# Patient Record
Sex: MALE | Race: WHITE | NOT HISPANIC OR LATINO | Employment: FULL TIME | URBAN - METROPOLITAN AREA
[De-identification: names, ages, dates, MRNs, and addresses within clinical notes are randomized per-mention and may not be internally consistent; named-entity substitution may affect disease eponyms.]

---

## 2021-04-14 ENCOUNTER — TELEPHONE (OUTPATIENT)
Dept: RHEUMATOLOGY | Facility: CLINIC | Age: 56
End: 2021-04-14

## 2021-05-26 ENCOUNTER — TELEPHONE (OUTPATIENT)
Dept: RHEUMATOLOGY | Facility: CLINIC | Age: 56
End: 2021-05-26

## 2021-07-30 ENCOUNTER — APPOINTMENT (OUTPATIENT)
Dept: RADIOLOGY | Facility: HOSPITAL | Age: 56
End: 2021-07-30
Attending: INTERNAL MEDICINE
Payer: COMMERCIAL

## 2021-07-30 ENCOUNTER — OFFICE VISIT (OUTPATIENT)
Dept: RHEUMATOLOGY | Facility: CLINIC | Age: 56
End: 2021-07-30
Payer: COMMERCIAL

## 2021-07-30 VITALS
TEMPERATURE: 98 F | DIASTOLIC BLOOD PRESSURE: 93 MMHG | SYSTOLIC BLOOD PRESSURE: 148 MMHG | WEIGHT: 219.38 LBS | OXYGEN SATURATION: 97 % | BODY MASS INDEX: 31.41 KG/M2 | RESPIRATION RATE: 18 BRPM | HEIGHT: 70 IN | HEART RATE: 93 BPM

## 2021-07-30 DIAGNOSIS — M06.9 RHEUMATOID ARTHRITIS INVOLVING MULTIPLE SITES, UNSPECIFIED WHETHER RHEUMATOID FACTOR PRESENT: ICD-10-CM

## 2021-07-30 DIAGNOSIS — Z71.89 COUNSELING AND COORDINATION OF CARE: ICD-10-CM

## 2021-07-30 DIAGNOSIS — E66.9 CLASS 1 OBESITY WITH BODY MASS INDEX (BMI) OF 31.0 TO 31.9 IN ADULT, UNSPECIFIED OBESITY TYPE, UNSPECIFIED WHETHER SERIOUS COMORBIDITY PRESENT: ICD-10-CM

## 2021-07-30 DIAGNOSIS — Z79.899 ENCOUNTER FOR LONG-TERM (CURRENT) USE OF OTHER MEDICATIONS: ICD-10-CM

## 2021-07-30 DIAGNOSIS — M06.9 RHEUMATOID ARTHRITIS INVOLVING MULTIPLE SITES, UNSPECIFIED WHETHER RHEUMATOID FACTOR PRESENT: Primary | ICD-10-CM

## 2021-07-30 PROCEDURE — 99204 OFFICE O/P NEW MOD 45 MIN: CPT | Mod: 25,S$GLB,, | Performed by: INTERNAL MEDICINE

## 2021-07-30 PROCEDURE — 3077F PR MOST RECENT SYSTOLIC BLOOD PRESSURE >= 140 MM HG: ICD-10-PCS | Mod: CPTII,S$GLB,, | Performed by: INTERNAL MEDICINE

## 2021-07-30 PROCEDURE — 77077 JOINT SURVEY SINGLE VIEW: CPT | Mod: 26,,, | Performed by: RADIOLOGY

## 2021-07-30 PROCEDURE — 99999 PR PBB SHADOW E&M-EST. PATIENT-LVL III: CPT | Mod: PBBFAC,,, | Performed by: INTERNAL MEDICINE

## 2021-07-30 PROCEDURE — 1125F AMNT PAIN NOTED PAIN PRSNT: CPT | Mod: CPTII,S$GLB,, | Performed by: INTERNAL MEDICINE

## 2021-07-30 PROCEDURE — 1125F PR PAIN SEVERITY QUANTIFIED, PAIN PRESENT: ICD-10-PCS | Mod: CPTII,S$GLB,, | Performed by: INTERNAL MEDICINE

## 2021-07-30 PROCEDURE — 3077F SYST BP >= 140 MM HG: CPT | Mod: CPTII,S$GLB,, | Performed by: INTERNAL MEDICINE

## 2021-07-30 PROCEDURE — 3080F PR MOST RECENT DIASTOLIC BLOOD PRESSURE >= 90 MM HG: ICD-10-PCS | Mod: CPTII,S$GLB,, | Performed by: INTERNAL MEDICINE

## 2021-07-30 PROCEDURE — 3080F DIAST BP >= 90 MM HG: CPT | Mod: CPTII,S$GLB,, | Performed by: INTERNAL MEDICINE

## 2021-07-30 PROCEDURE — 77077 XR ARTHRITIS SURVEY: ICD-10-PCS | Mod: 26,,, | Performed by: RADIOLOGY

## 2021-07-30 PROCEDURE — 99999 PR PBB SHADOW E&M-EST. PATIENT-LVL III: ICD-10-PCS | Mod: PBBFAC,,, | Performed by: INTERNAL MEDICINE

## 2021-07-30 PROCEDURE — 20610 DRAIN/INJ JOINT/BURSA W/O US: CPT | Mod: RT,S$GLB,, | Performed by: INTERNAL MEDICINE

## 2021-07-30 PROCEDURE — 3008F PR BODY MASS INDEX (BMI) DOCUMENTED: ICD-10-PCS | Mod: CPTII,S$GLB,, | Performed by: INTERNAL MEDICINE

## 2021-07-30 PROCEDURE — 99204 PR OFFICE/OUTPT VISIT, NEW, LEVL IV, 45-59 MIN: ICD-10-PCS | Mod: 25,S$GLB,, | Performed by: INTERNAL MEDICINE

## 2021-07-30 PROCEDURE — 1159F PR MEDICATION LIST DOCUMENTED IN MEDICAL RECORD: ICD-10-PCS | Mod: CPTII,S$GLB,, | Performed by: INTERNAL MEDICINE

## 2021-07-30 PROCEDURE — 3008F BODY MASS INDEX DOCD: CPT | Mod: CPTII,S$GLB,, | Performed by: INTERNAL MEDICINE

## 2021-07-30 PROCEDURE — 77077 JOINT SURVEY SINGLE VIEW: CPT | Mod: TC,PN

## 2021-07-30 PROCEDURE — 20610 LARGE JOINT ASPIRATION/INJECTION: R KNEE JOINT: ICD-10-PCS | Mod: RT,S$GLB,, | Performed by: INTERNAL MEDICINE

## 2021-07-30 PROCEDURE — 1159F MED LIST DOCD IN RCRD: CPT | Mod: CPTII,S$GLB,, | Performed by: INTERNAL MEDICINE

## 2021-07-30 RX ORDER — TRIAMCINOLONE ACETONIDE 40 MG/ML
40 INJECTION, SUSPENSION INTRA-ARTICULAR; INTRAMUSCULAR
Status: DISCONTINUED | OUTPATIENT
Start: 2021-07-30 | End: 2021-07-30 | Stop reason: HOSPADM

## 2021-07-30 RX ORDER — METHOTREXATE 2.5 MG/1
15 TABLET ORAL
Qty: 80 TABLET | Refills: 1 | Status: SHIPPED | OUTPATIENT
Start: 2021-07-30 | End: 2022-01-24 | Stop reason: SDUPTHER

## 2021-07-30 RX ORDER — SERTRALINE HYDROCHLORIDE 50 MG/1
50 TABLET, FILM COATED ORAL NIGHTLY
COMMUNITY
Start: 2021-05-10 | End: 2021-10-11 | Stop reason: SDUPTHER

## 2021-07-30 RX ORDER — DOXAZOSIN 2 MG/1
2 TABLET ORAL NIGHTLY
COMMUNITY
Start: 2021-05-10 | End: 2021-10-11 | Stop reason: SDUPTHER

## 2021-07-30 RX ORDER — PREDNISONE 20 MG/1
20 TABLET ORAL DAILY PRN
COMMUNITY
Start: 2021-04-28 | End: 2022-11-07

## 2021-07-30 RX ORDER — FOLIC ACID 1 MG/1
1000 TABLET ORAL DAILY
Qty: 90 TABLET | Refills: 2 | Status: SHIPPED | OUTPATIENT
Start: 2021-07-30 | End: 2022-01-24 | Stop reason: SDUPTHER

## 2021-07-30 RX ORDER — FOLIC ACID 1 MG/1
1000 TABLET ORAL DAILY
COMMUNITY
Start: 2021-04-28 | End: 2021-07-30 | Stop reason: SDUPTHER

## 2021-07-30 RX ORDER — METHOTREXATE 2.5 MG/1
15 TABLET ORAL
COMMUNITY
Start: 2021-06-19 | End: 2021-07-30 | Stop reason: SDUPTHER

## 2021-07-30 RX ORDER — LIDOCAINE HYDROCHLORIDE 10 MG/ML
2 INJECTION INFILTRATION; PERINEURAL
Status: DISCONTINUED | OUTPATIENT
Start: 2021-07-30 | End: 2021-07-30 | Stop reason: HOSPADM

## 2021-07-30 RX ADMIN — LIDOCAINE HYDROCHLORIDE 2 ML: 10 INJECTION INFILTRATION; PERINEURAL at 01:07

## 2021-07-30 RX ADMIN — TRIAMCINOLONE ACETONIDE 40 MG: 40 INJECTION, SUSPENSION INTRA-ARTICULAR; INTRAMUSCULAR at 01:07

## 2021-08-02 ENCOUNTER — LAB VISIT (OUTPATIENT)
Dept: LAB | Facility: HOSPITAL | Age: 56
End: 2021-08-02
Payer: COMMERCIAL

## 2021-08-02 DIAGNOSIS — M06.9 RHEUMATOID ARTHRITIS INVOLVING MULTIPLE SITES, UNSPECIFIED WHETHER RHEUMATOID FACTOR PRESENT: ICD-10-CM

## 2021-08-02 LAB
25(OH)D3+25(OH)D2 SERPL-MCNC: 32 NG/ML (ref 30–96)
ALBUMIN SERPL BCP-MCNC: 4.1 G/DL (ref 3.5–5.2)
ALP SERPL-CCNC: 95 U/L (ref 38–126)
ALT SERPL W/O P-5'-P-CCNC: 53 U/L (ref 10–44)
ANION GAP SERPL CALC-SCNC: 10 MMOL/L (ref 8–16)
AST SERPL-CCNC: 74 U/L (ref 15–46)
BASOPHILS # BLD AUTO: 0.03 K/UL (ref 0–0.2)
BASOPHILS NFR BLD: 0.7 % (ref 0–1.9)
BILIRUB SERPL-MCNC: 0.5 MG/DL (ref 0.1–1)
CALCIUM SERPL-MCNC: 9.3 MG/DL (ref 8.7–10.5)
CCP AB SER IA-ACNC: 0.7 U/ML
CHLORIDE SERPL-SCNC: 106 MMOL/L (ref 95–110)
CO2 SERPL-SCNC: 26 MMOL/L (ref 23–29)
CREAT SERPL-MCNC: 0.74 MG/DL (ref 0.5–1.4)
CRP SERPL-MCNC: 0.78 MG/DL (ref 0–1)
DIFFERENTIAL METHOD: ABNORMAL
EOSINOPHIL # BLD AUTO: 0.1 K/UL (ref 0–0.5)
EOSINOPHIL NFR BLD: 2.6 % (ref 0–8)
ERYTHROCYTE [DISTWIDTH] IN BLOOD BY AUTOMATED COUNT: 12.9 % (ref 11.5–14.5)
ERYTHROCYTE [SEDIMENTATION RATE] IN BLOOD BY WESTERGREN METHOD: 17 MM/HR (ref 0–10)
EST. GFR  (AFRICAN AMERICAN): >60 ML/MIN/1.73 M^2
EST. GFR  (NON AFRICAN AMERICAN): >60 ML/MIN/1.73 M^2
GLUCOSE SERPL-MCNC: 110 MG/DL (ref 70–110)
HCT VFR BLD AUTO: 44.8 % (ref 40–54)
HGB BLD-MCNC: 15.5 G/DL (ref 14–18)
IMM GRANULOCYTES # BLD AUTO: 0.03 K/UL (ref 0–0.04)
IMM GRANULOCYTES NFR BLD AUTO: 0.7 % (ref 0–0.5)
LYMPHOCYTES # BLD AUTO: 0.8 K/UL (ref 1–4.8)
LYMPHOCYTES NFR BLD: 20 % (ref 18–48)
MCH RBC QN AUTO: 34.8 PG (ref 27–31)
MCHC RBC AUTO-ENTMCNC: 34.6 G/DL (ref 32–36)
MCV RBC AUTO: 100 FL (ref 82–98)
MONOCYTES # BLD AUTO: 0.5 K/UL (ref 0.3–1)
MONOCYTES NFR BLD: 10.7 % (ref 4–15)
NEUTROPHILS # BLD AUTO: 2.7 K/UL (ref 1.8–7.7)
NEUTROPHILS NFR BLD: 65.3 % (ref 38–73)
NRBC BLD-RTO: 0 /100 WBC
PLATELET # BLD AUTO: 184 K/UL (ref 150–450)
PMV BLD AUTO: 10.1 FL (ref 9.2–12.9)
POTASSIUM SERPL-SCNC: 3.9 MMOL/L (ref 3.5–5.1)
PROT SERPL-MCNC: 7.4 G/DL (ref 6–8.4)
RBC # BLD AUTO: 4.46 M/UL (ref 4.6–6.2)
SODIUM SERPL-SCNC: 142 MMOL/L (ref 136–145)
TSH SERPL DL<=0.005 MIU/L-ACNC: 1.26 UIU/ML (ref 0.4–4)
URATE SERPL-MCNC: 8 MG/DL (ref 3.4–7)
URATE SERPL-MCNC: 8 MG/DL (ref 3.4–7)
UUN UR-MCNC: 7 MG/DL (ref 2–20)
WBC # BLD AUTO: 4.19 K/UL (ref 3.9–12.7)

## 2021-08-02 PROCEDURE — 80053 COMPREHEN METABOLIC PANEL: CPT | Mod: PO | Performed by: INTERNAL MEDICINE

## 2021-08-02 PROCEDURE — 86200 CCP ANTIBODY: CPT | Mod: PO | Performed by: INTERNAL MEDICINE

## 2021-08-02 PROCEDURE — 85025 COMPLETE CBC W/AUTO DIFF WBC: CPT | Mod: PO | Performed by: INTERNAL MEDICINE

## 2021-08-02 PROCEDURE — 87340 HEPATITIS B SURFACE AG IA: CPT | Mod: PO | Performed by: INTERNAL MEDICINE

## 2021-08-02 PROCEDURE — 86706 HEP B SURFACE ANTIBODY: CPT | Mod: PO | Performed by: INTERNAL MEDICINE

## 2021-08-02 PROCEDURE — 86140 C-REACTIVE PROTEIN: CPT | Mod: PO | Performed by: INTERNAL MEDICINE

## 2021-08-02 PROCEDURE — 84443 ASSAY THYROID STIM HORMONE: CPT | Mod: PO | Performed by: INTERNAL MEDICINE

## 2021-08-02 PROCEDURE — 84550 ASSAY OF BLOOD/URIC ACID: CPT | Performed by: INTERNAL MEDICINE

## 2021-08-02 PROCEDURE — 85652 RBC SED RATE AUTOMATED: CPT | Performed by: INTERNAL MEDICINE

## 2021-08-02 PROCEDURE — 86803 HEPATITIS C AB TEST: CPT | Mod: PO | Performed by: INTERNAL MEDICINE

## 2021-08-02 PROCEDURE — 87389 HIV-1 AG W/HIV-1&-2 AB AG IA: CPT | Mod: PO | Performed by: INTERNAL MEDICINE

## 2021-08-02 PROCEDURE — 82306 VITAMIN D 25 HYDROXY: CPT | Mod: PO | Performed by: INTERNAL MEDICINE

## 2021-08-02 PROCEDURE — 86431 RHEUMATOID FACTOR QUANT: CPT | Mod: PO | Performed by: INTERNAL MEDICINE

## 2021-08-03 LAB
HBV SURFACE AB SER-ACNC: ABNORMAL M[IU]/ML
HBV SURFACE AG SERPL QL IA: NEGATIVE
HCV AB SERPL QL IA: NEGATIVE
HIV 1+2 AB+HIV1 P24 AG SERPL QL IA: NEGATIVE
RHEUMATOID FACT SERPL-ACNC: 199 IU/ML (ref 0–15)

## 2021-08-04 ENCOUNTER — TELEPHONE (OUTPATIENT)
Dept: RHEUMATOLOGY | Facility: CLINIC | Age: 56
End: 2021-08-04

## 2021-08-04 DIAGNOSIS — M1A.9XX0 CHRONIC GOUT WITHOUT TOPHUS, UNSPECIFIED CAUSE, UNSPECIFIED SITE: Primary | ICD-10-CM

## 2021-08-04 RX ORDER — COLCHICINE 0.6 MG/1
0.6 TABLET ORAL DAILY
Qty: 30 TABLET | Refills: 0 | Status: ON HOLD | OUTPATIENT
Start: 2021-08-04 | End: 2022-06-20

## 2021-10-11 ENCOUNTER — OFFICE VISIT (OUTPATIENT)
Dept: FAMILY MEDICINE | Facility: CLINIC | Age: 56
End: 2021-10-11
Payer: COMMERCIAL

## 2021-10-11 VITALS
WEIGHT: 219.5 LBS | SYSTOLIC BLOOD PRESSURE: 150 MMHG | HEIGHT: 70 IN | OXYGEN SATURATION: 97 % | BODY MASS INDEX: 31.42 KG/M2 | HEART RATE: 76 BPM | DIASTOLIC BLOOD PRESSURE: 98 MMHG | RESPIRATION RATE: 18 BRPM

## 2021-10-11 DIAGNOSIS — Z00.00 WELLNESS EXAMINATION: Primary | ICD-10-CM

## 2021-10-11 DIAGNOSIS — F41.9 ANXIETY: ICD-10-CM

## 2021-10-11 DIAGNOSIS — E79.0 ELEVATED URIC ACID IN BLOOD: ICD-10-CM

## 2021-10-11 DIAGNOSIS — M06.9 RHEUMATOID ARTHRITIS, INVOLVING UNSPECIFIED SITE, UNSPECIFIED WHETHER RHEUMATOID FACTOR PRESENT: ICD-10-CM

## 2021-10-11 DIAGNOSIS — R79.89 ELEVATED LFTS: ICD-10-CM

## 2021-10-11 DIAGNOSIS — I10 PRIMARY HYPERTENSION: ICD-10-CM

## 2021-10-11 PROCEDURE — 3080F PR MOST RECENT DIASTOLIC BLOOD PRESSURE >= 90 MM HG: ICD-10-PCS | Mod: CPTII,S$GLB,, | Performed by: FAMILY MEDICINE

## 2021-10-11 PROCEDURE — 3077F PR MOST RECENT SYSTOLIC BLOOD PRESSURE >= 140 MM HG: ICD-10-PCS | Mod: CPTII,S$GLB,, | Performed by: FAMILY MEDICINE

## 2021-10-11 PROCEDURE — 99204 OFFICE O/P NEW MOD 45 MIN: CPT | Mod: S$GLB,,, | Performed by: FAMILY MEDICINE

## 2021-10-11 PROCEDURE — 3008F PR BODY MASS INDEX (BMI) DOCUMENTED: ICD-10-PCS | Mod: CPTII,S$GLB,, | Performed by: FAMILY MEDICINE

## 2021-10-11 PROCEDURE — 3008F BODY MASS INDEX DOCD: CPT | Mod: CPTII,S$GLB,, | Performed by: FAMILY MEDICINE

## 2021-10-11 PROCEDURE — 99204 PR OFFICE/OUTPT VISIT, NEW, LEVL IV, 45-59 MIN: ICD-10-PCS | Mod: S$GLB,,, | Performed by: FAMILY MEDICINE

## 2021-10-11 PROCEDURE — 3080F DIAST BP >= 90 MM HG: CPT | Mod: CPTII,S$GLB,, | Performed by: FAMILY MEDICINE

## 2021-10-11 PROCEDURE — 1159F PR MEDICATION LIST DOCUMENTED IN MEDICAL RECORD: ICD-10-PCS | Mod: CPTII,S$GLB,, | Performed by: FAMILY MEDICINE

## 2021-10-11 PROCEDURE — 3077F SYST BP >= 140 MM HG: CPT | Mod: CPTII,S$GLB,, | Performed by: FAMILY MEDICINE

## 2021-10-11 PROCEDURE — 1159F MED LIST DOCD IN RCRD: CPT | Mod: CPTII,S$GLB,, | Performed by: FAMILY MEDICINE

## 2021-10-11 RX ORDER — SERTRALINE HYDROCHLORIDE 50 MG/1
50 TABLET, FILM COATED ORAL NIGHTLY
Qty: 90 TABLET | Refills: 3 | Status: SHIPPED | OUTPATIENT
Start: 2021-10-11 | End: 2022-10-04

## 2021-10-11 RX ORDER — DOXAZOSIN 2 MG/1
2 TABLET ORAL NIGHTLY
Qty: 90 TABLET | Refills: 3 | Status: SHIPPED | OUTPATIENT
Start: 2021-10-11 | End: 2022-07-02

## 2021-10-13 ENCOUNTER — TELEPHONE (OUTPATIENT)
Dept: ENDOSCOPY | Facility: HOSPITAL | Age: 56
End: 2021-10-13

## 2021-11-18 ENCOUNTER — TELEPHONE (OUTPATIENT)
Dept: ENDOSCOPY | Facility: HOSPITAL | Age: 56
End: 2021-11-18
Payer: COMMERCIAL

## 2021-11-22 ENCOUNTER — ANESTHESIA (OUTPATIENT)
Dept: ENDOSCOPY | Facility: HOSPITAL | Age: 56
End: 2021-11-22
Payer: COMMERCIAL

## 2021-11-22 ENCOUNTER — HOSPITAL ENCOUNTER (OUTPATIENT)
Facility: HOSPITAL | Age: 56
Discharge: HOME | End: 2021-11-22
Attending: INTERNAL MEDICINE | Admitting: INTERNAL MEDICINE
Payer: COMMERCIAL

## 2021-11-22 ENCOUNTER — ANESTHESIA EVENT (OUTPATIENT)
Dept: ENDOSCOPY | Facility: HOSPITAL | Age: 56
End: 2021-11-22
Payer: COMMERCIAL

## 2021-11-22 VITALS
WEIGHT: 225 LBS | BODY MASS INDEX: 32.21 KG/M2 | DIASTOLIC BLOOD PRESSURE: 79 MMHG | RESPIRATION RATE: 15 BRPM | HEIGHT: 70 IN | HEART RATE: 57 BPM | TEMPERATURE: 98 F | OXYGEN SATURATION: 98 % | SYSTOLIC BLOOD PRESSURE: 108 MMHG

## 2021-11-22 DIAGNOSIS — Z12.11 COLON CANCER SCREENING: Primary | ICD-10-CM

## 2021-11-22 PROCEDURE — 37000008 HC ANESTHESIA 1ST 15 MINUTES: Performed by: INTERNAL MEDICINE

## 2021-11-22 PROCEDURE — 37000009 HC ANESTHESIA EA ADD 15 MINS: Performed by: INTERNAL MEDICINE

## 2021-11-22 PROCEDURE — G0121 COLON CA SCRN NOT HI RSK IND: HCPCS | Performed by: INTERNAL MEDICINE

## 2021-11-22 PROCEDURE — G0121 COLON CA SCRN NOT HI RSK IND: ICD-10-PCS | Mod: ,,, | Performed by: INTERNAL MEDICINE

## 2021-11-22 PROCEDURE — 63600175 PHARM REV CODE 636 W HCPCS: Performed by: NURSE ANESTHETIST, CERTIFIED REGISTERED

## 2021-11-22 PROCEDURE — G0121 COLON CA SCRN NOT HI RSK IND: HCPCS | Mod: ,,, | Performed by: INTERNAL MEDICINE

## 2021-11-22 PROCEDURE — 25000003 PHARM REV CODE 250: Performed by: INTERNAL MEDICINE

## 2021-11-22 PROCEDURE — 25000003 PHARM REV CODE 250: Performed by: NURSE ANESTHETIST, CERTIFIED REGISTERED

## 2021-11-22 RX ORDER — SODIUM CHLORIDE 9 MG/ML
INJECTION, SOLUTION INTRAVENOUS CONTINUOUS
Status: DISCONTINUED | OUTPATIENT
Start: 2021-11-22 | End: 2021-11-22 | Stop reason: HOSPADM

## 2021-11-22 RX ORDER — LIDOCAINE HYDROCHLORIDE 20 MG/ML
INJECTION INTRAVENOUS
Status: DISCONTINUED | OUTPATIENT
Start: 2021-11-22 | End: 2021-11-22

## 2021-11-22 RX ORDER — DEXTROMETHORPHAN/PSEUDOEPHED 2.5-7.5/.8
DROPS ORAL
Status: COMPLETED | OUTPATIENT
Start: 2021-11-22 | End: 2021-11-22

## 2021-11-22 RX ORDER — SODIUM CHLORIDE 0.9 % (FLUSH) 0.9 %
10 SYRINGE (ML) INJECTION
Status: DISCONTINUED | OUTPATIENT
Start: 2021-11-22 | End: 2021-11-22 | Stop reason: HOSPADM

## 2021-11-22 RX ORDER — PROPOFOL 10 MG/ML
VIAL (ML) INTRAVENOUS
Status: DISCONTINUED | OUTPATIENT
Start: 2021-11-22 | End: 2021-11-22

## 2021-11-22 RX ORDER — PROPOFOL 10 MG/ML
VIAL (ML) INTRAVENOUS CONTINUOUS PRN
Status: DISCONTINUED | OUTPATIENT
Start: 2021-11-22 | End: 2021-11-22

## 2021-11-22 RX ORDER — SODIUM CHLORIDE 0.9 % (FLUSH) 0.9 %
3 SYRINGE (ML) INJECTION
Status: DISCONTINUED | OUTPATIENT
Start: 2021-11-22 | End: 2021-11-22 | Stop reason: HOSPADM

## 2021-11-22 RX ADMIN — SODIUM CHLORIDE: 0.9 INJECTION, SOLUTION INTRAVENOUS at 11:11

## 2021-11-22 RX ADMIN — PROPOFOL 80 MG: 10 INJECTION, EMULSION INTRAVENOUS at 12:11

## 2021-11-22 RX ADMIN — LIDOCAINE HYDROCHLORIDE 60 MG: 20 INJECTION, SOLUTION INTRAVENOUS at 12:11

## 2021-11-22 RX ADMIN — PROPOFOL 150 MCG/KG/MIN: 10 INJECTION, EMULSION INTRAVENOUS at 12:11

## 2021-11-22 RX ADMIN — SIMETHICONE 66 MG: 20 SUSPENSION/ DROPS ORAL at 12:11

## 2022-01-24 ENCOUNTER — OFFICE VISIT (OUTPATIENT)
Dept: RHEUMATOLOGY | Facility: CLINIC | Age: 57
End: 2022-01-24
Payer: COMMERCIAL

## 2022-01-24 ENCOUNTER — LAB VISIT (OUTPATIENT)
Dept: LAB | Facility: HOSPITAL | Age: 57
End: 2022-01-24
Attending: INTERNAL MEDICINE
Payer: COMMERCIAL

## 2022-01-24 VITALS
BODY MASS INDEX: 32.01 KG/M2 | HEIGHT: 70 IN | HEART RATE: 82 BPM | RESPIRATION RATE: 18 BRPM | OXYGEN SATURATION: 96 % | DIASTOLIC BLOOD PRESSURE: 86 MMHG | TEMPERATURE: 98 F | SYSTOLIC BLOOD PRESSURE: 132 MMHG | WEIGHT: 223.56 LBS

## 2022-01-24 DIAGNOSIS — M06.9 RHEUMATOID ARTHRITIS INVOLVING MULTIPLE SITES, UNSPECIFIED WHETHER RHEUMATOID FACTOR PRESENT: ICD-10-CM

## 2022-01-24 DIAGNOSIS — M06.9 RHEUMATOID ARTHRITIS INVOLVING MULTIPLE SITES, UNSPECIFIED WHETHER RHEUMATOID FACTOR PRESENT: Primary | ICD-10-CM

## 2022-01-24 DIAGNOSIS — E66.9 CLASS 1 OBESITY WITH BODY MASS INDEX (BMI) OF 32.0 TO 32.9 IN ADULT, UNSPECIFIED OBESITY TYPE, UNSPECIFIED WHETHER SERIOUS COMORBIDITY PRESENT: ICD-10-CM

## 2022-01-24 DIAGNOSIS — Z71.89 COUNSELING AND COORDINATION OF CARE: ICD-10-CM

## 2022-01-24 DIAGNOSIS — Z79.899 ENCOUNTER FOR LONG-TERM (CURRENT) USE OF OTHER MEDICATIONS: ICD-10-CM

## 2022-01-24 DIAGNOSIS — S76.111A STRAIN OF RIGHT QUADRICEPS MUSCLE, INITIAL ENCOUNTER: ICD-10-CM

## 2022-01-24 LAB
ALBUMIN SERPL BCP-MCNC: 3.3 G/DL (ref 3.5–5.2)
ALP SERPL-CCNC: 63 U/L (ref 55–135)
ALT SERPL W/O P-5'-P-CCNC: 41 U/L (ref 10–44)
ANION GAP SERPL CALC-SCNC: 11 MMOL/L (ref 8–16)
AST SERPL-CCNC: 69 U/L (ref 10–40)
BASOPHILS # BLD AUTO: 0.04 K/UL (ref 0–0.2)
BASOPHILS NFR BLD: 0.6 % (ref 0–1.9)
BILIRUB SERPL-MCNC: 0.6 MG/DL (ref 0.1–1)
BUN SERPL-MCNC: 6 MG/DL (ref 6–20)
CALCIUM SERPL-MCNC: 8.9 MG/DL (ref 8.7–10.5)
CHLORIDE SERPL-SCNC: 103 MMOL/L (ref 95–110)
CO2 SERPL-SCNC: 28 MMOL/L (ref 23–29)
CREAT SERPL-MCNC: 0.9 MG/DL (ref 0.5–1.4)
DIFFERENTIAL METHOD: ABNORMAL
EOSINOPHIL # BLD AUTO: 0.1 K/UL (ref 0–0.5)
EOSINOPHIL NFR BLD: 1.3 % (ref 0–8)
ERYTHROCYTE [DISTWIDTH] IN BLOOD BY AUTOMATED COUNT: 12.8 % (ref 11.5–14.5)
EST. GFR  (AFRICAN AMERICAN): >60 ML/MIN/1.73 M^2
EST. GFR  (NON AFRICAN AMERICAN): >60 ML/MIN/1.73 M^2
GLUCOSE SERPL-MCNC: 86 MG/DL (ref 70–110)
HCT VFR BLD AUTO: 47.6 % (ref 40–54)
HGB BLD-MCNC: 15.8 G/DL (ref 14–18)
IMM GRANULOCYTES # BLD AUTO: 0.04 K/UL (ref 0–0.04)
IMM GRANULOCYTES NFR BLD AUTO: 0.6 % (ref 0–0.5)
LYMPHOCYTES # BLD AUTO: 0.8 K/UL (ref 1–4.8)
LYMPHOCYTES NFR BLD: 13.2 % (ref 18–48)
MCH RBC QN AUTO: 33.8 PG (ref 27–31)
MCHC RBC AUTO-ENTMCNC: 33.2 G/DL (ref 32–36)
MCV RBC AUTO: 102 FL (ref 82–98)
MONOCYTES # BLD AUTO: 0.7 K/UL (ref 0.3–1)
MONOCYTES NFR BLD: 11.4 % (ref 4–15)
NEUTROPHILS # BLD AUTO: 4.7 K/UL (ref 1.8–7.7)
NEUTROPHILS NFR BLD: 72.9 % (ref 38–73)
NRBC BLD-RTO: 0 /100 WBC
PLATELET # BLD AUTO: 195 K/UL (ref 150–450)
PMV BLD AUTO: 10.7 FL (ref 9.2–12.9)
POTASSIUM SERPL-SCNC: 4.5 MMOL/L (ref 3.5–5.1)
PROT SERPL-MCNC: 7.4 G/DL (ref 6–8.4)
RBC # BLD AUTO: 4.67 M/UL (ref 4.6–6.2)
SODIUM SERPL-SCNC: 142 MMOL/L (ref 136–145)
WBC # BLD AUTO: 6.38 K/UL (ref 3.9–12.7)

## 2022-01-24 PROCEDURE — 36415 COLL VENOUS BLD VENIPUNCTURE: CPT | Mod: PN | Performed by: INTERNAL MEDICINE

## 2022-01-24 PROCEDURE — 85025 COMPLETE CBC W/AUTO DIFF WBC: CPT | Performed by: INTERNAL MEDICINE

## 2022-01-24 PROCEDURE — 3075F SYST BP GE 130 - 139MM HG: CPT | Mod: CPTII,S$GLB,, | Performed by: INTERNAL MEDICINE

## 2022-01-24 PROCEDURE — 1159F PR MEDICATION LIST DOCUMENTED IN MEDICAL RECORD: ICD-10-PCS | Mod: CPTII,S$GLB,, | Performed by: INTERNAL MEDICINE

## 2022-01-24 PROCEDURE — 3008F PR BODY MASS INDEX (BMI) DOCUMENTED: ICD-10-PCS | Mod: CPTII,S$GLB,, | Performed by: INTERNAL MEDICINE

## 2022-01-24 PROCEDURE — 3079F PR MOST RECENT DIASTOLIC BLOOD PRESSURE 80-89 MM HG: ICD-10-PCS | Mod: CPTII,S$GLB,, | Performed by: INTERNAL MEDICINE

## 2022-01-24 PROCEDURE — 1159F MED LIST DOCD IN RCRD: CPT | Mod: CPTII,S$GLB,, | Performed by: INTERNAL MEDICINE

## 2022-01-24 PROCEDURE — 99999 PR PBB SHADOW E&M-EST. PATIENT-LVL III: ICD-10-PCS | Mod: PBBFAC,,, | Performed by: INTERNAL MEDICINE

## 2022-01-24 PROCEDURE — 80053 COMPREHEN METABOLIC PANEL: CPT | Performed by: INTERNAL MEDICINE

## 2022-01-24 PROCEDURE — 99214 OFFICE O/P EST MOD 30 MIN: CPT | Mod: S$GLB,,, | Performed by: INTERNAL MEDICINE

## 2022-01-24 PROCEDURE — 3079F DIAST BP 80-89 MM HG: CPT | Mod: CPTII,S$GLB,, | Performed by: INTERNAL MEDICINE

## 2022-01-24 PROCEDURE — 99999 PR PBB SHADOW E&M-EST. PATIENT-LVL III: CPT | Mod: PBBFAC,,, | Performed by: INTERNAL MEDICINE

## 2022-01-24 PROCEDURE — 99214 PR OFFICE/OUTPT VISIT, EST, LEVL IV, 30-39 MIN: ICD-10-PCS | Mod: S$GLB,,, | Performed by: INTERNAL MEDICINE

## 2022-01-24 PROCEDURE — 3008F BODY MASS INDEX DOCD: CPT | Mod: CPTII,S$GLB,, | Performed by: INTERNAL MEDICINE

## 2022-01-24 PROCEDURE — 3075F PR MOST RECENT SYSTOLIC BLOOD PRESS GE 130-139MM HG: ICD-10-PCS | Mod: CPTII,S$GLB,, | Performed by: INTERNAL MEDICINE

## 2022-01-24 RX ORDER — METHOTREXATE 2.5 MG/1
15 TABLET ORAL
Qty: 80 TABLET | Refills: 1 | Status: SHIPPED | OUTPATIENT
Start: 2022-01-24 | End: 2022-07-08

## 2022-01-24 RX ORDER — FOLIC ACID 1 MG/1
1000 TABLET ORAL DAILY
Qty: 90 TABLET | Refills: 2 | Status: SHIPPED | OUTPATIENT
Start: 2022-01-24 | End: 2023-04-30 | Stop reason: SDUPTHER

## 2022-01-24 NOTE — PROGRESS NOTES
RHEUMATOLOGY OUTPATIENT CLINIC NOTE    1/24/2022    Attending Rheumatologist: Shane Lewis  Primary Care Provider: Shane Lewis MD   Physician Requesting Consultation: No referring provider defined for this encounter.  Chief Complaint/Reason For Consultation:  No chief complaint on file.      Subjective:       HPI  Andrae Aviles is a 56 y.o. White male with medical history noted below presents for evaluation of rheumatoid arthritis.  Patient just recently moved from [place]. Seeking to establish with Rheumatology. Patient was following with Dr. Mateo Charles (Rheumatology) for RA. Paced on MTX 3 years ago and has been in remission sense. Methotrexate 6 tabs weekly. No other therapies. Patient notes that for the last 3 weeks knee pain and swelling which has improved slightly. No other complaints.     Today  Patient here for follow up.   Last visit right knee injected for knee pain and MTX continued for RA. He notes persistent right knee pain. Small joints doing well, tolerating meds. No other complaints.     Review of Systems   Constitutional: Negative for chills, fatigue, fever and unexpected weight change.   HENT: Negative for mouth sores and trouble swallowing.    Eyes: Negative for redness and eye dryness.   Respiratory: Negative for cough and shortness of breath.    Cardiovascular: Negative for chest pain.   Gastrointestinal: Negative for abdominal distention, constipation, diarrhea, nausea, vomiting and reflux.   Genitourinary: Negative for genital sores.   Musculoskeletal: Positive for arthralgias and joint swelling. Negative for back pain, gait problem, leg pain, myalgias, neck pain, neck stiffness and joint deformity.   Integumentary:  Negative for rash.   Neurological: Negative for weakness, numbness, headaches and memory loss.   Hematological: Negative for adenopathy. Does not bruise/bleed easily.   Psychiatric/Behavioral: Negative for confusion, decreased concentration, sleep  disturbance and suicidal ideas. The patient is not nervous/anxious.    All other systems reviewed and are negative.       Chronic comorbid conditions affecting medical decision making today:  Past Medical History:   Diagnosis Date    Arthritis     rheumatoid    Hypertension      Past Surgical History:   Procedure Laterality Date    COLONOSCOPY      age 42    COLONOSCOPY N/A 11/22/2021    Procedure: COLONOSCOPY  Lori Garza;  Surgeon: Shane Guzman MD;  Location: [facility];  Service: Endoscopy;  Laterality: N/A;    VASECTOMY       Family History   Problem Relation Age of Onset    No Known Problems Daughter     No Known Problems Son      Social History     Substance and Sexual Activity   Alcohol Use Yes    Comment: social     Social History     Tobacco Use   Smoking Status Former Smoker   Smokeless Tobacco Never Used     Social History     Substance and Sexual Activity   Drug Use Never       Current Outpatient Medications:     colchicine (COLCRYS) 0.6 mg tablet, Take 1 tablet (0.6 mg total) by mouth once daily., Disp: 30 tablet, Rfl: 0    doxazosin (CARDURA) 2 MG tablet, Take 1 tablet (2 mg total) by mouth nightly., Disp: 90 tablet, Rfl: 3    predniSONE (DELTASONE) 20 MG tablet, Take 20 mg by mouth daily as needed., Disp: , Rfl:     sertraline (ZOLOFT) 50 MG tablet, Take 1 tablet (50 mg total) by mouth every evening., Disp: 90 tablet, Rfl: 3    folic acid (FOLVITE) 1 MG tablet, Take 1 tablet (1,000 mcg total) by mouth once daily., Disp: 90 tablet, Rfl: 2    methotrexate 2.5 MG Tab, Take 6 tablets (15 mg total) by mouth every 7 days., Disp: 80 tablet, Rfl: 1     Objective:         Vitals:    01/24/22 0858   BP: 132/86   Pulse: 82   Resp: 18   Temp: 98.2 °F (36.8 °C)     Physical Exam   Musculoskeletal:      Right shoulder: Normal.      Left shoulder: Normal.      Right elbow: Normal.      Left elbow: Normal.      Right wrist: Normal.      Left wrist: Normal.      Left knee: Normal.       Comments: Right knee VMA swelling and TTP        Right Side Rheumatological Exam     Examination finds the shoulder, elbow, wrist, 1st PIP, 1st MCP, 2nd PIP, 2nd MCP, 3rd PIP, 3rd MCP, 4th PIP, 4th MCP, 5th PIP and 5th MCP normal.    Left Side Rheumatological Exam     Examination finds the shoulder, elbow, wrist, knee, 1st PIP, 1st MCP, 2nd PIP, 2nd MCP, 3rd PIP, 3rd MCP, 4th PIP, 4th MCP, 5th PIP and 5th MCP normal.          Reviewed old and all outside pertinent medical records available.    All lab results personally reviewed and interpreted by me.  Lab Results   Component Value Date    WBC 4.19 08/02/2021    HGB 15.5 08/02/2021    HCT 44.8 08/02/2021     (H) 08/02/2021    MCH 34.8 (H) 08/02/2021    MCHC 34.6 08/02/2021    RDW 12.9 08/02/2021     08/02/2021    MPV 10.1 08/02/2021       Lab Results   Component Value Date     08/02/2021    K 3.9 08/02/2021     08/02/2021    CO2 26 08/02/2021     08/02/2021    BUN 7 08/02/2021    CALCIUM 9.3 08/02/2021    PROT 7.4 08/02/2021    ALBUMIN 4.1 08/02/2021    BILITOT 0.5 08/02/2021    AST 74 (H) 08/02/2021    ALKPHOS 95 08/02/2021    ALT 53 (H) 08/02/2021       No results found for: COLORU, APPEARANCEUA, SPECGRAV, PHUR, PHUA, PROTEINUA, GLUCOSEU, KETONESU, BLOODU, LEUKOCYTESUR, NITRITE, UROBILINOGEN    Lab Results   Component Value Date    CRP 0.78 08/02/2021       Lab Results   Component Value Date    SEDRATE 17 (H) 08/02/2021       Lab Results   Component Value Date    .0 (H) 08/02/2021    SEDRATE 17 (H) 08/02/2021       No components found for: 25OHVITDTOT, 08PVVFZO1, 77EWYXHO1, METHODNOTE    Lab Results   Component Value Date    URICACID 8.0 (H) 08/02/2021    URICACID 8.0 (H) 08/02/2021       No components found for: TSPOTTB        Imaging:  All imaging reviewed and independently interpreted by me.         ASSESSMENT / PLAN:     Andrae Aviles is a 56 y.o. White male with:      1. Rheumatoid arthritis involving multiple sites,  unspecified whether rheumatoid factor present  - patient with Hx of RA  - on MTX 6 tabs, daily FA  - doing well  - update labs  - reassurance     2. Right knee Pain  - patients knee pain like coming from [employer] strain   - disused diagnosis and management  - Ice area, NSAIDs  - reassurance     3. DMARD Toxicity Monitoring  - daily FA  - monitor labs     4. Other specified counseling  - over 10 minutes spent regarding below topics:  - Immunization counseling done.  - Weight loss counseling done.  - Nutrition and exercise counseling.  - Limitation of alcohol consumption.  - Regular exercise:  Aerobic and resistance.  - Medication counseling provided.    5. Obesity  - would benefit from decreasing at least 10% of body weight.  - recommended goal of losing 1 lb per week.  - consider nutritionist evaluation.      Follow up in about 3 months (around 4/24/2022).    Method of contact with patient concerns: [facility]    Disclaimer:  This note is prepared using voice recognition software and as such is likely to have errors and has not been proof read. Please contact me for questions.     Time spent: 30 minutes in face to face discussion concerning diagnosis, prognosis, review of lab and test results, benefits of treatment as well as management of disease, counseling of patient and coordination of care between various health care providers.  Greater than half the time spent was used for coordination of care and counseling of patient.    Shane Lewis M.D.  Rheumatology Department   [facility]

## 2022-02-01 ENCOUNTER — TELEPHONE (OUTPATIENT)
Dept: RHEUMATOLOGY | Facility: CLINIC | Age: 57
End: 2022-02-01
Payer: COMMERCIAL

## 2022-02-01 NOTE — TELEPHONE ENCOUNTER
----- Message from Shane Lewis MD sent at 1/28/2022  8:59 AM CST -----  Please let him know labs are stable.   Dr. Lewis

## 2022-06-19 ENCOUNTER — HOSPITAL ENCOUNTER (INPATIENT)
Facility: HOSPITAL | Age: 57
LOS: 5 days | Discharge: ANOTHER HOSPITAL | DRG: 280 | End: 2022-06-24
Attending: FAMILY MEDICINE | Admitting: FAMILY MEDICINE
Payer: COMMERCIAL

## 2022-06-19 DIAGNOSIS — R07.9 CHEST PAIN: ICD-10-CM

## 2022-06-19 DIAGNOSIS — I21.4 NSTEMI (NON-ST ELEVATED MYOCARDIAL INFARCTION): Primary | ICD-10-CM

## 2022-06-19 DIAGNOSIS — S30.1XXA HEMATOMA OF ABDOMINAL WALL, INITIAL ENCOUNTER: ICD-10-CM

## 2022-06-19 PROBLEM — M25.561 ACUTE PAIN OF RIGHT KNEE: Status: ACTIVE | Noted: 2022-06-19

## 2022-06-19 PROBLEM — F10.20 ALCOHOL DEPENDENCE: Status: ACTIVE | Noted: 2022-06-19

## 2022-06-19 PROBLEM — I21.3 STEMI (ST ELEVATION MYOCARDIAL INFARCTION): Status: ACTIVE | Noted: 2022-06-19

## 2022-06-19 LAB
ABO + RH BLD: NORMAL
ALBUMIN SERPL BCP-MCNC: 3 G/DL (ref 3.5–5.2)
ALBUMIN SERPL BCP-MCNC: 3.9 G/DL (ref 3.5–5.2)
ALP SERPL-CCNC: 67 U/L (ref 55–135)
ALP SERPL-CCNC: 97 U/L (ref 38–126)
ALT SERPL W/O P-5'-P-CCNC: 39 U/L (ref 10–44)
ALT SERPL W/O P-5'-P-CCNC: 44 U/L (ref 10–44)
ANION GAP SERPL CALC-SCNC: 13 MMOL/L (ref 8–16)
ANION GAP SERPL CALC-SCNC: 6 MMOL/L (ref 8–16)
APTT BLDCRRT: 25.6 SEC (ref 21–32)
APTT BLDCRRT: 31.6 SEC (ref 21–32)
AST SERPL-CCNC: 116 U/L (ref 15–46)
AST SERPL-CCNC: 132 U/L (ref 10–40)
BASOPHILS # BLD AUTO: 0.03 K/UL (ref 0–0.2)
BASOPHILS # BLD AUTO: 0.04 K/UL (ref 0–0.2)
BASOPHILS NFR BLD: 0.4 % (ref 0–1.9)
BASOPHILS NFR BLD: 0.5 % (ref 0–1.9)
BILIRUB SERPL-MCNC: 0.8 MG/DL (ref 0.1–1)
BILIRUB SERPL-MCNC: 0.9 MG/DL (ref 0.1–1)
BLD GP AB SCN CELLS X3 SERPL QL: NORMAL
BNP SERPL-MCNC: 337 PG/ML (ref 0–99)
BUN SERPL-MCNC: 9 MG/DL (ref 6–20)
CALCIUM SERPL-MCNC: 8.6 MG/DL (ref 8.7–10.5)
CALCIUM SERPL-MCNC: 8.7 MG/DL (ref 8.7–10.5)
CHLORIDE SERPL-SCNC: 106 MMOL/L (ref 95–110)
CHLORIDE SERPL-SCNC: 107 MMOL/L (ref 95–110)
CO2 SERPL-SCNC: 22 MMOL/L (ref 23–29)
CO2 SERPL-SCNC: 25 MMOL/L (ref 23–29)
CREAT SERPL-MCNC: 0.8 MG/DL (ref 0.5–1.4)
CREAT SERPL-MCNC: 1 MG/DL (ref 0.5–1.4)
DIFFERENTIAL METHOD: ABNORMAL
DIFFERENTIAL METHOD: ABNORMAL
EOSINOPHIL # BLD AUTO: 0 K/UL (ref 0–0.5)
EOSINOPHIL # BLD AUTO: 0 K/UL (ref 0–0.5)
EOSINOPHIL NFR BLD: 0.3 % (ref 0–8)
EOSINOPHIL NFR BLD: 0.6 % (ref 0–8)
ERYTHROCYTE [DISTWIDTH] IN BLOOD BY AUTOMATED COUNT: 13 % (ref 11.5–14.5)
ERYTHROCYTE [DISTWIDTH] IN BLOOD BY AUTOMATED COUNT: 13 % (ref 11.5–14.5)
EST. GFR  (AFRICAN AMERICAN): >60 ML/MIN/1.73 M^2
EST. GFR  (AFRICAN AMERICAN): >60 ML/MIN/1.73 M^2
EST. GFR  (NON AFRICAN AMERICAN): >60 ML/MIN/1.73 M^2
EST. GFR  (NON AFRICAN AMERICAN): >60 ML/MIN/1.73 M^2
GLUCOSE SERPL-MCNC: 176 MG/DL (ref 70–110)
GLUCOSE SERPL-MCNC: 98 MG/DL (ref 70–110)
HCT VFR BLD AUTO: 40.9 % (ref 40–54)
HCT VFR BLD AUTO: 45 % (ref 40–54)
HGB BLD-MCNC: 14 G/DL (ref 14–18)
HGB BLD-MCNC: 15.4 G/DL (ref 14–18)
IMM GRANULOCYTES # BLD AUTO: 0.04 K/UL (ref 0–0.04)
IMM GRANULOCYTES # BLD AUTO: 0.05 K/UL (ref 0–0.04)
IMM GRANULOCYTES NFR BLD AUTO: 0.6 % (ref 0–0.5)
IMM GRANULOCYTES NFR BLD AUTO: 0.6 % (ref 0–0.5)
INR PPP: 1 (ref 0.8–1.2)
INR PPP: 1.1 (ref 0.8–1.2)
LYMPHOCYTES # BLD AUTO: 0.9 K/UL (ref 1–4.8)
LYMPHOCYTES # BLD AUTO: 1 K/UL (ref 1–4.8)
LYMPHOCYTES NFR BLD: 11.2 % (ref 18–48)
LYMPHOCYTES NFR BLD: 12.4 % (ref 18–48)
MCH RBC QN AUTO: 34.1 PG (ref 27–31)
MCH RBC QN AUTO: 34.5 PG (ref 27–31)
MCHC RBC AUTO-ENTMCNC: 34.2 G/DL (ref 32–36)
MCHC RBC AUTO-ENTMCNC: 34.2 G/DL (ref 32–36)
MCV RBC AUTO: 100 FL (ref 82–98)
MCV RBC AUTO: 101 FL (ref 82–98)
MONOCYTES # BLD AUTO: 0.7 K/UL (ref 0.3–1)
MONOCYTES # BLD AUTO: 0.8 K/UL (ref 0.3–1)
MONOCYTES NFR BLD: 8.9 % (ref 4–15)
MONOCYTES NFR BLD: 9.7 % (ref 4–15)
NEUTROPHILS # BLD AUTO: 5.5 K/UL (ref 1.8–7.7)
NEUTROPHILS # BLD AUTO: 7 K/UL (ref 1.8–7.7)
NEUTROPHILS NFR BLD: 76.3 % (ref 38–73)
NEUTROPHILS NFR BLD: 78.5 % (ref 38–73)
NRBC BLD-RTO: 0 /100 WBC
NRBC BLD-RTO: 0 /100 WBC
NT-PROBNP SERPL-MCNC: 976 PG/ML (ref 5–900)
PLATELET # BLD AUTO: 193 K/UL (ref 150–450)
PLATELET # BLD AUTO: 203 K/UL (ref 150–450)
PMV BLD AUTO: 10.7 FL (ref 9.2–12.9)
PMV BLD AUTO: 10.7 FL (ref 9.2–12.9)
POCT GLUCOSE: 109 MG/DL (ref 70–110)
POTASSIUM SERPL-SCNC: 3.8 MMOL/L (ref 3.5–5.1)
POTASSIUM SERPL-SCNC: 4.2 MMOL/L (ref 3.5–5.1)
PROT SERPL-MCNC: 6.8 G/DL (ref 6–8.4)
PROT SERPL-MCNC: 7.4 G/DL (ref 6–8.4)
PROTHROMBIN TIME: 10.9 SEC (ref 9–12.5)
PROTHROMBIN TIME: 10.9 SEC (ref 9–12.5)
RBC # BLD AUTO: 4.06 M/UL (ref 4.6–6.2)
RBC # BLD AUTO: 4.52 M/UL (ref 4.6–6.2)
SARS-COV-2 RDRP RESP QL NAA+PROBE: NEGATIVE
SODIUM SERPL-SCNC: 137 MMOL/L (ref 136–145)
SODIUM SERPL-SCNC: 142 MMOL/L (ref 136–145)
TROPONIN I SERPL DL<=0.01 NG/ML-MCNC: 18.38 NG/ML (ref 0–0.03)
TROPONIN I SERPL-MCNC: 1.76 NG/ML (ref 0.01–0.03)
TROPONIN I SERPL-MCNC: 9.23 NG/ML (ref 0.01–0.03)
UUN UR-MCNC: 8 MG/DL (ref 2–20)
WBC # BLD AUTO: 7.19 K/UL (ref 3.9–12.7)
WBC # BLD AUTO: 8.85 K/UL (ref 3.9–12.7)

## 2022-06-19 PROCEDURE — 83880 ASSAY OF NATRIURETIC PEPTIDE: CPT | Mod: ER | Performed by: FAMILY MEDICINE

## 2022-06-19 PROCEDURE — 84484 ASSAY OF TROPONIN QUANT: CPT | Mod: 91,ER | Performed by: FAMILY MEDICINE

## 2022-06-19 PROCEDURE — 86850 RBC ANTIBODY SCREEN: CPT | Performed by: FAMILY MEDICINE

## 2022-06-19 PROCEDURE — 99285 EMERGENCY DEPT VISIT HI MDM: CPT | Mod: 25,ER

## 2022-06-19 PROCEDURE — 85610 PROTHROMBIN TIME: CPT | Mod: ER | Performed by: FAMILY MEDICINE

## 2022-06-19 PROCEDURE — 85025 COMPLETE CBC W/AUTO DIFF WBC: CPT | Mod: 91 | Performed by: FAMILY MEDICINE

## 2022-06-19 PROCEDURE — 25000003 PHARM REV CODE 250

## 2022-06-19 PROCEDURE — 93010 EKG 12-LEAD: ICD-10-PCS | Mod: ,,, | Performed by: INTERNAL MEDICINE

## 2022-06-19 PROCEDURE — 25000003 PHARM REV CODE 250: Mod: ER | Performed by: FAMILY MEDICINE

## 2022-06-19 PROCEDURE — 36415 COLL VENOUS BLD VENIPUNCTURE: CPT

## 2022-06-19 PROCEDURE — 93005 ELECTROCARDIOGRAM TRACING: CPT

## 2022-06-19 PROCEDURE — 25000003 PHARM REV CODE 250: Performed by: FAMILY MEDICINE

## 2022-06-19 PROCEDURE — 85730 THROMBOPLASTIN TIME PARTIAL: CPT | Mod: ER | Performed by: FAMILY MEDICINE

## 2022-06-19 PROCEDURE — 85610 PROTHROMBIN TIME: CPT | Mod: 91 | Performed by: FAMILY MEDICINE

## 2022-06-19 PROCEDURE — 93005 ELECTROCARDIOGRAM TRACING: CPT | Mod: ER

## 2022-06-19 PROCEDURE — 93010 ELECTROCARDIOGRAM REPORT: CPT | Mod: ,,, | Performed by: INTERNAL MEDICINE

## 2022-06-19 PROCEDURE — 63600175 PHARM REV CODE 636 W HCPCS: Performed by: FAMILY MEDICINE

## 2022-06-19 PROCEDURE — 85730 THROMBOPLASTIN TIME PARTIAL: CPT | Mod: 91 | Performed by: FAMILY MEDICINE

## 2022-06-19 PROCEDURE — 84484 ASSAY OF TROPONIN QUANT: CPT | Mod: 91

## 2022-06-19 PROCEDURE — U0002 COVID-19 LAB TEST NON-CDC: HCPCS | Mod: ER | Performed by: FAMILY MEDICINE

## 2022-06-19 PROCEDURE — 36415 COLL VENOUS BLD VENIPUNCTURE: CPT | Performed by: FAMILY MEDICINE

## 2022-06-19 PROCEDURE — 85025 COMPLETE CBC W/AUTO DIFF WBC: CPT | Mod: ER | Performed by: FAMILY MEDICINE

## 2022-06-19 PROCEDURE — 99900035 HC TECH TIME PER 15 MIN (STAT)

## 2022-06-19 PROCEDURE — 63600175 PHARM REV CODE 636 W HCPCS: Mod: ER | Performed by: FAMILY MEDICINE

## 2022-06-19 PROCEDURE — 80053 COMPREHEN METABOLIC PANEL: CPT | Mod: 91 | Performed by: FAMILY MEDICINE

## 2022-06-19 PROCEDURE — 83880 ASSAY OF NATRIURETIC PEPTIDE: CPT | Mod: 91 | Performed by: FAMILY MEDICINE

## 2022-06-19 PROCEDURE — 11000001 HC ACUTE MED/SURG PRIVATE ROOM

## 2022-06-19 PROCEDURE — 80053 COMPREHEN METABOLIC PANEL: CPT | Mod: ER | Performed by: FAMILY MEDICINE

## 2022-06-19 PROCEDURE — 93010 ELECTROCARDIOGRAM REPORT: CPT | Mod: 76,,, | Performed by: INTERNAL MEDICINE

## 2022-06-19 PROCEDURE — 94761 N-INVAS EAR/PLS OXIMETRY MLT: CPT

## 2022-06-19 RX ORDER — IPRATROPIUM BROMIDE AND ALBUTEROL SULFATE 2.5; .5 MG/3ML; MG/3ML
3 SOLUTION RESPIRATORY (INHALATION) EVERY 6 HOURS PRN
Status: DISCONTINUED | OUTPATIENT
Start: 2022-06-19 | End: 2022-06-24 | Stop reason: HOSPADM

## 2022-06-19 RX ORDER — CLOPIDOGREL BISULFATE 75 MG/1
75 TABLET ORAL DAILY
Status: DISCONTINUED | OUTPATIENT
Start: 2022-06-20 | End: 2022-06-23

## 2022-06-19 RX ORDER — CLOPIDOGREL BISULFATE 75 MG/1
600 TABLET ORAL ONCE
Status: COMPLETED | OUTPATIENT
Start: 2022-06-19 | End: 2022-06-19

## 2022-06-19 RX ORDER — SODIUM CHLORIDE 0.9 % (FLUSH) 0.9 %
10 SYRINGE (ML) INJECTION EVERY 8 HOURS PRN
Status: DISCONTINUED | OUTPATIENT
Start: 2022-06-19 | End: 2022-06-19

## 2022-06-19 RX ORDER — GLUCAGON 1 MG
1 KIT INJECTION
Status: DISCONTINUED | OUTPATIENT
Start: 2022-06-19 | End: 2022-06-24 | Stop reason: HOSPADM

## 2022-06-19 RX ORDER — TALC
6 POWDER (GRAM) TOPICAL NIGHTLY PRN
Status: DISCONTINUED | OUTPATIENT
Start: 2022-06-19 | End: 2022-06-24 | Stop reason: HOSPADM

## 2022-06-19 RX ORDER — ASPIRIN 325 MG
325 TABLET ORAL
Status: COMPLETED | OUTPATIENT
Start: 2022-06-19 | End: 2022-06-19

## 2022-06-19 RX ORDER — IBUPROFEN 200 MG
16 TABLET ORAL
Status: DISCONTINUED | OUTPATIENT
Start: 2022-06-19 | End: 2022-06-24 | Stop reason: HOSPADM

## 2022-06-19 RX ORDER — MORPHINE SULFATE 4 MG/ML
4 INJECTION, SOLUTION INTRAMUSCULAR; INTRAVENOUS
Status: DISCONTINUED | OUTPATIENT
Start: 2022-06-19 | End: 2022-06-19

## 2022-06-19 RX ORDER — ACETAMINOPHEN 325 MG/1
650 TABLET ORAL EVERY 4 HOURS PRN
Status: DISCONTINUED | OUTPATIENT
Start: 2022-06-19 | End: 2022-06-24 | Stop reason: HOSPADM

## 2022-06-19 RX ORDER — SIMETHICONE 80 MG
1 TABLET,CHEWABLE ORAL 4 TIMES DAILY PRN
Status: DISCONTINUED | OUTPATIENT
Start: 2022-06-19 | End: 2022-06-24 | Stop reason: HOSPADM

## 2022-06-19 RX ORDER — DIPHENHYDRAMINE HCL 25 MG
25 CAPSULE ORAL NIGHTLY PRN
Status: DISCONTINUED | OUTPATIENT
Start: 2022-06-19 | End: 2022-06-24 | Stop reason: HOSPADM

## 2022-06-19 RX ORDER — ASPIRIN 81 MG/1
81 TABLET ORAL DAILY
Status: DISCONTINUED | OUTPATIENT
Start: 2022-06-20 | End: 2022-06-24 | Stop reason: HOSPADM

## 2022-06-19 RX ORDER — SERTRALINE HYDROCHLORIDE 50 MG/1
50 TABLET, FILM COATED ORAL NIGHTLY
Status: DISCONTINUED | OUTPATIENT
Start: 2022-06-19 | End: 2022-06-24 | Stop reason: HOSPADM

## 2022-06-19 RX ORDER — NITROGLYCERIN 0.4 MG/1
0.4 TABLET SUBLINGUAL EVERY 5 MIN PRN
Status: DISCONTINUED | OUTPATIENT
Start: 2022-06-19 | End: 2022-06-24 | Stop reason: HOSPADM

## 2022-06-19 RX ORDER — THIAMINE HCL 100 MG
100 TABLET ORAL DAILY
Status: DISCONTINUED | OUTPATIENT
Start: 2022-06-20 | End: 2022-06-24 | Stop reason: HOSPADM

## 2022-06-19 RX ORDER — SODIUM CHLORIDE 0.9 % (FLUSH) 0.9 %
10 SYRINGE (ML) INJECTION
Status: DISCONTINUED | OUTPATIENT
Start: 2022-06-19 | End: 2022-06-24 | Stop reason: HOSPADM

## 2022-06-19 RX ORDER — DOXAZOSIN 2 MG/1
2 TABLET ORAL NIGHTLY
Status: DISCONTINUED | OUTPATIENT
Start: 2022-06-19 | End: 2022-06-24 | Stop reason: HOSPADM

## 2022-06-19 RX ORDER — FOLIC ACID 1 MG/1
1000 TABLET ORAL DAILY
Status: DISCONTINUED | OUTPATIENT
Start: 2022-06-20 | End: 2022-06-24 | Stop reason: HOSPADM

## 2022-06-19 RX ORDER — NAPROXEN SODIUM 220 MG/1
324 TABLET, FILM COATED ORAL ONCE
Status: DISCONTINUED | OUTPATIENT
Start: 2022-06-19 | End: 2022-06-24 | Stop reason: HOSPADM

## 2022-06-19 RX ORDER — ATORVASTATIN CALCIUM 40 MG/1
40 TABLET, FILM COATED ORAL DAILY
Status: DISCONTINUED | OUTPATIENT
Start: 2022-06-20 | End: 2022-06-24 | Stop reason: HOSPADM

## 2022-06-19 RX ORDER — INSULIN ASPART 100 [IU]/ML
0-5 INJECTION, SOLUTION INTRAVENOUS; SUBCUTANEOUS
Status: DISCONTINUED | OUTPATIENT
Start: 2022-06-19 | End: 2022-06-24 | Stop reason: HOSPADM

## 2022-06-19 RX ORDER — ONDANSETRON 2 MG/ML
4 INJECTION INTRAMUSCULAR; INTRAVENOUS EVERY 8 HOURS PRN
Status: DISCONTINUED | OUTPATIENT
Start: 2022-06-19 | End: 2022-06-24 | Stop reason: HOSPADM

## 2022-06-19 RX ORDER — IBUPROFEN 200 MG
24 TABLET ORAL
Status: DISCONTINUED | OUTPATIENT
Start: 2022-06-19 | End: 2022-06-24 | Stop reason: HOSPADM

## 2022-06-19 RX ORDER — HEPARIN SODIUM,PORCINE/D5W 25000/250
0-40 INTRAVENOUS SOLUTION INTRAVENOUS CONTINUOUS
Status: DISCONTINUED | OUTPATIENT
Start: 2022-06-19 | End: 2022-06-23

## 2022-06-19 RX ORDER — TALC
6 POWDER (GRAM) TOPICAL NIGHTLY PRN
Status: DISCONTINUED | OUTPATIENT
Start: 2022-06-19 | End: 2022-06-19

## 2022-06-19 RX ORDER — DIAZEPAM 5 MG/1
5 TABLET ORAL EVERY 8 HOURS
Status: COMPLETED | OUTPATIENT
Start: 2022-06-19 | End: 2022-06-20

## 2022-06-19 RX ORDER — MAG HYDROX/ALUMINUM HYD/SIMETH 200-200-20
30 SUSPENSION, ORAL (FINAL DOSE FORM) ORAL 4 TIMES DAILY PRN
Status: DISCONTINUED | OUTPATIENT
Start: 2022-06-19 | End: 2022-06-24 | Stop reason: HOSPADM

## 2022-06-19 RX ADMIN — SERTRALINE HYDROCHLORIDE 50 MG: 50 TABLET ORAL at 10:06

## 2022-06-19 RX ADMIN — NITROGLYCERIN 0.5 INCH: 20 OINTMENT TOPICAL at 11:06

## 2022-06-19 RX ADMIN — DOXAZOSIN 2 MG: 2 TABLET ORAL at 10:06

## 2022-06-19 RX ADMIN — ASPIRIN 325 MG ORAL TABLET 325 MG: 325 PILL ORAL at 11:06

## 2022-06-19 RX ADMIN — ACETAMINOPHEN 650 MG: 325 TABLET ORAL at 06:06

## 2022-06-19 RX ADMIN — HEPARIN SODIUM 12 UNITS/KG/HR: 1000 INJECTION, SOLUTION INTRAVENOUS; SUBCUTANEOUS at 12:06

## 2022-06-19 RX ADMIN — CLOPIDOGREL 600 MG: 75 TABLET, FILM COATED ORAL at 05:06

## 2022-06-19 RX ADMIN — DIPHENHYDRAMINE HYDROCHLORIDE 25 MG: 25 CAPSULE ORAL at 10:06

## 2022-06-19 RX ADMIN — HEPARIN SODIUM 15 UNITS/KG/HR: 1000 INJECTION, SOLUTION INTRAVENOUS; SUBCUTANEOUS at 06:06

## 2022-06-19 RX ADMIN — DIAZEPAM 5 MG: 5 TABLET ORAL at 10:06

## 2022-06-19 NOTE — Clinical Note
75 ml of contrast were injected throughout the case. 50 mL of contrast was the total wasted during the case. 125 mL was the total amount used during the case.

## 2022-06-19 NOTE — ED PROVIDER NOTES
Encounter Date: 6/19/2022       History     Chief Complaint   Patient presents with    Chest Pain     56-year-old male complains of left chest pain which started about an hour ago.  He has been having left chest pain for last couple of weeks which was on and off.  Chest pain increased frequency in last 1 week.  Increase intensity this morning.  Numbness in his left upper arm.  No weakness, tingling.  No jaw pain.  Mild nausea 1 episode of vomiting.    The history is provided by the patient.     Review of patient's allergies indicates:  No Known Allergies  Past Medical History:   Diagnosis Date    Arthritis     rheumatoid    Hypertension      Past Surgical History:   Procedure Laterality Date    COLONOSCOPY      age 42    COLONOSCOPY N/A 11/22/2021    Procedure: COLONOSCOPY  [place]ely   Rapid Covid;  Surgeon: Shane Guzman MD;  Location: [facility] ENDO;  Service: Endoscopy;  Laterality: N/A;    LEFT HEART CATHETERIZATION N/A 6/20/2022    Procedure: Left heart cath;  Surgeon: Marlen Thompson MD;  Location: [facility] CATH LAB/EP;  Service: Cardiology;  Laterality: N/A;    VASECTOMY       Family History   Problem Relation Age of Onset    No Known Problems Daughter     No Known Problems Son      Social History     Tobacco Use    Smoking status: Former Smoker    Smokeless tobacco: Never Used   Substance Use Topics    Alcohol use: Yes     Comment: social    Drug use: Never     Review of Systems   Constitutional: Negative for activity change, appetite change, chills and fever.   HENT: Negative for congestion, rhinorrhea and sore throat.    Eyes: Negative for pain and itching.   Respiratory: Negative for cough, shortness of breath and wheezing.    Cardiovascular: Positive for chest pain.   Gastrointestinal: Negative for abdominal pain, diarrhea, nausea and vomiting.   Genitourinary: Negative for dysuria and flank pain.   Musculoskeletal: Negative for back pain, gait problem, neck pain and neck stiffness.    Skin: Negative for rash.   Neurological: Negative for dizziness, facial asymmetry, weakness, light-headedness, numbness and headaches.   Hematological: Does not bruise/bleed easily.   Psychiatric/Behavioral: The patient is not nervous/anxious.    All other systems reviewed and are negative.      Physical Exam     Initial Vitals   BP Pulse Resp Temp SpO2   06/19/22 1114 06/19/22 1114 06/19/22 1114 06/19/22 1655 06/19/22 1114   126/88 91 19 97.6 °F (36.4 °C) 98 %      MAP       --                Physical Exam    Nursing note and vitals reviewed.  Constitutional: Vital signs are normal. He appears well-developed and well-nourished. He is not diaphoretic. He is active. No distress.   HENT:   Head: Normocephalic.   Nose: Nose normal.   Mouth/Throat: Oropharynx is clear and moist and mucous membranes are normal.   Eyes: Conjunctivae, EOM and lids are normal.   Neck: Neck supple. No JVD present.   Normal range of motion.  Cardiovascular: Normal rate, regular rhythm, S1 normal, S2 normal and normal heart sounds.   Pulmonary/Chest: Breath sounds normal. No respiratory distress. He has no wheezes. He has no rhonchi. He has no rales. He exhibits no tenderness.   Abdominal: Abdomen is soft. Bowel sounds are normal. He exhibits no distension. There is no abdominal tenderness. There is no rebound and no guarding.   Musculoskeletal:         General: No tenderness or edema. Normal range of motion.      Right upper arm: Normal.      Left upper arm: Normal.      Cervical back: Normal range of motion and neck supple.      Right lower leg: Normal.      Left lower leg: Normal.     Neurological: He is alert and oriented to person, place, and time. He has normal strength. He displays normal reflexes. No cranial nerve deficit or sensory deficit. GCS score is 15. GCS eye subscore is 4. GCS verbal subscore is 5. GCS motor subscore is 6.   Skin: Skin is warm. Capillary refill takes less than 2 seconds.   Psychiatric: He has a normal mood  and affect. His speech is normal and behavior is normal. Thought content normal. Cognition and memory are normal.         ED Course   Critical Care    Date/Time: 6/19/2022 1:24 PM  Performed by: Saravanan Ketia MD  Authorized by: Kilo Mares MD   Direct patient critical care time: 5 minutes  Additional history critical care time: 5 minutes  Ordering / reviewing critical care time: 5 minutes  Documentation critical care time: 5 minutes  Consulting other physicians critical care time: 5 minutes  Consult with family critical care time: 5 minutes  Other critical care time: 5 minutes  Total critical care time (exclusive of procedural time) : 35 minutes  Critical care was time spent personally by me on the following activities: evaluation of patient's response to treatment, obtaining history from patient or surrogate, ordering and review of laboratory studies, pulse oximetry, review of old charts, re-evaluation of patient's condition, ordering and review of radiographic studies, ordering and performing treatments and interventions, examination of patient and discussions with consultants.        Labs Reviewed   CBC W/ AUTO DIFFERENTIAL - Abnormal; Notable for the following components:       Result Value    RBC 4.52 (*)      (*)     MCH 34.1 (*)     Immature Granulocytes 0.6 (*)     Lymph # 0.9 (*)     Gran % 76.3 (*)     Lymph % 12.4 (*)     All other components within normal limits   COMPREHENSIVE METABOLIC PANEL - Abnormal; Notable for the following components:    Glucose 176 (*)      (*)     Anion Gap 6 (*)     All other components within normal limits   TROPONIN I - Abnormal; Notable for the following components:    Troponin I 1.760 (*)     All other components within normal limits   NT-PRO NATRIURETIC PEPTIDE - Abnormal; Notable for the following components:    NT-proBNP 976 (*)     All other components within normal limits   TROPONIN I - Abnormal; Notable for the following components:     Troponin I 9.230 (*)     All other components within normal limits   SARS-COV-2 RNA AMPLIFICATION, QUAL    Narrative:     Is the patient symptomatic?->No   NT-PRO NATRIURETIC PEPTIDE   APTT   PROTIME-INR   APTT   PROTIME-INR     EKG Readings: (Independently Interpreted)   Initial Reading: No STEMI. Rhythm: Normal Sinus Rhythm. Heart Rate: 90. Ectopy: No Ectopy. Conduction: Normal. ST Segments: Non-Specific ST Segment Depression. ST Segment Depression: V3, V4, V5 and V6. T Waves: Normal. Clinical Impression: Normal Sinus Rhythm     ECG Results          EKG 12-lead (Final result)  Result time 06/20/22 08:28:36    Final result by Interface, Lab In [facility] (06/20/22 08:28:36)                 Narrative:    Test Reason : R07.9,    Vent. Rate : 090 BPM     Atrial Rate : 090 BPM     P-R Int : 144 ms          QRS Dur : 098 ms      QT Int : 364 ms       P-R-T Axes : 034 080 -19 degrees     QTc Int : 445 ms    Normal sinus rhythm  ST and T wave abnormality, consider inferior ischemia  Abnormal ECG  No previous ECGs available  Confirmed by Cameron Villasenor MD ([id number]) on 6/20/2022 8:28:27 AM    Referred By: [email or web address]   SELF           Confirmed By:Cameron Villasenor MD                            Imaging Results          X-Ray Chest AP Portable (Final result)  Result time 06/19/22 11:39:00    Final result by Nghia Richards Jr., MD (06/19/22 11:39:00)                 Impression:      Age-indeterminate posterolateral left rib fractures.  Ground-glass infiltrate within the periphery of the right lung could relate to and Modic process including COVID-19 infection.      Electronically signed by: Nghia Richards Jr., MD  Date:    06/19/2022  Time:    11:39             Narrative:    EXAMINATION:  XR CHEST AP PORTABLE    CLINICAL HISTORY:  chest pain;    COMPARISON:  None.    FINDINGS:  There are age-indeterminate posterolateral left rib fractures.  Peripheral patchy ground-glass opacity predominantly within the right lung.  No pleural fluid  or pneumothorax.  Heart size within normal limits.                                 Medications   morphine 2 mg/mL injection (  Canceled Entry 6/23/22 0000)   aspirin tablet 325 mg (325 mg Oral Given 6/19/22 1133)   nitroGLYCERIN 2% TD oint ointment 0.5 inch (0.5 inches Transdermal Given 6/19/22 1133)   heparin 25,000 units in dextrose 5% (100 units/ml) IV bolus from bag INITIAL BOLUS (max bolus 4000 units) (4,000 Units Intravenous Bolus from Bag 6/19/22 1246)   clopidogreL tablet 600 mg (600 mg Oral Given 6/19/22 1758)   diazePAM tablet 5 mg (5 mg Oral Given 6/20/22 1400)   furosemide injection 20 mg (20 mg Intravenous Given 6/20/22 1230)   methotrexate tablet 15 mg (15 mg Oral Given 6/20/22 1520)   perflutren protein-A microsphr 0.22 mg/mL IV susp (0.11 mg Intravenous Given 6/20/22 1700)   oxyCODONE-acetaminophen 5-325 mg per tablet 1 tablet (1 tablet Oral Given 6/21/22 2225)   morphine injection 2 mg (2 mg Intravenous Given 6/22/22 2347)   morphine injection 2 mg (2 mg Intravenous Given 6/23/22 0712)   azithromycin tablet 500 mg (500 mg Oral Given 6/23/22 1614)     Medical Decision Making:   Initial Assessment:   Left chest pain radiating to left arm with numbness.  One episode of vomiting.  Differential Diagnosis:   Angina, MI, acute coronary syndrome, musculoskeletal pain, rib pain  Clinical Tests:   Lab Tests: Ordered and Reviewed  Radiological Study: Ordered and Reviewed  Medical Tests: Ordered and Reviewed  ED Management:  ST depressions in lateral leads.  T-wave inversion in inferior lead.  Elevated troponin 1.7.  Patient is given nitro and aspirin.  Pain improved from 7-3.  Consistent with NSTMI-   Discussed with  advised for heparin drip.- will consider for cath lab if pain does not get better.  Admit to hospitalist.  2:00 p.m. re-evaluation no pain.                      Clinical Impression:   Final diagnoses:  [R07.9] Chest pain  [I21.4] NSTEMI (non-ST elevated myocardial infarction)  (Primary)          ED Disposition Condition    Admit               Saravanan Keita MD  06/19/22 1402       Saravanan Keita MD  07/02/22 4830

## 2022-06-19 NOTE — ASSESSMENT & PLAN NOTE
-admits to drinking 12 can pack of beer daily  -Initiated alcohol withdrawal protocol  -CIWA q4hr  -Thiamine, MVI, and folate daily  -Monitor for s/s of alcohol withdrawal or DTs

## 2022-06-19 NOTE — ASSESSMENT & PLAN NOTE
Chest pain    - presents with reported left sided chest pain x2-3 weeks  - EKG revealed ST depression V3, V4, V5 and V6.  -Troponin 1.760--9.230; continue to trend troponins   -NT-proBNP 976  -CXR revealed ground-glass infiltrate within the periphery of the right lung could relate to and Modic process including COVID-19 infection.  - Received 325mg PO ASA x1 in ED and was started on continuous heparin infusion  -Will give Plavix load 600mg PO now  - Continue ASA 81mg daily and Plavix 81mg daily  - TTE ordered  - stratify risks: obtain lipid panel, HgbA1c, and TSH  - Monitor on telemetry  - CMP daily; keep K >4, Mg >2  -Supplemental oxygen PRN for SpO2 <90%  -Will initiate atorvastatin 40mg   -SL Nitro PRN for chest pain  -EKG PRN for recurrent chest pain  - Cardiology consulted in ED; appreciate assistance  -NPO after midnight for LHC in am

## 2022-06-19 NOTE — SUBJECTIVE & OBJECTIVE
Past Medical History:   Diagnosis Date    Arthritis     rheumatoid    Hypertension        Past Surgical History:   Procedure Laterality Date    COLONOSCOPY      age 42    COLONOSCOPY N/A 11/22/2021    Procedure: COLONOSCOPY  Lori Garza;  Surgeon: Shane Guzman MD;  Location: [facility];  Service: Endoscopy;  Laterality: N/A;    VASECTOMY         Review of patient's allergies indicates:  No Known Allergies    No current facility-administered medications on file prior to encounter.     Current Outpatient Medications on File Prior to Encounter   Medication Sig    colchicine (COLCRYS) 0.6 mg tablet Take 1 tablet (0.6 mg total) by mouth once daily.    doxazosin (CARDURA) 2 MG tablet Take 1 tablet (2 mg total) by mouth nightly.    folic acid (FOLVITE) 1 MG tablet Take 1 tablet (1,000 mcg total) by mouth once daily.    methotrexate 2.5 MG Tab Take 6 tablets (15 mg total) by mouth every 7 days.    predniSONE (DELTASONE) 20 MG tablet Take 20 mg by mouth daily as needed.    sertraline (ZOLOFT) 50 MG tablet Take 1 tablet (50 mg total) by mouth every evening.     Family History       Problem Relation (Age of Onset)    No Known Problems Daughter, Son          Tobacco Use    Smoking status: Former Smoker    Smokeless tobacco: Never Used   Substance and Sexual Activity    Alcohol use: Yes     Comment: social    Drug use: Never    Sexual activity: Not on file     Review of Systems   Constitutional:  Negative for chills, diaphoresis and fever.   HENT:  Negative for hearing loss and sore throat.    Eyes:  Negative for visual disturbance.   Respiratory:  Positive for shortness of breath. Negative for cough and chest tightness.    Cardiovascular:  Positive for chest pain and palpitations. Negative for leg swelling.   Gastrointestinal:  Positive for nausea and vomiting. Negative for abdominal pain and diarrhea.   Genitourinary:  Negative for difficulty urinating and flank pain.   Musculoskeletal:  Negative for back  pain.   Skin:  Negative for rash and wound.   Neurological:  Negative for dizziness, weakness and headaches.   Psychiatric/Behavioral:  Negative for agitation and confusion.    Objective:     Vital Signs (Most Recent):  Pulse: 81 (06/19/22 1200)  Resp: 17 (06/19/22 1200)  BP: 112/74 (06/19/22 1200)  SpO2: (!) 93 % (06/19/22 1200)   Vital Signs (24h Range):  Pulse:  [81-91] 81  Resp:  [17-19] 17  SpO2:  [93 %-98 %] 93 %  BP: (112-126)/(74-88) 112/74     Weight: 102.5 kg (226 lb)  Body mass index is 32.43 kg/m².    Physical Exam  Vitals and nursing note reviewed.   Constitutional:       General: He is not in acute distress.     Appearance: Normal appearance. He is not ill-appearing.   HENT:      Head: Normocephalic and atraumatic.      Mouth/Throat:      Mouth: Mucous membranes are moist.   Eyes:      Extraocular Movements: Extraocular movements intact.      Pupils: Pupils are equal, round, and reactive to light.   Cardiovascular:      Rate and Rhythm: Normal rate and regular rhythm.      Pulses: Normal pulses.      Heart sounds: Normal heart sounds. No murmur heard.    No friction rub. No gallop.   Pulmonary:      Effort: Pulmonary effort is normal. No respiratory distress.      Breath sounds: No decreased breath sounds, wheezing or rhonchi.   Abdominal:      General: Bowel sounds are normal. There is no distension.      Palpations: Abdomen is soft.      Tenderness: There is no abdominal tenderness. There is no guarding.   Genitourinary:     Comments: deferred  Musculoskeletal:         General: No swelling.      Cervical back: Normal range of motion and neck supple.      Right knee: Erythema present. Tenderness present.      Right lower leg: No edema.      Left lower leg: No edema.   Skin:     General: Skin is warm and dry.      Capillary Refill: Capillary refill takes less than 2 seconds.      Findings: No bruising, erythema or rash.   Neurological:      General: No focal deficit present.      Mental Status: He is  alert and oriented to person, place, and time.      GCS: GCS eye subscore is 4. GCS verbal subscore is 5. GCS motor subscore is 6.   Psychiatric:         Mood and Affect: Mood normal.         Behavior: Behavior normal. Behavior is cooperative.         CRANIAL NERVES     CN III, IV, VI   Pupils are equal, round, and reactive to light.     Significant Labs: I have reviewed all significant lab findings within the past 24 hours.    Recent Results (from the past 24 hour(s))   CBC auto differential    Collection Time: 06/19/22 11:16 AM   Result Value Ref Range    WBC 7.19 3.90 - 12.70 K/uL    RBC 4.52 (L) 4.60 - 6.20 M/uL    Hemoglobin 15.4 14.0 - 18.0 g/dL    Hematocrit 45.0 40.0 - 54.0 %     (H) 82 - 98 fL    MCH 34.1 (H) 27.0 - 31.0 pg    MCHC 34.2 32.0 - 36.0 g/dL    RDW 13.0 11.5 - 14.5 %    Platelets 203 150 - 450 K/uL    MPV 10.7 9.2 - 12.9 fL    Immature Granulocytes 0.6 (H) 0.0 - 0.5 %    Gran # (ANC) 5.5 1.8 - 7.7 K/uL    Immature Grans (Abs) 0.04 0.00 - 0.04 K/uL    Lymph # 0.9 (L) 1.0 - 4.8 K/uL    Mono # 0.7 0.3 - 1.0 K/uL    Eos # 0.0 0.0 - 0.5 K/uL    Baso # 0.03 0.00 - 0.20 K/uL    nRBC 0 0 /100 WBC    Gran % 76.3 (H) 38.0 - 73.0 %    Lymph % 12.4 (L) 18.0 - 48.0 %    Mono % 9.7 4.0 - 15.0 %    Eosinophil % 0.6 0.0 - 8.0 %    Basophil % 0.4 0.0 - 1.9 %    Differential Method Automated    Comprehensive metabolic panel    Collection Time: 06/19/22 11:16 AM   Result Value Ref Range    Sodium 137 136 - 145 mmol/L    Potassium 4.2 3.5 - 5.1 mmol/L    Chloride 106 95 - 110 mmol/L    CO2 25 23 - 29 mmol/L    Glucose 176 (H) 70 - 110 mg/dL    BUN 8 2 - 20 mg/dL    Creatinine 1.00 0.50 - 1.40 mg/dL    Calcium 8.7 8.7 - 10.5 mg/dL    Total Protein 7.4 6.0 - 8.4 g/dL    Albumin 3.9 3.5 - 5.2 g/dL    Total Bilirubin 0.8 0.1 - 1.0 mg/dL    Alkaline Phosphatase 97 38 - 126 U/L     (H) 15 - 46 U/L    ALT 44 10 - 44 U/L    Anion Gap 6 (L) 8 - 16 mmol/L    eGFR if African American >60.0 >60 mL/min/1.73 m^2     eGFR if non African American >60.0 >60 mL/min/1.73 m^2   Troponin I #1    Collection Time: 06/19/22 11:16 AM   Result Value Ref Range    Troponin I 1.760 (H) 0.012 - 0.034 ng/mL   NT-Pro Natriuretic Peptide    Collection Time: 06/19/22 11:16 AM   Result Value Ref Range    NT-proBNP 976 (H) 5 - 900 pg/mL   COVID-19 Rapid Screening    Collection Time: 06/19/22 12:08 PM   Result Value Ref Range    SARS-CoV-2 RNA, Amplification, Qual Negative Negative   APTT    Collection Time: 06/19/22 12:15 PM   Result Value Ref Range    aPTT 25.6 21.0 - 32.0 sec   Protime-INR    Collection Time: 06/19/22 12:15 PM   Result Value Ref Range    Prothrombin Time 10.9 9.0 - 12.5 sec    INR 1.0 0.8 - 1.2   Troponin I    Collection Time: 06/19/22  3:27 PM   Result Value Ref Range    Troponin I 9.230 (H) 0.012 - 0.034 ng/mL   Comprehensive metabolic panel    Collection Time: 06/19/22  5:06 PM   Result Value Ref Range    Sodium 142 136 - 145 mmol/L    Potassium 3.8 3.5 - 5.1 mmol/L    Chloride 107 95 - 110 mmol/L    CO2 22 (L) 23 - 29 mmol/L    Glucose 98 70 - 110 mg/dL    BUN 9 6 - 20 mg/dL    Creatinine 0.8 0.5 - 1.4 mg/dL    Calcium 8.6 (L) 8.7 - 10.5 mg/dL    Total Protein 6.8 6.0 - 8.4 g/dL    Albumin 3.0 (L) 3.5 - 5.2 g/dL    Total Bilirubin 0.9 0.1 - 1.0 mg/dL    Alkaline Phosphatase 67 55 - 135 U/L     (H) 10 - 40 U/L    ALT 39 10 - 44 U/L    Anion Gap 13 8 - 16 mmol/L    eGFR if African American >60 >60 mL/min/1.73 m^2    eGFR if non African American >60 >60 mL/min/1.73 m^2   CBC auto differential    Collection Time: 06/19/22  5:06 PM   Result Value Ref Range    WBC 8.85 3.90 - 12.70 K/uL    RBC 4.06 (L) 4.60 - 6.20 M/uL    Hemoglobin 14.0 14.0 - 18.0 g/dL    Hematocrit 40.9 40.0 - 54.0 %     (H) 82 - 98 fL    MCH 34.5 (H) 27.0 - 31.0 pg    MCHC 34.2 32.0 - 36.0 g/dL    RDW 13.0 11.5 - 14.5 %    Platelets 193 150 - 450 K/uL    MPV 10.7 9.2 - 12.9 fL    Immature Granulocytes 0.6 (H) 0.0 - 0.5 %    Gran # (ANC) 7.0 1.8 -  7.7 K/uL    Immature Grans (Abs) 0.05 (H) 0.00 - 0.04 K/uL    Lymph # 1.0 1.0 - 4.8 K/uL    Mono # 0.8 0.3 - 1.0 K/uL    Eos # 0.0 0.0 - 0.5 K/uL    Baso # 0.04 0.00 - 0.20 K/uL    nRBC 0 0 /100 WBC    Gran % 78.5 (H) 38.0 - 73.0 %    Lymph % 11.2 (L) 18.0 - 48.0 %    Mono % 8.9 4.0 - 15.0 %    Eosinophil % 0.3 0.0 - 8.0 %    Basophil % 0.5 0.0 - 1.9 %    Differential Method Automated          Significant Imaging: I have reviewed all pertinent imaging results/findings within the past 24 hours.

## 2022-06-19 NOTE — Clinical Note
The catheter was repositioned into the ostium   right coronary artery. An angiography was performed of the right coronary arteries. Multiple views were taken. The angiography was performed via hand injection with 30 mL of contrast.

## 2022-06-19 NOTE — PROGRESS NOTES
06/19/22 1849   Admission   Initial VN Admission Questions Complete   Communication Issues? None   Shift   Virtual Nurse - Rounding Complete   Pain Management Interventions care clustered;diversional activity provided;pain management plan reviewed with patient/caregiver;quiet environment facilitated;relaxation techniques promoted   Virtual Nurse - Patient Verbalized Approval Of Camera Use;VN Rounding   Type of Frequent Check   Type Telemetry Monitoring;Patient Rounds;Other (see comments)  (new admission)   Safety/Activity   Patient Rounds bed in low position;call light in patient/parent reach;clutter free environment maintained;visualized patient;placement of personal items at bedside;ID band on   Safety Promotion/Fall Prevention assistive device/personal item within reach;diversional activities provided;Fall Risk reviewed with patient/family;family to remain at bedside;high risk medications identified;medications reviewed;nonskid shoes/socks when out of bed;side rails raised x 2;instructed to call staff for mobility   Safety Precautions emergency equipment at bedside   Activity Management Rolling - L1   Positioning   Body Position position changed independently;supine   Head of Bed (HOB) Positioning HOB at 30-45 degrees   VN cued into patient's room for introduction with patient's permission.  VN role explained and informed patient that VN would be working with bedside nurse and rest of care team.  Fall risk and bed alarm protocol education provided.  Instructed patient to call for assistance.  Patient aware and agreeable.  Patient's chart, labs and vital signs reviewed.  Allowed time for questions.  No acute distress noted.  Will continue to be available as needed.

## 2022-06-19 NOTE — ASSESSMENT & PLAN NOTE
-Reports right knee pain/erythema; states he was seen by his rheumatologist who planned on scheduling PT   -Painful to touch; may be associated with his RA  -Will resume home

## 2022-06-19 NOTE — Clinical Note
The catheter was inserted into the ostium   left main. An angiography was performed of the left coronary arteries. Multiple views were taken. The angiography was performed via hand injection with 35 mL of contrast.

## 2022-06-19 NOTE — HPI
"Andrae Bran is a 55 y/o male with PMHx HTN and RA presents to [facility] ED with complaints of chest pain. He reports his chest pain started today after he ate breakfast. He reports his chest pain is on his left side with radiation and numbness to his left arm. He describes his chest pain as squeezing and felt like "someone is sitting on my chest". He rates his chest pain 7/10. He reports shortness of breath with palpitations. He also reports associated nausea and vomiting today. He admits he has been having left sided chest pain for a couple of weeks that has been intermittent. He states his left sided chest pain has increased in frequency within the last week. He denies headaches, vision changes, or jaw pain. Also states right knee redness/pain that was evaluated by his rheumatologist and he was scheduled to work with PT. Denies history of past cardiac history. He travels for work. Reports past tobacco use but state he has quit. He admits to drinking 12 can pack of beer a day.       In the ED: /74   Pulse 77   Resp 20   Ht 5' 10" (1.778 m)   Wt 102.5 kg (226 lb)   SpO2 95%   BMI 32.43 kg/m² Labs revealed , glucose 176, , NT-proBNP 976, and initial troponin 1.760, repeat 9.230. CXR revealed Ground-glass infiltrate within the periphery of the right lung could relate to and Modic process including COVID-19 infection. Cardiology was consulted. He received ASA 325mg PO x1, Nitro paste x1, and was started on continuous heparin infusion. Will admit to hospital medicine for further evaluation and treatment.        "

## 2022-06-19 NOTE — H&P
"[facility] Medicine  History & Physical    Patient Name: Andrae Aviles  MRN: [id number]  Patient Class: IP- Inpatient  Admission Date: 6/19/2022  Attending Physician: Vidhya Truong*   Primary Care Provider: Shane Lewis MD         Patient information was obtained from patient and ER records.     Subjective:     Principal Problem:NSTEMI (non-ST elevated myocardial infarction)    Chief Complaint:   Chief Complaint   Patient presents with    Chest Pain        HPI: Andrae Bran is a 55 y/o male with PMHx HTN and RA presents to [facility] ED with complaints of chest pain. He reports his chest pain started today after he ate breakfast. He reports his chest pain is on his left side with radiation and numbness to his left arm. He describes his chest pain as squeezing and felt like "someone is sitting on my chest". He rates his chest pain 7/10. He reports shortness of breath with palpitations. He also reports associated nausea and vomiting today. He admits he has been having left sided chest pain for a couple of weeks that has been intermittent. He states his left sided chest pain has increased in frequency within the last week. He denies headaches, vision changes, or jaw pain. Also states right knee redness/pain that was evaluated by his rheumatologist and he was scheduled to work with PT. Denies history of past cardiac history. He travels for work. Reports past tobacco use but state he has quit. He admits to drinking 12 can pack of beer a day.       In the ED: /74   Pulse 77   Resp 20   Ht 5' 10" (1.778 m)   Wt 102.5 kg (226 lb)   SpO2 95%   BMI 32.43 kg/m² Labs revealed , glucose 176, , NT-proBNP 976, and initial troponin 1.760, repeat 9.230. CXR revealed Ground-glass infiltrate within the periphery of the right lung could relate to and Modic process including COVID-19 infection. Cardiology was consulted. He received ASA 325mg PO x1, Nitro paste x1, and was started " on continuous heparin infusion. Will admit to hospital medicine for further evaluation and treatment.            Past Medical History:   Diagnosis Date    Arthritis     rheumatoid    Hypertension        Past Surgical History:   Procedure Laterality Date    COLONOSCOPY      age 42    COLONOSCOPY N/A 11/22/2021    Procedure: COLONOSCOPY  Lori Youssefid;  Surgeon: Shane Guzman MD;  Location: [facility];  Service: Endoscopy;  Laterality: N/A;    VASECTOMY         Review of patient's allergies indicates:  No Known Allergies    No current facility-administered medications on file prior to encounter.     Current Outpatient Medications on File Prior to Encounter   Medication Sig    colchicine (COLCRYS) 0.6 mg tablet Take 1 tablet (0.6 mg total) by mouth once daily.    doxazosin (CARDURA) 2 MG tablet Take 1 tablet (2 mg total) by mouth nightly.    folic acid (FOLVITE) 1 MG tablet Take 1 tablet (1,000 mcg total) by mouth once daily.    methotrexate 2.5 MG Tab Take 6 tablets (15 mg total) by mouth every 7 days.    predniSONE (DELTASONE) 20 MG tablet Take 20 mg by mouth daily as needed.    sertraline (ZOLOFT) 50 MG tablet Take 1 tablet (50 mg total) by mouth every evening.     Family History       Problem Relation (Age of Onset)    No Known Problems Daughter, Son          Tobacco Use    Smoking status: Former Smoker    Smokeless tobacco: Never Used   Substance and Sexual Activity    Alcohol use: Yes     Comment: social    Drug use: Never    Sexual activity: Not on file     Review of Systems   Constitutional:  Negative for chills, diaphoresis and fever.   HENT:  Negative for hearing loss and sore throat.    Eyes:  Negative for visual disturbance.   Respiratory:  Positive for shortness of breath. Negative for cough and chest tightness.    Cardiovascular:  Positive for chest pain and palpitations. Negative for leg swelling.   Gastrointestinal:  Positive for nausea and vomiting. Negative for  abdominal pain and diarrhea.   Genitourinary:  Negative for difficulty urinating and flank pain.   Musculoskeletal:  Negative for back pain.   Skin:  Negative for rash and wound.   Neurological:  Negative for dizziness, weakness and headaches.   Psychiatric/Behavioral:  Negative for agitation and confusion.    Objective:     Vital Signs (Most Recent):  Pulse: 81 (06/19/22 1200)  Resp: 17 (06/19/22 1200)  BP: 112/74 (06/19/22 1200)  SpO2: (!) 93 % (06/19/22 1200)   Vital Signs (24h Range):  Pulse:  [81-91] 81  Resp:  [17-19] 17  SpO2:  [93 %-98 %] 93 %  BP: (112-126)/(74-88) 112/74     Weight: 102.5 kg (226 lb)  Body mass index is 32.43 kg/m².    Physical Exam  Vitals and nursing note reviewed.   Constitutional:       General: He is not in acute distress.     Appearance: Normal appearance. He is not ill-appearing.   HENT:      Head: Normocephalic and atraumatic.      Mouth/Throat:      Mouth: Mucous membranes are moist.   Eyes:      Extraocular Movements: Extraocular movements intact.      Pupils: Pupils are equal, round, and reactive to light.   Cardiovascular:      Rate and Rhythm: Normal rate and regular rhythm.      Pulses: Normal pulses.      Heart sounds: Normal heart sounds. No murmur heard.    No friction rub. No gallop.   Pulmonary:      Effort: Pulmonary effort is normal. No respiratory distress.      Breath sounds: No decreased breath sounds, wheezing or rhonchi.   Abdominal:      General: Bowel sounds are normal. There is no distension.      Palpations: Abdomen is soft.      Tenderness: There is no abdominal tenderness. There is no guarding.   Genitourinary:     Comments: deferred  Musculoskeletal:         General: No swelling.      Cervical back: Normal range of motion and neck supple.      Right knee: Erythema present. Tenderness present.      Right lower leg: No edema.      Left lower leg: No edema.   Skin:     General: Skin is warm and dry.      Capillary Refill: Capillary refill takes less than 2  seconds.      Findings: No bruising, erythema or rash.   Neurological:      General: No focal deficit present.      Mental Status: He is alert and oriented to person, place, and time.      GCS: GCS eye subscore is 4. GCS verbal subscore is 5. GCS motor subscore is 6.   Psychiatric:         Mood and Affect: Mood normal.         Behavior: Behavior normal. Behavior is cooperative.         CRANIAL NERVES     CN III, IV, VI   Pupils are equal, round, and reactive to light.     Significant Labs: I have reviewed all significant lab findings within the past 24 hours.    Recent Results (from the past 24 hour(s))   CBC auto differential    Collection Time: 06/19/22 11:16 AM   Result Value Ref Range    WBC 7.19 3.90 - 12.70 K/uL    RBC 4.52 (L) 4.60 - 6.20 M/uL    Hemoglobin 15.4 14.0 - 18.0 g/dL    Hematocrit 45.0 40.0 - 54.0 %     (H) 82 - 98 fL    MCH 34.1 (H) 27.0 - 31.0 pg    MCHC 34.2 32.0 - 36.0 g/dL    RDW 13.0 11.5 - 14.5 %    Platelets 203 150 - 450 K/uL    MPV 10.7 9.2 - 12.9 fL    Immature Granulocytes 0.6 (H) 0.0 - 0.5 %    Gran # (ANC) 5.5 1.8 - 7.7 K/uL    Immature Grans (Abs) 0.04 0.00 - 0.04 K/uL    Lymph # 0.9 (L) 1.0 - 4.8 K/uL    Mono # 0.7 0.3 - 1.0 K/uL    Eos # 0.0 0.0 - 0.5 K/uL    Baso # 0.03 0.00 - 0.20 K/uL    nRBC 0 0 /100 WBC    Gran % 76.3 (H) 38.0 - 73.0 %    Lymph % 12.4 (L) 18.0 - 48.0 %    Mono % 9.7 4.0 - 15.0 %    Eosinophil % 0.6 0.0 - 8.0 %    Basophil % 0.4 0.0 - 1.9 %    Differential Method Automated    Comprehensive metabolic panel    Collection Time: 06/19/22 11:16 AM   Result Value Ref Range    Sodium 137 136 - 145 mmol/L    Potassium 4.2 3.5 - 5.1 mmol/L    Chloride 106 95 - 110 mmol/L    CO2 25 23 - 29 mmol/L    Glucose 176 (H) 70 - 110 mg/dL    BUN 8 2 - 20 mg/dL    Creatinine 1.00 0.50 - 1.40 mg/dL    Calcium 8.7 8.7 - 10.5 mg/dL    Total Protein 7.4 6.0 - 8.4 g/dL    Albumin 3.9 3.5 - 5.2 g/dL    Total Bilirubin 0.8 0.1 - 1.0 mg/dL    Alkaline Phosphatase 97 38 - 126 U/L      (H) 15 - 46 U/L    ALT 44 10 - 44 U/L    Anion Gap 6 (L) 8 - 16 mmol/L    eGFR if African American >60.0 >60 mL/min/1.73 m^2    eGFR if non African American >60.0 >60 mL/min/1.73 m^2   Troponin I #1    Collection Time: 06/19/22 11:16 AM   Result Value Ref Range    Troponin I 1.760 (H) 0.012 - 0.034 ng/mL   NT-Pro Natriuretic Peptide    Collection Time: 06/19/22 11:16 AM   Result Value Ref Range    NT-proBNP 976 (H) 5 - 900 pg/mL   COVID-19 Rapid Screening    Collection Time: 06/19/22 12:08 PM   Result Value Ref Range    SARS-CoV-2 RNA, Amplification, Qual Negative Negative   APTT    Collection Time: 06/19/22 12:15 PM   Result Value Ref Range    aPTT 25.6 21.0 - 32.0 sec   Protime-INR    Collection Time: 06/19/22 12:15 PM   Result Value Ref Range    Prothrombin Time 10.9 9.0 - 12.5 sec    INR 1.0 0.8 - 1.2   Troponin I    Collection Time: 06/19/22  3:27 PM   Result Value Ref Range    Troponin I 9.230 (H) 0.012 - 0.034 ng/mL   Comprehensive metabolic panel    Collection Time: 06/19/22  5:06 PM   Result Value Ref Range    Sodium 142 136 - 145 mmol/L    Potassium 3.8 3.5 - 5.1 mmol/L    Chloride 107 95 - 110 mmol/L    CO2 22 (L) 23 - 29 mmol/L    Glucose 98 70 - 110 mg/dL    BUN 9 6 - 20 mg/dL    Creatinine 0.8 0.5 - 1.4 mg/dL    Calcium 8.6 (L) 8.7 - 10.5 mg/dL    Total Protein 6.8 6.0 - 8.4 g/dL    Albumin 3.0 (L) 3.5 - 5.2 g/dL    Total Bilirubin 0.9 0.1 - 1.0 mg/dL    Alkaline Phosphatase 67 55 - 135 U/L     (H) 10 - 40 U/L    ALT 39 10 - 44 U/L    Anion Gap 13 8 - 16 mmol/L    eGFR if African American >60 >60 mL/min/1.73 m^2    eGFR if non African American >60 >60 mL/min/1.73 m^2   CBC auto differential    Collection Time: 06/19/22  5:06 PM   Result Value Ref Range    WBC 8.85 3.90 - 12.70 K/uL    RBC 4.06 (L) 4.60 - 6.20 M/uL    Hemoglobin 14.0 14.0 - 18.0 g/dL    Hematocrit 40.9 40.0 - 54.0 %     (H) 82 - 98 fL    MCH 34.5 (H) 27.0 - 31.0 pg    MCHC 34.2 32.0 - 36.0 g/dL    RDW 13.0 11.5  - 14.5 %    Platelets 193 150 - 450 K/uL    MPV 10.7 9.2 - 12.9 fL    Immature Granulocytes 0.6 (H) 0.0 - 0.5 %    Gran # (ANC) 7.0 1.8 - 7.7 K/uL    Immature Grans (Abs) 0.05 (H) 0.00 - 0.04 K/uL    Lymph # 1.0 1.0 - 4.8 K/uL    Mono # 0.8 0.3 - 1.0 K/uL    Eos # 0.0 0.0 - 0.5 K/uL    Baso # 0.04 0.00 - 0.20 K/uL    nRBC 0 0 /100 WBC    Gran % 78.5 (H) 38.0 - 73.0 %    Lymph % 11.2 (L) 18.0 - 48.0 %    Mono % 8.9 4.0 - 15.0 %    Eosinophil % 0.3 0.0 - 8.0 %    Basophil % 0.5 0.0 - 1.9 %    Differential Method Automated          Significant Imaging: I have reviewed all pertinent imaging results/findings within the past 24 hours.    Assessment/Plan:     * NSTEMI (non-ST elevated myocardial infarction)  Chest pain    - presents with reported left sided chest pain x2-3 weeks  - EKG revealed ST depression V3, V4, V5 and V6.  -Troponin 1.760--9.230; continue to trend troponins   -NT-proBNP 976  -CXR revealed ground-glass infiltrate within the periphery of the right lung could relate to and Modic process including COVID-19 infection.  - Received 325mg PO ASA x1 in ED and was started on continuous heparin infusion  -Will give Plavix load 600mg PO now  - Continue ASA 81mg daily and Plavix 81mg daily  - TTE ordered  - stratify risks: obtain lipid panel, HgbA1c, and TSH  - Monitor on telemetry  - CMP daily; keep K >4, Mg >2  -Supplemental oxygen PRN for SpO2 <90%  -Will initiate atorvastatin 40mg   -SL Nitro PRN for chest pain  -EKG PRN for recurrent chest pain  - Cardiology consulted in ED; appreciate assistance  -NPO after midnight for LHC in am    Alcohol dependence  -admits to drinking 12 can pack of beer daily  -Initiated alcohol withdrawal protocol  -CIWA q4hr  -Thiamine, MVI, and folate daily  -Monitor for s/s of alcohol withdrawal or DTs      Chest pain  See above      Anxiety  -resume home zoloft 50mg PO nightly      Rheumatoid arthritis  -stable  -Followed by Rheumatologist, Dr. Lewis  -On Methotrexate 2.5mg (15mg  total) weekly  -Resume folic acid        Primary hypertension  -Resume home doxazosin 2mg PO nightly  -Monitor and trend vital signs q4hr  -Anti-hypertensive PRN for SBP >180        VTE Risk Mitigation (From admission, onward)         Ordered     IP VTE HIGH RISK PATIENT  Once         06/19/22 1656     Place sequential compression device  Until discontinued         06/19/22 1656     Reason for No Pharmacological VTE Prophylaxis  Once        Question:  Reasons:  Answer:  IV Heparin w/in 24 hrs. Pre or Post-Op    06/19/22 1656     Place sequential compression device  Until discontinued         06/19/22 1654     heparin 25,000 units in dextrose 5% (100 units/ml) IV bolus from bag - ADDITIONAL PRN BOLUS - 60 units/kg (max bolus 4000 units)  As needed (PRN)        Question:  Heparin Infusion Adjustment (DO NOT MODIFY ANSWER)  Answer:  [email or web address]\epic\Images\Pharmacy\HeparinInfusions\heparin LOW INTENSITY nomogram for [facility] [id number].pdf    06/19/22 1216     heparin 25,000 units in dextrose 5% (100 units/ml) IV bolus from bag - ADDITIONAL PRN BOLUS - 30 units/kg (max bolus 4000 units)  As needed (PRN)        Question:  Heparin Infusion Adjustment (DO NOT MODIFY ANSWER)  Answer:  [email or web address]\epic\Images\Pharmacy\HeparinInfusions\heparin LOW INTENSITY nomogram for [facility] [id number].pdf    06/19/22 1216     heparin 25,000 units in dextrose 5% 250 mL (100 units/mL) infusion LOW INTENSITY nomogram - [facility]  Continuous        Question Answer Comment   Heparin Infusion Adjustment (DO NOT MODIFY ANSWER) \\[email or web address]\epic\Images\Pharmacy\HeparinInfusions\heparin LOW INTENSITY nomogram for [facility] [id number].pdf    Begin at (in units/kg/hr) 12        06/19/22 1216                   Carie Lyman, RUDDY, ACNPC-AG, CCRN  Hospitalist  Department of Hospital Medicine  [facility]   Pager: [phone]

## 2022-06-19 NOTE — ED NOTES
Pt resting on stretcher. Denies any chest pain. No SOB. VSS. Denies any needs at this time. Awaiting EMS for transport.

## 2022-06-19 NOTE — ASSESSMENT & PLAN NOTE
-Resume home doxazosin 2mg PO nightly  -Monitor and trend vital signs q4hr  -Anti-hypertensive PRN for SBP >180

## 2022-06-19 NOTE — ASSESSMENT & PLAN NOTE
-stable  -Followed by Rheumatologist, Dr. Lewis  -On Methotrexate 2.5mg (15mg total) weekly  -Resume folic acid

## 2022-06-20 LAB
ALBUMIN SERPL BCP-MCNC: 2.8 G/DL (ref 3.5–5.2)
ALP SERPL-CCNC: 66 U/L (ref 55–135)
ALT SERPL W/O P-5'-P-CCNC: 39 U/L (ref 10–44)
ANION GAP SERPL CALC-SCNC: 14 MMOL/L (ref 8–16)
AORTIC ROOT ANNULUS: 3.18 CM
AORTIC VALVE CUSP SEPERATION: 1.99 CM
APTT BLDCRRT: 27.4 SEC (ref 21–32)
APTT BLDCRRT: 35.1 SEC (ref 21–32)
APTT BLDCRRT: 39.5 SEC (ref 21–32)
APTT BLDCRRT: 50.1 SEC (ref 21–32)
AST SERPL-CCNC: 135 U/L (ref 10–40)
AV INDEX (PROSTH): 0.59
AV MEAN GRADIENT: 5 MMHG
AV PEAK GRADIENT: 8 MMHG
AV VALVE AREA: 2.04 CM2
AV VELOCITY RATIO: 0.72
BASOPHILS # BLD AUTO: 0.03 K/UL (ref 0–0.2)
BASOPHILS NFR BLD: 0.3 % (ref 0–1.9)
BILIRUB SERPL-MCNC: 1 MG/DL (ref 0.1–1)
BSA FOR ECHO PROCEDURE: 2.25 M2
BUN SERPL-MCNC: 9 MG/DL (ref 6–20)
CALCIUM SERPL-MCNC: 8.3 MG/DL (ref 8.7–10.5)
CHLORIDE SERPL-SCNC: 102 MMOL/L (ref 95–110)
CHOLEST SERPL-MCNC: 160 MG/DL (ref 120–199)
CHOLEST/HDLC SERPL: 3.6 {RATIO} (ref 2–5)
CO2 SERPL-SCNC: 23 MMOL/L (ref 23–29)
CREAT SERPL-MCNC: 0.8 MG/DL (ref 0.5–1.4)
CV ECHO LV RWT: 0.34 CM
DIFFERENTIAL METHOD: ABNORMAL
DOP CALC AO PEAK VEL: 1.41 M/S
DOP CALC AO VTI: 26.71 CM
DOP CALC LVOT AREA: 3.4 CM2
DOP CALC LVOT DIAMETER: 2.09 CM
DOP CALC LVOT PEAK VEL: 1.01 M/S
DOP CALC LVOT STROKE VOLUME: 54.49 CM3
DOP CALC MV VTI: 28.34 CM
DOP CALCLVOT PEAK VEL VTI: 15.89 CM
E WAVE DECELERATION TIME: 139.05 MSEC
E/A RATIO: 1.14
E/E' RATIO: 12.13 M/S
ECHO LV POSTERIOR WALL: 1.02 CM (ref 0.6–1.1)
EJECTION FRACTION: 30 %
EOSINOPHIL # BLD AUTO: 0.1 K/UL (ref 0–0.5)
EOSINOPHIL NFR BLD: 0.9 % (ref 0–8)
ERYTHROCYTE [DISTWIDTH] IN BLOOD BY AUTOMATED COUNT: 13.1 % (ref 11.5–14.5)
EST. GFR  (AFRICAN AMERICAN): >60 ML/MIN/1.73 M^2
EST. GFR  (NON AFRICAN AMERICAN): >60 ML/MIN/1.73 M^2
ESTIMATED AVG GLUCOSE: 123 MG/DL (ref 68–131)
FRACTIONAL SHORTENING: 18 % (ref 28–44)
GLUCOSE SERPL-MCNC: 73 MG/DL (ref 70–110)
HBA1C MFR BLD: 5.9 % (ref 4–5.6)
HCT VFR BLD AUTO: 40.7 % (ref 40–54)
HDLC SERPL-MCNC: 44 MG/DL (ref 40–75)
HDLC SERPL: 27.5 % (ref 20–50)
HGB BLD-MCNC: 13.9 G/DL (ref 14–18)
IMM GRANULOCYTES # BLD AUTO: 0.07 K/UL (ref 0–0.04)
IMM GRANULOCYTES NFR BLD AUTO: 0.7 % (ref 0–0.5)
INTERVENTRICULAR SEPTUM: 1.06 CM (ref 0.6–1.1)
IVRT: 105.61 MSEC
LA MAJOR: 5.58 CM
LA WIDTH: 4.2 CM
LDLC SERPL CALC-MCNC: 92.8 MG/DL (ref 63–159)
LEFT ATRIUM SIZE: 3.97 CM
LEFT ATRIUM VOLUME INDEX MOD: 29.7 ML/M2
LEFT ATRIUM VOLUME MOD: 65.3 CM3
LEFT INTERNAL DIMENSION IN SYSTOLE: 4.97 CM (ref 2.1–4)
LEFT VENTRICLE DIASTOLIC VOLUME INDEX: 83.85 ML/M2
LEFT VENTRICLE DIASTOLIC VOLUME: 184.46 ML
LEFT VENTRICLE MASS INDEX: 120 G/M2
LEFT VENTRICLE SYSTOLIC VOLUME INDEX: 52.9 ML/M2
LEFT VENTRICLE SYSTOLIC VOLUME: 116.35 ML
LEFT VENTRICULAR INTERNAL DIMENSION IN DIASTOLE: 6.06 CM (ref 3.5–6)
LEFT VENTRICULAR MASS: 264.18 G
LV LATERAL E/E' RATIO: 9.1 M/S
LV SEPTAL E/E' RATIO: 18.2 M/S
LYMPHOCYTES # BLD AUTO: 1.2 K/UL (ref 1–4.8)
LYMPHOCYTES NFR BLD: 11.9 % (ref 18–48)
MAGNESIUM SERPL-MCNC: 1.7 MG/DL (ref 1.6–2.6)
MCH RBC QN AUTO: 33.8 PG (ref 27–31)
MCHC RBC AUTO-ENTMCNC: 34.2 G/DL (ref 32–36)
MCV RBC AUTO: 99 FL (ref 82–98)
MONOCYTES # BLD AUTO: 0.9 K/UL (ref 0.3–1)
MONOCYTES NFR BLD: 8.9 % (ref 4–15)
MV A" WAVE DURATION": 13.42 MSEC
MV MEAN GRADIENT: 1 MMHG
MV PEAK A VEL: 0.8 M/S
MV PEAK E VEL: 0.91 M/S
MV PEAK GRADIENT: 5 MMHG
MV STENOSIS PRESSURE HALF TIME: 40.32 MS
MV VALVE AREA BY CONTINUITY EQUATION: 1.92 CM2
MV VALVE AREA P 1/2 METHOD: 5.46 CM2
NEUTROPHILS # BLD AUTO: 7.6 K/UL (ref 1.8–7.7)
NEUTROPHILS NFR BLD: 77.3 % (ref 38–73)
NONHDLC SERPL-MCNC: 116 MG/DL
NRBC BLD-RTO: 0 /100 WBC
PISA MRMAX VEL: 0.05 M/S
PISA TR MAX VEL: 1.22 M/S
PLATELET # BLD AUTO: 189 K/UL (ref 150–450)
PMV BLD AUTO: 11.2 FL (ref 9.2–12.9)
POCT GLUCOSE: 101 MG/DL (ref 70–110)
POCT GLUCOSE: 122 MG/DL (ref 70–110)
POTASSIUM SERPL-SCNC: 3.5 MMOL/L (ref 3.5–5.1)
PROT SERPL-MCNC: 6.4 G/DL (ref 6–8.4)
PULM VEIN S/D RATIO: 0.88
PV PEAK D VEL: 0.43 M/S
PV PEAK S VEL: 0.38 M/S
PV PEAK VELOCITY: 0.99 CM/S
RA PRESSURE: 3 MMHG
RBC # BLD AUTO: 4.11 M/UL (ref 4.6–6.2)
RIGHT VENTRICULAR END-DIASTOLIC DIMENSION: 2.85 CM
SODIUM SERPL-SCNC: 139 MMOL/L (ref 136–145)
TDI LATERAL: 0.1 M/S
TDI SEPTAL: 0.05 M/S
TDI: 0.08 M/S
TR MAX PG: 6 MMHG
TRICUSPID ANNULAR PLANE SYSTOLIC EXCURSION: 1.82 CM
TRIGL SERPL-MCNC: 116 MG/DL (ref 30–150)
TROPONIN I SERPL DL<=0.01 NG/ML-MCNC: 16.61 NG/ML (ref 0–0.03)
TROPONIN I SERPL DL<=0.01 NG/ML-MCNC: 7.62 NG/ML (ref 0–0.03)
TSH SERPL DL<=0.005 MIU/L-ACNC: 1.75 UIU/ML (ref 0.4–4)
TV REST PULMONARY ARTERY PRESSURE: 9 MMHG
WBC # BLD AUTO: 9.87 K/UL (ref 3.9–12.7)

## 2022-06-20 PROCEDURE — 85347 COAGULATION TIME ACTIVATED: CPT | Performed by: INTERNAL MEDICINE

## 2022-06-20 PROCEDURE — 93458 L HRT ARTERY/VENTRICLE ANGIO: CPT | Performed by: INTERNAL MEDICINE

## 2022-06-20 PROCEDURE — 84484 ASSAY OF TROPONIN QUANT: CPT

## 2022-06-20 PROCEDURE — 25000003 PHARM REV CODE 250: Performed by: FAMILY MEDICINE

## 2022-06-20 PROCEDURE — 93458 PR CATH PLACE/CORON ANGIO, IMG SUPER/INTERP,W LEFT HEART VENTRICULOGRAPHY: ICD-10-PCS | Mod: 26,,, | Performed by: INTERNAL MEDICINE

## 2022-06-20 PROCEDURE — 63600175 PHARM REV CODE 636 W HCPCS: Performed by: INTERNAL MEDICINE

## 2022-06-20 PROCEDURE — 25500020 PHARM REV CODE 255: Performed by: INTERNAL MEDICINE

## 2022-06-20 PROCEDURE — 99900035 HC TECH TIME PER 15 MIN (STAT)

## 2022-06-20 PROCEDURE — 36415 COLL VENOUS BLD VENIPUNCTURE: CPT | Performed by: FAMILY MEDICINE

## 2022-06-20 PROCEDURE — 84443 ASSAY THYROID STIM HORMONE: CPT

## 2022-06-20 PROCEDURE — 25000003 PHARM REV CODE 250

## 2022-06-20 PROCEDURE — 99222 1ST HOSP IP/OBS MODERATE 55: CPT | Mod: ,,, | Performed by: INTERNAL MEDICINE

## 2022-06-20 PROCEDURE — 94761 N-INVAS EAR/PLS OXIMETRY MLT: CPT

## 2022-06-20 PROCEDURE — 85730 THROMBOPLASTIN TIME PARTIAL: CPT | Performed by: FAMILY MEDICINE

## 2022-06-20 PROCEDURE — 63600175 PHARM REV CODE 636 W HCPCS: Performed by: FAMILY MEDICINE

## 2022-06-20 PROCEDURE — C1894 INTRO/SHEATH, NON-LASER: HCPCS | Performed by: INTERNAL MEDICINE

## 2022-06-20 PROCEDURE — 83735 ASSAY OF MAGNESIUM: CPT

## 2022-06-20 PROCEDURE — 36415 COLL VENOUS BLD VENIPUNCTURE: CPT

## 2022-06-20 PROCEDURE — 85025 COMPLETE CBC W/AUTO DIFF WBC: CPT

## 2022-06-20 PROCEDURE — 11000001 HC ACUTE MED/SURG PRIVATE ROOM

## 2022-06-20 PROCEDURE — 85730 THROMBOPLASTIN TIME PARTIAL: CPT | Mod: 91 | Performed by: FAMILY MEDICINE

## 2022-06-20 PROCEDURE — C1769 GUIDE WIRE: HCPCS | Performed by: INTERNAL MEDICINE

## 2022-06-20 PROCEDURE — 83036 HEMOGLOBIN GLYCOSYLATED A1C: CPT

## 2022-06-20 PROCEDURE — 99153 MOD SED SAME PHYS/QHP EA: CPT | Performed by: INTERNAL MEDICINE

## 2022-06-20 PROCEDURE — 99152 MOD SED SAME PHYS/QHP 5/>YRS: CPT | Performed by: INTERNAL MEDICINE

## 2022-06-20 PROCEDURE — 25000003 PHARM REV CODE 250: Performed by: INTERNAL MEDICINE

## 2022-06-20 PROCEDURE — 80053 COMPREHEN METABOLIC PANEL: CPT

## 2022-06-20 PROCEDURE — 99152 MOD SED SAME PHYS/QHP 5/>YRS: CPT | Mod: ,,, | Performed by: INTERNAL MEDICINE

## 2022-06-20 PROCEDURE — 63600175 PHARM REV CODE 636 W HCPCS: Performed by: NURSE PRACTITIONER

## 2022-06-20 PROCEDURE — 93458 L HRT ARTERY/VENTRICLE ANGIO: CPT | Mod: 26,,, | Performed by: INTERNAL MEDICINE

## 2022-06-20 PROCEDURE — 99222 PR INITIAL HOSPITAL CARE,LEVL II: ICD-10-PCS | Mod: ,,, | Performed by: INTERNAL MEDICINE

## 2022-06-20 PROCEDURE — 99152 PR MOD CONSCIOUS SEDATION, SAME PHYS, 5+ YRS, FIRST 15 MIN: ICD-10-PCS | Mod: ,,, | Performed by: INTERNAL MEDICINE

## 2022-06-20 PROCEDURE — 80061 LIPID PANEL: CPT

## 2022-06-20 PROCEDURE — C1887 CATHETER, GUIDING: HCPCS | Performed by: INTERNAL MEDICINE

## 2022-06-20 RX ORDER — MIDAZOLAM HYDROCHLORIDE 1 MG/ML
INJECTION, SOLUTION INTRAMUSCULAR; INTRAVENOUS
Status: DISCONTINUED | OUTPATIENT
Start: 2022-06-20 | End: 2022-06-20 | Stop reason: HOSPADM

## 2022-06-20 RX ORDER — FUROSEMIDE 10 MG/ML
20 INJECTION INTRAMUSCULAR; INTRAVENOUS ONCE
Status: COMPLETED | OUTPATIENT
Start: 2022-06-20 | End: 2022-06-20

## 2022-06-20 RX ORDER — FENTANYL CITRATE 50 UG/ML
INJECTION, SOLUTION INTRAMUSCULAR; INTRAVENOUS
Status: DISCONTINUED | OUTPATIENT
Start: 2022-06-20 | End: 2022-06-20 | Stop reason: HOSPADM

## 2022-06-20 RX ORDER — METHOTREXATE 2.5 MG/1
15 TABLET ORAL ONCE
Status: COMPLETED | OUTPATIENT
Start: 2022-06-20 | End: 2022-06-20

## 2022-06-20 RX ORDER — VERAPAMIL HYDROCHLORIDE 2.5 MG/ML
INJECTION, SOLUTION INTRAVENOUS
Status: DISCONTINUED | OUTPATIENT
Start: 2022-06-20 | End: 2022-06-20 | Stop reason: HOSPADM

## 2022-06-20 RX ORDER — HEPARIN SODIUM 1000 [USP'U]/ML
INJECTION, SOLUTION INTRAVENOUS; SUBCUTANEOUS
Status: DISCONTINUED | OUTPATIENT
Start: 2022-06-20 | End: 2022-06-20 | Stop reason: HOSPADM

## 2022-06-20 RX ORDER — NAPROXEN SODIUM 220 MG/1
TABLET, FILM COATED ORAL
Status: DISCONTINUED | OUTPATIENT
Start: 2022-06-20 | End: 2022-06-20 | Stop reason: HOSPADM

## 2022-06-20 RX ORDER — CLOPIDOGREL BISULFATE 75 MG/1
TABLET ORAL
Status: DISCONTINUED | OUTPATIENT
Start: 2022-06-20 | End: 2022-06-20 | Stop reason: HOSPADM

## 2022-06-20 RX ORDER — IODIXANOL 320 MG/ML
INJECTION, SOLUTION INTRAVASCULAR
Status: DISCONTINUED | OUTPATIENT
Start: 2022-06-20 | End: 2022-06-24 | Stop reason: HOSPADM

## 2022-06-20 RX ORDER — HEPARIN SODIUM 200 [USP'U]/100ML
INJECTION, SOLUTION INTRAVENOUS
Status: DISCONTINUED | OUTPATIENT
Start: 2022-06-20 | End: 2022-06-24 | Stop reason: HOSPADM

## 2022-06-20 RX ORDER — SODIUM CHLORIDE 0.9 % (FLUSH) 0.9 %
10 SYRINGE (ML) INJECTION
Status: DISCONTINUED | OUTPATIENT
Start: 2022-06-20 | End: 2022-06-24 | Stop reason: HOSPADM

## 2022-06-20 RX ORDER — LIDOCAINE HYDROCHLORIDE 10 MG/ML
INJECTION, SOLUTION EPIDURAL; INFILTRATION; INTRACAUDAL; PERINEURAL
Status: DISCONTINUED | OUTPATIENT
Start: 2022-06-20 | End: 2022-06-20 | Stop reason: HOSPADM

## 2022-06-20 RX ADMIN — CLOPIDOGREL 75 MG: 75 TABLET, FILM COATED ORAL at 12:06

## 2022-06-20 RX ADMIN — FOLIC ACID 1000 MCG: 1 TABLET ORAL at 12:06

## 2022-06-20 RX ADMIN — HEPARIN SODIUM 17 UNITS/KG/HR: 1000 INJECTION, SOLUTION INTRAVENOUS; SUBCUTANEOUS at 02:06

## 2022-06-20 RX ADMIN — DOXAZOSIN 2 MG: 2 TABLET ORAL at 08:06

## 2022-06-20 RX ADMIN — HEPARIN SODIUM 15 UNITS/KG/HR: 1000 INJECTION, SOLUTION INTRAVENOUS; SUBCUTANEOUS at 03:06

## 2022-06-20 RX ADMIN — SERTRALINE HYDROCHLORIDE 50 MG: 50 TABLET ORAL at 08:06

## 2022-06-20 RX ADMIN — THIAMINE HCL TAB 100 MG 100 MG: 100 TAB at 12:06

## 2022-06-20 RX ADMIN — METHOTREXATE SODIUM 15 MG: 2.5 TABLET ORAL at 03:06

## 2022-06-20 RX ADMIN — DIAZEPAM 5 MG: 5 TABLET ORAL at 02:06

## 2022-06-20 RX ADMIN — FUROSEMIDE 20 MG: 10 INJECTION, SOLUTION INTRAMUSCULAR; INTRAVENOUS at 12:06

## 2022-06-20 RX ADMIN — HUMAN ALBUMIN MICROSPHERES AND PERFLUTREN 0.11 MG: 10; .22 INJECTION, SOLUTION INTRAVENOUS at 05:06

## 2022-06-20 RX ADMIN — DIAZEPAM 5 MG: 5 TABLET ORAL at 05:06

## 2022-06-20 RX ADMIN — ATORVASTATIN CALCIUM 40 MG: 40 TABLET, FILM COATED ORAL at 12:06

## 2022-06-20 NOTE — PLAN OF CARE
SW met with pt via [facility] to discuss dc planning. Pts wife was at bedside Caroline Aviles   [phone].     Pt confirmed information on chart. Pt reported that he resides in home with his wife. Pt is independent in ADLs Pt reported upon dc his wife will transport him home. Pt reported no HH/DME at home. Pt was made aware to contact SW with any questions or concerns. SW will continue to follow pt throughout his transitions of care and assist with any dc needs.     Future Appointments   Date Time Provider Department Center   8/1/2022  9:00 AM Shane Lewis MD [facility] [place] [facility]        06/20/22 1304   Discharge Assessment   Assessment Type Discharge Planning Assessment   Confirmed/corrected address, phone number and insurance Yes   Confirmed Demographics Correct on Facesheet   Source of Information patient;family   Communicated TURNER with patient/caregiver Yes   Reason For Admission NSTEMI (non-ST elevated myocardial infarction)   Lives With spouse   Do you expect to return to your current living situation? Yes   Do you have help at home or someone to help you manage your care at home? Yes   Who are your caregiver(s) and their phone number(s)? Caroline Aviles (Spouse)   [phone]   Prior to hospitilization cognitive status: Alert/Oriented   Current cognitive status: Alert/Oriented   Walking or Climbing Stairs Difficulty none   Dressing/Bathing Difficulty none   Home Layout Able to live on 1st floor   Equipment Currently Used at Home none   Readmission within 30 days? No   Patient currently being followed by outpatient case management? No   Do you currently have service(s) that help you manage your care at home? No   Do you take prescription medications? Yes   Do you have prescription coverage? Yes   Coverage Blue [employer] Blue Shield   Do you have any problems affording any of your prescribed medications? No   Is the patient taking medications as prescribed? yes   Who is going to help you get home at  discharge? Caroline Aviles (Spouse)   [phone]   How do you get to doctors appointments? family or friend will provide   Are you on dialysis? No   Do you take coumadin? No   Discharge Plan A Home with family   DME Needed Upon Discharge  none   Discharge Plan discussed with: Patient;Spouse/sig other   Name(s) and Number(s) Caroline Aviles (Spouse)   [phone]   Discharge Barriers Identified None   Relationship/Environment   Name(s) of Who Lives With Patient Caroline Aviles (Spouse)   [phone]

## 2022-06-20 NOTE — ASSESSMENT & PLAN NOTE
- presented with chest pain concerning for ischemic etiology  - troponin elevated with peak at 18.379  - [facility] with multivessel CAD  - continue GDMT; transfer to [facility] for CABG evaluation

## 2022-06-20 NOTE — PLAN OF CARE
Report called to [facility] 4th floor tele. All questions and concerns addressed. Will continue to monitor pt through to transfer back to the floor.

## 2022-06-20 NOTE — CONSULTS
[facility] - Telemetry  Cardiology  Consult Note    Patient Name: Andrae Aviles  MRN: [id number]  Admission Date: 6/19/2022  Hospital Length of Stay: 1 days  Code Status: Full Code   Attending Provider: Vidhya Truong*   Consulting Provider: ALETHEA Stewart, GIANNA  Primary Care Physician: Shane Lewis MD  Principal Problem:NSTEMI (non-ST elevated myocardial infarction)      Inpatient consult to [facility]  Consult performed by: ALETHEA Woodard, GIANNA  Consult ordered by: Vidhya Truong MD  Reason for consult: NSTEMI         Subjective:     Chief Complaint:  Chest pain     HPI:   55yo male with HTN, RA, NSTEMI and chest pain who presented to the ER with complaints of chest pain. His consult was received this morning and he was in the cath lab during rounds. His HPI was obtained from his chart. He complained of left sided chest pain that started after eating and was accompanied by radiation, numbness to his left arm and SOB with palpitations. He describes the pain as if someone was sitting on his chest. He reports the chest pain started a few weeks ago and began increasing in frequency over the past week. Upon arrival to the ER, initial troponin 1.760 with trend up to 9.230-18.379 and down to 16.0608 this AM. EKG with NSR with normal axis and STTW abnormality. He was loaded with ASA and Plavix and started on GDMT with DAPT, statin and Heparin drip. BMP and CBC WNL. Cardiology consulted for NSTEMI. Taken to the cath lab with notation of multivessel CAD with plans for transfer to [facility] for CABG evaluation          No new subjective & objective note has been filed under this hospital service since the last note was generated.    Assessment and Plan:     * NSTEMI (non-ST elevated myocardial infarction)  - initial troponin 1.762 with trend up to 9.230-18.379 and down to 16.609; EKG with STTW abnormality  - [facility] today with following results:     LM: 80-90% ostial and mid LM  disease   LAD: Moderate early mid LAD lesion. Mild-mod distal LAD stenosis   LCx: 90% mid LCx stenosis. 80% stenosis OM2 at the point of bifurcation. Samll caliber vessels.   RCA: Proximal RCA disease. Mid RCA  with L-R collaterals  - continue GDMT; transfer to [facility] for CABG evaluation        Chest pain  - presented with chest pain concerning for ischemic etiology  - troponin elevated with peak at 18.379  - [facility] with multivessel CAD  - continue GDMT; transfer to [place] for CABG evaluation     Primary hypertension  - SBP 110s-120s overnight  - no antihypertensives noted on home med rec  - monitor BP         VTE Risk Mitigation (From admission, onward)         Ordered     heparin infusion 1,000 units/500 ml in 0.9% NaCl (pressure line flush)  Intra-op continuous PRN         06/20/22 0842     IP VTE HIGH RISK PATIENT  Once         06/19/22 1656     Place sequential compression device  Until discontinued         06/19/22 1656     Reason for No Pharmacological VTE Prophylaxis  Once        Question:  Reasons:  Answer:  IV Heparin w/in 24 hrs. Pre or Post-Op    06/19/22 1656     Place sequential compression device  Until discontinued         06/19/22 1654     heparin 25,000 units in dextrose 5% (100 units/ml) IV bolus from bag - ADDITIONAL PRN BOLUS - 60 units/kg (max bolus 4000 units)  As needed (PRN)        Question:  Heparin Infusion Adjustment (DO NOT MODIFY ANSWER)  Answer:  [email or web address]\[place]\Images\Pharmacy\HeparinInfusions\heparin LOW INTENSITY nomogram for [facility] [id number].pdf    06/19/22 1216     heparin 25,000 units in dextrose 5% (100 units/ml) IV bolus from bag - ADDITIONAL PRN BOLUS - 30 units/kg (max bolus 4000 units)  As needed (PRN)        Question:  Heparin Infusion Adjustment (DO NOT MODIFY ANSWER)  Answer:  [email or web address]\epic\Images\Pharmacy\HeparinInfusions\heparin LOW INTENSITY nomogram for [facility] [id number].pdf    06/19/22 1216     heparin 25,000 units in dextrose 5% 250 mL (100 units/mL) infusion LOW INTENSITY  nomogram - [facility]  Continuous        Question Answer Comment   Heparin Infusion Adjustment (DO NOT MODIFY ANSWER) \\[email or web address]\epic\Images\Pharmacy\HeparinInfusions\heparin LOW INTENSITY nomogram for [facility] [id number].pdf    Begin at (in units/kg/hr) 12        06/19/22 1216                Thank you for your consult. I will follow-up with patient. Please contact us if you have any additional questions.    ALETHEA Stewart, ANP  Cardiology   [place] - Telemetry

## 2022-06-20 NOTE — ASSESSMENT & PLAN NOTE
- initial troponin 1.762 with trend up to 9.230-18.379 and down to 16.609; EKG with STTW abnormality  - [facility] today with following results:     LM: 80-90% ostial and mid LM disease   LAD: Moderate early mid LAD lesion. Mild-mod distal LAD stenosis   LCx: 90% mid LCx stenosis. 80% stenosis OM2 at the point of bifurcation. Samll caliber vessels.   RCA: Proximal RCA disease. Mid RCA  with L-R collaterals  - continue GDMT; transfer to [facility] for CABG evaluation

## 2022-06-20 NOTE — PLAN OF CARE
Patient on room air tolerating well with no distress. Will continue to monitor.  No shortness of breath or wheezing detected at this time. PRN treatment not needed and will continue to monitor.

## 2022-06-20 NOTE — PLAN OF CARE
VN note: VN completed AVS and attachments and notified bedside nurse, Demetra TERAN Will cont to be available and intervene prn.

## 2022-06-20 NOTE — DISCHARGE INSTRUCTIONS
Discharge Instructions:    Do not drive a car, operate heavy equipment, care for a young child, etc for the next 12-24 hours.   Avoid drinking alcohol for 24 hours.  Do not make any important decisions for 24 hours.  Drink fluids to keep hydrated. Resume your usual diet as tolerated.   Rest for today then activity as tolerated.   Do not lift anything over 5 pounds for the first 3 days after procedure.    Remove dressing tomorrow Tuesday 6/21/2022 at 11:00AM then may shower with warm soapy water. Do not scrub site. Pat dry.   May apply bandaid for 2 days. No tub baths.  Do not submerge wound in water for 3 days.     Call MD for any unrelieved pain, excessive nausea or vomiting, redness around site, bleeding, or pus or foul smelling drainage, or any other questions or concerns. [phone]. CALL 911 and Go to the ER for any difficulty breathing or chest pain.    If site swells or bleeds, hold direct pressure to area for 10 full minutes.   If site continues to bleed, continue to hold pressure to site and have someone bring you to the ER.      Follow any additional instructions given to you by MD. Call MD to schedule a follow up appointment, or follow up as instructed.    Understanding Transradial Cardiac Catheterization      Cardiac catheterization (cardiac cath) is a common, non-surgical procedure. During the procedure, your doctor will insert a long, thin tube (catheter) into an artery and move it up into your heart. Transradial means the catheter is inserted into an artery in the wrist (the radial artery). This procedure can be used to diagnose and treat certain heart problems.  Why do I need a transradial cardiac cath?  You may need a cardiac cath if signs indicate a problem with your heart. These may include:  Symptoms of chest pain, tightness, or heaviness (known as angina). This is a common symptom of blocked heart arteries, known as coronary artery disease.  Symptoms of weakness, dizziness, trouble breathing,  or swollen legs or feet. These may be symptoms of a problem with a heart valve or the heart muscle.  Other test results show heart problems. Tests may include stress tests, heart scans, and echocardiography.  During a cardiac cath, your doctor can see the condition of the coronary arteries and heart valves. He or she can also check how well the heart pumps and the flow of blood through the heart. Your doctor can also measure pressures and take blood samples. And, if needed, he or she can open blocked arteries. This can help reduce symptoms of angina.  Cardiac cath is often done using a catheter inserted into an artery in the groin. During transradial cardiac cath, the catheter is inserted into an artery in the wrist. This can mean less bleeding and a faster recovery. Some people may have blockages in the groin arteries as well as in the heart arteries, making it difficult to reach the heart. The transradial approach can be used to get around this problem.   What happens during a transradial cardiac cath?  The procedure is done in the hospital or a surgery center. First, an IV line is put in your arm or hand to deliver fluids and medicines. You will likely be given medicine to relax you and make you drowsy. When the procedure begins:  You lie on an X-ray table.  The skin over the insertion site in your wrist is numbed.  The doctor makes a tiny puncture or incision into the artery in the wrist. He or she then inserts a catheter and threads it through the blood vessel into your heart.    The doctor may inject a contrast fluid through the catheter into the arteries. This fluid makes the arteries show up better on X-rays.  Tests may be done to check the condition of your heart and arteries. If needed, the doctor can clear blockages in the arteries or do other repairs.  When the doctor is finished, he or she will remove the catheter and put direct pressure on the site to prevent bleeding.  You will stay for a time to  recover, and then go home.  What are the risks of transradial cardiac cath?  These include:  Bleeding, bruising, infection, or blood clots  Damage to the radial artery that may cause injury to the hand  Allergic reaction to the contrast fluid  Abnormal heartbeat (arrhythmia)  Damage to blood vessels or tissues  Kidney damage or failure  The need for emergency heart surgery  Heart attack, stroke, or death  Date Last Reviewed: 5/1/2016 © 2000-2017 [employer]. [address] [place] [address]. All rights reserved. This information is not intended as a substitute for professional medical care. Always follow your healthcare professional's instructions.    Bleeding or Hematoma After Cardiac Catheterization  You recently had cardiac catheterization. A catheter was put into your body through a puncture site in your groin or arm. You now have bleeding from this site. When bleeding occurs, it may drip or spurt from the site. Or it may collect in a lump (hematoma) under the skin. In the emergency department, pressure will be put on the site to stop bleeding. You will be sent home when the bleeding stops. To prevent repeat bleeding, take some precautions at home. If the bleeding starts again, follow the advice below.      Home care  For the next 48 hours:  Don't do any strenuous activity.  Don't climb stairs.  Don't lift anything heavy.  If the puncture site is in your arm or wrist, don't lie on that arm.  If your puncture site is in the groin, don't strain at bowel movements.  Don't scrub the site when bathing. It is fine to get it wet after your healthcare provider says it is OK, but don't scrub it or massage it.  If bleeding happens again, call 911. Take the following steps to stop the bleeding until help arrives:  Lie on your back.  Place a clean cloth or gauze pad over the puncture site. Then hold firm pressure right on the site. Or have someone else apply firm pressure using the gauze pad or  "washcloth.  Keep your arm straight and raised above the level of your heart if the site is in your arm or wrist. If the site is in your groin, have someone else hold pressure on the site. If you dont have someone to do this, put a 5-pound bag of rice or flour on the site and apply pressure with your hand over the bag.  Don't press too hard! If you press too hard, your leg and foot (or arm and hand) will not get blood flow and the skin under your toenails or fingernails may turn white. The skin should look pink, like the toenails on your other foot. When you (or someone) presses on the toenail it will turn white, but when you stop pressing on the nail it will turn pink again. This is called "capillary refill." If there is someone with you, he or she can check it.  If your toes, foot, or leg start feeling numb, tingly, or cold, ease up on the pressure, because you are probably pressing too hard.  As soon as emergency care arrives, they will take over your care.  Follow-up care  Follow up with your healthcare provider, or as advised. Ask your healthcare provider for a contact number to call.  Call 911  Call 911 if any of these occur:  Chest pain or pressure  Any bleeding from the site  Feeling weak or faint  Trouble breathing  Lump (hematoma) is quickly getting larger  Coolness, numbness, tingling, or skin color changes in the leg or arm with the puncture site  When to seek medical advice  Call your healthcare provider right away if any of these occur:  Increased pain, redness, swelling, or drainage from the puncture site  Nausea or vomiting  Date Last Reviewed: 1/11/2016  © 3620-9233 [employer]. [address] [place] [address], [place] [address]. All rights reserved. This information is not intended as a substitute for professional medical care. Always follow your healthcare professional's instructions.    Discharge Instructions for Cardiac Catheterization  Cardiac catheterization is a procedure to look for " blocked areas in the blood vessels that send blood to the heart. A thin, flexible tube (catheter) is put in a blood vessel in your groin or arm. The healthcare provider injects contrast fluid into your blood, which then flows to your heart. X-rays pictures are taken of your heart. Your provider will review the results with you. Be sure to ask any questions you have before you leave. This sheet will help you take care of yourself at home.  Home care  Only do light and easy activities for the next 2 to 3 days. Ask for help with chores and errands while you recover. Have someone drive you to your appointments.  Don't lift anything heavy for a while. Your healthcare team will tell you when it's safe to lift again.  Ask your healthcare team when you can expect to return to work. Unless your job involves lifting, you may be able to return to your normal activities within a couple of days.  Take your medicines as directed. Don't skip doses.  Drink 6 to 8 glasses of water a day. This is to help flush the contrast dye out of your body. Call your healthcare team if your urine has any change in color.  Take your temperature each day for 7 days. If you feel cold and clammy or start sweating, take your temperature right away and call your healthcare team.  Check your incisions every day for signs of infection. These include redness, swelling, and drainage. It is normal to have a small bruise or bump where the catheter was inserted. A bruise that is getting larger is not normal and should be reported to your healthcare team. If you see blood forming in the incision, call your healthcare team. Go to the emergency department if you have uncontrolled bleeding from the artery site. This is especially true if you take medicines that make it difficult for your blood to clot. Examples are aspirin, clopidogrel, and warfarin.  Eat a healthy diet. Make sure it is low in fat, salt, and cholesterol. Ask your healthcare team for diet  information.  Stop smoking. Enroll in a stop-smoking program or ask your healthcare team for help. Stop-smoking programs can be life saving.  Exercise as your healthcare team tells you to. Your healthcare team may recommend you start a cardiac rehabilitation program. Cardiac rehab is an exercise program in which trained healthcare staff watch your progress and stress on your heart while you exercise. Ask your team how to enroll.  Don't swim or take baths until your healthcare team says its OK. You can shower the day after the procedure. Keep the site clean and dry. This keeps the incision from getting wet and infected until the skin and artery can heal.  Follow-up care  Make a follow-up appointment as advised by our staff. It's common to have a follow-up appointment 2 to 4 weeks after an angioplasty or coronary stent procedure.  Make a yearly appointment, too. This is to make sure you are still doing well and not having any new symptoms.  Don't wait for a follow-up appointment if your medicines aren't working or you are having heart-related symptoms.  When to seek medical care  Call your healthcare provider right away if you have any of the following:  Chest pain  Constant or increasing pain or numbness in your leg  Fever of 100.4°F (38.0°C) or higher, or as directed by your healthcare provider  Symptoms of infection. These include redness, swelling, drainage, or warmth at the incision site.  Shortness of breath  A leg that feels cold or appears blue  Bleeding, bruising, or a lot of swelling where the catheter was inserted  Blood in your urine  Black or tarry stools  Any unusual bleeding   Date Last Reviewed: 10/1/2016  © 2167-4594 [employer]. [address] [place] [address], [place] [address]. All rights reserved. This information is not intended as a substitute for professional medical care. Always follow your healthcare professional's instructions.

## 2022-06-20 NOTE — PLAN OF CARE
Dr. Thompson notified by Stephanie PISANO RN of arrival of pt's wife to cath lab waiting room. Wife would like to be updated. Pt resting quietly, declined headphones and tv at this time.

## 2022-06-20 NOTE — PHARMACY MED REC
"[facility]           Pharmacy  Admission Medication Reconciliation     The home medication history was taken by Bernie Brandt PharmD.      Medication history obtained from Medications listed below were obtained from: Patient/family    Based on information gathered for medication list, you may go to "Admission" then "Reconcile Home Medications" tabs to review and/or act upon those items.      The home medication list has been updated by the Pharmacy department.    Please read ALL comments highlighted in yellow.    Please address this information as you see fit.     Feel free to contact us if you have any questions or require assistance.    The current inpatient medication list has been compared to the home medication list and the following discrepancies were noted:     Patient reports NOT TAKING the following medication(s):  o    Patient reports he/she IS TAKING the following which was not ordered upon admit  o    Patient reports taking a DIFFERENT DRUG than that ordered upon admit  o    Patient reports taking a drug DIFFERENTLY than how ordered upon admit  o     Potential issues to be addressed PRIOR TO DISCHARGE  Patient reported not taking the following medications: (COLCHICINE, PREDNISONE). These medications remain on the home medication list. Please address accordingly.     No current facility-administered medications on file prior to encounter.     Current Outpatient Medications on File Prior to Encounter   Medication Sig Dispense Refill    doxazosin (CARDURA) 2 MG tablet Take 1 tablet (2 mg total) by mouth nightly. 90 tablet 3    folic acid (FOLVITE) 1 MG tablet Take 1 tablet (1,000 mcg total) by mouth once daily. 90 tablet 2    methotrexate 2.5 MG Tab Take 6 tablets (15 mg total) by mouth every 7 days. 80 tablet 1    predniSONE (DELTASONE) 20 MG tablet Take 20 mg by mouth daily as needed.      sertraline (ZOLOFT) 50 MG tablet Take 1 tablet (50 mg total) by mouth every evening. " 90 tablet 3    [DISCONTINUED] colchicine (COLCRYS) 0.6 mg tablet Take 1 tablet (0.6 mg total) by mouth once daily. 30 tablet 0       Please address this information as you see fit.  Feel free to contact us if you have any questions or require assistance.    Bernie Brandt, PharmD  [phone]                .

## 2022-06-20 NOTE — PLAN OF CARE
SW confirmed with floor nurse that pt will be transported to [facility]. Pt expressed that he is already aware he will be transported to [facility]. SW confirmed with pt that once bed is available at [facility] he will be transported. Pt expressed understanding. SW will continue to follow pt throughout his transitions of care and assist with any dc needs. Please note pts Caroline Aviles (Spouse) [phone] was at bedside and expressed understanding as well.     Future Appointments   Date Time Provider Department Center   8/1/2022  9:00 AM Shane Lewis MD [facility]        06/20/22 1310   Final Note   Assessment Type Final Discharge Note   Hospital Resources/Appts/Education Provided Appointments scheduled by Navigator/Coordinator   Post-Acute Status   Discharge Delays None known at this time

## 2022-06-20 NOTE — PLAN OF CARE
2 cc of air removed from R radial vasc band. No hematoma or bleeding noted. +2 ewa radial pulses palpated. Skin is normal in color, warm to touch, < 3 sec cap refill. VSS. Will continue to monitor pt for safety and needs.

## 2022-06-20 NOTE — HPI
55yo male with HTN, RA, NSTEMI and chest pain who presented to the ER with complaints of chest pain. His consult was received this morning and he was in the cath lab during rounds. His HPI was obtained from his chart. He complained of left sided chest pain that started after eating and was accompanied by radiation, numbness to his left arm and SOB with palpitations. He describes the pain as if someone was sitting on his chest. He reports the chest pain started a few weeks ago and began increasing in frequency over the past week. Upon arrival to the ER, initial troponin 1.760 with trend up to 9.230-18.379 and down to 16.0608 this AM. EKG with NSR with normal axis and STTW abnormality. He was loaded with ASA and Plavix and started on GDMT with DAPT, statin and Heparin drip. BMP and CBC WNL. Cardiology consulted for NSTEMI. Taken to the cath lab with notation of multivessel CAD with plans for transfer to [facility] for CABG evaluation

## 2022-06-20 NOTE — HOSPITAL COURSE
6/20/2022 per HPI  6/21/2022 [facility] yesterday with results as detailed below:       Findings  Right dominant  LM: 80-90% ostial and mid LM disease  LAD: Moderate early mid LAD lesion. Mild-mod distal LAD stenosis  LCx: 90% mid LCx stenosis. 80% stenosis OM2 at the point of bifurcation. Samll caliber vessels.  RCA: Proximal RCA disease. Mid RCA  with L-R collaterals  LVEDP 29     Transfer to [facility] for CABG evaluation in process. Chest pain free overnight. HR and BP stable. Echo yesterday with EF 30-35%  6/22/2022 Stable overnight. No chest pain or SOB. Remains on IV Heparin. Awaiting bed at [facility]. Carotid ultrasound as part of pre op workup. HR and BP stable   6/23/2022 HR and BP stable overnight. H&H stable. Remains on IV Heparin. CT abdomen with spontaneous rectus abdominal hematoma. Abdominal ultrasound pending. Awaiting transfer to [facility] for CABG evaluation   6/24/2022 HR and BP remains stable. Heparin stopped due to rectus abdominal hematoma. Awaiting transfer to [facility] eval. H/H stable.

## 2022-06-20 NOTE — PLAN OF CARE
Per Dr. Thompson, restart heparin on floor at 1345 at previous rate. NO Bolus. Will inform PACO Mccrary at transfer.

## 2022-06-20 NOTE — PLAN OF CARE
Patient transferred via wheelchair to Room 432 4th floor tele on assigned cardiac tele monitor. VSS, AAOx4. No c/o pain.     Right radial gauze/tegaderm site CDI. No bleeding no hematoma noted. Site and surrounding area soft.  Assessed by PACO Mccrary at bedside. +2 ewa radial pulses. All questions answered.

## 2022-06-20 NOTE — PLAN OF CARE
SW attempted to complete assessment. Pt was not on floor. SW will continue to follow pt throughout his transitions of care and assist with any dc needs.     Future Appointments   Date Time Provider Department Center   8/1/2022  9:00 AM Shane Lewis MD [facility]           06/20/22 0929   Post-Acute Status   Post-Acute Authorization Other

## 2022-06-20 NOTE — PLAN OF CARE
Patient transfered to cath lab bay 2 via stretcher with side rails up x2. Pt AAOx4 and able to follow commands. Pt is stable when connecting to cardiac monitors. VSS. NADN. Will return to 4th floor tele room 432 after 2 hour recovery. Bedside report received from Adia LANDEROS RN.    Right radial vasc band in place with 12cc of air in band. Site is CDI. No redness, bruising, or hematoma noted around site. +2 ewa radial pulses palpated. Skin normal in color and warm to touch, <3 sec cap refill. Fall risk precautions given and patient acknowledges. AIDET completed to pt. Will continue to monitor patient.

## 2022-06-20 NOTE — BRIEF OP NOTE
Procedure: Coronary angiogram, LVEDP  Access: Right radial artery  Indication: NSTEMI    Findings  Right dominant  LM: 80-90% ostial and mid LM disease  LAD: Moderate early mid LAD lesion. Mild-mod distal LAD stenosis  LCx: 90% mid LCx stenosis. 80% stenosis OM2 at the point of bifurcation. Samll caliber vessels.  RCA: Proximal RCA disease. Mid RCA  with L-R collaterals    LVEDP 29    Recommendation  IV furosemide 20 mg x1  CTS consult    Update mitchell Velazquez in the waiting room

## 2022-06-21 DIAGNOSIS — I21.4 NSTEMI (NON-ST ELEVATED MYOCARDIAL INFARCTION): ICD-10-CM

## 2022-06-21 DIAGNOSIS — I25.10 CORONARY ARTERY DISEASE, UNSPECIFIED VESSEL OR LESION TYPE, UNSPECIFIED WHETHER ANGINA PRESENT, UNSPECIFIED WHETHER NATIVE OR TRANSPLANTED HEART: Primary | ICD-10-CM

## 2022-06-21 PROBLEM — I50.41 ACUTE COMBINED SYSTOLIC AND DIASTOLIC HEART FAILURE: Status: ACTIVE | Noted: 2022-06-21

## 2022-06-21 LAB
ALBUMIN SERPL BCP-MCNC: 2.9 G/DL (ref 3.5–5.2)
ALP SERPL-CCNC: 67 U/L (ref 55–135)
ALT SERPL W/O P-5'-P-CCNC: 30 U/L (ref 10–44)
ANION GAP SERPL CALC-SCNC: 11 MMOL/L (ref 8–16)
APTT BLDCRRT: 66.6 SEC (ref 21–32)
AST SERPL-CCNC: 83 U/L (ref 10–40)
BASOPHILS # BLD AUTO: 0.03 K/UL (ref 0–0.2)
BASOPHILS NFR BLD: 0.4 % (ref 0–1.9)
BILIRUB SERPL-MCNC: 1.1 MG/DL (ref 0.1–1)
BUN SERPL-MCNC: 8 MG/DL (ref 6–20)
CALCIUM SERPL-MCNC: 8.7 MG/DL (ref 8.7–10.5)
CHLORIDE SERPL-SCNC: 101 MMOL/L (ref 95–110)
CO2 SERPL-SCNC: 24 MMOL/L (ref 23–29)
CREAT SERPL-MCNC: 0.8 MG/DL (ref 0.5–1.4)
DIFFERENTIAL METHOD: ABNORMAL
EOSINOPHIL # BLD AUTO: 0 K/UL (ref 0–0.5)
EOSINOPHIL NFR BLD: 0.5 % (ref 0–8)
ERYTHROCYTE [DISTWIDTH] IN BLOOD BY AUTOMATED COUNT: 12.9 % (ref 11.5–14.5)
EST. GFR  (AFRICAN AMERICAN): >60 ML/MIN/1.73 M^2
EST. GFR  (NON AFRICAN AMERICAN): >60 ML/MIN/1.73 M^2
GLUCOSE SERPL-MCNC: 166 MG/DL (ref 70–110)
HCT VFR BLD AUTO: 43.8 % (ref 40–54)
HGB BLD-MCNC: 15.2 G/DL (ref 14–18)
IMM GRANULOCYTES # BLD AUTO: 0.04 K/UL (ref 0–0.04)
IMM GRANULOCYTES NFR BLD AUTO: 0.5 % (ref 0–0.5)
LYMPHOCYTES # BLD AUTO: 0.7 K/UL (ref 1–4.8)
LYMPHOCYTES NFR BLD: 9 % (ref 18–48)
MCH RBC QN AUTO: 34.3 PG (ref 27–31)
MCHC RBC AUTO-ENTMCNC: 34.7 G/DL (ref 32–36)
MCV RBC AUTO: 99 FL (ref 82–98)
MONOCYTES # BLD AUTO: 0.7 K/UL (ref 0.3–1)
MONOCYTES NFR BLD: 8 % (ref 4–15)
NEUTROPHILS # BLD AUTO: 6.7 K/UL (ref 1.8–7.7)
NEUTROPHILS NFR BLD: 81.6 % (ref 38–73)
NRBC BLD-RTO: 0 /100 WBC
PLATELET # BLD AUTO: 164 K/UL (ref 150–450)
PMV BLD AUTO: 10.6 FL (ref 9.2–12.9)
POC ACTIVATED CLOTTING TIME K: 166 SEC (ref 74–137)
POC ACTIVATED CLOTTING TIME K: 220 SEC (ref 74–137)
POCT GLUCOSE: 109 MG/DL (ref 70–110)
POCT GLUCOSE: 110 MG/DL (ref 70–110)
POCT GLUCOSE: 112 MG/DL (ref 70–110)
POCT GLUCOSE: 131 MG/DL (ref 70–110)
POTASSIUM SERPL-SCNC: 3.9 MMOL/L (ref 3.5–5.1)
PROT SERPL-MCNC: 7 G/DL (ref 6–8.4)
RBC # BLD AUTO: 4.43 M/UL (ref 4.6–6.2)
SAMPLE: ABNORMAL
SAMPLE: ABNORMAL
SODIUM SERPL-SCNC: 136 MMOL/L (ref 136–145)
WBC # BLD AUTO: 8.24 K/UL (ref 3.9–12.7)

## 2022-06-21 PROCEDURE — 99900035 HC TECH TIME PER 15 MIN (STAT)

## 2022-06-21 PROCEDURE — 80053 COMPREHEN METABOLIC PANEL: CPT | Performed by: NURSE PRACTITIONER

## 2022-06-21 PROCEDURE — 25000003 PHARM REV CODE 250: Performed by: FAMILY MEDICINE

## 2022-06-21 PROCEDURE — 94761 N-INVAS EAR/PLS OXIMETRY MLT: CPT

## 2022-06-21 PROCEDURE — 85730 THROMBOPLASTIN TIME PARTIAL: CPT | Performed by: FAMILY MEDICINE

## 2022-06-21 PROCEDURE — 25000003 PHARM REV CODE 250

## 2022-06-21 PROCEDURE — 11000001 HC ACUTE MED/SURG PRIVATE ROOM

## 2022-06-21 PROCEDURE — 63600175 PHARM REV CODE 636 W HCPCS: Performed by: FAMILY MEDICINE

## 2022-06-21 PROCEDURE — 36415 COLL VENOUS BLD VENIPUNCTURE: CPT | Performed by: NURSE PRACTITIONER

## 2022-06-21 PROCEDURE — 36415 COLL VENOUS BLD VENIPUNCTURE: CPT | Performed by: FAMILY MEDICINE

## 2022-06-21 PROCEDURE — 99233 PR SUBSEQUENT HOSPITAL CARE,LEVL III: ICD-10-PCS | Mod: ,,, | Performed by: NURSE PRACTITIONER

## 2022-06-21 PROCEDURE — 99233 SBSQ HOSP IP/OBS HIGH 50: CPT | Mod: ,,, | Performed by: NURSE PRACTITIONER

## 2022-06-21 PROCEDURE — 85025 COMPLETE CBC W/AUTO DIFF WBC: CPT | Performed by: NURSE PRACTITIONER

## 2022-06-21 RX ORDER — OXYCODONE AND ACETAMINOPHEN 5; 325 MG/1; MG/1
1 TABLET ORAL ONCE
Status: COMPLETED | OUTPATIENT
Start: 2022-06-21 | End: 2022-06-21

## 2022-06-21 RX ADMIN — DOXAZOSIN 2 MG: 2 TABLET ORAL at 10:06

## 2022-06-21 RX ADMIN — THIAMINE HCL TAB 100 MG 100 MG: 100 TAB at 09:06

## 2022-06-21 RX ADMIN — SERTRALINE HYDROCHLORIDE 50 MG: 50 TABLET ORAL at 10:06

## 2022-06-21 RX ADMIN — ATORVASTATIN CALCIUM 40 MG: 40 TABLET, FILM COATED ORAL at 09:06

## 2022-06-21 RX ADMIN — ASPIRIN 81 MG: 81 TABLET, COATED ORAL at 09:06

## 2022-06-21 RX ADMIN — HEPARIN SODIUM 20 UNITS/KG/HR: 1000 INJECTION, SOLUTION INTRAVENOUS; SUBCUTANEOUS at 12:06

## 2022-06-21 RX ADMIN — HEPARIN SODIUM 20 UNITS/KG/HR: 1000 INJECTION, SOLUTION INTRAVENOUS; SUBCUTANEOUS at 02:06

## 2022-06-21 RX ADMIN — CLOPIDOGREL 75 MG: 75 TABLET, FILM COATED ORAL at 09:06

## 2022-06-21 RX ADMIN — ACETAMINOPHEN 650 MG: 325 TABLET ORAL at 05:06

## 2022-06-21 RX ADMIN — OXYCODONE HYDROCHLORIDE AND ACETAMINOPHEN 1 TABLET: 5; 325 TABLET ORAL at 10:06

## 2022-06-21 RX ADMIN — FOLIC ACID 1000 MCG: 1 TABLET ORAL at 09:06

## 2022-06-21 RX ADMIN — DIPHENHYDRAMINE HYDROCHLORIDE 25 MG: 25 CAPSULE ORAL at 10:06

## 2022-06-21 NOTE — SUBJECTIVE & OBJECTIVE
Interval History: Patient is status post LHC - + multivessel CAD. Plan to transfer to [facility] for CVS eval and CABG. On heparin drip.  Denies chest pain and dyspnea    Review of Systems   Constitutional:  Negative for appetite change, chills and diaphoresis.   Respiratory:  Negative for cough, shortness of breath and wheezing.    Cardiovascular:  Negative for chest pain, palpitations and leg swelling.   Gastrointestinal:  Negative for abdominal pain, blood in stool and nausea.   Neurological:  Negative for dizziness, syncope and light-headedness.   Objective:     Vital Signs (Most Recent):  Temp: 97 °F (36.1 °C) (06/20/22 2033)  Pulse: 79 (06/20/22 2033)  Resp: 18 (06/20/22 2033)  BP: 125/76 (06/20/22 1747)  SpO2: 95 % (06/20/22 2033) Vital Signs (24h Range):  Temp:  [96.8 °F (36 °C)-98.6 °F (37 °C)] 97 °F (36.1 °C)  Pulse:  [70-89] 79  Resp:  [15-20] 18  SpO2:  [93 %-98 %] 95 %  BP: (109-126)/(69-85) 125/76     Weight: 102.5 kg (225 lb 15.5 oz)  Body mass index is 32.42 kg/m².  No intake or output data in the 24 hours ending 06/20/22 2134   Physical Exam  Vitals and nursing note reviewed.   Constitutional:       Appearance: He is well-developed.   Cardiovascular:      Rate and Rhythm: Normal rate and regular rhythm.      Heart sounds: Normal heart sounds.   Pulmonary:      Effort: Pulmonary effort is normal.      Breath sounds: Normal breath sounds.   Abdominal:      General: Bowel sounds are normal.      Palpations: Abdomen is soft.   Musculoskeletal:         General: Normal range of motion.   Skin:     General: Skin is warm and dry.   Neurological:      Mental Status: He is alert and oriented to person, place, and time.   Psychiatric:         Behavior: Behavior normal.       Significant Labs: All pertinent labs within the past 24 hours have been reviewed.  BMP:   Recent Labs   Lab 06/20/22  0311   GLU 73      K 3.5      CO2 23   BUN 9   CREATININE 0.8   CALCIUM 8.3*   MG 1.7     CBC:   Recent Labs   Lab  06/19/22  1116 06/19/22  1706 06/20/22  0312   WBC 7.19 8.85 9.87   HGB 15.4 14.0 13.9*   HCT 45.0 40.9 40.7    193 189     Cardiac Markers:   Recent Labs   Lab 06/19/22  1706   *       Significant Imaging: I have reviewed all pertinent imaging results/findings within the past 24 hours.

## 2022-06-21 NOTE — NURSING
Care plan reviewed. Cardiac and glucose monitoring in place. Pt remains on heparin low intensity infusion with two therapeutic levels, next aptt to be drawn in am. Heparin currently at 20units/kg/hr. Pt  Waiting for a bed to become available at [facility]. Cardiology following. Safety maintained, bed at lowest position, wheels locked, call light within reach.

## 2022-06-21 NOTE — SUBJECTIVE & OBJECTIVE
Interval History:   No fevers  No chest pain  Remains on heparin gtt  Awaiting bed at [facility] for CABG    Review of Systems   Constitutional:  Positive for activity change.   HENT: Negative.     Eyes: Negative.    Respiratory:  Negative for cough, choking, chest tightness and shortness of breath.    Cardiovascular:  Negative for chest pain, palpitations and leg swelling.   Gastrointestinal: Negative.    Endocrine: Negative.    Genitourinary: Negative.    Musculoskeletal: Negative.    Skin: Negative.    Allergic/Immunologic: Negative.    Neurological: Negative.    Hematological: Negative.    Psychiatric/Behavioral: Negative.     Objective:     Vital Signs (Most Recent):  Temp: 96.2 °F (35.7 °C) (06/21/22 1432)  Pulse: 85 (06/21/22 1432)  Resp: 17 (06/21/22 1432)  BP: 108/70 (06/21/22 1432)  SpO2: (!) 93 % (06/21/22 1432)   Vital Signs (24h Range):  Temp:  [96.2 °F (35.7 °C)-97.9 °F (36.6 °C)] 96.2 °F (35.7 °C)  Pulse:  [67-85] 85  Resp:  [17-18] 17  SpO2:  [91 %-98 %] 93 %  BP: (108-125)/(63-79) 108/70     Weight: 102.5 kg (225 lb 15.5 oz)  Body mass index is 32.42 kg/m².    Intake/Output Summary (Last 24 hours) at 6/21/2022 1516  Last data filed at 6/20/2022 2130  Gross per 24 hour   Intake 120 ml   Output --   Net 120 ml      Physical Exam  Constitutional:       Appearance: Normal appearance.   HENT:      Head: Normocephalic and atraumatic.      Nose: Nose normal.      Mouth/Throat:      Mouth: Mucous membranes are moist.   Eyes:      General: No scleral icterus.     Extraocular Movements: Extraocular movements intact.      Pupils: Pupils are equal, round, and reactive to light.   Cardiovascular:      Rate and Rhythm: Normal rate and regular rhythm.      Pulses: Normal pulses.      Heart sounds: Normal heart sounds.   Pulmonary:      Effort: Pulmonary effort is normal.      Breath sounds: Normal breath sounds. No wheezing, rhonchi or rales.   Chest:      Chest wall: No tenderness.   Abdominal:      Palpations: Abdomen  is soft.      Tenderness: There is no abdominal tenderness. There is no guarding or rebound.      Hernia: No hernia is present.   Genitourinary:     Comments: deferred  Musculoskeletal:         General: Normal range of motion.      Cervical back: Normal range of motion and neck supple.   Skin:     General: Skin is warm and dry.      Capillary Refill: Capillary refill takes less than 2 seconds.   Neurological:      Mental Status: He is alert and oriented to person, place, and time.   Psychiatric:         Mood and Affect: Mood normal.       Significant Labs:   Recent Labs   Lab 06/19/22  1706 06/20/22  0312 06/21/22  0842   WBC 8.85 9.87 8.24   HGB 14.0 13.9* 15.2   HCT 40.9 40.7 43.8    189 164   MONO 8.9  0.8 8.9  0.9 8.0  0.7     Recent Labs   Lab 06/19/22  1706 06/20/22  0311 06/21/22  0842    139 136   K 3.8 3.5 3.9    102 101   CO2 22* 23 24   BUN 9 9 8   CREATININE 0.8 0.8 0.8   CALCIUM 8.6* 8.3* 8.7   PROT 6.8 6.4 7.0   BILITOT 0.9 1.0 1.1*   ALKPHOS 67 66 67   ALT 39 39 30   * 135* 83*     Microbiology Results (last 7 days)       ** No results found for the last 168 hours. **              Significant Imaging: I have reviewed all pertinent imaging results/findings within the past 24 hours.

## 2022-06-21 NOTE — PLAN OF CARE
SW confirmed with floor nurse that pt will be transported to [facility].  SW confirmed with pt that once bed is available at [facility] he will be transported. Pt expressed understanding. SW will continue to follow pt throughout his transitions of care and assist with any dc needs. Please note pts Caroline Aviles (Spouse) [phone] was at bedside and expressed understanding as well.     Pt and or pts wife did not have any questions or concerns for SW. Pts expressed understanding for the following stated above.      06/21/22 1529   Final Note   Assessment Type Final Discharge Note   Anticipated Discharge Disposition   (Tranfered to [facility])   Hospital Resources/Appts/Education Provided Appointments scheduled by Navigator/Coordinator   Post-Acute Status   Discharge Delays None known at this time

## 2022-06-21 NOTE — ASSESSMENT & PLAN NOTE
- initial troponin 1.762 with trend up to 9.230-18.379 and down to 16.609; EKG with STTW abnormality  - [facility] today with following results:     LM: 80-90% ostial and mid LM disease   LAD: Moderate early mid LAD lesion. Mild-mod distal LAD stenosis   LCx: 90% mid LCx stenosis. 80% stenosis OM2 at the point of bifurcation. Samll caliber vessels.   RCA: Proximal RCA disease. Mid RCA  with L-R collaterals  - continue DAPT and statin; needs BB and ACEI if BP tolerates; transfer to [facility] for CABG evaluation

## 2022-06-21 NOTE — SUBJECTIVE & OBJECTIVE
Review of Systems   Constitutional: Negative for chills, decreased appetite, diaphoresis and fever.   Cardiovascular:  Negative for chest pain, claudication, cyanosis, dyspnea on exertion, irregular heartbeat, leg swelling, near-syncope, orthopnea, palpitations, paroxysmal nocturnal dyspnea and syncope.   Respiratory:  Negative for cough, hemoptysis, shortness of breath and wheezing.    Gastrointestinal:  Negative for bloating, abdominal pain, constipation, diarrhea, melena, nausea and vomiting.   Neurological:  Negative for dizziness and weakness.   Objective:     Vital Signs (Most Recent):  Temp: 97.8 °F (36.6 °C) (06/21/22 0919)  Pulse: 80 (06/21/22 0919)  Resp: 17 (06/21/22 0919)  BP: 113/63 (06/21/22 0919)  SpO2: (!) 93 % (06/21/22 0919)   Vital Signs (24h Range):  Temp:  [96.6 °F (35.9 °C)-98.6 °F (37 °C)] 97.8 °F (36.6 °C)  Pulse:  [67-85] 80  Resp:  [15-20] 17  SpO2:  [91 %-98 %] 93 %  BP: (109-126)/(63-85) 113/63     Weight: 102.5 kg (225 lb 15.5 oz)  Body mass index is 32.42 kg/m².     SpO2: (!) 93 %  O2 Device (Oxygen Therapy): room air      Intake/Output Summary (Last 24 hours) at 6/21/2022 0942  Last data filed at 6/20/2022 2130  Gross per 24 hour   Intake 120 ml   Output --   Net 120 ml       Lines/Drains/Airways       Peripheral Intravenous Line  Duration                  Peripheral IV - Single Lumen 06/19/22 1115 20 G Left Antecubital 1 day         Peripheral IV - Single Lumen 06/19/22 1247 18 G Right Antecubital 1 day                    Physical Exam  Constitutional:       General: He is not in acute distress.     Appearance: He is well-developed.   Cardiovascular:      Rate and Rhythm: Normal rate and regular rhythm.      Heart sounds: No murmur heard.    No gallop.   Pulmonary:      Effort: Pulmonary effort is normal. No respiratory distress.      Breath sounds: Normal breath sounds. No wheezing.   Abdominal:      General: Bowel sounds are normal. There is no distension.      Palpations:  "Abdomen is soft.      Tenderness: There is no abdominal tenderness.   Skin:     General: Skin is warm and dry.   Neurological:      Mental Status: He is alert and oriented to person, place, and time.       Significant Labs: BMP:   Recent Labs   Lab 06/19/22  1706 06/20/22  0311 06/21/22  0842   GLU 98 73 166*    139 136   K 3.8 3.5 3.9    102 101   CO2 22* 23 24   BUN 9 9 8   CREATININE 0.8 0.8 0.8   CALCIUM 8.6* 8.3* 8.7   MG  --  1.7  --     and CBC   Recent Labs   Lab 06/19/22  1706 06/20/22  0312 06/21/22  0842   WBC 8.85 9.87 8.24   HGB 14.0 13.9* 15.2   HCT 40.9 40.7 43.8    189 164       Significant Imaging: Echocardiogram: Transthoracic echo (TTE) complete (Cupid Only):   Results for orders placed or performed during the hospital encounter of 06/19/22   Echo   Result Value Ref Range    BSA 2.25 m2    TDI SEPTAL 0.05 m/s    LV LATERAL E/E' RATIO 9.10 m/s    LV SEPTAL E/E' RATIO 18.20 m/s    LA WIDTH 4.20 cm    AORTIC VALVE CUSP SEPERATION 1.99 cm    TDI LATERAL 0.10 m/s    PV PEAK VELOCITY 0.99 cm/s    LVIDd 6.06 (A) 3.5 - 6.0 cm    IVS 1.06 0.6 - 1.1 cm    Posterior Wall 1.02 0.6 - 1.1 cm    Ao root annulus 3.18 cm    LVIDs 4.97 (A) 2.1 - 4.0 cm    FS 18 28 - 44 %    LV mass 264.18 g    LA size 3.97 cm    RVDD 2.85 cm    TAPSE 1.82 cm    Left Ventricle Relative Wall Thickness 0.34 cm    AV mean gradient 5 mmHg    AV valve area 2.04 cm2    AV Velocity Ratio 0.72     AV index (prosthetic) 0.59     MV mean gradient 1 mmHg    MV valve area p 1/2 method 5.46 cm2    MV valve area by continuity eq 1.92 cm2    E/A ratio 1.14     Mean e' 0.08 m/s    E wave deceleration time 139.05 msec    IVRT 105.61 msec    MV "A" wave duration 13.42 msec    Pulm vein S/D ratio 0.88     LVOT diameter 2.09 cm    LVOT area 3.4 cm2    LVOT peak betty 1.01 m/s    LVOT peak VTI 15.89 cm    Ao peak betty 1.41 m/s    Ao VTI 26.71 cm    Mr max betty 0.05 m/s    LVOT stroke volume 54.49 cm3    AV peak gradient 8 mmHg    MV peak " gradient 5 mmHg    E/E' ratio 12.13 m/s    MV Peak E Tom 0.91 m/s    TR Max Tom 1.22 m/s    MV VTI 28.34 cm    MV stenosis pressure 1/2 time 40.32 ms    MV Peak A Tom 0.80 m/s    PV Peak S Tom 0.38 m/s    PV Peak D Tom 0.43 m/s    LV Systolic Volume 116.35 mL    LV Systolic Volume Index 52.9 mL/m2    LV Diastolic Volume 184.46 mL    LV Diastolic Volume Index 83.85 mL/m2    LV Mass Index 120 g/m2    Left Atrium Major Axis 5.58 cm    Triscuspid Valve Regurgitation Peak Gradient 6 mmHg    LA Volume Index (Mod) 29.7 mL/m2    LA volume (mod) 65.30 cm3    Right Atrial Pressure (from IVC) 3 mmHg    EF 30 %    TV rest pulmonary artery pressure 9 mmHg    Narrative    · The left ventricle is mildly enlarged with eccentric hypertrophy and   moderately decreased systolic function.  · The estimated ejection fraction is 30-35%.  · There are segmental left ventricular wall motion abnormalities.  · Grade I left ventricular diastolic dysfunction.  · With normal right ventricular systolic function.  · Mild mitral regurgitation.  · The estimated PA systolic pressure is 9 mmHg.  · Normal central venous pressure (3 mmHg).

## 2022-06-21 NOTE — PROGRESS NOTES
[facility] - Telemetry  Cardiology  Progress Note    Patient Name: Andrae Aviles  MRN: [id number]  Admission Date: 6/19/2022  Hospital Length of Stay: 2 days  Code Status: Full Code   Attending Physician: Kilo Mares, *   Primary Care Physician: Shane Lewis MD  Expected Discharge Date: 6/20/2022  Principal Problem:NSTEMI (non-ST elevated myocardial infarction)    Subjective:     Hospital Course:   6/20/2022 per HPI  6/21/2022 [facility] yesterday with results as detailed below:       Findings  Right dominant  LM: 80-90% ostial and mid LM disease  LAD: Moderate early mid LAD lesion. Mild-mod distal LAD stenosis  LCx: 90% mid LCx stenosis. 80% stenosis OM2 at the point of bifurcation. Samll caliber vessels.  RCA: Proximal RCA disease. Mid RCA  with L-R collaterals  LVEDP 29     Transfer to [facility] for CABG evaluation in process. Chest pain free overnight. HR and BP stable. Echo yesterday with EF 30-35%          Review of Systems   Constitutional: Negative for chills, decreased appetite, diaphoresis and fever.   Cardiovascular:  Negative for chest pain, claudication, cyanosis, dyspnea on exertion, irregular heartbeat, leg swelling, near-syncope, orthopnea, palpitations, paroxysmal nocturnal dyspnea and syncope.   Respiratory:  Negative for cough, hemoptysis, shortness of breath and wheezing.    Gastrointestinal:  Negative for bloating, abdominal pain, constipation, diarrhea, melena, nausea and vomiting.   Neurological:  Negative for dizziness and weakness.   Objective:     Vital Signs (Most Recent):  Temp: 97.8 °F (36.6 °C) (06/21/22 0919)  Pulse: 80 (06/21/22 0919)  Resp: 17 (06/21/22 0919)  BP: 113/63 (06/21/22 0919)  SpO2: (!) 93 % (06/21/22 0919)   Vital Signs (24h Range):  Temp:  [96.6 °F (35.9 °C)-98.6 °F (37 °C)] 97.8 °F (36.6 °C)  Pulse:  [67-85] 80  Resp:  [15-20] 17  SpO2:  [91 %-98 %] 93 %  BP: (109-126)/(63-85) 113/63     Weight: 102.5 kg (225 lb 15.5 oz)  Body mass index is 32.42 kg/m².     SpO2:  (!) 93 %  O2 Device (Oxygen Therapy): room air      Intake/Output Summary (Last 24 hours) at 6/21/2022 0942  Last data filed at 6/20/2022 2130  Gross per 24 hour   Intake 120 ml   Output --   Net 120 ml       Lines/Drains/Airways       Peripheral Intravenous Line  Duration                  Peripheral IV - Single Lumen 06/19/22 1115 20 G Left Antecubital 1 day         Peripheral IV - Single Lumen 06/19/22 1247 18 G Right Antecubital 1 day                    Physical Exam  Constitutional:       General: He is not in acute distress.     Appearance: He is well-developed.   Cardiovascular:      Rate and Rhythm: Normal rate and regular rhythm.      Heart sounds: No murmur heard.    No gallop.   Pulmonary:      Effort: Pulmonary effort is normal. No respiratory distress.      Breath sounds: Normal breath sounds. No wheezing.   Abdominal:      General: Bowel sounds are normal. There is no distension.      Palpations: Abdomen is soft.      Tenderness: There is no abdominal tenderness.   Skin:     General: Skin is warm and dry.   Neurological:      Mental Status: He is alert and oriented to person, place, and time.       Significant Labs: BMP:   Recent Labs   Lab 06/19/22  1706 06/20/22  0311 06/21/22  0842   GLU 98 73 166*    139 136   K 3.8 3.5 3.9    102 101   CO2 22* 23 24   BUN 9 9 8   CREATININE 0.8 0.8 0.8   CALCIUM 8.6* 8.3* 8.7   MG  --  1.7  --     and CBC   Recent Labs   Lab 06/19/22  1706 06/20/22  0312 06/21/22  0842   WBC 8.85 9.87 8.24   HGB 14.0 13.9* 15.2   HCT 40.9 40.7 43.8    189 164       Significant Imaging: Echocardiogram: Transthoracic echo (TTE) complete (Cupid Only):   Results for orders placed or performed during the hospital encounter of 06/19/22   Echo   Result Value Ref Range    BSA 2.25 m2    TDI SEPTAL 0.05 m/s    LV LATERAL E/E' RATIO 9.10 m/s    LV SEPTAL E/E' RATIO 18.20 m/s    LA WIDTH 4.20 cm    AORTIC VALVE CUSP SEPERATION 1.99 cm    TDI LATERAL 0.10 m/s    PV PEAK  "VELOCITY 0.99 cm/s    LVIDd 6.06 (A) 3.5 - 6.0 cm    IVS 1.06 0.6 - 1.1 cm    Posterior Wall 1.02 0.6 - 1.1 cm    Ao root annulus 3.18 cm    LVIDs 4.97 (A) 2.1 - 4.0 cm    FS 18 28 - 44 %    LV mass 264.18 g    LA size 3.97 cm    RVDD 2.85 cm    TAPSE 1.82 cm    Left Ventricle Relative Wall Thickness 0.34 cm    AV mean gradient 5 mmHg    AV valve area 2.04 cm2    AV Velocity Ratio 0.72     AV index (prosthetic) 0.59     MV mean gradient 1 mmHg    MV valve area p 1/2 method 5.46 cm2    MV valve area by continuity eq 1.92 cm2    E/A ratio 1.14     Mean e' 0.08 m/s    E wave deceleration time 139.05 msec    IVRT 105.61 msec    MV "A" wave duration 13.42 msec    Pulm vein S/D ratio 0.88     LVOT diameter 2.09 cm    LVOT area 3.4 cm2    LVOT peak tom 1.01 m/s    LVOT peak VTI 15.89 cm    Ao peak tom 1.41 m/s    Ao VTI 26.71 cm    Mr max tom 0.05 m/s    LVOT stroke volume 54.49 cm3    AV peak gradient 8 mmHg    MV peak gradient 5 mmHg    E/E' ratio 12.13 m/s    MV Peak E Tom 0.91 m/s    TR Max Tom 1.22 m/s    MV VTI 28.34 cm    MV stenosis pressure 1/2 time 40.32 ms    MV Peak A Tom 0.80 m/s    PV Peak S Tom 0.38 m/s    PV Peak D Tom 0.43 m/s    LV Systolic Volume 116.35 mL    LV Systolic Volume Index 52.9 mL/m2    LV Diastolic Volume 184.46 mL    LV Diastolic Volume Index 83.85 mL/m2    LV Mass Index 120 g/m2    Left Atrium Major Axis 5.58 cm    Triscuspid Valve Regurgitation Peak Gradient 6 mmHg    LA Volume Index (Mod) 29.7 mL/m2    LA volume (mod) 65.30 cm3    Right Atrial Pressure (from IVC) 3 mmHg    EF 30 %    TV rest pulmonary artery pressure 9 mmHg    Narrative    · The left ventricle is mildly enlarged with eccentric hypertrophy and   moderately decreased systolic function.  · The estimated ejection fraction is 30-35%.  · There are segmental left ventricular wall motion abnormalities.  · Grade I left ventricular diastolic dysfunction.  · With normal right ventricular systolic function.  · Mild mitral " regurgitation.  · The estimated PA systolic pressure is 9 mmHg.  · Normal central venous pressure (3 mmHg).        Assessment and Plan:     Brief HPI: Seen this morning on AM NP rounds while resting in bed with wife at the bedside. Denies any complaints. Reviewed angiogram and echocardiogram findings from yesterday.Discussed cardiac POC as detailed below with patient and wife- both verbalized understanding  and agrees with POC     * NSTEMI (non-ST elevated myocardial infarction)  - initial troponin 1.762 with trend up to 9.230-18.379 and down to 16.609; EKG with STTW abnormality  - [facility] today with following results:     LM: 80-90% ostial and mid LM disease   LAD: Moderate early mid LAD lesion. Mild-mod distal LAD stenosis   LCx: 90% mid LCx stenosis. 80% stenosis OM2 at the point of bifurcation. Samll caliber vessels.   RCA: Proximal RCA disease. Mid RCA  with L-R collaterals  - continue DAPT and statin; needs BB and ACEI if BP tolerates; transfer to [facility] for CABG evaluation        Acute combined systolic and diastolic heart failure  - presented with NSTEMI with peak troponin at 18.379  - echo with EF 30-35% and grade I diastolic dysfunction; LVEDP 29mmHg on [facility] yesterday  - Lasix 20mg IV x 1 yesterday- no output documented  - no acute distress noted  - SBP 110s-120s; will start Toprol XL and plan to start ARB if BP tolerates     Chest pain  - presented with chest pain concerning for ischemic etiology  - troponin elevated with peak at 18.379  - resolved with no recurrence overnight; [facility] with multivessel CAD  - continue GDMT; transfer to [facility] for CABG evaluation     Primary hypertension  - SBP 110s-120s overnight  - no antihypertensives noted on home med rec  - monitor BP         VTE Risk Mitigation (From admission, onward)         Ordered     heparin infusion 1,000 units/500 ml in 0.9% NaCl (pressure line flush)  Intra-op continuous PRN         06/20/22 0842     IP VTE HIGH RISK PATIENT  Once         06/19/22  1656     Place sequential compression device  Until discontinued         06/19/22 1656     Reason for No Pharmacological VTE Prophylaxis  Once        Question:  Reasons:  Answer:  IV Heparin w/in 24 hrs. Pre or Post-Op    06/19/22 1656     Place sequential compression device  Until discontinued         06/19/22 1654     heparin 25,000 units in dextrose 5% (100 units/ml) IV bolus from bag - ADDITIONAL PRN BOLUS - 60 units/kg (max bolus 4000 units)  As needed (PRN)        Question:  Heparin Infusion Adjustment (DO NOT MODIFY ANSWER)  Answer:  [email or web address]\Images\Pharmacy\HeparinInfusions\heparin LOW INTENSITY nomogram for [facility] [id number]    06/19/22 1216     heparin 25,000 units in dextrose 5% (100 units/ml) IV bolus from bag - ADDITIONAL PRN BOLUS - 30 units/kg (max bolus 4000 units)  As needed (PRN)        Question:  Heparin Infusion Adjustment (DO NOT MODIFY ANSWER)  Answer:  [email or web address]\Images\Pharmacy\HeparinInfusions\heparin LOW INTENSITY nomogram for [facility] [id number]    06/19/22 1216     heparin 25,000 units in dextrose 5% 250 mL (100 units/mL) infusion LOW INTENSITY nomogram - [facility]  Continuous        Question Answer Comment   Heparin Infusion Adjustment (DO NOT MODIFY ANSWER) [email or web address]\epic\Images\Pharmacy\HeparinInfusions\heparin LOW INTENSITY nomogram for [facility] [id number]    Begin at (in units/kg/hr) 12        06/19/22 1216                Ruthann Pete, APRN, ANP  Cardiology  [facility] - Telemetry

## 2022-06-21 NOTE — ASSESSMENT & PLAN NOTE
Patient has a current diagnosis of HTN which is controlled.  Latest blood pressure and vitals reviewed-   Temp:  [96.2 °F (35.7 °C)-97.9 °F (36.6 °C)]   Pulse:  [67-85]   Resp:  [17-18]   BP: (108-125)/(63-79)   SpO2:  [91 %-98 %] .   Patient currently off IV antihypertensives.   Home meds for hypertension were reviewed and noted below.   Hypertension Medications             doxazosin (CARDURA) 2 MG tablet Take 1 tablet (2 mg total) by mouth nightly.          Medication adjustment for hospital antihypertensives is as follows-     -Resume home doxazosin 2mg PO nightly  -Monitor and trend vital signs q4hr  -Anti-hypertensive PRN for SBP >180    Will goal for controlled BP reduction as noted be medications noted above and monitor and mitigate end organ damage as indicated.

## 2022-06-21 NOTE — PROGRESS NOTES
"[facility] Medicine  Progress Note    Patient Name: Andrae Aviles  MRN: [id number]  Patient Class: IP- Inpatient   Admission Date: 6/19/2022  Length of Stay: 1 days  Attending Physician: Vidhya Truong*  Primary Care Provider: Shane Lewis MD        Subjective:     Principal Problem:NSTEMI (non-ST elevated myocardial infarction)        HPI:  Andrae Bran is a 55 y/o male with PMHx HTN and RA presents to [facility] ED with complaints of chest pain. He reports his chest pain started today after he ate breakfast. He reports his chest pain is on his left side with radiation and numbness to his left arm. He describes his chest pain as squeezing and felt like "someone is sitting on my chest". He rates his chest pain 7/10. He reports shortness of breath with palpitations. He also reports associated nausea and vomiting today. He admits he has been having left sided chest pain for a couple of weeks that has been intermittent. He states his left sided chest pain has increased in frequency within the last week. He denies headaches, vision changes, or jaw pain. Also states right knee redness/pain that was evaluated by his rheumatologist and he was scheduled to work with PT. Denies history of past cardiac history. He travels for work. Reports past tobacco use but state he has quit. He admits to drinking 12 can pack of beer a day.       In the ED: /74   Pulse 77   Resp 20   Ht 5' 10" (1.778 m)   Wt 102.5 kg (226 lb)   SpO2 95%   BMI 32.43 kg/m² Labs revealed , glucose 176, , NT-proBNP 976, and initial troponin 1.760, repeat 9.230. CXR revealed Ground-glass infiltrate within the periphery of the right lung could relate to and Modic process including COVID-19 infection. Cardiology was consulted. He received ASA 325mg PO x1, Nitro paste x1, and was started on continuous heparin infusion. Will admit to hospital medicine for further evaluation and treatment.    "         Overview/Hospital Course:  No notes on file    Interval History: Patient is status post [facility] - + multivessel CAD. Plan to [place] [facility] [place] eval and CABG. On heparin drip.  Denies chest pain and dyspnea    Review of Systems   Constitutional:  Negative for appetite change, chills and diaphoresis.   Respiratory:  Negative for cough, shortness of breath and wheezing.    Cardiovascular:  Negative for chest pain, palpitations and leg swelling.   Gastrointestinal:  Negative for abdominal pain, blood in stool and nausea.   Neurological:  Negative for dizziness, syncope and light-headedness.   Objective:     Vital Signs (Most Recent):  Temp: 97 °F (36.1 °C) (06/20/22 2033)  Pulse: 79 (06/20/22 2033)  Resp: 18 (06/20/22 2033)  BP: 125/76 (06/20/22 1747)  SpO2: 95 % (06/20/22 2033) Vital Signs (24h Range):  Temp:  [96.8 °F (36 °C)-98.6 °F (37 °C)] 97 °F (36.1 °C)  Pulse:  [70-89] 79  Resp:  [15-20] 18  SpO2:  [93 %-98 %] 95 %  BP: (109-126)/(69-85) 125/76     Weight: 102.5 kg (225 lb 15.5 oz)  Body mass index is 32.42 kg/m².  No intake or output data in the 24 hours ending 06/20/22 2134   Physical Exam  Vitals and nursing note reviewed.   Constitutional:       Appearance: He is well-developed.   Cardiovascular:      Rate and Rhythm: Normal rate and regular rhythm.      Heart sounds: Normal heart sounds.   Pulmonary:      Effort: Pulmonary effort is normal.      Breath sounds: Normal breath sounds.   Abdominal:      General: Bowel sounds are normal.      Palpations: Abdomen is soft.   Musculoskeletal:         General: Normal range of motion.   Skin:     General: Skin is warm and dry.   Neurological:      Mental Status: He is alert and oriented to person, place, and time.   Psychiatric:         Behavior: Behavior normal.       Significant Labs: All pertinent labs within the past 24 hours have been reviewed.  BMP:   Recent Labs   Lab 06/20/22  0311   GLU 73      K 3.5      CO2 23   BUN 9   CREATININE 0.8    CALCIUM 8.3*   MG 1.7     CBC:   Recent Labs   Lab 06/19/22  1116 06/19/22  1706 06/20/22  0312   WBC 7.19 8.85 9.87   HGB 15.4 14.0 13.9*   HCT 45.0 40.9 40.7    193 189     Cardiac Markers:   Recent Labs   Lab 06/19/22  1706   *       Significant Imaging: I have reviewed all pertinent imaging results/findings within the past 24 hours.      Assessment/Plan:      * NSTEMI (non-ST elevated myocardial infarction)  Chest pain    - presents with reported left sided chest pain x2-3 weeks  - EKG revealed ST depression V3, V4, V5 and V6.  -Troponin 1.760--9.230; continue to trend troponins   -NT-proBNP 976  -CXR revealed ground-glass infiltrate within the periphery of the right lung could relate to and Modic process including COVID-19 infection.  - Received 325mg PO ASA x1 in ED and was started on continuous heparin infusion  -Will give Plavix load 600mg PO now  - Continue ASA 81mg daily and Plavix 81mg daily  - TTE ordered  - stratify risks: obtain lipid panel, HgbA1c, and TSH  - Monitor on telemetry  - CMP daily; keep K >4, Mg >2  -Supplemental oxygen PRN for SpO2 <90%  -Will initiate atorvastatin 40mg   -SL Nitro PRN for chest pain  -EKG PRN for recurrent chest pain  - Cardiology consulted in ED; appreciate assistance  -NPO after midnight for [facility] in am-- multivessel CAD- transfer to [facility] [place] [facility] For CVS evaluation and possible CABG    Alcohol dependence  -admits to drinking 12 can pack of beer daily  -Initiated alcohol withdrawal protocol  -CIWA q4hr  -Thiamine, MVI, and folate daily  -Monitor for s/s of alcohol withdrawal or DTs      Chest pain  See above      Anxiety  -resume home zoloft 50mg PO nightly      Rheumatoid arthritis  -stable  -Followed by Rheumatologist, Dr. Lewis  -On Methotrexate 2.5mg (15mg total) weekly  -Resume folic acid        Primary hypertension  -Resume home doxazosin 2mg PO nightly  -Monitor and trend vital signs q4hr  -Anti-hypertensive PRN for SBP >180        VTE Risk Mitigation (From  admission, onward)         Ordered     heparin infusion 1,000 units/500 ml in 0.9% NaCl (pressure line flush)  Intra-op continuous PRN         06/20/22 0842     IP VTE HIGH RISK PATIENT  Once         06/19/22 1656     Place sequential compression device  Until discontinued         06/19/22 1656     Reason for No Pharmacological VTE Prophylaxis  Once        Question:  Reasons:  Answer:  IV Heparin w/in 24 hrs. Pre or Post-Op    06/19/22 1656     Place sequential compression device  Until discontinued         06/19/22 1654     heparin 25,000 units in dextrose 5% (100 units/ml) IV bolus from bag - ADDITIONAL PRN BOLUS - 60 units/kg (max bolus 4000 units)  As needed (PRN)        Question:  Heparin Infusion Adjustment (DO NOT MODIFY ANSWER)  Answer:  [email or web address]\epic\Images\Pharmacy\HeparinInfusions\heparin LOW INTENSITY nomogram for [facility] [id number].pdf    06/19/22 1216     heparin 25,000 units in dextrose 5% (100 units/ml) IV bolus from bag - ADDITIONAL PRN BOLUS - 30 units/kg (max bolus 4000 units)  As needed (PRN)        Question:  Heparin Infusion Adjustment (DO NOT MODIFY ANSWER)  Answer:  \\[email or web address]\epic\Images\Pharmacy\HeparinInfusions\heparin LOW INTENSITY nomogram for [facility] [id number].pdf    06/19/22 1216     heparin 25,000 units in dextrose 5% 250 mL (100 units/mL) infusion LOW INTENSITY nomogram - [facility]  Continuous        Question Answer Comment   Heparin Infusion Adjustment (DO NOT MODIFY ANSWER) [email or web address]\epic\Images\Pharmacy\HeparinInfusions\heparin LOW INTENSITY nomogram for [facility] [id number]    Begin at (in units/kg/hr) 12        06/19/22 1216                Discharge Planning   TURNER: 6/20/2022     Code Status: Full Code   Is the patient medically ready for discharge?:     Reason for patient still in hospital (select all that apply): Consult recommendations  Discharge Plan A: Home with family   Discharge Delays: None known at this time              Dede Wright NP  Department of Hospital Medicine   [place] -  Telemetry

## 2022-06-21 NOTE — PROGRESS NOTES
"[facility] Medicine  Progress Note    Patient Name: Andrae Aviles  MRN: [id number]  Patient Class: IP- Inpatient   Admission Date: 6/19/2022  Length of Stay: 2 days  Attending Physician: Kilo Mares, *  Primary Care Provider: Shane Lewis MD        Subjective:     Principal Problem:NSTEMI (non-ST elevated myocardial infarction)        HPI:  Andrae Bran is a 57 y/o male with PMHx HTN and RA presents to [facility] ED with complaints of chest pain. He reports his chest pain started today after he ate breakfast. He reports his chest pain is on his left side with radiation and numbness to his left arm. He describes his chest pain as squeezing and felt like "someone is sitting on my chest". He rates his chest pain 7/10. He reports shortness of breath with palpitations. He also reports associated nausea and vomiting today. He admits he has been having left sided chest pain for a couple of weeks that has been intermittent. He states his left sided chest pain has increased in frequency within the last week. He denies headaches, vision changes, or jaw pain. Also states right knee redness/pain that was evaluated by his rheumatologist and he was scheduled to work with PT. Denies history of past cardiac history. He travels for work. Reports past tobacco use but state he has quit. He admits to drinking 12 can pack of beer a day.       In the ED: /74   Pulse 77   Resp 20   Ht 5' 10" (1.778 m)   Wt 102.5 kg (226 lb)   SpO2 95%   BMI 32.43 kg/m² Labs revealed , glucose 176, , NT-proBNP 976, and initial troponin 1.760, repeat 9.230. CXR revealed Ground-glass infiltrate within the periphery of the right lung could relate to and Modic process including COVID-19 infection. Cardiology was consulted. He received ASA 325mg PO x1, Nitro paste x1, and was started on continuous heparin infusion. Will admit to hospital medicine for further evaluation and treatment.    "         Overview/Hospital Course:  Admitted from OSH with chest pain found to have NSTEMI with peak troponin of 18; now 7.6  Started on heparin gtt and Plavix  Cardiology consulted who took the patient for C with findings of multi vessel CAD with need for CABG  Right dominant  LM: 80-90% ostial and mid LM disease  LAD: Moderate early mid LAD lesion. Mild-mod distal LAD stenosis  LCx: 90% mid LCx stenosis. 80% stenosis OM2 at the point of bifurcation. Samll caliber vessels.  RCA: Proximal RCA disease. Mid RCA  with L-R collaterals  LVEDP 29  Cards recommended [facility] transfer; he was accepted to [facility]  Awaiting bed at [facility]      Interval History:   No fevers  No chest pain  Remains on heparin gtt  Awaiting bed at [facility] for CABG    Review of Systems   Constitutional:  Positive for activity change.   HENT: Negative.     Eyes: Negative.    Respiratory:  Negative for cough, choking, chest tightness and shortness of breath.    Cardiovascular:  Negative for chest pain, palpitations and leg swelling.   Gastrointestinal: Negative.    Endocrine: Negative.    Genitourinary: Negative.    Musculoskeletal: Negative.    Skin: Negative.    Allergic/Immunologic: Negative.    Neurological: Negative.    Hematological: Negative.    Psychiatric/Behavioral: Negative.     Objective:     Vital Signs (Most Recent):  Temp: 96.2 °F (35.7 °C) (06/21/22 1432)  Pulse: 85 (06/21/22 1432)  Resp: 17 (06/21/22 1432)  BP: 108/70 (06/21/22 1432)  SpO2: (!) 93 % (06/21/22 1432)   Vital Signs (24h Range):  Temp:  [96.2 °F (35.7 °C)-97.9 °F (36.6 °C)] 96.2 °F (35.7 °C)  Pulse:  [67-85] 85  Resp:  [17-18] 17  SpO2:  [91 %-98 %] 93 %  BP: (108-125)/(63-79) 108/70     Weight: 102.5 kg (225 lb 15.5 oz)  Body mass index is 32.42 kg/m².    Intake/Output Summary (Last 24 hours) at 6/21/2022 1516  Last data filed at 6/20/2022 2130  Gross per 24 hour   Intake 120 ml   Output --   Net 120 ml      Physical Exam  Constitutional:       Appearance: Normal appearance.    HENT:      Head: Normocephalic and atraumatic.      Nose: Nose normal.      Mouth/Throat:      Mouth: Mucous membranes are moist.   Eyes:      General: No scleral icterus.     Extraocular Movements: Extraocular movements intact.      Pupils: Pupils are equal, round, and reactive to light.   Cardiovascular:      Rate and Rhythm: Normal rate and regular rhythm.      Pulses: Normal pulses.      Heart sounds: Normal heart sounds.   Pulmonary:      Effort: Pulmonary effort is normal.      Breath sounds: Normal breath sounds. No wheezing, rhonchi or rales.   Chest:      Chest wall: No tenderness.   Abdominal:      Palpations: Abdomen is soft.      Tenderness: There is no abdominal tenderness. There is no guarding or rebound.      Hernia: No hernia is present.   Genitourinary:     Comments: deferred  Musculoskeletal:         General: Normal range of motion.      Cervical back: Normal range of motion and neck supple.   Skin:     General: Skin is warm and dry.      Capillary Refill: Capillary refill takes less than 2 seconds.   Neurological:      Mental Status: He is alert and oriented to person, place, and time.   Psychiatric:         Mood and Affect: Mood normal.       Significant Labs:   Recent Labs   Lab 06/19/22  1706 06/20/22  0312 06/21/22  0842   WBC 8.85 9.87 8.24   HGB 14.0 13.9* 15.2   HCT 40.9 40.7 43.8    189 164   MONO 8.9  0.8 8.9  0.9 8.0  0.7     Recent Labs   Lab 06/19/22  1706 06/20/22  0311 06/21/22  0842    139 136   K 3.8 3.5 3.9    102 101   CO2 22* 23 24   BUN 9 9 8   CREATININE 0.8 0.8 0.8   CALCIUM 8.6* 8.3* 8.7   PROT 6.8 6.4 7.0   BILITOT 0.9 1.0 1.1*   ALKPHOS 67 66 67   ALT 39 39 30   * 135* 83*     Microbiology Results (last 7 days)       ** No results found for the last 168 hours. **              Significant Imaging: I have reviewed all pertinent imaging results/findings within the past 24 hours.          Assessment/Plan:      * NSTEMI (non-ST elevated myocardial  infarction)  Chest pain    - presents with reported left sided chest pain x2-3 weeks  - EKG revealed ST depression V3, V4, V5 and V6.  -Troponin 1.760--9.230; continue to trend troponins   -NT-proBNP 976  -CXR revealed ground-glass infiltrate within the periphery of the right lung could relate to and Modic process including COVID-19 infection.  - Received 325mg PO ASA x1 in ED and was started on continuous heparin infusion  -Will give Plavix load 600mg PO now  - Continue ASA 81mg daily and Plavix 81mg daily  - TTE ordered  - stratify risks: obtain lipid panel, HgbA1c, and TSH  - Monitor on telemetry  - CMP daily; keep K >4, Mg >2  -Supplemental oxygen PRN for SpO2 <90%  -Will initiate atorvastatin 40mg   -SL Nitro PRN for chest pain  -EKG PRN for recurrent chest pain  - Cardiology consulted in ED; appreciate assistance  -NPO after midnight for LHC in am-- multivessel CAD- transfer [place] [facility] [place]S evaluation and possible CABG    6/21:  LHC showed multivessel CAD  Needs CABG  Awaiting [facility] bed for CABG  Continue heparin gtt  No chest pain    Acute combined systolic and diastolic heart failure  Appears euvolemic now  Continue GDMT with ASA  BB and ACE I as per cards      Alcohol dependence  -admits to drinking 12 can pack of beer daily  -Initiated alcohol withdrawal protocol  -CIWA q4hr  -Thiamine, MVI, and folate daily  -Monitor for s/s of alcohol withdrawal or DTs    -PRN valium      Chest pain  See above      -resolved      Anxiety  -resume home zoloft 50mg PO nightly      Rheumatoid arthritis  -stable  -Followed by Rheumatologist, Dr. Lewis  -On Methotrexate 2.5mg (15mg total) weekly  -Resume folic acid        Primary hypertension  Patient has a current diagnosis of HTN which is controlled.  Latest blood pressure and vitals reviewed-   Temp:  [96.2 °F (35.7 °C)-97.9 °F (36.6 °C)]   Pulse:  [67-85]   Resp:  [17-18]   BP: (108-125)/(63-79)   SpO2:  [91 %-98 %] .   Patient currently off IV antihypertensives.   Home meds  for hypertension were reviewed and noted below.   Hypertension Medications             doxazosin (CARDURA) 2 MG tablet Take 1 tablet (2 mg total) by mouth nightly.          Medication adjustment for hospital antihypertensives is as follows-     -Resume home doxazosin 2mg PO nightly  -Monitor and trend vital signs q4hr  -Anti-hypertensive PRN for SBP >180    Will goal for controlled BP reduction as noted be medications noted above and monitor and mitigate end organ damage as indicated.                    VTE Risk Mitigation (From admission, onward)         Ordered     heparin infusion 1,000 units/500 ml in 0.9% NaCl (pressure line flush)  Intra-op continuous PRN         06/20/22 0842     IP VTE HIGH RISK PATIENT  Once         06/19/22 1656     Place sequential compression device  Until discontinued         06/19/22 1656     Reason for No Pharmacological VTE Prophylaxis  Once        Question:  Reasons:  Answer:  IV Heparin w/in 24 hrs. Pre or Post-Op    06/19/22 1656     Place sequential compression device  Until discontinued         06/19/22 1654     heparin 25,000 units in dextrose 5% (100 units/ml) IV bolus from bag - ADDITIONAL PRN BOLUS - 60 units/kg (max bolus 4000 units)  As needed (PRN)        Question:  Heparin Infusion Adjustment (DO NOT MODIFY ANSWER)  Answer:  [email or web address]\epic\Images\Pharmacy\HeparinInfusions\heparin LOW INTENSITY nomogram for [facility] [id number].pdf    06/19/22 1216     heparin 25,000 units in dextrose 5% (100 units/ml) IV bolus from bag - ADDITIONAL PRN BOLUS - 30 units/kg (max bolus 4000 units)  As needed (PRN)        Question:  Heparin Infusion Adjustment (DO NOT MODIFY ANSWER)  Answer:  [email or web address]\epic\Images\Pharmacy\HeparinInfusions\heparin LOW INTENSITY nomogram for [facility] [id number].pdf    06/19/22 1216     heparin 25,000 units in dextrose 5% 250 mL (100 units/mL) infusion LOW INTENSITY nomogram - [facility]  Continuous        Question Answer Comment   Heparin Infusion Adjustment (DO NOT MODIFY  ANSWER) [email or web address]\epic\Images\Pharmacy\HeparinInfusions\heparin LOW INTENSITY nomogram for [facility] [id number].pdf    Begin at (in units/kg/hr) 12        06/19/22 1216                Discharge Planning   TURNER: 6/20/2022     Code Status: Full Code   Is the patient medically ready for discharge?:     Reason for patient still in hospital (select all that apply): Other (specify) Awaiting bed at [facility]  Discharge Plan A: Home with family   Discharge Delays: None known at this time              Christen Medrano NP  Department of Hospital Medicine   [place] - [facility]

## 2022-06-21 NOTE — ASSESSMENT & PLAN NOTE
- presented with NSTEMI with peak troponin at 18.379  - echo with EF 30-35% and grade I diastolic dysfunction; LVEDP 29mmHg on LHC yesterday  - Lasix 20mg IV x 1 yesterday- no output documented  - no acute distress noted  - SBP 110s-120s; will start Toprol XL and plan to start ARB if BP tolerates

## 2022-06-21 NOTE — PLAN OF CARE
Care plan reviewed with patient, AAOx4, family at bedside, no respiratory distress noted, on room air. NSR per cardiac monitor, no red alarm noted. Denies any pain of discomfort, education provided on medication effect and side effect, voices understanding. Heparin drip infusing as ordered, no changes in rate, therapeutic APTT level. Call light within his reach, no apparent distress noted, bed in low position, bed alarm on, educated on the importance of calling as needed, voices understanding, stable at this time.

## 2022-06-21 NOTE — ASSESSMENT & PLAN NOTE
Chest pain    - presents with reported left sided chest pain x2-3 weeks  - EKG revealed ST depression V3, V4, V5 and V6.  -Troponin 1.760--9.230; continue to trend troponins   -NT-proBNP 976  -CXR revealed ground-glass infiltrate within the periphery of the right lung could relate to and Modic process including COVID-19 infection.  - Received 325mg PO ASA x1 in ED and was started on continuous heparin infusion  -Will give Plavix load 600mg PO now  - Continue ASA 81mg daily and Plavix 81mg daily  - TTE ordered  - stratify risks: obtain lipid panel, HgbA1c, and TSH  - Monitor on telemetry  - CMP daily; keep K >4, Mg >2  -Supplemental oxygen PRN for SpO2 <90%  -Will initiate atorvastatin 40mg   -SL Nitro PRN for chest pain  -EKG PRN for recurrent chest pain  - Cardiology consulted in ED; appreciate assistance  -NPO after midnight for LHC in am-- multivessel CAD- transfer to [facility] For CVS evaluation and possible CABG    6/21:  LHC showed multivessel CAD  Needs CABG  Awaiting [facility] bed for CABG  Continue heparin gtt  No chest pain

## 2022-06-21 NOTE — ASSESSMENT & PLAN NOTE
- presented with chest pain concerning for ischemic etiology  - troponin elevated with peak at 18.379  - resolved with no recurrence overnight; LHC with multivessel CAD  - continue GDMT; transfer to [facility] for CABG evaluation

## 2022-06-21 NOTE — ASSESSMENT & PLAN NOTE
-admits to drinking 12 can pack of beer daily  -Initiated alcohol withdrawal protocol  -CIWA q4hr  -Thiamine, MVI, and folate daily  -Monitor for s/s of alcohol withdrawal or DTs    -PRN valium

## 2022-06-22 LAB
ALBUMIN SERPL BCP-MCNC: 2.9 G/DL (ref 3.5–5.2)
ALP SERPL-CCNC: 78 U/L (ref 55–135)
ALT SERPL W/O P-5'-P-CCNC: 38 U/L (ref 10–44)
ANION GAP SERPL CALC-SCNC: 12 MMOL/L (ref 8–16)
APTT BLDCRRT: 46.5 SEC (ref 21–32)
AST SERPL-CCNC: 83 U/L (ref 10–40)
BASOPHILS # BLD AUTO: 0.04 K/UL (ref 0–0.2)
BASOPHILS NFR BLD: 0.4 % (ref 0–1.9)
BILIRUB SERPL-MCNC: 1.1 MG/DL (ref 0.1–1)
BUN SERPL-MCNC: 8 MG/DL (ref 6–20)
CALCIUM SERPL-MCNC: 8.9 MG/DL (ref 8.7–10.5)
CHLORIDE SERPL-SCNC: 105 MMOL/L (ref 95–110)
CO2 SERPL-SCNC: 20 MMOL/L (ref 23–29)
CREAT SERPL-MCNC: 0.8 MG/DL (ref 0.5–1.4)
DIFFERENTIAL METHOD: ABNORMAL
EOSINOPHIL # BLD AUTO: 0.1 K/UL (ref 0–0.5)
EOSINOPHIL NFR BLD: 0.8 % (ref 0–8)
ERYTHROCYTE [DISTWIDTH] IN BLOOD BY AUTOMATED COUNT: 13.1 % (ref 11.5–14.5)
EST. GFR  (AFRICAN AMERICAN): >60 ML/MIN/1.73 M^2
EST. GFR  (NON AFRICAN AMERICAN): >60 ML/MIN/1.73 M^2
GLUCOSE SERPL-MCNC: 99 MG/DL (ref 70–110)
HCT VFR BLD AUTO: 47 % (ref 40–54)
HGB BLD-MCNC: 15.4 G/DL (ref 14–18)
IMM GRANULOCYTES # BLD AUTO: 0.03 K/UL (ref 0–0.04)
IMM GRANULOCYTES NFR BLD AUTO: 0.3 % (ref 0–0.5)
LYMPHOCYTES # BLD AUTO: 1.2 K/UL (ref 1–4.8)
LYMPHOCYTES NFR BLD: 12.1 % (ref 18–48)
MCH RBC QN AUTO: 33.8 PG (ref 27–31)
MCHC RBC AUTO-ENTMCNC: 32.8 G/DL (ref 32–36)
MCV RBC AUTO: 103 FL (ref 82–98)
MONOCYTES # BLD AUTO: 0.9 K/UL (ref 0.3–1)
MONOCYTES NFR BLD: 8.8 % (ref 4–15)
NEUTROPHILS # BLD AUTO: 7.8 K/UL (ref 1.8–7.7)
NEUTROPHILS NFR BLD: 77.6 % (ref 38–73)
NRBC BLD-RTO: 0 /100 WBC
PLATELET # BLD AUTO: 174 K/UL (ref 150–450)
PMV BLD AUTO: 11.3 FL (ref 9.2–12.9)
POCT GLUCOSE: 110 MG/DL (ref 70–110)
POCT GLUCOSE: 115 MG/DL (ref 70–110)
POCT GLUCOSE: 123 MG/DL (ref 70–110)
POCT GLUCOSE: 143 MG/DL (ref 70–110)
POTASSIUM SERPL-SCNC: 3.7 MMOL/L (ref 3.5–5.1)
PROT SERPL-MCNC: 6.9 G/DL (ref 6–8.4)
RBC # BLD AUTO: 4.55 M/UL (ref 4.6–6.2)
SODIUM SERPL-SCNC: 137 MMOL/L (ref 136–145)
WBC # BLD AUTO: 10.03 K/UL (ref 3.9–12.7)

## 2022-06-22 PROCEDURE — 99900035 HC TECH TIME PER 15 MIN (STAT)

## 2022-06-22 PROCEDURE — 36415 COLL VENOUS BLD VENIPUNCTURE: CPT | Performed by: NURSE PRACTITIONER

## 2022-06-22 PROCEDURE — 25000003 PHARM REV CODE 250

## 2022-06-22 PROCEDURE — 99233 SBSQ HOSP IP/OBS HIGH 50: CPT | Mod: ,,, | Performed by: NURSE PRACTITIONER

## 2022-06-22 PROCEDURE — 94761 N-INVAS EAR/PLS OXIMETRY MLT: CPT

## 2022-06-22 PROCEDURE — 99233 PR SUBSEQUENT HOSPITAL CARE,LEVL III: ICD-10-PCS | Mod: ,,, | Performed by: NURSE PRACTITIONER

## 2022-06-22 PROCEDURE — 25000003 PHARM REV CODE 250: Performed by: NURSE PRACTITIONER

## 2022-06-22 PROCEDURE — 11000001 HC ACUTE MED/SURG PRIVATE ROOM

## 2022-06-22 PROCEDURE — 25000003 PHARM REV CODE 250: Performed by: FAMILY MEDICINE

## 2022-06-22 PROCEDURE — 85025 COMPLETE CBC W/AUTO DIFF WBC: CPT | Performed by: NURSE PRACTITIONER

## 2022-06-22 PROCEDURE — 99233 PR SUBSEQUENT HOSPITAL CARE,LEVL III: ICD-10-PCS | Mod: ,,, | Performed by: INTERNAL MEDICINE

## 2022-06-22 PROCEDURE — 99233 SBSQ HOSP IP/OBS HIGH 50: CPT | Mod: ,,, | Performed by: INTERNAL MEDICINE

## 2022-06-22 PROCEDURE — 80053 COMPREHEN METABOLIC PANEL: CPT | Performed by: NURSE PRACTITIONER

## 2022-06-22 PROCEDURE — 85730 THROMBOPLASTIN TIME PARTIAL: CPT | Performed by: FAMILY MEDICINE

## 2022-06-22 PROCEDURE — 63600175 PHARM REV CODE 636 W HCPCS: Performed by: FAMILY MEDICINE

## 2022-06-22 RX ORDER — MORPHINE SULFATE 2 MG/ML
2 INJECTION, SOLUTION INTRAMUSCULAR; INTRAVENOUS ONCE
Status: COMPLETED | OUTPATIENT
Start: 2022-06-23 | End: 2022-06-22

## 2022-06-22 RX ORDER — BENZONATATE 100 MG/1
100 CAPSULE ORAL 3 TIMES DAILY PRN
Status: DISCONTINUED | OUTPATIENT
Start: 2022-06-22 | End: 2022-06-24 | Stop reason: HOSPADM

## 2022-06-22 RX ORDER — MORPHINE SULFATE 2 MG/ML
INJECTION, SOLUTION INTRAMUSCULAR; INTRAVENOUS
Status: DISPENSED
Start: 2022-06-22 | End: 2022-06-23

## 2022-06-22 RX ADMIN — ACETAMINOPHEN 650 MG: 325 TABLET ORAL at 09:06

## 2022-06-22 RX ADMIN — FOLIC ACID 1000 MCG: 1 TABLET ORAL at 09:06

## 2022-06-22 RX ADMIN — ASPIRIN 81 MG: 81 TABLET, COATED ORAL at 09:06

## 2022-06-22 RX ADMIN — DOXAZOSIN 2 MG: 2 TABLET ORAL at 08:06

## 2022-06-22 RX ADMIN — DIPHENHYDRAMINE HYDROCHLORIDE 25 MG: 25 CAPSULE ORAL at 08:06

## 2022-06-22 RX ADMIN — THIAMINE HCL TAB 100 MG 100 MG: 100 TAB at 09:06

## 2022-06-22 RX ADMIN — ATORVASTATIN CALCIUM 40 MG: 40 TABLET, FILM COATED ORAL at 09:06

## 2022-06-22 RX ADMIN — HEPARIN SODIUM 20.05 UNITS/KG/HR: 1000 INJECTION, SOLUTION INTRAVENOUS; SUBCUTANEOUS at 06:06

## 2022-06-22 RX ADMIN — ACETAMINOPHEN 650 MG: 325 TABLET ORAL at 04:06

## 2022-06-22 RX ADMIN — CLOPIDOGREL 75 MG: 75 TABLET, FILM COATED ORAL at 09:06

## 2022-06-22 RX ADMIN — MORPHINE SULFATE 2 MG: 2 INJECTION, SOLUTION INTRAMUSCULAR; INTRAVENOUS at 11:06

## 2022-06-22 RX ADMIN — SERTRALINE HYDROCHLORIDE 50 MG: 50 TABLET ORAL at 08:06

## 2022-06-22 RX ADMIN — BENZONATATE 100 MG: 100 CAPSULE ORAL at 08:06

## 2022-06-22 RX ADMIN — HEPARIN SODIUM 20 UNITS/KG/HR: 1000 INJECTION, SOLUTION INTRAVENOUS; SUBCUTANEOUS at 07:06

## 2022-06-22 RX ADMIN — METOPROLOL SUCCINATE 12.5 MG: 25 TABLET, EXTENDED RELEASE ORAL at 09:06

## 2022-06-22 NOTE — PROGRESS NOTES
"[facility] Medicine  Progress Note    Patient Name: Andrae Aviles  MRN: [id number]  Patient Class: IP- Inpatient   Admission Date: 6/19/2022  Length of Stay: 3 days  Attending Physician: Kilo Mares, *  Primary Care Provider: Shane Lewis MD        Subjective:     Principal Problem:NSTEMI (non-ST elevated myocardial infarction)        HPI:  Andrae Bran is a 57 y/o male with PMHx HTN and RA presents to [facility] ED with complaints of chest pain. He reports his chest pain started today after he ate breakfast. He reports his chest pain is on his left side with radiation and numbness to his left arm. He describes his chest pain as squeezing and felt like "someone is sitting on my chest". He rates his chest pain 7/10. He reports shortness of breath with palpitations. He also reports associated nausea and vomiting today. He admits he has been having left sided chest pain for a couple of weeks that has been intermittent. He states his left sided chest pain has increased in frequency within the last week. He denies headaches, vision changes, or jaw pain. Also states right knee redness/pain that was evaluated by his rheumatologist and he was scheduled to work with PT. Denies history of past cardiac history. He travels for work. Reports past tobacco use but state he has quit. He admits to drinking 12 can pack of beer a day.       In the ED: /74   Pulse 77   Resp 20   Ht 5' 10" (1.778 m)   Wt 102.5 kg (226 lb)   SpO2 95%   BMI 32.43 kg/m² Labs revealed , glucose 176, , NT-proBNP 976, and initial troponin 1.760, repeat 9.230. CXR revealed Ground-glass infiltrate within the periphery of the right lung could relate to and Modic process including COVID-19 infection. Cardiology was consulted. He received ASA 325mg PO x1, Nitro paste x1, and was started on continuous heparin infusion. Will admit to hospital medicine for further evaluation and treatment.    "         Overview/Hospital Course:  Admitted from OSH with chest pain found to have NSTEMI with peak troponin of 18; now 7.6  Started on heparin gtt and Plavix  Cardiology consulted who took the patient for C with findings of multi vessel CAD with need for CABG  Right dominant  LM: 80-90% ostial and mid LM disease  LAD: Moderate early mid LAD lesion. Mild-mod distal LAD stenosis  LCx: 90% mid LCx stenosis. 80% stenosis OM2 at the point of bifurcation. Samll caliber vessels.  RCA: Proximal RCA disease. Mid RCA  with L-R collaterals  LVEDP 29  Cards recommended [facility] transfer; he was accepted to [facility]  Awaiting bed at [facility]      Interval History:   No fevers  No chest pain  Remains on heparin gtt  Nothing new to add today  Awaiting bed at [facility] for CABG  Hopefully he gets a bed today    Review of Systems   Constitutional:  Positive for activity change.   HENT: Negative.     Eyes: Negative.    Respiratory:  Negative for cough, choking, chest tightness and shortness of breath.    Cardiovascular:  Negative for chest pain, palpitations and leg swelling.   Gastrointestinal: Negative.    Endocrine: Negative.    Genitourinary: Negative.    Musculoskeletal: Negative.    Skin: Negative.    Allergic/Immunologic: Negative.    Neurological: Negative.    Hematological: Negative.    Psychiatric/Behavioral: Negative.     Objective:     Vital Signs (Most Recent):  Temp: 98.3 °F (36.8 °C) (06/22/22 1017)  Pulse: 79 (06/22/22 1017)  Resp: 18 (06/22/22 1017)  BP: 96/67 (06/22/22 1017)  SpO2: (!) 92 % (06/22/22 1017)   Vital Signs (24h Range):  Temp:  [96.2 °F (35.7 °C)-98.7 °F (37.1 °C)] 98.3 °F (36.8 °C)  Pulse:  [70-95] 79  Resp:  [16-18] 18  SpO2:  [92 %-100 %] 92 %  BP: ()/(59-89) 96/67     Weight: 102.5 kg (225 lb 15.5 oz)  Body mass index is 32.42 kg/m².    Intake/Output Summary (Last 24 hours) at 6/22/2022 1057  Last data filed at 6/22/2022 0526  Gross per 24 hour   Intake 480 ml   Output 1750 ml   Net -1270 ml        Physical  Exam  Constitutional:       Appearance: Normal appearance.   HENT:      Head: Normocephalic and atraumatic.      Nose: Nose normal.      Mouth/Throat:      Mouth: Mucous membranes are moist.   Eyes:      General: No scleral icterus.     Extraocular Movements: Extraocular movements intact.      Pupils: Pupils are equal, round, and reactive to light.   Cardiovascular:      Rate and Rhythm: Normal rate and regular rhythm.      Pulses: Normal pulses.      Heart sounds: Normal heart sounds.   Pulmonary:      Effort: Pulmonary effort is normal.      Breath sounds: Normal breath sounds. No wheezing, rhonchi or rales.   Chest:      Chest wall: No tenderness.   Abdominal:      Palpations: Abdomen is soft.      Tenderness: There is no abdominal tenderness. There is no guarding or rebound.      Hernia: No hernia is present.   Genitourinary:     Comments: deferred  Musculoskeletal:         General: Normal range of motion.      Cervical back: Normal range of motion and neck supple.   Skin:     General: Skin is warm and dry.      Capillary Refill: Capillary refill takes less than 2 seconds.   Neurological:      Mental Status: He is alert and oriented to person, place, and time.   Psychiatric:         Mood and Affect: Mood normal.       Significant Labs:   Recent Labs   Lab 06/20/22  0312 06/21/22  0842 06/22/22  0232   WBC 9.87 8.24 10.03   HGB 13.9* 15.2 15.4   HCT 40.7 43.8 47.0    164 174   MONO 8.9  0.9 8.0  0.7 8.8  0.9       Recent Labs   Lab 06/20/22  0311 06/21/22  0842 06/22/22  0231    136 137   K 3.5 3.9 3.7    101 105   CO2 23 24 20*   BUN 9 8 8   CREATININE 0.8 0.8 0.8   CALCIUM 8.3* 8.7 8.9   PROT 6.4 7.0 6.9   BILITOT 1.0 1.1* 1.1*   ALKPHOS 66 67 78   ALT 39 30 38   * 83* 83*       Microbiology Results (last 7 days)       ** No results found for the last 168 hours. **              Significant Imaging: I have reviewed all pertinent imaging results/findings within the past 24  hours.          Assessment/Plan:      * NSTEMI (non-ST elevated myocardial infarction)  Chest pain    - presents with reported left sided chest pain x2-3 weeks  - EKG revealed ST depression V3, V4, V5 and V6.  -Troponin 1.760--9.230; continue to trend troponins   -NT-proBNP 976  -CXR revealed ground-glass infiltrate within the periphery of the right lung could relate to and Modic process including COVID-19 infection.  - Received 325mg PO ASA x1 in ED and was started on continuous heparin infusion  -Will give Plavix load 600mg PO now  - Continue ASA 81mg daily and Plavix 81mg daily  - TTE ordered  - stratify risks: obtain lipid panel, HgbA1c, and TSH  - Monitor on telemetry  - CMP daily; keep K >4, Mg >2  -Supplemental oxygen PRN for SpO2 <90%  -Will initiate atorvastatin 40mg   -SL Nitro PRN for chest pain  -EKG PRN for recurrent chest pain  - Cardiology consulted in ED; appreciate assistance  -NPO after midnight for LHC in am-- multivessel CAD- transfer to [facility] For CVS evaluation and possible CABG    6/21:  LHC showed multivessel CAD  Needs CABG  Awaiting [facility] bed for CABG  Continue heparin gtt  No chest pain  Await bed at [facility] for transfer    Acute combined systolic and diastolic heart failure  Appears euvolemic now  Continue GDMT with ASA  BB and ACE I as per cards  Strict intake and output   1.5L FR      Alcohol dependence  -admits to drinking 12 can pack of beer daily  -Initiated alcohol withdrawal protocol  -CIWA q4hr  -Thiamine, MVI, and folate daily  -Monitor for s/s of alcohol withdrawal or DTs    -PRN valium      Chest pain  See above      -resolved      Anxiety  -resume home zoloft 50mg PO nightly      Rheumatoid arthritis  -stable  -Followed by Rheumatologist, Dr. Lewis  -On Methotrexate 2.5mg (15mg total) weekly  -Resume folic acid        Primary hypertension  Patient has a current diagnosis of HTN which is controlled.  Latest blood pressure and vitals reviewed-   Temp:  [96.2 °F (35.7 °C)-98.7 °F (37.1  °C)]   Pulse:  [70-95]   Resp:  [16-18]   BP: ()/(59-89)   SpO2:  [92 %-100 %] .   Patient currently off IV antihypertensives.   Home meds for hypertension were reviewed and noted below.   Hypertension Medications             doxazosin (CARDURA) 2 MG tablet Take 1 tablet (2 mg total) by mouth nightly.          Medication adjustment for hospital antihypertensives is as follows-     Doxazosin may be affecting BP; SBP 's  Continue for now but may require a dose adjustment    Will goal for controlled BP reduction as noted be medications noted above and monitor and mitigate end organ damage as indicated.                    VTE Risk Mitigation (From admission, onward)         Ordered     heparin infusion 1,000 units/500 ml in 0.9% NaCl (pressure line flush)  Intra-op continuous PRN         06/20/22 0842     IP VTE HIGH RISK PATIENT  Once         06/19/22 1656     Place sequential compression device  Until discontinued         06/19/22 1656     Reason for No Pharmacological VTE Prophylaxis  Once        Question:  Reasons:  Answer:  IV Heparin w/in 24 hrs. Pre or Post-Op    06/19/22 1656     Place sequential compression device  Until discontinued         06/19/22 1654     heparin 25,000 units in dextrose 5% (100 units/ml) IV bolus from bag - ADDITIONAL PRN BOLUS - 60 units/kg (max bolus 4000 units)  As needed (PRN)        Question:  Heparin Infusion Adjustment (DO NOT MODIFY ANSWER)  Answer:  \\[email or web address]\epic\Images\Pharmacy\HeparinInfusions\heparin LOW INTENSITY nomogram for [facility] [id number].pdf    06/19/22 1216     heparin 25,000 units in dextrose 5% (100 units/ml) IV bolus from bag - ADDITIONAL PRN BOLUS - 30 units/kg (max bolus 4000 units)  As needed (PRN)        Question:  Heparin Infusion Adjustment (DO NOT MODIFY ANSWER)  Answer:  [email or web address]\epic\Images\Pharmacy\HeparinInfusions\heparin LOW INTENSITY nomogram for [facility] [id number].pdf    06/19/22 1216     heparin 25,000 units in dextrose 5% 250 mL (100 units/mL)  infusion LOW INTENSITY nomogram - [facility]  Continuous        Question Answer Comment   Heparin Infusion Adjustment (DO NOT MODIFY ANSWER) [email or web address]\epic\Images\Pharmacy\HeparinInfusions\heparin LOW INTENSITY nomogram for [facility] [id number].pdf    Begin at (in units/kg/hr) 12        06/19/22 1216                Discharge Planning   TURNER: 6/20/2022     Code Status: Full Code   Is the patient medically ready for discharge?:     Reason for patient still in hospital (select all that apply): Other (specify) Awaiting bed at [facility] for CABG  Discharge Plan A: Home with family   Discharge Delays: None known at this time              Christen Medrano NP  Department of Hospital Medicine   [place] [facility]

## 2022-06-22 NOTE — PROGRESS NOTES
[facility] - Telemetry  Cardiology  Progress Note    Patient Name: Andrae Aviles  MRN: [id number]  Admission Date: 6/19/2022  Hospital Length of Stay: 3 days  Code Status: Full Code   Attending Physician: Kilo Mares, *   Primary Care Physician: Shane Lewis MD  Expected Discharge Date: 6/20/2022  Principal Problem:NSTEMI (non-ST elevated myocardial infarction)    Subjective:     Hospital Course:   6/20/2022 per HPI  6/21/2022 [facility] yesterday with results as detailed below:       Findings  Right dominant  LM: 80-90% ostial and mid LM disease  LAD: Moderate early mid LAD lesion. Mild-mod distal LAD stenosis  LCx: 90% mid LCx stenosis. 80% stenosis OM2 at the point of bifurcation. Samll caliber vessels.  RCA: Proximal RCA disease. Mid RCA  with L-R collaterals  LVEDP 29     Transfer to [facility] for CABG evaluation in process. Chest pain free overnight. HR and BP stable. Echo yesterday with EF 30-35%  6/22/2022 Stable overnight. No chest pain or SOB. Remains on IV Heparin. Awaiting bed at [facility]. Carotid ultrasound as part of pre op workup. HR and BP stable           Review of Systems   Constitutional: Negative for chills, decreased appetite, diaphoresis and fever.   Cardiovascular:  Negative for chest pain, claudication, cyanosis, dyspnea on exertion, irregular heartbeat, leg swelling, near-syncope, orthopnea, palpitations, paroxysmal nocturnal dyspnea and syncope.   Respiratory:  Negative for cough, hemoptysis, shortness of breath and wheezing.    Gastrointestinal:  Negative for bloating, abdominal pain, constipation, diarrhea, melena, nausea and vomiting.   Neurological:  Negative for dizziness and weakness.   Objective:     Vital Signs (Most Recent):  Temp: 98.3 °F (36.8 °C) (06/22/22 1017)  Pulse: 79 (06/22/22 1017)  Resp: 18 (06/22/22 1017)  BP: 96/67 (06/22/22 1017)  SpO2: (!) 92 % (06/22/22 1017)   Vital Signs (24h Range):  Temp:  [96.2 °F (35.7 °C)-98.7 °F (37.1 °C)] 98.3 °F (36.8 °C)  Pulse:   [70-95] 79  Resp:  [16-18] 18  SpO2:  [92 %-100 %] 92 %  BP: ()/(59-89) 96/67     Weight: 102.5 kg (225 lb 15.5 oz)  Body mass index is 32.42 kg/m².     SpO2: (!) 92 %  O2 Device (Oxygen Therapy): room air      Intake/Output Summary (Last 24 hours) at 6/22/2022 1113  Last data filed at 6/22/2022 0526  Gross per 24 hour   Intake 480 ml   Output 1750 ml   Net -1270 ml       Lines/Drains/Airways       Peripheral Intravenous Line  Duration                  Peripheral IV - Single Lumen 06/19/22 1115 20 G Left Antecubital 2 days         Peripheral IV - Single Lumen 06/19/22 1247 18 G Right Antecubital 2 days                    Physical Exam  Constitutional:       General: He is not in acute distress.     Appearance: He is well-developed.   Cardiovascular:      Rate and Rhythm: Normal rate and regular rhythm.      Heart sounds: No murmur heard.    No gallop.   Pulmonary:      Effort: Pulmonary effort is normal. No respiratory distress.      Breath sounds: Normal breath sounds. No wheezing.   Abdominal:      General: Bowel sounds are normal. There is no distension.      Palpations: Abdomen is soft.      Tenderness: There is no abdominal tenderness.   Skin:     General: Skin is warm and dry.   Neurological:      Mental Status: He is alert and oriented to person, place, and time.       Significant Labs: BMP:   Recent Labs   Lab 06/21/22  0842 06/22/22  0231   * 99    137   K 3.9 3.7    105   CO2 24 20*   BUN 8 8   CREATININE 0.8 0.8   CALCIUM 8.7 8.9    and CBC   Recent Labs   Lab 06/21/22  0842 06/22/22  0232   WBC 8.24 10.03   HGB 15.2 15.4   HCT 43.8 47.0    174       Significant Imaging: Echocardiogram: Transthoracic echo (TTE) complete ([facility] Only):   Results for orders placed or performed during the hospital encounter of 06/19/22   Echo   Result Value Ref Range    BSA 2.25 m2    TDI SEPTAL 0.05 m/s    LV LATERAL E/E' RATIO 9.10 m/s    LV SEPTAL E/E' RATIO 18.20 m/s    LA WIDTH 4.20 cm     "AORTIC VALVE CUSP SEPERATION 1.99 cm    TDI LATERAL 0.10 m/s    PV PEAK VELOCITY 0.99 cm/s    LVIDd 6.06 (A) 3.5 - 6.0 cm    IVS 1.06 0.6 - 1.1 cm    Posterior Wall 1.02 0.6 - 1.1 cm    Ao root annulus 3.18 cm    LVIDs 4.97 (A) 2.1 - 4.0 cm    FS 18 28 - 44 %    LV mass 264.18 g    LA size 3.97 cm    RVDD 2.85 cm    TAPSE 1.82 cm    Left Ventricle Relative Wall Thickness 0.34 cm    AV mean gradient 5 mmHg    AV valve area 2.04 cm2    AV Velocity Ratio 0.72     AV index (prosthetic) 0.59     MV mean gradient 1 mmHg    MV valve area p 1/2 method 5.46 cm2    MV valve area by continuity eq 1.92 cm2    E/A ratio 1.14     Mean e' 0.08 m/s    E wave deceleration time 139.05 msec    IVRT 105.61 msec    MV "A" wave duration 13.42 msec    Pulm vein S/D ratio 0.88     LVOT diameter 2.09 cm    LVOT area 3.4 cm2    LVOT peak tom 1.01 m/s    LVOT peak VTI 15.89 cm    Ao peak tom 1.41 m/s    Ao VTI 26.71 cm    Mr max tom 0.05 m/s    LVOT stroke volume 54.49 cm3    AV peak gradient 8 mmHg    MV peak gradient 5 mmHg    E/E' ratio 12.13 m/s    MV Peak E Tom 0.91 m/s    TR Max Tom 1.22 m/s    MV VTI 28.34 cm    MV stenosis pressure 1/2 time 40.32 ms    MV Peak A Tom 0.80 m/s    PV Peak S Tom 0.38 m/s    PV Peak D Tom 0.43 m/s    LV Systolic Volume 116.35 mL    LV Systolic Volume Index 52.9 mL/m2    LV Diastolic Volume 184.46 mL    LV Diastolic Volume Index 83.85 mL/m2    LV Mass Index 120 g/m2    Left Atrium Major Axis 5.58 cm    Triscuspid Valve Regurgitation Peak Gradient 6 mmHg    LA Volume Index (Mod) 29.7 mL/m2    LA volume (mod) 65.30 cm3    Right Atrial Pressure (from IVC) 3 mmHg    EF 30 %    TV rest pulmonary artery pressure 9 mmHg    Narrative    · The left ventricle is mildly enlarged with eccentric hypertrophy and   moderately decreased systolic function.  · The estimated ejection fraction is 30-35%.  · There are segmental left ventricular wall motion abnormalities.  · Grade I left ventricular diastolic dysfunction.  · " With normal right ventricular systolic function.  · Mild mitral regurgitation.  · The estimated PA systolic pressure is 9 mmHg.  · Normal central venous pressure (3 mmHg).        Assessment and Plan:     Brief HPI: Seen this morning on AM rounds with Dr. Thompson with wife at the bedside. Complains of cough and chest pain with coughing but different than presentation. No complaints of chest pain with exertion. Discussed POC as detailed below-verbalized understanding and agrees with POC     * NSTEMI (non-ST elevated myocardial infarction)  - initial troponin 1.762 with trend up to 9.230-18.379 and down to 16.609; EKG with STTW abnormality  - [facility] with following results:     LM: 80-90% ostial and mid LM disease   LAD: Moderate early mid LAD lesion. Mild-mod distal LAD stenosis   LCx: 90% mid LCx stenosis. 80% stenosis OM2 at the point of bifurcation. Samll caliber vessels.   RCA: Proximal RCA disease. Mid RCA  with L-R collaterals  - continue DAPT and statin; will start Toprol XL today; awaiting transfer to [facility] for CABG evaluation        Acute combined systolic and diastolic heart failure  - presented with NSTEMI with peak troponin at 18.379  - echo with EF 30-35% and grade I diastolic dysfunction; LVEDP 29mmHg on [facility]; given dose of Lasix 20mg IV; no complaints of TREVINO, orthopnea or PND; appears euvolemic on exam   - no acute distress noted  - SBP 100s-140s; will start Toprol XL today; consider low dose ARB if BP tolerates; will avoid ACEI due to complaints of cough currently     Chest pain  - presented with chest pain concerning for ischemic etiology  - troponin elevated with peak at 18.379  - resolved with no recurrence overnight; [facility] with multivessel CAD  - continue GDMT; awaiting transfer to [facility] for CABG evaluation     Primary hypertension  - SBP 100s-140s overnight  - no antihypertensives noted on home med rec  - monitor BP; will start on BB for CAD and CHF management         VTE Risk Mitigation (From  admission, onward)         Ordered     heparin infusion 1,000 units/500 ml in 0.9% NaCl (pressure line flush)  Intra-op continuous PRN         06/20/22 0842     IP VTE HIGH RISK PATIENT  Once         06/19/22 1656     Place sequential compression device  Until discontinued         06/19/22 1656     Reason for No Pharmacological VTE Prophylaxis  Once        Question:  Reasons:  Answer:  IV Heparin w/in 24 hrs. Pre or Post-Op    06/19/22 1656     Place sequential compression device  Until discontinued         06/19/22 1654     heparin 25,000 units in dextrose 5% (100 units/ml) IV bolus from bag - ADDITIONAL PRN BOLUS - 60 units/kg (max bolus 4000 units)  As needed (PRN)        Question:  Heparin Infusion Adjustment (DO NOT MODIFY ANSWER)  Answer:  [email or web address]\epic\Images\Pharmacy\HeparinInfusions\heparin LOW INTENSITY nomogram for [facility] [id number].pdf    06/19/22 1216     heparin 25,000 units in dextrose 5% (100 units/ml) IV bolus from bag - ADDITIONAL PRN BOLUS - 30 units/kg (max bolus 4000 units)  As needed (PRN)        Question:  Heparin Infusion Adjustment (DO NOT MODIFY ANSWER)  Answer:  \\[email or web address]\epic\Images\Pharmacy\HeparinInfusions\heparin LOW INTENSITY nomogram for [facility] [id number].pdf    06/19/22 1216     heparin 25,000 units in dextrose 5% 250 mL (100 units/mL) infusion LOW INTENSITY nomogram - [facility]  Continuous        Question Answer Comment   Heparin Infusion Adjustment (DO NOT MODIFY ANSWER) \\[email or web address]\epic\Images\Pharmacy\HeparinInfusions\heparin LOW INTENSITY nomogram for [facility] [id number].pdf    Begin at (in units/kg/hr) 12        06/19/22 1216                Ruthann Pete, APRN, ANP  Cardiology  [facility] - Telemetry

## 2022-06-22 NOTE — ASSESSMENT & PLAN NOTE
Patient has a current diagnosis of HTN which is controlled.  Latest blood pressure and vitals reviewed-   Temp:  [96.2 °F (35.7 °C)-98.7 °F (37.1 °C)]   Pulse:  [70-95]   Resp:  [16-18]   BP: ()/(59-89)   SpO2:  [92 %-100 %] .   Patient currently off IV antihypertensives.   Home meds for hypertension were reviewed and noted below.   Hypertension Medications             doxazosin (CARDURA) 2 MG tablet Take 1 tablet (2 mg total) by mouth nightly.          Medication adjustment for hospital antihypertensives is as follows-     Doxazosin may be affecting BP; SBP 's  Continue for now but may require a dose adjustment    Will goal for controlled BP reduction as noted be medications noted above and monitor and mitigate end organ damage as indicated.

## 2022-06-22 NOTE — ASSESSMENT & PLAN NOTE
Appears euvolemic now  Continue GDMT with ASA  BB and ACE I as per cards  Strict intake and output   1.5L FR

## 2022-06-22 NOTE — SUBJECTIVE & OBJECTIVE
Review of Systems   Constitutional: Negative for chills, decreased appetite, diaphoresis and fever.   Cardiovascular:  Negative for chest pain, claudication, cyanosis, dyspnea on exertion, irregular heartbeat, leg swelling, near-syncope, orthopnea, palpitations, paroxysmal nocturnal dyspnea and syncope.   Respiratory:  Negative for cough, hemoptysis, shortness of breath and wheezing.    Gastrointestinal:  Negative for bloating, abdominal pain, constipation, diarrhea, melena, nausea and vomiting.   Neurological:  Negative for dizziness and weakness.   Objective:     Vital Signs (Most Recent):  Temp: 98.3 °F (36.8 °C) (06/22/22 1017)  Pulse: 79 (06/22/22 1017)  Resp: 18 (06/22/22 1017)  BP: 96/67 (06/22/22 1017)  SpO2: (!) 92 % (06/22/22 1017)   Vital Signs (24h Range):  Temp:  [96.2 °F (35.7 °C)-98.7 °F (37.1 °C)] 98.3 °F (36.8 °C)  Pulse:  [70-95] 79  Resp:  [16-18] 18  SpO2:  [92 %-100 %] 92 %  BP: ()/(59-89) 96/67     Weight: 102.5 kg (225 lb 15.5 oz)  Body mass index is 32.42 kg/m².     SpO2: (!) 92 %  O2 Device (Oxygen Therapy): room air      Intake/Output Summary (Last 24 hours) at 6/22/2022 1113  Last data filed at 6/22/2022 0526  Gross per 24 hour   Intake 480 ml   Output 1750 ml   Net -1270 ml       Lines/Drains/Airways       Peripheral Intravenous Line  Duration                  Peripheral IV - Single Lumen 06/19/22 1115 20 G Left Antecubital 2 days         Peripheral IV - Single Lumen 06/19/22 1247 18 G Right Antecubital 2 days                    Physical Exam  Constitutional:       General: He is not in acute distress.     Appearance: He is well-developed.   Cardiovascular:      Rate and Rhythm: Normal rate and regular rhythm.      Heart sounds: No murmur heard.    No gallop.   Pulmonary:      Effort: Pulmonary effort is normal. No respiratory distress.      Breath sounds: Normal breath sounds. No wheezing.   Abdominal:      General: Bowel sounds are normal. There is no distension.       "Palpations: Abdomen is soft.      Tenderness: There is no abdominal tenderness.   Skin:     General: Skin is warm and dry.   Neurological:      Mental Status: He is alert and oriented to person, place, and time.       Significant Labs: BMP:   Recent Labs   Lab 06/21/22  0842 06/22/22  0231   * 99    137   K 3.9 3.7    105   CO2 24 20*   BUN 8 8   CREATININE 0.8 0.8   CALCIUM 8.7 8.9    and CBC   Recent Labs   Lab 06/21/22  0842 06/22/22  0232   WBC 8.24 10.03   HGB 15.2 15.4   HCT 43.8 47.0    174       Significant Imaging: Echocardiogram: Transthoracic echo (TTE) complete (Cupid Only):   Results for orders placed or performed during the hospital encounter of 06/19/22   Echo   Result Value Ref Range    BSA 2.25 m2    TDI SEPTAL 0.05 m/s    LV LATERAL E/E' RATIO 9.10 m/s    LV SEPTAL E/E' RATIO 18.20 m/s    LA WIDTH 4.20 cm    AORTIC VALVE CUSP SEPERATION 1.99 cm    TDI LATERAL 0.10 m/s    PV PEAK VELOCITY 0.99 cm/s    LVIDd 6.06 (A) 3.5 - 6.0 cm    IVS 1.06 0.6 - 1.1 cm    Posterior Wall 1.02 0.6 - 1.1 cm    Ao root annulus 3.18 cm    LVIDs 4.97 (A) 2.1 - 4.0 cm    FS 18 28 - 44 %    LV mass 264.18 g    LA size 3.97 cm    RVDD 2.85 cm    TAPSE 1.82 cm    Left Ventricle Relative Wall Thickness 0.34 cm    AV mean gradient 5 mmHg    AV valve area 2.04 cm2    AV Velocity Ratio 0.72     AV index (prosthetic) 0.59     MV mean gradient 1 mmHg    MV valve area p 1/2 method 5.46 cm2    MV valve area by continuity eq 1.92 cm2    E/A ratio 1.14     Mean e' 0.08 m/s    E wave deceleration time 139.05 msec    IVRT 105.61 msec    MV "A" wave duration 13.42 msec    Pulm vein S/D ratio 0.88     LVOT diameter 2.09 cm    LVOT area 3.4 cm2    LVOT peak tom 1.01 m/s    LVOT peak VTI 15.89 cm    Ao peak tom 1.41 m/s    Ao VTI 26.71 cm    Mr max tom 0.05 m/s    LVOT stroke volume 54.49 cm3    AV peak gradient 8 mmHg    MV peak gradient 5 mmHg    E/E' ratio 12.13 m/s    MV Peak E Tom 0.91 m/s    TR Max Tom 1.22 " m/s    MV VTI 28.34 cm    MV stenosis pressure 1/2 time 40.32 ms    MV Peak A Tom 0.80 m/s    PV Peak S Tom 0.38 m/s    PV Peak D Tom 0.43 m/s    LV Systolic Volume 116.35 mL    LV Systolic Volume Index 52.9 mL/m2    LV Diastolic Volume 184.46 mL    LV Diastolic Volume Index 83.85 mL/m2    LV Mass Index 120 g/m2    Left Atrium Major Axis 5.58 cm    Triscuspid Valve Regurgitation Peak Gradient 6 mmHg    LA Volume Index (Mod) 29.7 mL/m2    LA volume (mod) 65.30 cm3    Right Atrial Pressure (from IVC) 3 mmHg    EF 30 %    TV rest pulmonary artery pressure 9 mmHg    Narrative    · The left ventricle is mildly enlarged with eccentric hypertrophy and   moderately decreased systolic function.  · The estimated ejection fraction is 30-35%.  · There are segmental left ventricular wall motion abnormalities.  · Grade I left ventricular diastolic dysfunction.  · With normal right ventricular systolic function.  · Mild mitral regurgitation.  · The estimated PA systolic pressure is 9 mmHg.  · Normal central venous pressure (3 mmHg).

## 2022-06-22 NOTE — PLAN OF CARE
SW met with pt via [employer] to discuss dc planning.   SW confirmed with pt that once bed is available at [facility] he will be transported. Pt expressed understanding. SW will continue to follow pt throughout his transitions of care and assist with any dc needs. Pt did not have any questions or concerns for SW at this time.      Pts expressed understanding for the following stated above. SW will continue to follow pt throughout his transitions of care and assist with any dc needs.      Future Appointments   Date Time Provider Department Center   8/1/2022  9:00 AM Shane Lewis MD [facility]        06/22/22 1254   Final Note   Assessment Type Final Discharge Note   Anticipated Discharge Disposition   ([facility])   Hospital Resources/Appts/Education Provided Appointments scheduled by Navigator/Coordinator   Post-Acute Status   Discharge Delays None known at this time

## 2022-06-22 NOTE — PROGRESS NOTES
[facility]                    Pharmacy       Discharge Medication Education    Patient ACCEPTED medication education. Pharmacy has provided education on the name, indication, and possible side effects of the medication(s) prescribed, using teach-back method.     The following medications have also been discussed, during this admission.        Medication List        CONTINUE taking these medications      doxazosin 2 MG tablet  Commonly known as: CARDURA  Take 1 tablet (2 mg total) by mouth nightly.     folic acid 1 MG tablet  Commonly known as: FOLVITE  Take 1 tablet (1,000 mcg total) by mouth once daily.     methotrexate 2.5 MG Tab  Take 6 tablets (15 mg total) by mouth every 7 days.     predniSONE 20 MG tablet  Commonly known as: DELTASONE     sertraline 50 MG tablet  Commonly known as: ZOLOFT  Take 1 tablet (50 mg total) by mouth every evening.               Thank you  Alphonso Copeland, PharmD  [phone]

## 2022-06-22 NOTE — ASSESSMENT & PLAN NOTE
-admits to drinking 12 can pack of beer daily  -Initiated alcohol withdrawal protocol  -CIWA q4hr  -Thiamine, MVI, and folate daily  -Monitor for s/s of alcohol withdrawal or DTs    -PRN valium     Inflammation Suggestive Of Cancer Camouflage Histology Text: There was a dense lymphocytic infiltrate which prevented adequate histologic evaluation of adjacent structures.

## 2022-06-22 NOTE — ASSESSMENT & PLAN NOTE
Chest pain    - presents with reported left sided chest pain x2-3 weeks  - EKG revealed ST depression V3, V4, V5 and V6.  -Troponin 1.760--9.230; continue to trend troponins   -NT-proBNP 976  -CXR revealed ground-glass infiltrate within the periphery of the right lung could relate to and Modic process including COVID-19 infection.  - Received 325mg PO ASA x1 in ED and was started on continuous heparin infusion  -Will give Plavix load 600mg PO now  - Continue ASA 81mg daily and Plavix 81mg daily  - TTE ordered  - stratify risks: obtain lipid panel, HgbA1c, and TSH  - Monitor on telemetry  - CMP daily; keep K >4, Mg >2  -Supplemental oxygen PRN for SpO2 <90%  -Will initiate atorvastatin 40mg   -SL Nitro PRN for chest pain  -EKG PRN for recurrent chest pain  - Cardiology consulted in ED; appreciate assistance  -NPO after midnight for LHC in am-- multivessel CAD- transfer to [facility] For CVS evaluation and possible CABG    6/21:  LHC showed multivessel CAD  Needs CABG  Awaiting [facility] bed for CABG  Continue heparin gtt  No chest pain  Await bed at [facility] for transfer

## 2022-06-22 NOTE — ASSESSMENT & PLAN NOTE
- SBP 100s-140s overnight  - no antihypertensives noted on home med rec  - monitor BP; will start on BB for CAD and CHF management

## 2022-06-22 NOTE — ASSESSMENT & PLAN NOTE
- presented with chest pain concerning for ischemic etiology  - troponin elevated with peak at 18.379  - resolved with no recurrence overnight; LHC with multivessel CAD  - continue GDMT; awaiting transfer to [facility] for CABG evaluation

## 2022-06-22 NOTE — ASSESSMENT & PLAN NOTE
- presented with NSTEMI with peak troponin at 18.379  - echo with EF 30-35% and grade I diastolic dysfunction; LVEDP 29mmHg on LHC; given dose of Lasix 20mg IV; no complaints of TREVINO, orthopnea or PND; appears euvolemic on exam   - no acute distress noted  - SBP 100s-140s; will start Toprol XL today; consider low dose ARB if BP tolerates; will avoid ACEI due to complaints of cough currently

## 2022-06-22 NOTE — NURSING
Care plan reviewed. Cardiac and glucose monitoring in place. Pt remains on heparin at 20units/kg/hr, am aptt therapeutic and no changes made. Received call from transport line, pt still waiting for a bed. Carotic US ordered today. Safety maintained, bed at lowest position, wheels locked, call light within reach.

## 2022-06-22 NOTE — SUBJECTIVE & OBJECTIVE
Interval History:   No fevers  No chest pain  Remains on heparin gtt  Nothing new to add today  Awaiting bed at [facility] for CABG  Hopefully he gets a bed today    Review of Systems   Constitutional:  Positive for activity change.   HENT: Negative.     Eyes: Negative.    Respiratory:  Negative for cough, choking, chest tightness and shortness of breath.    Cardiovascular:  Negative for chest pain, palpitations and leg swelling.   Gastrointestinal: Negative.    Endocrine: Negative.    Genitourinary: Negative.    Musculoskeletal: Negative.    Skin: Negative.    Allergic/Immunologic: Negative.    Neurological: Negative.    Hematological: Negative.    Psychiatric/Behavioral: Negative.     Objective:     Vital Signs (Most Recent):  Temp: 98.3 °F (36.8 °C) (06/22/22 1017)  Pulse: 79 (06/22/22 1017)  Resp: 18 (06/22/22 1017)  BP: 96/67 (06/22/22 1017)  SpO2: (!) 92 % (06/22/22 1017)   Vital Signs (24h Range):  Temp:  [96.2 °F (35.7 °C)-98.7 °F (37.1 °C)] 98.3 °F (36.8 °C)  Pulse:  [70-95] 79  Resp:  [16-18] 18  SpO2:  [92 %-100 %] 92 %  BP: ()/(59-89) 96/67     Weight: 102.5 kg (225 lb 15.5 oz)  Body mass index is 32.42 kg/m².    Intake/Output Summary (Last 24 hours) at 6/22/2022 1057  Last data filed at 6/22/2022 0526  Gross per 24 hour   Intake 480 ml   Output 1750 ml   Net -1270 ml        Physical Exam  Constitutional:       Appearance: Normal appearance.   HENT:      Head: Normocephalic and atraumatic.      Nose: Nose normal.      Mouth/Throat:      Mouth: Mucous membranes are moist.   Eyes:      General: No scleral icterus.     Extraocular Movements: Extraocular movements intact.      Pupils: Pupils are equal, round, and reactive to light.   Cardiovascular:      Rate and Rhythm: Normal rate and regular rhythm.      Pulses: Normal pulses.      Heart sounds: Normal heart sounds.   Pulmonary:      Effort: Pulmonary effort is normal.      Breath sounds: Normal breath sounds. No wheezing, rhonchi or rales.   Chest:       Chest wall: No tenderness.   Abdominal:      Palpations: Abdomen is soft.      Tenderness: There is no abdominal tenderness. There is no guarding or rebound.      Hernia: No hernia is present.   Genitourinary:     Comments: deferred  Musculoskeletal:         General: Normal range of motion.      Cervical back: Normal range of motion and neck supple.   Skin:     General: Skin is warm and dry.      Capillary Refill: Capillary refill takes less than 2 seconds.   Neurological:      Mental Status: He is alert and oriented to person, place, and time.   Psychiatric:         Mood and Affect: Mood normal.       Significant Labs:   Recent Labs   Lab 06/20/22 0312 06/21/22  0842 06/22/22  0232   WBC 9.87 8.24 10.03   HGB 13.9* 15.2 15.4   HCT 40.7 43.8 47.0    164 174   MONO 8.9  0.9 8.0  0.7 8.8  0.9       Recent Labs   Lab 06/20/22 0311 06/21/22  0842 06/22/22  0231    136 137   K 3.5 3.9 3.7    101 105   CO2 23 24 20*   BUN 9 8 8   CREATININE 0.8 0.8 0.8   CALCIUM 8.3* 8.7 8.9   PROT 6.4 7.0 6.9   BILITOT 1.0 1.1* 1.1*   ALKPHOS 66 67 78   ALT 39 30 38   * 83* 83*       Microbiology Results (last 7 days)       ** No results found for the last 168 hours. **              Significant Imaging: I have reviewed all pertinent imaging results/findings within the past 24 hours.

## 2022-06-22 NOTE — ASSESSMENT & PLAN NOTE
- initial troponin 1.762 with trend up to 9.230-18.379 and down to 16.609; EKG with STTW abnormality  - [facility] with following results:     LM: 80-90% ostial and mid LM disease   LAD: Moderate early mid LAD lesion. Mild-mod distal LAD stenosis   LCx: 90% mid LCx stenosis. 80% stenosis OM2 at the point of bifurcation. Samll caliber vessels.   RCA: Proximal RCA disease. Mid RCA  with L-R collaterals  - continue DAPT and statin; will start Toprol XL today; awaiting transfer to [facility] for CABG evaluation

## 2022-06-23 PROBLEM — R07.9 CHEST PAIN: Status: RESOLVED | Noted: 2022-06-19 | Resolved: 2022-06-23

## 2022-06-23 PROBLEM — S30.1XXA ABDOMINAL WALL HEMATOMA: Status: ACTIVE | Noted: 2022-06-23

## 2022-06-23 PROBLEM — S30.1XXA HEMATOMA OF ABDOMINAL WALL, INITIAL ENCOUNTER: Status: ACTIVE | Noted: 2022-06-23

## 2022-06-23 LAB
ANION GAP SERPL CALC-SCNC: 12 MMOL/L (ref 8–16)
APTT BLDCRRT: 39 SEC (ref 21–32)
APTT BLDCRRT: 43.8 SEC (ref 21–32)
BASOPHILS # BLD AUTO: 0.03 K/UL (ref 0–0.2)
BASOPHILS # BLD AUTO: 0.03 K/UL (ref 0–0.2)
BASOPHILS # BLD AUTO: 0.04 K/UL (ref 0–0.2)
BASOPHILS # BLD AUTO: 0.04 K/UL (ref 0–0.2)
BASOPHILS NFR BLD: 0.2 % (ref 0–1.9)
BASOPHILS NFR BLD: 0.3 % (ref 0–1.9)
BASOPHILS NFR BLD: 0.4 % (ref 0–1.9)
BASOPHILS NFR BLD: 0.4 % (ref 0–1.9)
BUN SERPL-MCNC: 9 MG/DL (ref 6–20)
CALCIUM SERPL-MCNC: 8.8 MG/DL (ref 8.7–10.5)
CHLORIDE SERPL-SCNC: 102 MMOL/L (ref 95–110)
CO2 SERPL-SCNC: 20 MMOL/L (ref 23–29)
CREAT SERPL-MCNC: 0.8 MG/DL (ref 0.5–1.4)
DIFFERENTIAL METHOD: ABNORMAL
EOSINOPHIL # BLD AUTO: 0 K/UL (ref 0–0.5)
EOSINOPHIL # BLD AUTO: 0 K/UL (ref 0–0.5)
EOSINOPHIL # BLD AUTO: 0.1 K/UL (ref 0–0.5)
EOSINOPHIL # BLD AUTO: 0.1 K/UL (ref 0–0.5)
EOSINOPHIL NFR BLD: 0.2 % (ref 0–8)
EOSINOPHIL NFR BLD: 0.3 % (ref 0–8)
EOSINOPHIL NFR BLD: 0.4 % (ref 0–8)
EOSINOPHIL NFR BLD: 0.4 % (ref 0–8)
ERYTHROCYTE [DISTWIDTH] IN BLOOD BY AUTOMATED COUNT: 13 % (ref 11.5–14.5)
ERYTHROCYTE [DISTWIDTH] IN BLOOD BY AUTOMATED COUNT: 13 % (ref 11.5–14.5)
ERYTHROCYTE [DISTWIDTH] IN BLOOD BY AUTOMATED COUNT: 13.1 % (ref 11.5–14.5)
ERYTHROCYTE [DISTWIDTH] IN BLOOD BY AUTOMATED COUNT: 13.1 % (ref 11.5–14.5)
EST. GFR  (AFRICAN AMERICAN): >60 ML/MIN/1.73 M^2
EST. GFR  (NON AFRICAN AMERICAN): >60 ML/MIN/1.73 M^2
GLUCOSE SERPL-MCNC: 124 MG/DL (ref 70–110)
HCT VFR BLD AUTO: 38 % (ref 40–54)
HCT VFR BLD AUTO: 38.1 % (ref 40–54)
HCT VFR BLD AUTO: 40.6 % (ref 40–54)
HCT VFR BLD AUTO: 40.7 % (ref 40–54)
HCT VFR BLD AUTO: 41.6 % (ref 40–54)
HCT VFR BLD AUTO: 41.6 % (ref 40–54)
HGB BLD-MCNC: 12.9 G/DL (ref 14–18)
HGB BLD-MCNC: 13.2 G/DL (ref 14–18)
HGB BLD-MCNC: 13.7 G/DL (ref 14–18)
HGB BLD-MCNC: 14.3 G/DL (ref 14–18)
HGB BLD-MCNC: 14.4 G/DL (ref 14–18)
HGB BLD-MCNC: 14.4 G/DL (ref 14–18)
IMM GRANULOCYTES # BLD AUTO: 0.07 K/UL (ref 0–0.04)
IMM GRANULOCYTES # BLD AUTO: 0.07 K/UL (ref 0–0.04)
IMM GRANULOCYTES # BLD AUTO: 0.08 K/UL (ref 0–0.04)
IMM GRANULOCYTES # BLD AUTO: 0.11 K/UL (ref 0–0.04)
IMM GRANULOCYTES NFR BLD AUTO: 0.6 % (ref 0–0.5)
IMM GRANULOCYTES NFR BLD AUTO: 0.6 % (ref 0–0.5)
IMM GRANULOCYTES NFR BLD AUTO: 0.7 % (ref 0–0.5)
IMM GRANULOCYTES NFR BLD AUTO: 0.8 % (ref 0–0.5)
INR PPP: 1.1 (ref 0.8–1.2)
LYMPHOCYTES # BLD AUTO: 0.7 K/UL (ref 1–4.8)
LYMPHOCYTES # BLD AUTO: 0.8 K/UL (ref 1–4.8)
LYMPHOCYTES # BLD AUTO: 1 K/UL (ref 1–4.8)
LYMPHOCYTES # BLD AUTO: 1 K/UL (ref 1–4.8)
LYMPHOCYTES NFR BLD: 5.2 % (ref 18–48)
LYMPHOCYTES NFR BLD: 7 % (ref 18–48)
LYMPHOCYTES NFR BLD: 8.5 % (ref 18–48)
LYMPHOCYTES NFR BLD: 8.5 % (ref 18–48)
MCH RBC QN AUTO: 34.4 PG (ref 27–31)
MCH RBC QN AUTO: 34.6 PG (ref 27–31)
MCHC RBC AUTO-ENTMCNC: 33.9 G/DL (ref 32–36)
MCHC RBC AUTO-ENTMCNC: 34.6 G/DL (ref 32–36)
MCHC RBC AUTO-ENTMCNC: 34.6 G/DL (ref 32–36)
MCHC RBC AUTO-ENTMCNC: 35.1 G/DL (ref 32–36)
MCV RBC AUTO: 102 FL (ref 82–98)
MCV RBC AUTO: 98 FL (ref 82–98)
MCV RBC AUTO: 99 FL (ref 82–98)
MCV RBC AUTO: 99 FL (ref 82–98)
MONOCYTES # BLD AUTO: 0.8 K/UL (ref 0.3–1)
MONOCYTES # BLD AUTO: 0.8 K/UL (ref 0.3–1)
MONOCYTES # BLD AUTO: 1 K/UL (ref 0.3–1)
MONOCYTES # BLD AUTO: 1.2 K/UL (ref 0.3–1)
MONOCYTES NFR BLD: 7.5 % (ref 4–15)
MONOCYTES NFR BLD: 7.5 % (ref 4–15)
MONOCYTES NFR BLD: 8.5 % (ref 4–15)
MONOCYTES NFR BLD: 9 % (ref 4–15)
NEUTROPHILS # BLD AUTO: 10 K/UL (ref 1.8–7.7)
NEUTROPHILS # BLD AUTO: 11.1 K/UL (ref 1.8–7.7)
NEUTROPHILS # BLD AUTO: 9.2 K/UL (ref 1.8–7.7)
NEUTROPHILS # BLD AUTO: 9.2 K/UL (ref 1.8–7.7)
NEUTROPHILS NFR BLD: 82.6 % (ref 38–73)
NEUTROPHILS NFR BLD: 82.6 % (ref 38–73)
NEUTROPHILS NFR BLD: 83.2 % (ref 38–73)
NEUTROPHILS NFR BLD: 84.6 % (ref 38–73)
NRBC BLD-RTO: 0 /100 WBC
PLATELET # BLD AUTO: 182 K/UL (ref 150–450)
PLATELET # BLD AUTO: 182 K/UL (ref 150–450)
PLATELET # BLD AUTO: 184 K/UL (ref 150–450)
PLATELET # BLD AUTO: 212 K/UL (ref 150–450)
PMV BLD AUTO: 11 FL (ref 9.2–12.9)
PMV BLD AUTO: 11.1 FL (ref 9.2–12.9)
PMV BLD AUTO: 11.2 FL (ref 9.2–12.9)
PMV BLD AUTO: 11.2 FL (ref 9.2–12.9)
POCT GLUCOSE: 112 MG/DL (ref 70–110)
POCT GLUCOSE: 123 MG/DL (ref 70–110)
POCT GLUCOSE: 123 MG/DL (ref 70–110)
POCT GLUCOSE: 124 MG/DL (ref 70–110)
POTASSIUM SERPL-SCNC: 3.8 MMOL/L (ref 3.5–5.1)
PROTHROMBIN TIME: 10.9 SEC (ref 9–12.5)
RBC # BLD AUTO: 3.73 M/UL (ref 4.6–6.2)
RBC # BLD AUTO: 4.16 M/UL (ref 4.6–6.2)
RBC # BLD AUTO: 4.19 M/UL (ref 4.6–6.2)
RBC # BLD AUTO: 4.19 M/UL (ref 4.6–6.2)
SODIUM SERPL-SCNC: 134 MMOL/L (ref 136–145)
WBC # BLD AUTO: 11.14 K/UL (ref 3.9–12.7)
WBC # BLD AUTO: 11.14 K/UL (ref 3.9–12.7)
WBC # BLD AUTO: 12 K/UL (ref 3.9–12.7)
WBC # BLD AUTO: 13.08 K/UL (ref 3.9–12.7)

## 2022-06-23 PROCEDURE — 99233 PR SUBSEQUENT HOSPITAL CARE,LEVL III: ICD-10-PCS | Mod: ,,, | Performed by: INTERNAL MEDICINE

## 2022-06-23 PROCEDURE — 63600175 PHARM REV CODE 636 W HCPCS: Performed by: NURSE PRACTITIONER

## 2022-06-23 PROCEDURE — 25000003 PHARM REV CODE 250: Performed by: NURSE PRACTITIONER

## 2022-06-23 PROCEDURE — 36415 COLL VENOUS BLD VENIPUNCTURE: CPT | Performed by: STUDENT IN AN ORGANIZED HEALTH CARE EDUCATION/TRAINING PROGRAM

## 2022-06-23 PROCEDURE — 85730 THROMBOPLASTIN TIME PARTIAL: CPT | Performed by: NURSE PRACTITIONER

## 2022-06-23 PROCEDURE — 99233 PR SUBSEQUENT HOSPITAL CARE,LEVL III: ICD-10-PCS | Mod: ,,, | Performed by: NURSE PRACTITIONER

## 2022-06-23 PROCEDURE — 85025 COMPLETE CBC W/AUTO DIFF WBC: CPT | Mod: 91 | Performed by: NURSE PRACTITIONER

## 2022-06-23 PROCEDURE — 80048 BASIC METABOLIC PNL TOTAL CA: CPT | Performed by: NURSE PRACTITIONER

## 2022-06-23 PROCEDURE — 85610 PROTHROMBIN TIME: CPT | Performed by: NURSE PRACTITIONER

## 2022-06-23 PROCEDURE — 36415 COLL VENOUS BLD VENIPUNCTURE: CPT | Performed by: NURSE PRACTITIONER

## 2022-06-23 PROCEDURE — 63600175 PHARM REV CODE 636 W HCPCS: Performed by: HOSPITALIST

## 2022-06-23 PROCEDURE — 99900035 HC TECH TIME PER 15 MIN (STAT)

## 2022-06-23 PROCEDURE — 85730 THROMBOPLASTIN TIME PARTIAL: CPT | Mod: 91 | Performed by: NURSE PRACTITIONER

## 2022-06-23 PROCEDURE — 99233 SBSQ HOSP IP/OBS HIGH 50: CPT | Mod: ,,, | Performed by: INTERNAL MEDICINE

## 2022-06-23 PROCEDURE — 85014 HEMATOCRIT: CPT | Mod: 91 | Performed by: NURSE PRACTITIONER

## 2022-06-23 PROCEDURE — 63700000 PHARM REV CODE 250 ALT 637 W/O HCPCS: Performed by: NURSE PRACTITIONER

## 2022-06-23 PROCEDURE — 25000003 PHARM REV CODE 250: Performed by: FAMILY MEDICINE

## 2022-06-23 PROCEDURE — 85025 COMPLETE CBC W/AUTO DIFF WBC: CPT | Mod: 91 | Performed by: STUDENT IN AN ORGANIZED HEALTH CARE EDUCATION/TRAINING PROGRAM

## 2022-06-23 PROCEDURE — 99233 SBSQ HOSP IP/OBS HIGH 50: CPT | Mod: ,,, | Performed by: NURSE PRACTITIONER

## 2022-06-23 PROCEDURE — 11000001 HC ACUTE MED/SURG PRIVATE ROOM

## 2022-06-23 PROCEDURE — 94761 N-INVAS EAR/PLS OXIMETRY MLT: CPT

## 2022-06-23 PROCEDURE — 85018 HEMOGLOBIN: CPT | Mod: 91 | Performed by: NURSE PRACTITIONER

## 2022-06-23 PROCEDURE — 25000003 PHARM REV CODE 250

## 2022-06-23 RX ORDER — MORPHINE SULFATE 2 MG/ML
2 INJECTION, SOLUTION INTRAMUSCULAR; INTRAVENOUS EVERY 4 HOURS PRN
Status: DISCONTINUED | OUTPATIENT
Start: 2022-06-23 | End: 2022-06-24 | Stop reason: HOSPADM

## 2022-06-23 RX ORDER — MORPHINE SULFATE 2 MG/ML
2 INJECTION, SOLUTION INTRAMUSCULAR; INTRAVENOUS ONCE
Status: COMPLETED | OUTPATIENT
Start: 2022-06-23 | End: 2022-06-23

## 2022-06-23 RX ORDER — AZITHROMYCIN 250 MG/1
500 TABLET, FILM COATED ORAL ONCE
Status: COMPLETED | OUTPATIENT
Start: 2022-06-23 | End: 2022-06-23

## 2022-06-23 RX ORDER — HEPARIN SODIUM,PORCINE/D5W 25000/250
0-40 INTRAVENOUS SOLUTION INTRAVENOUS CONTINUOUS
Status: DISCONTINUED | OUTPATIENT
Start: 2022-06-23 | End: 2022-06-23

## 2022-06-23 RX ORDER — AZITHROMYCIN 250 MG/1
250 TABLET, FILM COATED ORAL DAILY
Status: DISCONTINUED | OUTPATIENT
Start: 2022-06-24 | End: 2022-06-24 | Stop reason: HOSPADM

## 2022-06-23 RX ORDER — ALBUTEROL SULFATE 2.5 MG/.5ML
2.5 SOLUTION RESPIRATORY (INHALATION)
Status: DISCONTINUED | OUTPATIENT
Start: 2022-06-23 | End: 2022-06-24 | Stop reason: HOSPADM

## 2022-06-23 RX ORDER — SODIUM CHLORIDE FOR INHALATION 3 %
4 VIAL, NEBULIZER (ML) INHALATION
Status: DISCONTINUED | OUTPATIENT
Start: 2022-06-23 | End: 2022-06-24 | Stop reason: HOSPADM

## 2022-06-23 RX ORDER — OXYCODONE HYDROCHLORIDE 5 MG/1
10 TABLET ORAL EVERY 4 HOURS PRN
Status: DISCONTINUED | OUTPATIENT
Start: 2022-06-23 | End: 2022-06-24 | Stop reason: HOSPADM

## 2022-06-23 RX ORDER — CODEINE PHOSPHATE AND GUAIFENESIN 10; 100 MG/5ML; MG/5ML
5 SOLUTION ORAL EVERY 4 HOURS PRN
Status: DISCONTINUED | OUTPATIENT
Start: 2022-06-23 | End: 2022-06-24 | Stop reason: HOSPADM

## 2022-06-23 RX ADMIN — MORPHINE SULFATE 2 MG: 2 INJECTION, SOLUTION INTRAMUSCULAR; INTRAVENOUS at 10:06

## 2022-06-23 RX ADMIN — SERTRALINE HYDROCHLORIDE 50 MG: 50 TABLET ORAL at 09:06

## 2022-06-23 RX ADMIN — OXYCODONE 10 MG: 5 TABLET ORAL at 11:06

## 2022-06-23 RX ADMIN — DOXAZOSIN 2 MG: 2 TABLET ORAL at 09:06

## 2022-06-23 RX ADMIN — MORPHINE SULFATE 2 MG: 2 INJECTION, SOLUTION INTRAMUSCULAR; INTRAVENOUS at 09:06

## 2022-06-23 RX ADMIN — OXYCODONE 10 MG: 5 TABLET ORAL at 06:06

## 2022-06-23 RX ADMIN — THIAMINE HCL TAB 100 MG 100 MG: 100 TAB at 09:06

## 2022-06-23 RX ADMIN — MORPHINE SULFATE 2 MG: 2 INJECTION, SOLUTION INTRAMUSCULAR; INTRAVENOUS at 07:06

## 2022-06-23 RX ADMIN — AZITHROMYCIN MONOHYDRATE 500 MG: 250 TABLET ORAL at 04:06

## 2022-06-23 RX ADMIN — ASPIRIN 81 MG: 81 TABLET, COATED ORAL at 09:06

## 2022-06-23 RX ADMIN — FOLIC ACID 1000 MCG: 1 TABLET ORAL at 09:06

## 2022-06-23 RX ADMIN — METOPROLOL SUCCINATE 12.5 MG: 25 TABLET, EXTENDED RELEASE ORAL at 09:06

## 2022-06-23 RX ADMIN — MORPHINE SULFATE 2 MG: 2 INJECTION, SOLUTION INTRAMUSCULAR; INTRAVENOUS at 04:06

## 2022-06-23 RX ADMIN — HEPARIN SODIUM 20 UNITS/KG/HR: 1000 INJECTION, SOLUTION INTRAVENOUS; SUBCUTANEOUS at 11:06

## 2022-06-23 RX ADMIN — GUAIFENESIN AND CODEINE PHOSPHATE 5 ML: 100; 10 SOLUTION ORAL at 01:06

## 2022-06-23 RX ADMIN — ATORVASTATIN CALCIUM 40 MG: 40 TABLET, FILM COATED ORAL at 09:06

## 2022-06-23 RX ADMIN — HEPARIN SODIUM 20 UNITS/KG/HR: 1000 INJECTION, SOLUTION INTRAVENOUS; SUBCUTANEOUS at 01:06

## 2022-06-23 RX ADMIN — GUAIFENESIN AND CODEINE PHOSPHATE 5 ML: 100; 10 SOLUTION ORAL at 11:06

## 2022-06-23 RX ADMIN — BENZONATATE 100 MG: 100 CAPSULE ORAL at 09:06

## 2022-06-23 RX ADMIN — BENZONATATE 100 MG: 100 CAPSULE ORAL at 06:06

## 2022-06-23 NOTE — SIGNIFICANT EVENT
Called to bedside for severe pain to left lateral side with coughing. Left side is tender to touch. Reports has been coughing up green mucus for about a week but cough has gotten worse today. Chest XRAY reviewed. CT abdomen reviewed and noted to have anterior abdominal wall left rectus muscle sheath hematoma. Presumed from violent coughing. Discussed with cardiology, Dr. Thompson, and with general surgery, Dr. Sargent. Ok to continue heparin drip at this time. Monitor CBC. Will start cheritussin for cough suppression. The patient is awaiting transfer to [facility] for CT surgery for 3 vessel disease.       60 minutes of critical care time was spent personally by me on the following activities: development of treatment plan with patient or surrogate and bedside caregivers, discussions with consultants, evaluation of patient's response to treatment, examination of patient, ordering and performing treatments and interventions, ordering and review of laboratory studies, ordering and review of radiographic studies, pulse oximetry, re-evaluation of patient's condition. This critical care time did not overlap with that of any other provider or involve time for any procedures.

## 2022-06-23 NOTE — SUBJECTIVE & OBJECTIVE
Interval History:   No fevers  No chest pain  Remains on heparin gtt  Spontaneous development of abdominal left rectus wall hematoma overnight due to cough  Hgb remains stable  Await abdominal US today for comparison of size/stability  Awaiting bed at [facility] for CABG  Hopefully he gets a bed soon    Review of Systems   Constitutional:  Positive for activity change.   HENT: Negative.     Eyes: Negative.    Respiratory:  Positive for cough. Negative for choking, chest tightness and shortness of breath.    Cardiovascular:  Negative for chest pain, palpitations and leg swelling.   Gastrointestinal:  Positive for abdominal pain. Negative for constipation, diarrhea and nausea.   Endocrine: Negative.    Genitourinary: Negative.    Musculoskeletal: Negative.    Skin: Negative.    Allergic/Immunologic: Negative.    Neurological: Negative.    Hematological: Negative.    Psychiatric/Behavioral: Negative.     Objective:     Vital Signs (Most Recent):  Temp: 96.7 °F (35.9 °C) (06/23/22 1503)  Pulse: 79 (06/23/22 1503)  Resp: 18 (06/23/22 1503)  BP: 109/68 (06/23/22 1503)  SpO2: (!) 94 % (06/23/22 1503)   Vital Signs (24h Range):  Temp:  [96.7 °F (35.9 °C)-99 °F (37.2 °C)] 96.7 °F (35.9 °C)  Pulse:  [60-81] 79  Resp:  [16-20] 18  SpO2:  [92 %-97 %] 94 %  BP: (100-118)/(62-74) 109/68     Weight: 102.5 kg (225 lb 15.5 oz)  Body mass index is 32.42 kg/m².    Intake/Output Summary (Last 24 hours) at 6/23/2022 1537  Last data filed at 6/23/2022 0624  Gross per 24 hour   Intake --   Output 400 ml   Net -400 ml        Physical Exam  Constitutional:       Appearance: Normal appearance.   HENT:      Head: Normocephalic and atraumatic.      Nose: Nose normal.      Mouth/Throat:      Mouth: Mucous membranes are moist.   Eyes:      General: No scleral icterus.     Extraocular Movements: Extraocular movements intact.      Pupils: Pupils are equal, round, and reactive to light.   Cardiovascular:      Rate and Rhythm: Normal rate and regular  rhythm.      Pulses: Normal pulses.      Heart sounds: Normal heart sounds.   Pulmonary:      Effort: Pulmonary effort is normal.      Breath sounds: Normal breath sounds. No wheezing, rhonchi or rales.   Chest:      Chest wall: No tenderness.   Abdominal:      Palpations: Abdomen is soft.      Tenderness: There is abdominal tenderness (LUQ). There is no guarding or rebound.      Hernia: No hernia is present.      Comments: Hematoma is palpable to LUQ   Genitourinary:     Comments: deferred  Musculoskeletal:         General: Normal range of motion.      Cervical back: Normal range of motion and neck supple.   Skin:     General: Skin is warm and dry.      Capillary Refill: Capillary refill takes less than 2 seconds.   Neurological:      Mental Status: He is alert and oriented to person, place, and time.   Psychiatric:         Mood and Affect: Mood normal.       Significant Labs:   Recent Labs   Lab 06/22/22  0232 06/23/22  0137 06/23/22  0333 06/23/22  1214   WBC 10.03 12.00 11.14  11.14  --    HGB 15.4 14.3 14.4  14.4 13.7*   HCT 47.0 40.7 41.6  41.6 40.6    184 182  182  --    MONO 8.8  0.9 8.5  1.0 7.5  7.5  0.8  0.8  --        Recent Labs   Lab 06/20/22  0311 06/21/22  0842 06/22/22  0231 06/23/22  0333    136 137 134*   K 3.5 3.9 3.7 3.8    101 105 102   CO2 23 24 20* 20*   BUN 9 8 8 9   CREATININE 0.8 0.8 0.8 0.8   CALCIUM 8.3* 8.7 8.9 8.8   PROT 6.4 7.0 6.9  --    BILITOT 1.0 1.1* 1.1*  --    ALKPHOS 66 67 78  --    ALT 39 30 38  --    * 83* 83*  --        Microbiology Results (last 7 days)       Procedure Component Value Units Date/Time    Culture, Respiratory with Gram Stain [[id number]]     Order Status: No result Specimen: Respiratory               Significant Imaging: I have reviewed all pertinent imaging results/findings within the past 24 hours.

## 2022-06-23 NOTE — ASSESSMENT & PLAN NOTE
- BP stable   - no antihypertensives noted on home med rec  - monitor BP; will start on BB for CAD and CHF management

## 2022-06-23 NOTE — PROGRESS NOTES
[place] - Telemetry  Cardiology  Progress Note    Patient Name: Andrae Aviles  MRN: [id number]  Admission Date: 6/19/2022  Hospital Length of Stay: 4 days  Code Status: Full Code   Attending Physician: Kilo Mares, *   Primary Care Physician: Shane Lewis MD  Expected Discharge Date: 6/20/2022  Principal Problem:NSTEMI (non-ST elevated myocardial infarction)    Subjective:     Hospital Course:   6/20/2022 per HPI  6/21/2022 C yesterday with results as detailed below:       Findings  Right dominant  LM: 80-90% ostial and mid LM disease  LAD: Moderate early mid LAD lesion. Mild-mod distal LAD stenosis  LCx: 90% mid LCx stenosis. 80% stenosis OM2 at the point of bifurcation. Samll caliber vessels.  RCA: Proximal RCA disease. Mid RCA  with L-R collaterals  LVEDP 29     Transfer to [facility] for CABG evaluation in process. Chest pain free overnight. HR and BP stable. Echo yesterday with EF 30-35%  6/22/2022 Stable overnight. No chest pain or SOB. Remains on IV Heparin. Awaiting bed at [facility]. Carotid ultrasound as part of pre op workup. HR and BP stable   6/23/2022 HR and BP stable overnight. H&H stable. Remains on IV Heparin. CT abdomen with spontaneous rectus abdominal hematoma. Abdominal ultrasound pending. Awaiting transfer to [facility] for CABG evaluation           Review of Systems   Constitutional: Negative for chills, decreased appetite, diaphoresis, fever, malaise/fatigue, weight gain and weight loss.   Cardiovascular:  Negative for chest pain, claudication, dyspnea on exertion, irregular heartbeat, leg swelling, near-syncope, orthopnea, palpitations and paroxysmal nocturnal dyspnea.   Respiratory:  Negative for cough, shortness of breath, snoring, sputum production and wheezing.    Endocrine: Negative for cold intolerance, heat intolerance, polydipsia, polyphagia and polyuria.   Skin:  Negative for color change, dry skin, itching, nail changes and poor wound healing.   Musculoskeletal:  Negative  for back pain, gout, joint pain and joint swelling.   Gastrointestinal:  Negative for bloating, abdominal pain, constipation, diarrhea, hematemesis, hematochezia, melena, nausea and vomiting.   Genitourinary:  Negative for dysuria, hematuria and nocturia.   Neurological:  Negative for dizziness, headaches, light-headedness, numbness, paresthesias and weakness.   Psychiatric/Behavioral:  Negative for altered mental status, depression and memory loss.    Objective:     Vital Signs (Most Recent):  Temp: 96.9 °F (36.1 °C) (06/23/22 1019)  Pulse: 78 (06/23/22 1019)  Resp: 18 (06/23/22 1019)  BP: 108/66 (06/23/22 1019)  SpO2: (!) 94 % (06/23/22 1019)   Vital Signs (24h Range):  Temp:  [96.9 °F (36.1 °C)-99 °F (37.2 °C)] 96.9 °F (36.1 °C)  Pulse:  [60-81] 78  Resp:  [16-20] 18  SpO2:  [92 %-97 %] 94 %  BP: (100-118)/(62-74) 108/66     Weight: 102.5 kg (225 lb 15.5 oz)  Body mass index is 32.42 kg/m².     SpO2: (!) 94 %  O2 Device (Oxygen Therapy): room air      Intake/Output Summary (Last 24 hours) at 6/23/2022 1148  Last data filed at 6/23/2022 0624  Gross per 24 hour   Intake --   Output 400 ml   Net -400 ml       Lines/Drains/Airways       Peripheral Intravenous Line  Duration                  Peripheral IV - Single Lumen 06/19/22 1115 20 G Left Antecubital 4 days                    Physical Exam  Constitutional:       General: He is not in acute distress.     Appearance: He is well-developed.   Neck:      Vascular: No JVD.   Cardiovascular:      Rate and Rhythm: Normal rate and regular rhythm.      Heart sounds: No murmur heard.    No gallop.   Pulmonary:      Effort: Pulmonary effort is normal. No respiratory distress.      Breath sounds: Normal breath sounds. No wheezing.   Abdominal:      General: Bowel sounds are normal. There is no distension.      Palpations: Abdomen is soft.      Tenderness: There is no abdominal tenderness.   Musculoskeletal:      Cervical back: Normal range of motion and neck supple.   Skin:    "  General: Skin is warm and dry.   Neurological:      Mental Status: He is alert and oriented to person, place, and time.   Psychiatric:         Behavior: Behavior normal.         Thought Content: Thought content normal.         Judgment: Judgment normal.       Significant Labs: BMP:   Recent Labs   Lab 06/22/22  0231 06/23/22  0333   GLU 99 124*    134*   K 3.7 3.8    102   CO2 20* 20*   BUN 8 9   CREATININE 0.8 0.8   CALCIUM 8.9 8.8    and CBC   Recent Labs   Lab 06/22/22  0232 06/23/22  0137 06/23/22  0333   WBC 10.03 12.00 11.14  11.14   HGB 15.4 14.3 14.4  14.4   HCT 47.0 40.7 41.6  41.6    184 182  182       Significant Imaging: Echocardiogram: Transthoracic echo (TTE) complete (Cupid Only):   Results for orders placed or performed during the hospital encounter of 06/19/22   Echo   Result Value Ref Range    BSA 2.25 m2    TDI SEPTAL 0.05 m/s    LV LATERAL E/E' RATIO 9.10 m/s    LV SEPTAL E/E' RATIO 18.20 m/s    LA WIDTH 4.20 cm    AORTIC VALVE CUSP SEPERATION 1.99 cm    TDI LATERAL 0.10 m/s    PV PEAK VELOCITY 0.99 cm/s    LVIDd 6.06 (A) 3.5 - 6.0 cm    IVS 1.06 0.6 - 1.1 cm    Posterior Wall 1.02 0.6 - 1.1 cm    Ao root annulus 3.18 cm    LVIDs 4.97 (A) 2.1 - 4.0 cm    FS 18 28 - 44 %    LV mass 264.18 g    LA size 3.97 cm    RVDD 2.85 cm    TAPSE 1.82 cm    Left Ventricle Relative Wall Thickness 0.34 cm    AV mean gradient 5 mmHg    AV valve area 2.04 cm2    AV Velocity Ratio 0.72     AV index (prosthetic) 0.59     MV mean gradient 1 mmHg    MV valve area p 1/2 method 5.46 cm2    MV valve area by continuity eq 1.92 cm2    E/A ratio 1.14     Mean e' 0.08 m/s    E wave deceleration time 139.05 msec    IVRT 105.61 msec    MV "A" wave duration 13.42 msec    Pulm vein S/D ratio 0.88     LVOT diameter 2.09 cm    LVOT area 3.4 cm2    LVOT peak betty 1.01 m/s    LVOT peak VTI 15.89 cm    Ao peak betty 1.41 m/s    Ao VTI 26.71 cm    Mr max betty 0.05 m/s    LVOT stroke volume 54.49 cm3    AV peak " gradient 8 mmHg    MV peak gradient 5 mmHg    E/E' ratio 12.13 m/s    MV Peak E Tom 0.91 m/s    TR Max Tom 1.22 m/s    MV VTI 28.34 cm    MV stenosis pressure 1/2 time 40.32 ms    MV Peak A Tom 0.80 m/s    PV Peak S Tom 0.38 m/s    PV Peak D Tom 0.43 m/s    LV Systolic Volume 116.35 mL    LV Systolic Volume Index 52.9 mL/m2    LV Diastolic Volume 184.46 mL    LV Diastolic Volume Index 83.85 mL/m2    LV Mass Index 120 g/m2    Left Atrium Major Axis 5.58 cm    Triscuspid Valve Regurgitation Peak Gradient 6 mmHg    LA Volume Index (Mod) 29.7 mL/m2    LA volume (mod) 65.30 cm3    Right Atrial Pressure (from IVC) 3 mmHg    EF 30 %    TV rest pulmonary artery pressure 9 mmHg    Narrative    · The left ventricle is mildly enlarged with eccentric hypertrophy and   moderately decreased systolic function.  · The estimated ejection fraction is 30-35%.  · There are segmental left ventricular wall motion abnormalities.  · Grade I left ventricular diastolic dysfunction.  · With normal right ventricular systolic function.  · Mild mitral regurgitation.  · The estimated PA systolic pressure is 9 mmHg.  · Normal central venous pressure (3 mmHg).        Assessment and Plan:         * NSTEMI (non-ST elevated myocardial infarction)  - initial troponin 1.762 with trend up to 9.230-18.379 and down to 16.609; EKG with STTW abnormality  - [facility] with following results:     LM: 80-90% ostial and mid LM disease   LAD: Moderate early mid LAD lesion. Mild-mod distal LAD stenosis   LCx: 90% mid LCx stenosis. 80% stenosis OM2 at the point of bifurcation. Samll caliber vessels.   RCA: Proximal RCA disease. Mid RCA  with L-R collaterals  - continue DAPT and statin; will start Toprol XL today; awaiting transfer to [facility] for CABG evaluation; carotid ultrasound with no evidence of stenosis        Abdominal wall hematoma  - CT abdomen yesterday with spontaneous rectus abdominal hematoma  - remains on IV Heparin; H&H stable  - discussed with surgery by  primary team overnight with decision to continue IV Heparin and monitor closely  - repeat abdominal ultrasound today pending     Acute combined systolic and diastolic heart failure  - presented with NSTEMI with peak troponin at 18.379  - echo with EF 30-35% and grade I diastolic dysfunction;  no complaints of TREVINO, orthopnea or PND; appears euvolemic on exam   - no acute distress noted  - SBP 100s-140s; on Toprol XL; will hold ARB for now     Chest pain  - presented with chest pain concerning for ischemic etiology  - troponin elevated with peak at 18.379  - resolved with no recurrence overnight; [facility] with multivessel CAD  - continue GDMT; awaiting transfer to [facility] for CABG evaluation     Primary hypertension  - BP stable   - no antihypertensives noted on home med rec  - monitor BP; will start on BB for CAD and CHF management         VTE Risk Mitigation (From admission, onward)         Ordered     heparin 25,000 units in dextrose 5% (100 units/ml) IV bolus from bag - ADDITIONAL PRN BOLUS - 60 units/kg (max bolus 4000 units)  As needed (PRN)        Question:  Heparin Infusion Adjustment (DO NOT MODIFY ANSWER)  Answer:  [email or web address]\Images\Pharmacy\HeparinInfusions\heparin LOW INTENSITY nomogram for [facility] [id number]    06/23/22 0128     heparin 25,000 units in dextrose 5% (100 units/ml) IV bolus from bag - ADDITIONAL PRN BOLUS - 30 units/kg (max bolus 4000 units)  As needed (PRN)        Question:  Heparin Infusion Adjustment (DO NOT MODIFY ANSWER)  Answer:  \[email or web address]\epic\Images\Pharmacy\HeparinInfusions\heparin LOW INTENSITY nomogram for [facility] [id number]    06/23/22 0128     heparin 25,000 units in dextrose 5% 250 mL (100 units/mL) infusion LOW INTENSITY nomogram - [facility]  Continuous        Question Answer Comment   Heparin Infusion Adjustment (DO NOT MODIFY ANSWER) \[email or web address]\epic\Images\Pharmacy\HeparinInfusions\heparin LOW INTENSITY nomogram for [facility] [id number]    Begin at (in units/kg/hr) 20        06/23/22 0128      heparin infusion 1,000 units/500 ml in 0.9% NaCl (pressure line flush)  Intra-op continuous PRN         06/20/22 0842     IP VTE HIGH RISK PATIENT  Once         06/19/22 1656     Place sequential compression device  Until discontinued         06/19/22 1656     Reason for No Pharmacological VTE Prophylaxis  Once        Question:  Reasons:  Answer:  IV Heparin w/in 24 hrs. Pre or Post-Op    06/19/22 1656     Place sequential compression device  Until discontinued         06/19/22 1654                Ruthann Pete APRN, ANP  Cardiology  [place] - Telemetry

## 2022-06-23 NOTE — ASSESSMENT & PLAN NOTE
- presented with NSTEMI with peak troponin at 18.379  - echo with EF 30-35% and grade I diastolic dysfunction;  no complaints of TREVINO, orthopnea or PND; appears euvolemic on exam   - no acute distress noted  - SBP 100s-140s; on Toprol XL; will hold ARB for now

## 2022-06-23 NOTE — PLAN OF CARE
SW met with pt at beside to discuss dc planning. Pts wife was at bedside.   SW confirmed with pt that once bed is available at [facility] he will be transported. Pt expressed understanding. SW will continue to follow pt throughout his transitions of care and assist with any dc needs. Pt did not have any questions or concerns for SW at this time.        Pts expressed understanding for the following stated above. SW will continue to follow pt throughout his transitions of care and assist with any dc needs.        06/23/22 1243   Final Note   Assessment Type Final Discharge Note   Anticipated Discharge Disposition   ([facility])   Hospital Resources/Appts/Education Provided Appointments scheduled by Navigator/Coordinator   Post-Acute Status   Discharge Delays None known at this time

## 2022-06-23 NOTE — SUBJECTIVE & OBJECTIVE
Review of Systems   Constitutional: Negative for chills, decreased appetite, diaphoresis, fever, malaise/fatigue, weight gain and weight loss.   Cardiovascular:  Negative for chest pain, claudication, dyspnea on exertion, irregular heartbeat, leg swelling, near-syncope, orthopnea, palpitations and paroxysmal nocturnal dyspnea.   Respiratory:  Negative for cough, shortness of breath, snoring, sputum production and wheezing.    Endocrine: Negative for cold intolerance, heat intolerance, polydipsia, polyphagia and polyuria.   Skin:  Negative for color change, dry skin, itching, nail changes and poor wound healing.   Musculoskeletal:  Negative for back pain, gout, joint pain and joint swelling.   Gastrointestinal:  Negative for bloating, abdominal pain, constipation, diarrhea, hematemesis, hematochezia, melena, nausea and vomiting.   Genitourinary:  Negative for dysuria, hematuria and nocturia.   Neurological:  Negative for dizziness, headaches, light-headedness, numbness, paresthesias and weakness.   Psychiatric/Behavioral:  Negative for altered mental status, depression and memory loss.    Objective:     Vital Signs (Most Recent):  Temp: 96.9 °F (36.1 °C) (06/23/22 1019)  Pulse: 78 (06/23/22 1019)  Resp: 18 (06/23/22 1019)  BP: 108/66 (06/23/22 1019)  SpO2: (!) 94 % (06/23/22 1019)   Vital Signs (24h Range):  Temp:  [96.9 °F (36.1 °C)-99 °F (37.2 °C)] 96.9 °F (36.1 °C)  Pulse:  [60-81] 78  Resp:  [16-20] 18  SpO2:  [92 %-97 %] 94 %  BP: (100-118)/(62-74) 108/66     Weight: 102.5 kg (225 lb 15.5 oz)  Body mass index is 32.42 kg/m².     SpO2: (!) 94 %  O2 Device (Oxygen Therapy): room air      Intake/Output Summary (Last 24 hours) at 6/23/2022 1148  Last data filed at 6/23/2022 0624  Gross per 24 hour   Intake --   Output 400 ml   Net -400 ml       Lines/Drains/Airways       Peripheral Intravenous Line  Duration                  Peripheral IV - Single Lumen 06/19/22 1115 20 G Left Antecubital 4 days                     Physical Exam  Constitutional:       General: He is not in acute distress.     Appearance: He is well-developed.   Neck:      Vascular: No JVD.   Cardiovascular:      Rate and Rhythm: Normal rate and regular rhythm.      Heart sounds: No murmur heard.    No gallop.   Pulmonary:      Effort: Pulmonary effort is normal. No respiratory distress.      Breath sounds: Normal breath sounds. No wheezing.   Abdominal:      General: Bowel sounds are normal. There is no distension.      Palpations: Abdomen is soft.      Tenderness: There is no abdominal tenderness.   Musculoskeletal:      Cervical back: Normal range of motion and neck supple.   Skin:     General: Skin is warm and dry.   Neurological:      Mental Status: He is alert and oriented to person, place, and time.   Psychiatric:         Behavior: Behavior normal.         Thought Content: Thought content normal.         Judgment: Judgment normal.       Significant Labs: BMP:   Recent Labs   Lab 06/22/22  0231 06/23/22  0333   GLU 99 124*    134*   K 3.7 3.8    102   CO2 20* 20*   BUN 8 9   CREATININE 0.8 0.8   CALCIUM 8.9 8.8    and CBC   Recent Labs   Lab 06/22/22  0232 06/23/22  0137 06/23/22  0333   WBC 10.03 12.00 11.14  11.14   HGB 15.4 14.3 14.4  14.4   HCT 47.0 40.7 41.6  41.6    184 182  182       Significant Imaging: Echocardiogram: Transthoracic echo (TTE) complete (Cupid Only):   Results for orders placed or performed during the hospital encounter of 06/19/22   Echo   Result Value Ref Range    BSA 2.25 m2    TDI SEPTAL 0.05 m/s    LV LATERAL E/E' RATIO 9.10 m/s    LV SEPTAL E/E' RATIO 18.20 m/s    LA WIDTH 4.20 cm    AORTIC VALVE CUSP SEPERATION 1.99 cm    TDI LATERAL 0.10 m/s    PV PEAK VELOCITY 0.99 cm/s    LVIDd 6.06 (A) 3.5 - 6.0 cm    IVS 1.06 0.6 - 1.1 cm    Posterior Wall 1.02 0.6 - 1.1 cm    Ao root annulus 3.18 cm    LVIDs 4.97 (A) 2.1 - 4.0 cm    FS 18 28 - 44 %    LV mass 264.18 g    LA size 3.97 cm    RVDD 2.85 cm     "TAPSE 1.82 cm    Left Ventricle Relative Wall Thickness 0.34 cm    AV mean gradient 5 mmHg    AV valve area 2.04 cm2    AV Velocity Ratio 0.72     AV index (prosthetic) 0.59     MV mean gradient 1 mmHg    MV valve area p 1/2 method 5.46 cm2    MV valve area by continuity eq 1.92 cm2    E/A ratio 1.14     Mean e' 0.08 m/s    E wave deceleration time 139.05 msec    IVRT 105.61 msec    MV "A" wave duration 13.42 msec    Pulm vein S/D ratio 0.88     LVOT diameter 2.09 cm    LVOT area 3.4 cm2    LVOT peak tom 1.01 m/s    LVOT peak VTI 15.89 cm    Ao peak tom 1.41 m/s    Ao VTI 26.71 cm    Mr max tom 0.05 m/s    LVOT stroke volume 54.49 cm3    AV peak gradient 8 mmHg    MV peak gradient 5 mmHg    E/E' ratio 12.13 m/s    MV Peak E Tom 0.91 m/s    TR Max Tom 1.22 m/s    MV VTI 28.34 cm    MV stenosis pressure 1/2 time 40.32 ms    MV Peak A Tom 0.80 m/s    PV Peak S Tom 0.38 m/s    PV Peak D Tom 0.43 m/s    LV Systolic Volume 116.35 mL    LV Systolic Volume Index 52.9 mL/m2    LV Diastolic Volume 184.46 mL    LV Diastolic Volume Index 83.85 mL/m2    LV Mass Index 120 g/m2    Left Atrium Major Axis 5.58 cm    Triscuspid Valve Regurgitation Peak Gradient 6 mmHg    LA Volume Index (Mod) 29.7 mL/m2    LA volume (mod) 65.30 cm3    Right Atrial Pressure (from IVC) 3 mmHg    EF 30 %    TV rest pulmonary artery pressure 9 mmHg    Narrative    · The left ventricle is mildly enlarged with eccentric hypertrophy and   moderately decreased systolic function.  · The estimated ejection fraction is 30-35%.  · There are segmental left ventricular wall motion abnormalities.  · Grade I left ventricular diastolic dysfunction.  · With normal right ventricular systolic function.  · Mild mitral regurgitation.  · The estimated PA systolic pressure is 9 mmHg.  · Normal central venous pressure (3 mmHg).        "

## 2022-06-23 NOTE — ASSESSMENT & PLAN NOTE
Likely due to forceful cough while on Heparin gtt  Heparin gtt continues  Hgb stable  No overt bleeding  Await abdominal US for stability  Treat cough

## 2022-06-23 NOTE — NURSING
Care plan reviewed. Pt continues to have LUQ pain, had ABD US ordered. Heparin discontinued per MD. LUQ pain now radiating to lower part of the abdomen. Order for STAT CT of abd and pelvi. Pt given morphine and Oxycodone PRN providing moderate relief. Spouse remains at bedside. Safety maintained.

## 2022-06-23 NOTE — ASSESSMENT & PLAN NOTE
- presented with chest pain concerning for ischemic etiology  - troponin elevated with peak at 18.379  - resolved with no recurrence overnight; [facility] with multivessel CAD  - continue GDMT; awaiting transfer to [facility] for CABG evaluation

## 2022-06-23 NOTE — ASSESSMENT & PLAN NOTE
- CT abdomen yesterday with spontaneous rectus abdominal hematoma  - remains on IV Heparin; H&H stable  - discussed with surgery by primary team overnight with decision to continue IV Heparin and monitor closely  - repeat abdominal ultrasound today pending

## 2022-06-23 NOTE — PROGRESS NOTES
"[facility] Medicine  Progress Note    Patient Name: Andrae Aviles  MRN: [id number]  Patient Class: IP- Inpatient   Admission Date: 6/19/2022  Length of Stay: 4 days  Attending Physician: Kilo Mares, *  Primary Care Provider: Shane Lewis MD        Subjective:     Principal Problem:NSTEMI (non-ST elevated myocardial infarction)        HPI:  Andrae Bran is a 57 y/o male with PMHx HTN and RA presents to [facility] ED with complaints of chest pain. He reports his chest pain started today after he ate breakfast. He reports his chest pain is on his left side with radiation and numbness to his left arm. He describes his chest pain as squeezing and felt like "someone is sitting on my chest". He rates his chest pain 7/10. He reports shortness of breath with palpitations. He also reports associated nausea and vomiting today. He admits he has been having left sided chest pain for a couple of weeks that has been intermittent. He states his left sided chest pain has increased in frequency within the last week. He denies headaches, vision changes, or jaw pain. Also states right knee redness/pain that was evaluated by his rheumatologist and he was scheduled to work with PT. Denies history of past cardiac history. He travels for work. Reports past tobacco use but state he has quit. He admits to drinking 12 can pack of beer a day.       In the ED: /74   Pulse 77   Resp 20   Ht 5' 10" (1.778 m)   Wt 102.5 kg (226 lb)   SpO2 95%   BMI 32.43 kg/m² Labs revealed , glucose 176, , NT-proBNP 976, and initial troponin 1.760, repeat 9.230. CXR revealed Ground-glass infiltrate within the periphery of the right lung could relate to and Modic process including COVID-19 infection. Cardiology was consulted. He received ASA 325mg PO x1, Nitro paste x1, and was started on continuous heparin infusion. Will admit to hospital medicine for further evaluation and treatment.    "         Overview/Hospital Course:  Admitted from OSH with chest pain found to have NSTEMI with peak troponin of 18; now 7.6  Started on heparin gtt and Plavix  Cardiology consulted who took the patient for C with findings of multi vessel CAD with need for CABG  Right dominant  LM: 80-90% ostial and mid LM disease  LAD: Moderate early mid LAD lesion. Mild-mod distal LAD stenosis  LCx: 90% mid LCx stenosis. 80% stenosis OM2 at the point of bifurcation. Samll caliber vessels.  RCA: Proximal RCA disease. Mid RCA  with L-R collaterals  LVEDP 29  Cards recommended [facility] transfer; he was accepted to [facility]  Awaiting bed at [facility]    6/23: No chest pain. Awaiting transfer to [facility]. Had one week cough with mucoid sputum with reports of worsening cough yesterday evening. He experienced acute LUQ abdominal pain with CTAP showing spontaneous left rectus abdominal wall hematoma likely due to forceful cough and heparin gtt. Heparin gtt continues. Hgb stable with serial monitoring ongoing. No evidence to suggest expansion of hematoma or acute bleeding. Awaiting abdominal US for comparison of size. Remains on serial Hgb. He is medically clear for discharge to [facility] when bed assigned.       Interval History:   No fevers  No chest pain  Remains on heparin gtt  Spontaneous development of abdominal left rectus wall hematoma overnight due to cough  Hgb remains stable  Await abdominal US today for comparison of size/stability  Awaiting bed at [facility] for CABG  Hopefully he gets a bed soon    Review of Systems   Constitutional:  Positive for activity change.   HENT: Negative.     Eyes: Negative.    Respiratory:  Positive for cough. Negative for choking, chest tightness and shortness of breath.    Cardiovascular:  Negative for chest pain, palpitations and leg swelling.   Gastrointestinal:  Positive for abdominal pain. Negative for constipation, diarrhea and nausea.   Endocrine: Negative.    Genitourinary: Negative.    Musculoskeletal: Negative.     Skin: Negative.    Allergic/Immunologic: Negative.    Neurological: Negative.    Hematological: Negative.    Psychiatric/Behavioral: Negative.     Objective:     Vital Signs (Most Recent):  Temp: 96.7 °F (35.9 °C) (06/23/22 1503)  Pulse: 79 (06/23/22 1503)  Resp: 18 (06/23/22 1503)  BP: 109/68 (06/23/22 1503)  SpO2: (!) 94 % (06/23/22 1503)   Vital Signs (24h Range):  Temp:  [96.7 °F (35.9 °C)-99 °F (37.2 °C)] 96.7 °F (35.9 °C)  Pulse:  [60-81] 79  Resp:  [16-20] 18  SpO2:  [92 %-97 %] 94 %  BP: (100-118)/(62-74) 109/68     Weight: 102.5 kg (225 lb 15.5 oz)  Body mass index is 32.42 kg/m².    Intake/Output Summary (Last 24 hours) at 6/23/2022 1537  Last data filed at 6/23/2022 0624  Gross per 24 hour   Intake --   Output 400 ml   Net -400 ml        Physical Exam  Constitutional:       Appearance: Normal appearance.   HENT:      Head: Normocephalic and atraumatic.      Nose: Nose normal.      Mouth/Throat:      Mouth: Mucous membranes are moist.   Eyes:      General: No scleral icterus.     Extraocular Movements: Extraocular movements intact.      Pupils: Pupils are equal, round, and reactive to light.   Cardiovascular:      Rate and Rhythm: Normal rate and regular rhythm.      Pulses: Normal pulses.      Heart sounds: Normal heart sounds.   Pulmonary:      Effort: Pulmonary effort is normal.      Breath sounds: Normal breath sounds. No wheezing, rhonchi or rales.   Chest:      Chest wall: No tenderness.   Abdominal:      Palpations: Abdomen is soft.      Tenderness: There is abdominal tenderness (LUQ). There is no guarding or rebound.      Hernia: No hernia is present.      Comments: Hematoma is palpable to LUQ   Genitourinary:     Comments: deferred  Musculoskeletal:         General: Normal range of motion.      Cervical back: Normal range of motion and neck supple.   Skin:     General: Skin is warm and dry.      Capillary Refill: Capillary refill takes less than 2 seconds.   Neurological:      Mental Status: He  is alert and oriented to person, place, and time.   Psychiatric:         Mood and Affect: Mood normal.       Significant Labs:   Recent Labs   Lab 06/22/22  0232 06/23/22  0137 06/23/22  0333 06/23/22  1214   WBC 10.03 12.00 11.14  11.14  --    HGB 15.4 14.3 14.4  14.4 13.7*   HCT 47.0 40.7 41.6  41.6 40.6    184 182  182  --    MONO 8.8  0.9 8.5  1.0 7.5  7.5  0.8  0.8  --        Recent Labs   Lab 06/20/22  0311 06/21/22  0842 06/22/22  0231 06/23/22  0333    136 137 134*   K 3.5 3.9 3.7 3.8    101 105 102   CO2 23 24 20* 20*   BUN 9 8 8 9   CREATININE 0.8 0.8 0.8 0.8   CALCIUM 8.3* 8.7 8.9 8.8   PROT 6.4 7.0 6.9  --    BILITOT 1.0 1.1* 1.1*  --    ALKPHOS 66 67 78  --    ALT 39 30 38  --    * 83* 83*  --        Microbiology Results (last 7 days)       Procedure Component Value Units Date/Time    Culture, Respiratory with Gram Stain [[id number]]     Order Status: No result Specimen: Respiratory               Significant Imaging: I have reviewed all pertinent imaging results/findings within the past 24 hours.          Assessment/Plan:      * NSTEMI (non-ST elevated myocardial infarction)  Chest pain    - presents with reported left sided chest pain x2-3 weeks  - EKG revealed ST depression V3, V4, V5 and V6.  -Troponin 1.760--9.230; continue to trend troponins   -NT-proBNP 976  -CXR revealed ground-glass infiltrate within the periphery of the right lung could relate to and Modic process including COVID-19 infection.  - Received 325mg PO ASA x1 in ED and was started on continuous heparin infusion  -Will give Plavix load 600mg PO now  - Continue ASA 81mg daily and Plavix 81mg daily  - TTE ordered  - stratify risks: obtain lipid panel, HgbA1c, and TSH  - Monitor on telemetry  - CMP daily; keep K >4, Mg >2  -Supplemental oxygen PRN for SpO2 <90%  -Will initiate atorvastatin 40mg   -SL Nitro PRN for chest pain  -EKG PRN for recurrent chest pain  - Cardiology consulted in ED; appreciate  assistance  -NPO after midnight for [facility] in am-- multivessel CAD- transfer to [facility] For CVS evaluation and possible CABG    6/21:  C showed multivessel CAD  Needs CABG  Awaiting [facility] bed for CABG  Continue heparin gtt  No chest pain  Await bed at [facility] for transfer    Hematoma of abdominal wall, initial encounter  Likely due to forceful cough while on Heparin gtt  Heparin gtt continues  Hgb stable  No overt bleeding  Await abdominal US for stability  Treat cough      Acute combined systolic and diastolic heart failure  Appears euvolemic now  Continue GDMT with ASA  BB and ACE I as per cards  Strict intake and output   1.5L FR      Alcohol dependence  -admits to drinking 12 can pack of beer daily  -Initiated alcohol withdrawal protocol  -CIWA q4hr  -Thiamine, MVI, and folate daily  -Monitor for s/s of alcohol withdrawal or DTs    -PRN valium      Anxiety  -resume home zoloft 50mg PO nightly      Rheumatoid arthritis  -stable  -Followed by Rheumatologist, Dr. Lewis  -On Methotrexate 2.5mg (15mg total) weekly  -Resume folic acid        Primary hypertension  Patient has a current diagnosis of HTN which is controlled.  Latest blood pressure and vitals reviewed-   Temp:  [96.2 °F (35.7 °C)-98.7 °F (37.1 °C)]   Pulse:  [70-95]   Resp:  [16-18]   BP: ()/(59-89)   SpO2:  [92 %-100 %] .   Patient currently off IV antihypertensives.   Home meds for hypertension were reviewed and noted below.   Hypertension Medications             doxazosin (CARDURA) 2 MG tablet Take 1 tablet (2 mg total) by mouth nightly.          Medication adjustment for hospital antihypertensives is as follows-     Doxazosin may be affecting BP; SBP 's  Continue for now but may require a dose adjustment    Will goal for controlled BP reduction as noted be medications noted above and monitor and mitigate end organ damage as indicated.                    VTE Risk Mitigation (From admission, onward)         Ordered     heparin 25,000 units in dextrose  5% (100 units/ml) IV bolus from bag - ADDITIONAL PRN BOLUS - 60 units/kg (max bolus 4000 units)  As needed (PRN)        Question:  Heparin Infusion Adjustment (DO NOT MODIFY ANSWER)  Answer:  [email or web address]\epic\Images\Pharmacy\HeparinInfusions\heparin LOW INTENSITY nomogram for [facility] [id number]    06/23/22 0128     heparin 25,000 units in dextrose 5% (100 units/ml) IV bolus from bag - ADDITIONAL PRN BOLUS - 30 units/kg (max bolus 4000 units)  As needed (PRN)        Question:  Heparin Infusion Adjustment (DO NOT MODIFY ANSWER)  Answer:  \\[email or web address]\epic\Images\Pharmacy\HeparinInfusions\heparin LOW INTENSITY nomogram for [facility] [id number]    06/23/22 0128     heparin 25,000 units in dextrose 5% 250 mL (100 units/mL) infusion LOW INTENSITY nomogram - [facility]  Continuous        Question Answer Comment   Heparin Infusion Adjustment (DO NOT MODIFY ANSWER) \\[email or web address]\epic\Images\Pharmacy\HeparinInfusions\heparin LOW INTENSITY nomogram for [facility] [id number]    Begin at (in units/kg/hr) 20        06/23/22 0128     heparin infusion 1,000 units/500 ml in 0.9% NaCl (pressure line flush)  Intra-op continuous PRN         06/20/22 0842     IP VTE HIGH RISK PATIENT  Once         06/19/22 1656     Place sequential compression device  Until discontinued         06/19/22 1656     Reason for No Pharmacological VTE Prophylaxis  Once        Question:  Reasons:  Answer:  IV Heparin w/in 24 hrs. Pre or Post-Op    06/19/22 1656     Place sequential compression device  Until discontinued         06/19/22 1654                Discharge Planning   TURNER: 6/20/2022     Code Status: Full Code   Is the patient medically ready for discharge?:     Reason for patient still in hospital (select all that apply): Pending disposition and Other (specify) await bed at [facility]  Discharge Plan A: Home with family   Discharge Delays: None known at this time              Christen Medrano NP  Department of Hospital Medicine   [facility]

## 2022-06-23 NOTE — ASSESSMENT & PLAN NOTE
- initial troponin 1.762 with trend up to 9.230-18.379 and down to 16.609; EKG with STTW abnormality  - [facility] with following results:     LM: 80-90% ostial and mid LM disease   LAD: Moderate early mid LAD lesion. Mild-mod distal LAD stenosis   LCx: 90% mid LCx stenosis. 80% stenosis OM2 at the point of bifurcation. Samll caliber vessels.   RCA: Proximal RCA disease. Mid RCA  with L-R collaterals  - continue DAPT and statin; will start Toprol XL today; awaiting transfer to [facility] for CABG evaluation; carotid ultrasound with no evidence of stenosis

## 2022-06-24 ENCOUNTER — HOSPITAL ENCOUNTER (INPATIENT)
Facility: HOSPITAL | Age: 57
LOS: 8 days | Discharge: HOME | DRG: 246 | End: 2022-07-02
Attending: STUDENT IN AN ORGANIZED HEALTH CARE EDUCATION/TRAINING PROGRAM | Admitting: INTERNAL MEDICINE
Payer: COMMERCIAL

## 2022-06-24 VITALS
DIASTOLIC BLOOD PRESSURE: 69 MMHG | RESPIRATION RATE: 20 BRPM | SYSTOLIC BLOOD PRESSURE: 108 MMHG | WEIGHT: 226 LBS | HEART RATE: 97 BPM | HEIGHT: 70 IN | TEMPERATURE: 99 F | BODY MASS INDEX: 32.35 KG/M2 | OXYGEN SATURATION: 96 %

## 2022-06-24 DIAGNOSIS — R93.2 ABNORMAL LIVER ULTRASOUND: ICD-10-CM

## 2022-06-24 DIAGNOSIS — S30.1XXA HEMATOMA OF RECTUS SHEATH, INITIAL ENCOUNTER: ICD-10-CM

## 2022-06-24 DIAGNOSIS — I25.10 CORONARY ARTERY DISEASE INVOLVING NATIVE CORONARY ARTERY OF NATIVE HEART WITHOUT ANGINA PECTORIS: ICD-10-CM

## 2022-06-24 DIAGNOSIS — R07.9 CHEST PAIN: ICD-10-CM

## 2022-06-24 DIAGNOSIS — I21.4 NSTEMI (NON-ST ELEVATED MYOCARDIAL INFARCTION): ICD-10-CM

## 2022-06-24 DIAGNOSIS — I25.10 CAD (CORONARY ARTERY DISEASE): Primary | ICD-10-CM

## 2022-06-24 DIAGNOSIS — I20.0 UNSTABLE ANGINA PECTORIS: ICD-10-CM

## 2022-06-24 LAB
ALBUMIN SERPL BCP-MCNC: 2.9 G/DL (ref 3.5–5.2)
ALP SERPL-CCNC: 58 U/L (ref 55–135)
ALT SERPL W/O P-5'-P-CCNC: 33 U/L (ref 10–44)
ANION GAP SERPL CALC-SCNC: 11 MMOL/L (ref 8–16)
AST SERPL-CCNC: 71 U/L (ref 10–40)
BASOPHILS # BLD AUTO: 0.03 K/UL (ref 0–0.2)
BASOPHILS # BLD AUTO: 0.04 K/UL (ref 0–0.2)
BASOPHILS NFR BLD: 0.3 % (ref 0–1.9)
BASOPHILS NFR BLD: 0.3 % (ref 0–1.9)
BILIRUB SERPL-MCNC: 1.1 MG/DL (ref 0.1–1)
BUN SERPL-MCNC: 10 MG/DL (ref 6–20)
CALCIUM SERPL-MCNC: 8.8 MG/DL (ref 8.7–10.5)
CHLORIDE SERPL-SCNC: 100 MMOL/L (ref 95–110)
CO2 SERPL-SCNC: 22 MMOL/L (ref 23–29)
CREAT SERPL-MCNC: 0.8 MG/DL (ref 0.5–1.4)
DIFFERENTIAL METHOD: ABNORMAL
DIFFERENTIAL METHOD: ABNORMAL
EOSINOPHIL # BLD AUTO: 0 K/UL (ref 0–0.5)
EOSINOPHIL # BLD AUTO: 0.1 K/UL (ref 0–0.5)
EOSINOPHIL NFR BLD: 0.1 % (ref 0–8)
EOSINOPHIL NFR BLD: 0.4 % (ref 0–8)
ERYTHROCYTE [DISTWIDTH] IN BLOOD BY AUTOMATED COUNT: 13.1 % (ref 11.5–14.5)
ERYTHROCYTE [DISTWIDTH] IN BLOOD BY AUTOMATED COUNT: 13.1 % (ref 11.5–14.5)
EST. GFR  (AFRICAN AMERICAN): >60 ML/MIN/1.73 M^2
EST. GFR  (NON AFRICAN AMERICAN): >60 ML/MIN/1.73 M^2
GLUCOSE SERPL-MCNC: 114 MG/DL (ref 70–110)
HCT VFR BLD AUTO: 35 % (ref 40–54)
HCT VFR BLD AUTO: 36.4 % (ref 40–54)
HGB BLD-MCNC: 12 G/DL (ref 14–18)
HGB BLD-MCNC: 12.6 G/DL (ref 14–18)
IMM GRANULOCYTES # BLD AUTO: 0.07 K/UL (ref 0–0.04)
IMM GRANULOCYTES # BLD AUTO: 0.13 K/UL (ref 0–0.04)
IMM GRANULOCYTES NFR BLD AUTO: 0.6 % (ref 0–0.5)
IMM GRANULOCYTES NFR BLD AUTO: 1 % (ref 0–0.5)
LYMPHOCYTES # BLD AUTO: 0.5 K/UL (ref 1–4.8)
LYMPHOCYTES # BLD AUTO: 0.9 K/UL (ref 1–4.8)
LYMPHOCYTES NFR BLD: 4.1 % (ref 18–48)
LYMPHOCYTES NFR BLD: 7.3 % (ref 18–48)
MCH RBC QN AUTO: 34.4 PG (ref 27–31)
MCH RBC QN AUTO: 34.6 PG (ref 27–31)
MCHC RBC AUTO-ENTMCNC: 34.3 G/DL (ref 32–36)
MCHC RBC AUTO-ENTMCNC: 34.6 G/DL (ref 32–36)
MCV RBC AUTO: 100 FL (ref 82–98)
MCV RBC AUTO: 101 FL (ref 82–98)
MONOCYTES # BLD AUTO: 1.2 K/UL (ref 0.3–1)
MONOCYTES # BLD AUTO: 1.2 K/UL (ref 0.3–1)
MONOCYTES NFR BLD: 10.3 % (ref 4–15)
MONOCYTES NFR BLD: 9.4 % (ref 4–15)
NEUTROPHILS # BLD AUTO: 11.1 K/UL (ref 1.8–7.7)
NEUTROPHILS # BLD AUTO: 9.7 K/UL (ref 1.8–7.7)
NEUTROPHILS NFR BLD: 81.1 % (ref 38–73)
NEUTROPHILS NFR BLD: 85.1 % (ref 38–73)
NRBC BLD-RTO: 0 /100 WBC
NRBC BLD-RTO: 0 /100 WBC
PLATELET # BLD AUTO: 193 K/UL (ref 150–450)
PLATELET # BLD AUTO: 196 K/UL (ref 150–450)
PMV BLD AUTO: 11.2 FL (ref 9.2–12.9)
PMV BLD AUTO: 11.3 FL (ref 9.2–12.9)
POCT GLUCOSE: 101 MG/DL (ref 70–110)
POTASSIUM SERPL-SCNC: 3.9 MMOL/L (ref 3.5–5.1)
PROT SERPL-MCNC: 6.8 G/DL (ref 6–8.4)
RBC # BLD AUTO: 3.47 M/UL (ref 4.6–6.2)
RBC # BLD AUTO: 3.66 M/UL (ref 4.6–6.2)
SODIUM SERPL-SCNC: 133 MMOL/L (ref 136–145)
WBC # BLD AUTO: 11.99 K/UL (ref 3.9–12.7)
WBC # BLD AUTO: 13.04 K/UL (ref 3.9–12.7)

## 2022-06-24 PROCEDURE — 80053 COMPREHEN METABOLIC PANEL: CPT | Performed by: NURSE PRACTITIONER

## 2022-06-24 PROCEDURE — 25000003 PHARM REV CODE 250: Performed by: FAMILY MEDICINE

## 2022-06-24 PROCEDURE — 36415 COLL VENOUS BLD VENIPUNCTURE: CPT | Performed by: STUDENT IN AN ORGANIZED HEALTH CARE EDUCATION/TRAINING PROGRAM

## 2022-06-24 PROCEDURE — 25000003 PHARM REV CODE 250

## 2022-06-24 PROCEDURE — 25000003 PHARM REV CODE 250: Performed by: NURSE PRACTITIONER

## 2022-06-24 PROCEDURE — 99233 PR SUBSEQUENT HOSPITAL CARE,LEVL III: ICD-10-PCS | Mod: FS,,, | Performed by: INTERNAL MEDICINE

## 2022-06-24 PROCEDURE — 63700000 PHARM REV CODE 250 ALT 637 W/O HCPCS: Performed by: NURSE PRACTITIONER

## 2022-06-24 PROCEDURE — 99233 SBSQ HOSP IP/OBS HIGH 50: CPT | Mod: FS,,, | Performed by: INTERNAL MEDICINE

## 2022-06-24 PROCEDURE — 25000003 PHARM REV CODE 250: Performed by: STUDENT IN AN ORGANIZED HEALTH CARE EDUCATION/TRAINING PROGRAM

## 2022-06-24 PROCEDURE — 85025 COMPLETE CBC W/AUTO DIFF WBC: CPT | Performed by: STUDENT IN AN ORGANIZED HEALTH CARE EDUCATION/TRAINING PROGRAM

## 2022-06-24 PROCEDURE — 99900035 HC TECH TIME PER 15 MIN (STAT)

## 2022-06-24 PROCEDURE — 94761 N-INVAS EAR/PLS OXIMETRY MLT: CPT

## 2022-06-24 PROCEDURE — 20600001 HC STEP DOWN PRIVATE ROOM

## 2022-06-24 PROCEDURE — 36415 COLL VENOUS BLD VENIPUNCTURE: CPT | Performed by: NURSE PRACTITIONER

## 2022-06-24 RX ORDER — SODIUM CHLORIDE 0.9 % (FLUSH) 0.9 %
10 SYRINGE (ML) INJECTION
Status: DISCONTINUED | OUTPATIENT
Start: 2022-06-24 | End: 2022-06-30

## 2022-06-24 RX ORDER — OXYCODONE HYDROCHLORIDE 10 MG/1
10 TABLET ORAL EVERY 4 HOURS PRN
Status: DISCONTINUED | OUTPATIENT
Start: 2022-06-24 | End: 2022-07-02 | Stop reason: HOSPADM

## 2022-06-24 RX ORDER — NITROGLYCERIN 0.4 MG/1
0.4 TABLET SUBLINGUAL EVERY 5 MIN PRN
Status: DISCONTINUED | OUTPATIENT
Start: 2022-06-24 | End: 2022-07-02 | Stop reason: HOSPADM

## 2022-06-24 RX ORDER — NAPROXEN SODIUM 220 MG/1
81 TABLET, FILM COATED ORAL DAILY
Status: DISCONTINUED | OUTPATIENT
Start: 2022-06-25 | End: 2022-07-02 | Stop reason: HOSPADM

## 2022-06-24 RX ORDER — HEPARIN SODIUM,PORCINE/D5W 25000/250
0-40 INTRAVENOUS SOLUTION INTRAVENOUS CONTINUOUS
Status: DISCONTINUED | OUTPATIENT
Start: 2022-06-24 | End: 2022-06-24

## 2022-06-24 RX ORDER — OXYCODONE HYDROCHLORIDE 5 MG/1
5 TABLET ORAL EVERY 4 HOURS PRN
Status: DISCONTINUED | OUTPATIENT
Start: 2022-06-24 | End: 2022-07-02 | Stop reason: HOSPADM

## 2022-06-24 RX ORDER — SERTRALINE HYDROCHLORIDE 50 MG/1
50 TABLET, FILM COATED ORAL NIGHTLY
Status: DISCONTINUED | OUTPATIENT
Start: 2022-06-24 | End: 2022-07-02 | Stop reason: HOSPADM

## 2022-06-24 RX ADMIN — FOLIC ACID 1000 MCG: 1 TABLET ORAL at 08:06

## 2022-06-24 RX ADMIN — OXYCODONE 5 MG: 5 TABLET ORAL at 08:06

## 2022-06-24 RX ADMIN — ASPIRIN 81 MG: 81 TABLET, COATED ORAL at 08:06

## 2022-06-24 RX ADMIN — METOPROLOL SUCCINATE 12.5 MG: 25 TABLET, EXTENDED RELEASE ORAL at 08:06

## 2022-06-24 RX ADMIN — OXYCODONE 10 MG: 5 TABLET ORAL at 01:06

## 2022-06-24 RX ADMIN — THIAMINE HCL TAB 100 MG 100 MG: 100 TAB at 08:06

## 2022-06-24 RX ADMIN — OXYCODONE 10 MG: 5 TABLET ORAL at 08:06

## 2022-06-24 RX ADMIN — OXYCODONE 5 MG: 5 TABLET ORAL at 09:06

## 2022-06-24 RX ADMIN — AZITHROMYCIN MONOHYDRATE 250 MG: 250 TABLET ORAL at 08:06

## 2022-06-24 RX ADMIN — SERTRALINE HYDROCHLORIDE 50 MG: 50 TABLET ORAL at 09:06

## 2022-06-24 RX ADMIN — ATORVASTATIN CALCIUM 40 MG: 40 TABLET, FILM COATED ORAL at 08:06

## 2022-06-24 NOTE — ASSESSMENT & PLAN NOTE
Likely due to forceful cough while on Heparin gtt  Heparin gtt continues  Hgb stable  No overt bleeding  Await abdominal US for stability  Treat cough    Abd Us stable but repeat CTAP (due to worsening inna) showed expanding hematoma without evidence of internal bleeding. Heparin gtt stopped

## 2022-06-24 NOTE — ASSESSMENT & PLAN NOTE
- initial troponin 1.762 with trend up to 9.230-18.379 and down to 16.609; EKG with STTW abnormality  - [facility] with following results:     LM: 80-90% ostial and mid LM disease   LAD: Moderate early mid LAD lesion. Mild-mod distal LAD stenosis   LCx: 90% mid LCx stenosis. 80% stenosis OM2 at the point of bifurcation. Small caliber vessels.   RCA: Proximal RCA disease. Mid RCA  with L-R collaterals  - continue ASA and statin; awaiting transfer to [facility] for CABG evaluation; carotid ultrasound with no evidence of stenosis

## 2022-06-24 NOTE — ASSESSMENT & PLAN NOTE
-admits to drinking 12 can pack of beer daily  -Initiated alcohol withdrawal protocol  -CIWA q4hr  -Thiamine, MVI, and folate daily  -Monitor for s/s of alcohol withdrawal or DTs    -PRN valium    Asymptomatic this hospitalization

## 2022-06-24 NOTE — ASSESSMENT & PLAN NOTE
- presented with NSTEMI with peak troponin at 18.379  - echo with EF 30-35% and grade I diastolic dysfunction;  no complaints of TREVINO, orthopnea or PND; appears euvolemic on exam   - no acute distress noted  - SBP at goal on Toprol XL + doxazosin

## 2022-06-24 NOTE — SUBJECTIVE & OBJECTIVE
Interval History: Rounds with Dr. Crow. No cardiac complaints.     Review of Systems   Constitutional: Negative for chills, decreased appetite, diaphoresis, fever, malaise/fatigue, weight gain and weight loss.   Cardiovascular:  Negative for chest pain, claudication, dyspnea on exertion, irregular heartbeat, leg swelling, near-syncope, orthopnea, palpitations and paroxysmal nocturnal dyspnea.   Respiratory:  Negative for cough, shortness of breath, snoring, sputum production and wheezing.    Endocrine: Negative for cold intolerance, heat intolerance, polydipsia, polyphagia and polyuria.   Skin:  Negative for dry skin, itching, nail changes and poor wound healing.        Hematoma left sided abdomen   Musculoskeletal:  Negative for back pain, gout, joint pain and joint swelling.   Gastrointestinal:  Negative for bloating, abdominal pain, constipation, diarrhea, hematemesis, hematochezia, melena, nausea and vomiting.   Genitourinary:  Negative for dysuria, hematuria and nocturia.   Neurological:  Negative for dizziness, headaches, light-headedness, numbness, paresthesias and weakness.   Psychiatric/Behavioral:  Negative for altered mental status, depression and memory loss.    Objective:     Vital Signs (Most Recent):  Temp: 99.1 °F (37.3 °C) (06/24/22 0522)  Pulse: 88 (06/24/22 0937)  Resp: 18 (06/24/22 0842)  BP: 109/65 (06/24/22 0839)  SpO2: (!) 93 % (06/24/22 0839)   Vital Signs (24h Range):  Temp:  [96.7 °F (35.9 °C)-99.1 °F (37.3 °C)] 99.1 °F (37.3 °C)  Pulse:  [72-89] 88  Resp:  [16-20] 18  SpO2:  [93 %-96 %] 93 %  BP: (101-117)/(65-74) 109/65     Weight: 102.5 kg (225 lb 15.5 oz)  Body mass index is 32.42 kg/m².     SpO2: (!) 93 %  O2 Device (Oxygen Therapy): room air      Intake/Output Summary (Last 24 hours) at 6/24/2022 1024  Last data filed at 6/24/2022 0819  Gross per 24 hour   Intake --   Output 950 ml   Net -950 ml       Lines/Drains/Airways       Peripheral Intravenous Line  Duration                   Peripheral IV - Single Lumen 06/19/22 1115 20 G Left Antecubital 4 days                    Physical Exam  Constitutional:       General: He is not in acute distress.     Appearance: He is well-developed.   Neck:      Vascular: No JVD.   Cardiovascular:      Rate and Rhythm: Normal rate and regular rhythm.      Heart sounds: No murmur heard.    No gallop.   Pulmonary:      Effort: Pulmonary effort is normal. No respiratory distress.      Breath sounds: Normal breath sounds. No wheezing.   Abdominal:      General: Bowel sounds are normal. There is no distension.      Palpations: Abdomen is soft.      Tenderness: There is no abdominal tenderness.   Musculoskeletal:      Cervical back: Normal range of motion and neck supple.   Skin:     General: Skin is warm and dry.      Comments: Hematoma to left side abdomen   Neurological:      Mental Status: He is alert and oriented to person, place, and time.   Psychiatric:         Behavior: Behavior normal.         Thought Content: Thought content normal.         Judgment: Judgment normal.       Significant Labs: CMP   Recent Labs   Lab 06/23/22  0333 06/24/22  0230   * 133*   K 3.8 3.9    100   CO2 20* 22*   * 114*   BUN 9 10   CREATININE 0.8 0.8   CALCIUM 8.8 8.8   PROT  --  6.8   ALBUMIN  --  2.9*   BILITOT  --  1.1*   ALKPHOS  --  58   AST  --  71*   ALT  --  33   ANIONGAP 12 11   ESTGFRAFRICA >60 >60   EGFRNONAA >60 >60   , CBC   Recent Labs   Lab 06/23/22  0333 06/23/22  1214 06/23/22 2011 06/23/22  2223 06/24/22  0230   WBC 11.14  11.14  --   --  13.08* 11.99   HGB 14.4  14.4   < > 13.2* 12.9* 12.6*   HCT 41.6  41.6   < > 38.1* 38.0* 36.4*     182  --   --  212 196    < > = values in this interval not displayed.   , Lipid Panel No results for input(s): CHOL, HDL, LDLCALC, TRIG, CHOLHDL in the last 48 hours., and Troponin No results for input(s): TROPONINI in the last 48 hours.    Significant Imaging: Echocardiogram: Transthoracic echo (TTE)  "complete (Cupid Only):   Results for orders placed or performed during the hospital encounter of 06/19/22   Echo   Result Value Ref Range    BSA 2.25 m2    TDI SEPTAL 0.05 m/s    LV LATERAL E/E' RATIO 9.10 m/s    LV SEPTAL E/E' RATIO 18.20 m/s    LA WIDTH 4.20 cm    AORTIC VALVE CUSP SEPERATION 1.99 cm    TDI LATERAL 0.10 m/s    PV PEAK VELOCITY 0.99 cm/s    LVIDd 6.06 (A) 3.5 - 6.0 cm    IVS 1.06 0.6 - 1.1 cm    Posterior Wall 1.02 0.6 - 1.1 cm    Ao root annulus 3.18 cm    LVIDs 4.97 (A) 2.1 - 4.0 cm    FS 18 28 - 44 %    LV mass 264.18 g    LA size 3.97 cm    RVDD 2.85 cm    TAPSE 1.82 cm    Left Ventricle Relative Wall Thickness 0.34 cm    AV mean gradient 5 mmHg    AV valve area 2.04 cm2    AV Velocity Ratio 0.72     AV index (prosthetic) 0.59     MV mean gradient 1 mmHg    MV valve area p 1/2 method 5.46 cm2    MV valve area by continuity eq 1.92 cm2    E/A ratio 1.14     Mean e' 0.08 m/s    E wave deceleration time 139.05 msec    IVRT 105.61 msec    MV "A" wave duration 13.42 msec    Pulm vein S/D ratio 0.88     LVOT diameter 2.09 cm    LVOT area 3.4 cm2    LVOT peak tom 1.01 m/s    LVOT peak VTI 15.89 cm    Ao peak tom 1.41 m/s    Ao VTI 26.71 cm    Mr max tom 0.05 m/s    LVOT stroke volume 54.49 cm3    AV peak gradient 8 mmHg    MV peak gradient 5 mmHg    E/E' ratio 12.13 m/s    MV Peak E Tom 0.91 m/s    TR Max Tom 1.22 m/s    MV VTI 28.34 cm    MV stenosis pressure 1/2 time 40.32 ms    MV Peak A Tom 0.80 m/s    PV Peak S Tom 0.38 m/s    PV Peak D Tom 0.43 m/s    LV Systolic Volume 116.35 mL    LV Systolic Volume Index 52.9 mL/m2    LV Diastolic Volume 184.46 mL    LV Diastolic Volume Index 83.85 mL/m2    LV Mass Index 120 g/m2    Left Atrium Major Axis 5.58 cm    Triscuspid Valve Regurgitation Peak Gradient 6 mmHg    LA Volume Index (Mod) 29.7 mL/m2    LA volume (mod) 65.30 cm3    Right Atrial Pressure (from IVC) 3 mmHg    EF 30 %    TV rest pulmonary artery pressure 9 mmHg    Narrative    · The left " ventricle is mildly enlarged with eccentric hypertrophy and   moderately decreased systolic function.  · The estimated ejection fraction is 30-35%.  · There are segmental left ventricular wall motion abnormalities.  · Grade I left ventricular diastolic dysfunction.  · With normal right ventricular systolic function.  · Mild mitral regurgitation.  · The estimated PA systolic pressure is 9 mmHg.  · Normal central venous pressure (3 mmHg).

## 2022-06-24 NOTE — PROGRESS NOTES
[facility] - Telemetry  Cardiology  Progress Note    Patient Name: Andrae Aviles  MRN: [id number]  Admission Date: 6/19/2022  Hospital Length of Stay: 5 days  Code Status: Full Code   Attending Physician: Kilo Mares, *   Primary Care Physician: Shane Lewis MD  Expected Discharge Date: 6/20/2022  Principal Problem:NSTEMI (non-ST elevated myocardial infarction)    Subjective:     Hospital Course:   6/20/2022 per HPI  6/21/2022 C yesterday with results as detailed below:       Findings  Right dominant  LM: 80-90% ostial and mid LM disease  LAD: Moderate early mid LAD lesion. Mild-mod distal LAD stenosis  LCx: 90% mid LCx stenosis. 80% stenosis OM2 at the point of bifurcation. Samll caliber vessels.  RCA: Proximal RCA disease. Mid RCA  with L-R collaterals  LVEDP 29     Transfer to [facility] for CABG evaluation in process. Chest pain free overnight. HR and BP stable. Echo yesterday with EF 30-35%  6/22/2022 Stable overnight. No chest pain or SOB. Remains on IV Heparin. Awaiting bed at [facility]. Carotid ultrasound as part of pre op workup. HR and BP stable   6/23/2022 HR and BP stable overnight. H&H stable. Remains on IV Heparin. CT abdomen with spontaneous rectus abdominal hematoma. Abdominal ultrasound pending. Awaiting transfer to [facility] for CABG evaluation   6/24/2022 HR and BP remains stable. Heparin stopped due to rectus abdominal hematoma. Awaiting transfer to [facility] for [place]G eval. H/H stable.       Interval History: Rounds with Dr. Crow. No cardiac complaints.     Review of Systems   Constitutional: Negative for chills, decreased appetite, diaphoresis, fever, malaise/fatigue, weight gain and weight loss.   Cardiovascular:  Negative for chest pain, claudication, dyspnea on exertion, irregular heartbeat, leg swelling, near-syncope, orthopnea, palpitations and paroxysmal nocturnal dyspnea.   Respiratory:  Negative for cough, shortness of breath, snoring, sputum production and wheezing.    Endocrine:  Negative for cold intolerance, heat intolerance, polydipsia, polyphagia and polyuria.   Skin:  Negative for dry skin, itching, nail changes and poor wound healing.        Hematoma left sided abdomen   Musculoskeletal:  Negative for back pain, gout, joint pain and joint swelling.   Gastrointestinal:  Negative for bloating, abdominal pain, constipation, diarrhea, hematemesis, hematochezia, melena, nausea and vomiting.   Genitourinary:  Negative for dysuria, hematuria and nocturia.   Neurological:  Negative for dizziness, headaches, light-headedness, numbness, paresthesias and weakness.   Psychiatric/Behavioral:  Negative for altered mental status, depression and memory loss.    Objective:     Vital Signs (Most Recent):  Temp: 99.1 °F (37.3 °C) (06/24/22 0522)  Pulse: 88 (06/24/22 0937)  Resp: 18 (06/24/22 0842)  BP: 109/65 (06/24/22 0839)  SpO2: (!) 93 % (06/24/22 0839)   Vital Signs (24h Range):  Temp:  [96.7 °F (35.9 °C)-99.1 °F (37.3 °C)] 99.1 °F (37.3 °C)  Pulse:  [72-89] 88  Resp:  [16-20] 18  SpO2:  [93 %-96 %] 93 %  BP: (101-117)/(65-74) 109/65     Weight: 102.5 kg (225 lb 15.5 oz)  Body mass index is 32.42 kg/m².     SpO2: (!) 93 %  O2 Device (Oxygen Therapy): room air      Intake/Output Summary (Last 24 hours) at 6/24/2022 1024  Last data filed at 6/24/2022 0819  Gross per 24 hour   Intake --   Output 950 ml   Net -950 ml       Lines/Drains/Airways       Peripheral Intravenous Line  Duration                  Peripheral IV - Single Lumen 06/19/22 1115 20 G Left Antecubital 4 days                    Physical Exam  Constitutional:       General: He is not in acute distress.     Appearance: He is well-developed.   Neck:      Vascular: No JVD.   Cardiovascular:      Rate and Rhythm: Normal rate and regular rhythm.      Heart sounds: No murmur heard.    No gallop.   Pulmonary:      Effort: Pulmonary effort is normal. No respiratory distress.      Breath sounds: Normal breath sounds. No wheezing.   Abdominal:       General: Bowel sounds are normal. There is no distension.      Palpations: Abdomen is soft.      Tenderness: There is no abdominal tenderness.   Musculoskeletal:      Cervical back: Normal range of motion and neck supple.   Skin:     General: Skin is warm and dry.      Comments: Hematoma to left side abdomen   Neurological:      Mental Status: He is alert and oriented to person, place, and time.   Psychiatric:         Behavior: Behavior normal.         Thought Content: Thought content normal.         Judgment: Judgment normal.       Significant Labs: CMP   Recent Labs   Lab 06/23/22  0333 06/24/22  0230   * 133*   K 3.8 3.9    100   CO2 20* 22*   * 114*   BUN 9 10   CREATININE 0.8 0.8   CALCIUM 8.8 8.8   PROT  --  6.8   ALBUMIN  --  2.9*   BILITOT  --  1.1*   ALKPHOS  --  58   AST  --  71*   ALT  --  33   ANIONGAP 12 11   ESTGFRAFRICA >60 >60   EGFRNONAA >60 >60   , CBC   Recent Labs   Lab 06/23/22  0333 06/23/22  1214 06/23/22 2011 06/23/22  2223 06/24/22  0230   WBC 11.14  11.14  --   --  13.08* 11.99   HGB 14.4  14.4   < > 13.2* 12.9* 12.6*   HCT 41.6  41.6   < > 38.1* 38.0* 36.4*     182  --   --  212 196    < > = values in this interval not displayed.   , Lipid Panel No results for input(s): CHOL, HDL, LDLCALC, TRIG, CHOLHDL in the last 48 hours., and Troponin No results for input(s): TROPONINI in the last 48 hours.    Significant Imaging: Echocardiogram: Transthoracic echo (TTE) complete ([facility] Only):   Results for orders placed or performed during the hospital encounter of 06/19/22   Echo   Result Value Ref Range    BSA 2.25 m2    TDI SEPTAL 0.05 m/s    LV LATERAL E/E' RATIO 9.10 m/s    LV SEPTAL E/E' RATIO 18.20 m/s    LA WIDTH 4.20 cm    AORTIC VALVE CUSP SEPERATION 1.99 cm    TDI LATERAL 0.10 m/s    PV PEAK VELOCITY 0.99 cm/s    LVIDd 6.06 (A) 3.5 - 6.0 cm    IVS 1.06 0.6 - 1.1 cm    Posterior Wall 1.02 0.6 - 1.1 cm    Ao root annulus 3.18 cm    LVIDs 4.97 (A) 2.1 - 4.0 cm     "FS 18 28 - 44 %    LV mass 264.18 g    LA size 3.97 cm    RVDD 2.85 cm    TAPSE 1.82 cm    Left Ventricle Relative Wall Thickness 0.34 cm    AV mean gradient 5 mmHg    AV valve area 2.04 cm2    AV Velocity Ratio 0.72     AV index (prosthetic) 0.59     MV mean gradient 1 mmHg    MV valve area p 1/2 method 5.46 cm2    MV valve area by continuity eq 1.92 cm2    E/A ratio 1.14     Mean e' 0.08 m/s    E wave deceleration time 139.05 msec    IVRT 105.61 msec    MV "A" wave duration 13.42 msec    Pulm vein S/D ratio 0.88     LVOT diameter 2.09 cm    LVOT area 3.4 cm2    LVOT peak tom 1.01 m/s    LVOT peak VTI 15.89 cm    Ao peak tom 1.41 m/s    Ao VTI 26.71 cm    Mr max tom 0.05 m/s    LVOT stroke volume 54.49 cm3    AV peak gradient 8 mmHg    MV peak gradient 5 mmHg    E/E' ratio 12.13 m/s    MV Peak E Tom 0.91 m/s    TR Max Tom 1.22 m/s    MV VTI 28.34 cm    MV stenosis pressure 1/2 time 40.32 ms    MV Peak A Tom 0.80 m/s    PV Peak S Tom 0.38 m/s    PV Peak D Tom 0.43 m/s    LV Systolic Volume 116.35 mL    LV Systolic Volume Index 52.9 mL/m2    LV Diastolic Volume 184.46 mL    LV Diastolic Volume Index 83.85 mL/m2    LV Mass Index 120 g/m2    Left Atrium Major Axis 5.58 cm    Triscuspid Valve Regurgitation Peak Gradient 6 mmHg    LA Volume Index (Mod) 29.7 mL/m2    LA volume (mod) 65.30 cm3    Right Atrial Pressure (from IVC) 3 mmHg    EF 30 %    TV rest pulmonary artery pressure 9 mmHg    Narrative    · The left ventricle is mildly enlarged with eccentric hypertrophy and   moderately decreased systolic function.  · The estimated ejection fraction is 30-35%.  · There are segmental left ventricular wall motion abnormalities.  · Grade I left ventricular diastolic dysfunction.  · With normal right ventricular systolic function.  · Mild mitral regurgitation.  · The estimated PA systolic pressure is 9 mmHg.  · Normal central venous pressure (3 mmHg).        Assessment and Plan:       * NSTEMI (non-ST elevated myocardial " infarction)  - initial troponin 1.762 with trend up to 9.230-18.379 and down to 16.609; EKG with STTW abnormality  - [facility] with following results:     LM: 80-90% ostial and mid LM disease   LAD: Moderate early mid LAD lesion. Mild-mod distal LAD stenosis   LCx: 90% mid LCx stenosis. 80% stenosis OM2 at the point of bifurcation. Small caliber vessels.   RCA: Proximal RCA disease. Mid RCA  with L-R collaterals  - continue ASA and statin; awaiting transfer to [facility] for CABG evaluation; carotid ultrasound with no evidence of stenosis        Acute combined systolic and diastolic heart failure  - presented with NSTEMI with peak troponin at 18.379  - echo with EF 30-35% and grade I diastolic dysfunction;  no complaints of TREVINO, orthopnea or PND; appears euvolemic on exam   - no acute distress noted  - SBP at goal on Toprol XL + doxazosin     Primary hypertension  - BP stable   - continue BB , doxazosin        VTE Risk Mitigation (From admission, onward)         Ordered     heparin infusion 1,000 units/500 ml in 0.9% NaCl (pressure line flush)  Intra-op continuous PRN         06/20/22 0842     IP VTE HIGH RISK PATIENT  Once         06/19/22 1656     Place sequential compression device  Until discontinued         06/19/22 1656     Reason for No Pharmacological VTE Prophylaxis  Once        Question:  Reasons:  Answer:  IV Heparin w/in 24 hrs. Pre or Post-Op    06/19/22 1656     Place sequential compression device  Until discontinued         06/19/22 1654                Michelle Grewal NP  Cardiology  [employer] - Telemetry

## 2022-06-24 NOTE — CARE UPDATE
Spoke with Dr. Sahni who stated because patient's vitals are stable and H/H without a drastic drop at 13.2/38.1, he would like to continue to monitor with prn pain control.  No acute interventions recommended by surgery at this time.

## 2022-06-24 NOTE — NURSING
Report given to PACO Cr at [facility] [phone]. Pt going to room 347. She verbalized understanding with no additional questions.     Report given to transport. No additional questions at this time. Pt off the floor at approximately 1530.

## 2022-06-24 NOTE — DISCHARGE SUMMARY
"[facility] Medicine  Discharge Summary      Patient Name: Andrae Aviles  MRN: [id number]  Patient Class: IP- Inpatient  Admission Date: 6/19/2022  Hospital Length of Stay: 5 days  Discharge Date and Time:  06/24/2022 12:40 PM  Attending Physician: Kilo Mares, *   Discharging Provider: Christen Medrano NP  Primary Care Provider: Shane Lewis MD      HPI:   Andrae Bran is a 57 y/o male with PMHx HTN and RA presents to [facility] ED with complaints of chest pain. He reports his chest pain started today after he ate breakfast. He reports his chest pain is on his left side with radiation and numbness to his left arm. He describes his chest pain as squeezing and felt like "someone is sitting on my chest". He rates his chest pain 7/10. He reports shortness of breath with palpitations. He also reports associated nausea and vomiting today. He admits he has been having left sided chest pain for a couple of weeks that has been intermittent. He states his left sided chest pain has increased in frequency within the last week. He denies headaches, vision changes, or jaw pain. Also states right knee redness/pain that was evaluated by his rheumatologist and he was scheduled to work with PT. Denies history of past cardiac history. He travels for work. Reports past tobacco use but state he has quit. He admits to drinking 12 can pack of beer a day.       In the ED: /74   Pulse 77   Resp 20   Ht 5' 10" (1.778 m)   Wt 102.5 kg (226 lb)   SpO2 95%   BMI 32.43 kg/m² Labs revealed , glucose 176, , NT-proBNP 976, and initial troponin 1.760, repeat 9.230. CXR revealed Ground-glass infiltrate within the periphery of the right lung could relate to and Modic process including COVID-19 infection. Cardiology was consulted. He received ASA 325mg PO x1, Nitro paste x1, and was started on continuous heparin infusion. Will admit to hospital medicine for further evaluation and treatment.    "         Procedure(s) (LRB):  Left heart cath (N/A)  Placement of Closure Device (Right)      Hospital Course:   Admitted from OSH with chest pain found to have NSTEMI with peak troponin of 18; now 7.6  Started on heparin gtt and Plavix  Cardiology consulted who took the patient for [facility] with findings of multi vessel CAD with need for CABG  Right dominant  LM: 80-90% ostial and mid LM disease  LAD: Moderate early mid LAD lesion. Mild-mod distal LAD stenosis  LCx: 90% mid LCx stenosis. 80% stenosis OM2 at the point of bifurcation. Samll caliber vessels.  RCA: Proximal RCA disease. Mid RCA  with L-R collaterals  LVEDP 29  Cards recommended [facility] transfer; he was accepted to [facility]  Awaiting bed at [facility]    6/23: No chest pain. Awaiting transfer to [facility]. Had one week cough with mucoid sputum with reports of worsening cough yesterday evening. He experienced acute LUQ abdominal pain with CTAP showing spontaneous left rectus abdominal wall hematoma likely due to forceful cough and heparin gtt. Heparin gtt continues. Hgb stable with serial monitoring ongoing. No evidence to suggest expansion of hematoma or acute bleeding. Awaiting abdominal US for comparison of size. Remains on serial Hgb. He is medically clear for discharge to [facility] when bed assigned.   6/24: He remains chest pain free. He has been assigned a bed at [facility]. Heparin gtt remains off due to expanding left abdominal wall rectus sheath hematoma. He is hemodynamically stable and will discharge to [facility] today for anticipated urgent CABG per cardiology recommendations.        Goals of Care Treatment Preferences:  Code Status: Full Code      Consults:   Consults (From admission, onward)        Status Ordering Provider     Inpatient consult to General Surgery  Once        Provider:  (Not yet assigned)    Completed AMY PHILLIPS     [facility]  Once        Provider:  (Not yet assigned)    Completed VAZQUEZ-CASEY ZELAYA          * NSTEMI  (non-ST elevated myocardial infarction)  Chest pain    - presents with reported left sided chest pain x2-3 weeks  - EKG revealed ST depression V3, V4, V5 and V6.  -Troponin 1.760--9.230; continue to trend troponins   -NT-proBNP 976  -CXR revealed ground-glass infiltrate within the periphery of the right lung could relate to and Modic process including COVID-19 infection.  - Received 325mg PO ASA x1 in ED and was started on continuous heparin infusion  -Will give Plavix load 600mg PO now  - Continue ASA 81mg daily and Plavix 81mg daily  - TTE ordered  - stratify risks: obtain lipid panel, HgbA1c, and TSH  - Monitor on telemetry  - CMP daily; keep K >4, Mg >2  -Supplemental oxygen PRN for SpO2 <90%  -Will initiate atorvastatin 40mg   -SL Nitro PRN for chest pain  -EKG PRN for recurrent chest pain  - Cardiology consulted in ED; appreciate assistance  -NPO after midnight for LHC in am-- multivessel CAD- transfer to [facility] For CVS evaluation and possible CABG    6/21:  LHC showed multivessel CAD  Needs CABG  Awaiting [facility] bed for CABG  Continue heparin gtt  No chest pain  Await bed at [facility] for transfer  Transfer to [facility] for CABG    Hematoma of abdominal wall, initial encounter  Likely due to forceful cough while on Heparin gtt  Heparin gtt continues  Hgb stable  No overt bleeding  Await abdominal US for stability  Treat cough    Abd Us stable but repeat CTAP (due to worsening inna) showed expanding hematoma without evidence of internal bleeding. Heparin gtt stopped       Acute combined systolic and diastolic heart failure  Appears euvolemic now  Continue GDMT with ASA  BB and ACE I as per cards  Strict intake and output   1.5L FR      Alcohol dependence  -admits to drinking 12 can pack of beer daily  -Initiated alcohol withdrawal protocol  -CIWA q4hr  -Thiamine, MVI, and folate daily  -Monitor for s/s of alcohol withdrawal or DTs    -PRN valium    Asymptomatic this hospitalization    Anxiety  -resume home zoloft 50mg PO  nightly      Rheumatoid arthritis  -stable  -Followed by Rheumatologist, Dr. Lewis  -On Methotrexate 2.5mg (15mg total) weekly  -Resume folic acid        Primary hypertension  Patient has a current diagnosis of HTN which is controlled.  Latest blood pressure and vitals reviewed-   Temp:  [96.7 °F (35.9 °C)-99.1 °F (37.3 °C)]   Pulse:  [72-89]   Resp:  [16-20]   BP: (101-117)/(65-74)   SpO2:  [93 %-96 %] .   Patient currently off IV antihypertensives.   Home meds for hypertension were reviewed and noted below.   Hypertension Medications             doxazosin (CARDURA) 2 MG tablet Take 1 tablet (2 mg total) by mouth nightly.          Medication adjustment for hospital antihypertensives is as follows-     Doxazosin may be affecting BP; SBP 's  Continue for now but may require a dose adjustment    Will goal for controlled BP reduction as noted be medications noted above and monitor and mitigate end organ damage as indicated.                    Final Active Diagnoses:    Diagnosis Date Noted POA    PRINCIPAL PROBLEM:  NSTEMI (non-ST elevated myocardial infarction) [I21.4] 06/19/2022 Yes    Hematoma of abdominal wall, initial encounter [S30.1XXA] 06/23/2022 No    Acute combined systolic and diastolic heart failure [I50.41] 06/21/2022 Yes    Alcohol dependence [F10.20] 06/19/2022 Yes    Anxiety [F41.9] 10/11/2021 Yes    Rheumatoid arthritis [M06.9] 10/11/2021 Yes    Primary hypertension [I10] 10/11/2021 Yes      Problems Resolved During this Admission:    Diagnosis Date Noted Date Resolved POA    Chest pain [R07.9] 06/19/2022 06/23/2022 Yes       Discharged Condition: stable    Disposition: Another Health Care Inst*    Follow Up:    Patient Instructions:   No discharge procedures on file.    Pending Diagnostic Studies:     None         Medications:  Reconciled Home Medications:      Medication List      CONTINUE taking these medications    doxazosin 2 MG tablet  Commonly known as: CARDURA  Take 1 tablet (2 mg  total) by mouth nightly.     folic acid 1 MG tablet  Commonly known as: FOLVITE  Take 1 tablet (1,000 mcg total) by mouth once daily.     methotrexate 2.5 MG Tab  Take 6 tablets (15 mg total) by mouth every 7 days.     predniSONE 20 MG tablet  Commonly known as: DELTASONE  Take 20 mg by mouth daily as needed.     sertraline 50 MG tablet  Commonly known as: ZOLOFT  Take 1 tablet (50 mg total) by mouth every evening.            Indwelling Lines/Drains at time of discharge:   Lines/Drains/Airways     None                 Time spent on the discharge of patient: 35 minutes         Christen Medrano NP  Department of Hospital Medicine  [place] - [facility]

## 2022-06-24 NOTE — ASSESSMENT & PLAN NOTE
Chest pain    - presents with reported left sided chest pain x2-3 weeks  - EKG revealed ST depression V3, V4, V5 and V6.  -Troponin 1.760--9.230; continue to trend troponins   -NT-proBNP 976  -CXR revealed ground-glass infiltrate within the periphery of the right lung could relate to and Modic process including COVID-19 infection.  - Received 325mg PO ASA x1 in ED and was started on continuous heparin infusion  -Will give Plavix load 600mg PO now  - Continue ASA 81mg daily and Plavix 81mg daily  - TTE ordered  - stratify risks: obtain lipid panel, HgbA1c, and TSH  - Monitor on telemetry  - CMP daily; keep K >4, Mg >2  -Supplemental oxygen PRN for SpO2 <90%  -Will initiate atorvastatin 40mg   -SL Nitro PRN for chest pain  -EKG PRN for recurrent chest pain  - Cardiology consulted in ED; appreciate assistance  -NPO after midnight for LHC in am-- multivessel CAD- transfer to [facility] For CVS evaluation and possible CABG    6/21:  LHC showed multivessel CAD  Needs CABG  Awaiting [facility] bed for CABG  Continue heparin gtt  No chest pain  Await bed at [facility] for transfer  Transfer to [facility] for CABG

## 2022-06-24 NOTE — CARE UPDATE
Patient complaining of worsening abdominal pain, vital signs stable, no signs of distress.  CT scan reviewed, showed no evidence of intraperitoneal acute hemorrhage or hematoma. Interval enlargement of the lateral left abdominal musculature could be related to edematous changes or infiltration of the hematoma into the flank musculature.  Reviewed results with primary team.  Awaiting surgery to review.

## 2022-06-24 NOTE — ASSESSMENT & PLAN NOTE
Patient has a current diagnosis of HTN which is controlled.  Latest blood pressure and vitals reviewed-   Temp:  [96.7 °F (35.9 °C)-99.1 °F (37.3 °C)]   Pulse:  [72-89]   Resp:  [16-20]   BP: (101-117)/(65-74)   SpO2:  [93 %-96 %] .   Patient currently off IV antihypertensives.   Home meds for hypertension were reviewed and noted below.   Hypertension Medications             doxazosin (CARDURA) 2 MG tablet Take 1 tablet (2 mg total) by mouth nightly.          Medication adjustment for hospital antihypertensives is as follows-     Doxazosin may be affecting BP; SBP 's  Continue for now but may require a dose adjustment    Will goal for controlled BP reduction as noted be medications noted above and monitor and mitigate end organ damage as indicated.

## 2022-06-24 NOTE — NURSING
Notified Ovidio IBANEZ that abdomen hematoma was getting bigger and the patient was given Morphine for the pain.  Ovidio IBANEZ came to see the patient as well as the charge nurse, lead nurse and house supervisor.  Order was given to give more morphine. Team is working on sending patient to [facility] as soon as a bed is available. Patient is stable at this moment.

## 2022-06-24 NOTE — PLAN OF CARE
Pt was dc and transferred to [facility]. No needs noted.        06/24/22 1543   Final Note   Assessment Type Final Discharge Note   Anticipated Discharge Disposition   ([facility])   Post-Acute Status   Discharge Delays None known at this time

## 2022-06-24 NOTE — CONSULTS
Surgery Consult Note    Reason for consult:  Rectus sheath hematoma     History of present illness:  57 yo M with PMH of RA admitted 6/19 for NSTEMI.  Patient was started on a heparin gtt, underwent LHC, and is awaiting transfer to main [facility] for CABG.     Pt reports he had a coughing fit with worsening left sided abdominal pain.  Noncontrast CT was ordered and demonstrated rectus sheath hematoma, stable on repeat CT.  H/h has been stable with no transfusions required.  Hep gtt stopped this morning.      Past Medical History:   Diagnosis Date    Arthritis     rheumatoid    Hypertension      Past Surgical History:   Procedure Laterality Date    COLONOSCOPY      age 42    COLONOSCOPY N/A 11/22/2021    Procedure: COLONOSCOPY  Golytely   Rapid Covid;  Surgeon: Shane Guzman MD;  Location: [facility] ENDO;  Service: Endoscopy;  Laterality: N/A;    LEFT HEART CATHETERIZATION N/A 6/20/2022    Procedure: Left heart cath;  Surgeon: Marlen Thompson MD;  Location: [facility] CATH LAB/EP;  Service: Cardiology;  Laterality: N/A;    VASECTOMY       Family History   Problem Relation Age of Onset    No Known Problems Daughter     No Known Problems Son      Social History     Tobacco Use    Smoking status: Former Smoker    Smokeless tobacco: Never Used   Substance Use Topics    Alcohol use: Yes     Comment: social    Drug use: Never     Review of patient's allergies indicates:  No Known Allergies  Review of systems: see HPI; otherwise, 12-point ROS negative.     Vitals:   Vitals:    06/24/22 0842   BP:    Pulse:    Resp: 18   Temp:        Gen: no acute distress. Alert and oriented x3. Non-toxic appearing.   HEENT: normocephalic and atraumatic. EOMI. Moist mucous membranes. Trachea midline.   Neck: supple. Normal ROM.  Resp: unlabored respirations. Stable on room air.  CV: regular rate.  Abd: soft, palpable swelling within left rectus sheath, tender to palpation   Ext: warm and well perfused. No clubbing, cyanosis, or  edema.  Neuro: CN grossly intact. No focal neurological deficits.      Labs  Recent Labs     06/23/22  0333 06/23/22  1214 06/23/22  2223 06/24/22  0230   WBC 11.14  11.14  --  13.08* 11.99   HGB 14.4  14.4   < > 12.9* 12.6*   HCT 41.6  41.6   < > 38.0* 36.4*     182  --  212 196    < > = values in this interval not displayed.     CMP  Recent Labs     06/22/22  0231 06/23/22  0333 06/24/22  0230    134* 133*   K 3.7 3.8 3.9    102 100   CO2 20* 20* 22*   GLU 99 124* 114*   BUN 8 9 10   CREATININE 0.8 0.8 0.8   CALCIUM 8.9 8.8 8.8   PROT 6.9  --  6.8   ALBUMIN 2.9*  --  2.9*   BILITOT 1.1*  --  1.1*   ALKPHOS 78  --  58   AST 83*  --  71*   ALT 38  --  33   ANIONGAP 12 12 11   ESTGFRAFRICA >60 >60 >60   EGFRNONAA >60 >60 >60     No results for input(s): MG, PHOS in the last 72 hours.    Imaging  CT images reviewed- limited eval without contrast but overall stable rectus sheath hematoma    Assessment and plan:  56 M admitted for NSTEMI, with spontaneous left rectus sheath hematoma   - risks vs benefits of anticoagulation. Will defer to cardiology whether to continue hep gtt.  Patient is stable at this time with low concern for ongoing bleeding. If patient does develop ongoing bleeding would consult interventional radiology for embolization.  No surgical intervention at this time    Vero Rehman MD

## 2022-06-25 LAB
ANION GAP SERPL CALC-SCNC: 12 MMOL/L (ref 8–16)
APTT BLDCRRT: 26.6 SEC (ref 21–32)
APTT BLDCRRT: 26.8 SEC (ref 21–32)
BASOPHILS # BLD AUTO: 0.04 K/UL (ref 0–0.2)
BASOPHILS # BLD AUTO: 0.04 K/UL (ref 0–0.2)
BASOPHILS NFR BLD: 0.3 % (ref 0–1.9)
BASOPHILS NFR BLD: 0.4 % (ref 0–1.9)
BUN SERPL-MCNC: 11 MG/DL (ref 6–20)
CALCIUM SERPL-MCNC: 8.6 MG/DL (ref 8.7–10.5)
CHLORIDE SERPL-SCNC: 97 MMOL/L (ref 95–110)
CO2 SERPL-SCNC: 23 MMOL/L (ref 23–29)
CREAT SERPL-MCNC: 0.7 MG/DL (ref 0.5–1.4)
DIFFERENTIAL METHOD: ABNORMAL
DIFFERENTIAL METHOD: ABNORMAL
EOSINOPHIL # BLD AUTO: 0.1 K/UL (ref 0–0.5)
EOSINOPHIL # BLD AUTO: 0.1 K/UL (ref 0–0.5)
EOSINOPHIL NFR BLD: 0.7 % (ref 0–8)
EOSINOPHIL NFR BLD: 0.8 % (ref 0–8)
ERYTHROCYTE [DISTWIDTH] IN BLOOD BY AUTOMATED COUNT: 12.8 % (ref 11.5–14.5)
ERYTHROCYTE [DISTWIDTH] IN BLOOD BY AUTOMATED COUNT: 13 % (ref 11.5–14.5)
EST. GFR  (AFRICAN AMERICAN): >60 ML/MIN/1.73 M^2
EST. GFR  (NON AFRICAN AMERICAN): >60 ML/MIN/1.73 M^2
GLUCOSE SERPL-MCNC: 92 MG/DL (ref 70–110)
HCT VFR BLD AUTO: 30.6 % (ref 40–54)
HCT VFR BLD AUTO: 32.4 % (ref 40–54)
HGB BLD-MCNC: 10.5 G/DL (ref 14–18)
HGB BLD-MCNC: 11.2 G/DL (ref 14–18)
IMM GRANULOCYTES # BLD AUTO: 0.11 K/UL (ref 0–0.04)
IMM GRANULOCYTES # BLD AUTO: 0.15 K/UL (ref 0–0.04)
IMM GRANULOCYTES NFR BLD AUTO: 1 % (ref 0–0.5)
IMM GRANULOCYTES NFR BLD AUTO: 1.4 % (ref 0–0.5)
INR PPP: 1.1 (ref 0.8–1.2)
LYMPHOCYTES # BLD AUTO: 0.8 K/UL (ref 1–4.8)
LYMPHOCYTES # BLD AUTO: 0.9 K/UL (ref 1–4.8)
LYMPHOCYTES NFR BLD: 7.3 % (ref 18–48)
LYMPHOCYTES NFR BLD: 7.9 % (ref 18–48)
MCH RBC QN AUTO: 33.9 PG (ref 27–31)
MCH RBC QN AUTO: 34 PG (ref 27–31)
MCHC RBC AUTO-ENTMCNC: 34.3 G/DL (ref 32–36)
MCHC RBC AUTO-ENTMCNC: 34.6 G/DL (ref 32–36)
MCV RBC AUTO: 99 FL (ref 82–98)
MCV RBC AUTO: 99 FL (ref 82–98)
MONOCYTES # BLD AUTO: 1.3 K/UL (ref 0.3–1)
MONOCYTES # BLD AUTO: 1.3 K/UL (ref 0.3–1)
MONOCYTES NFR BLD: 11 % (ref 4–15)
MONOCYTES NFR BLD: 11.8 % (ref 4–15)
NEUTROPHILS # BLD AUTO: 8.4 K/UL (ref 1.8–7.7)
NEUTROPHILS # BLD AUTO: 9.2 K/UL (ref 1.8–7.7)
NEUTROPHILS NFR BLD: 77.7 % (ref 38–73)
NEUTROPHILS NFR BLD: 79.7 % (ref 38–73)
NRBC BLD-RTO: 0 /100 WBC
NRBC BLD-RTO: 0 /100 WBC
PLATELET # BLD AUTO: 200 K/UL (ref 150–450)
PLATELET # BLD AUTO: 211 K/UL (ref 150–450)
PMV BLD AUTO: 11.5 FL (ref 9.2–12.9)
PMV BLD AUTO: 11.6 FL (ref 9.2–12.9)
POTASSIUM SERPL-SCNC: 4 MMOL/L (ref 3.5–5.1)
PROTHROMBIN TIME: 10.9 SEC (ref 9–12.5)
RBC # BLD AUTO: 3.1 M/UL (ref 4.6–6.2)
RBC # BLD AUTO: 3.29 M/UL (ref 4.6–6.2)
SODIUM SERPL-SCNC: 132 MMOL/L (ref 136–145)
TROPONIN I SERPL DL<=0.01 NG/ML-MCNC: 0.7 NG/ML (ref 0–0.03)
WBC # BLD AUTO: 10.75 K/UL (ref 3.9–12.7)
WBC # BLD AUTO: 11.52 K/UL (ref 3.9–12.7)

## 2022-06-25 PROCEDURE — 20600001 HC STEP DOWN PRIVATE ROOM

## 2022-06-25 PROCEDURE — 85730 THROMBOPLASTIN TIME PARTIAL: CPT | Mod: 91 | Performed by: STUDENT IN AN ORGANIZED HEALTH CARE EDUCATION/TRAINING PROGRAM

## 2022-06-25 PROCEDURE — 63600175 PHARM REV CODE 636 W HCPCS: Performed by: STUDENT IN AN ORGANIZED HEALTH CARE EDUCATION/TRAINING PROGRAM

## 2022-06-25 PROCEDURE — 36415 COLL VENOUS BLD VENIPUNCTURE: CPT | Performed by: STUDENT IN AN ORGANIZED HEALTH CARE EDUCATION/TRAINING PROGRAM

## 2022-06-25 PROCEDURE — 84484 ASSAY OF TROPONIN QUANT: CPT | Performed by: STUDENT IN AN ORGANIZED HEALTH CARE EDUCATION/TRAINING PROGRAM

## 2022-06-25 PROCEDURE — 85025 COMPLETE CBC W/AUTO DIFF WBC: CPT | Mod: 91 | Performed by: STUDENT IN AN ORGANIZED HEALTH CARE EDUCATION/TRAINING PROGRAM

## 2022-06-25 PROCEDURE — 85730 THROMBOPLASTIN TIME PARTIAL: CPT | Performed by: STUDENT IN AN ORGANIZED HEALTH CARE EDUCATION/TRAINING PROGRAM

## 2022-06-25 PROCEDURE — 80048 BASIC METABOLIC PNL TOTAL CA: CPT | Performed by: STUDENT IN AN ORGANIZED HEALTH CARE EDUCATION/TRAINING PROGRAM

## 2022-06-25 PROCEDURE — 85610 PROTHROMBIN TIME: CPT | Performed by: STUDENT IN AN ORGANIZED HEALTH CARE EDUCATION/TRAINING PROGRAM

## 2022-06-25 PROCEDURE — 85025 COMPLETE CBC W/AUTO DIFF WBC: CPT | Performed by: STUDENT IN AN ORGANIZED HEALTH CARE EDUCATION/TRAINING PROGRAM

## 2022-06-25 PROCEDURE — 25000003 PHARM REV CODE 250: Performed by: STUDENT IN AN ORGANIZED HEALTH CARE EDUCATION/TRAINING PROGRAM

## 2022-06-25 PROCEDURE — 94761 N-INVAS EAR/PLS OXIMETRY MLT: CPT

## 2022-06-25 RX ORDER — HEPARIN SODIUM,PORCINE/D5W 25000/250
0-40 INTRAVENOUS SOLUTION INTRAVENOUS CONTINUOUS
Status: DISCONTINUED | OUTPATIENT
Start: 2022-06-25 | End: 2022-06-26

## 2022-06-25 RX ORDER — ACETAMINOPHEN 500 MG
1000 TABLET ORAL EVERY 8 HOURS
Status: DISCONTINUED | OUTPATIENT
Start: 2022-06-25 | End: 2022-06-30

## 2022-06-25 RX ORDER — HEPARIN SODIUM,PORCINE/D5W 25000/250
0-40 INTRAVENOUS SOLUTION INTRAVENOUS CONTINUOUS
Status: DISCONTINUED | OUTPATIENT
Start: 2022-06-25 | End: 2022-06-25

## 2022-06-25 RX ORDER — HYDROMORPHONE HYDROCHLORIDE 1 MG/ML
0.5 INJECTION, SOLUTION INTRAMUSCULAR; INTRAVENOUS; SUBCUTANEOUS
Status: DISCONTINUED | OUTPATIENT
Start: 2022-06-25 | End: 2022-06-30

## 2022-06-25 RX ADMIN — SERTRALINE HYDROCHLORIDE 50 MG: 50 TABLET ORAL at 08:06

## 2022-06-25 RX ADMIN — OXYCODONE HYDROCHLORIDE 10 MG: 10 TABLET ORAL at 06:06

## 2022-06-25 RX ADMIN — HYDROMORPHONE HYDROCHLORIDE 0.5 MG: 1 INJECTION, SOLUTION INTRAMUSCULAR; INTRAVENOUS; SUBCUTANEOUS at 08:06

## 2022-06-25 RX ADMIN — HEPARIN SODIUM 12 UNITS/KG/HR: 5000 INJECTION INTRAVENOUS; SUBCUTANEOUS at 11:06

## 2022-06-25 RX ADMIN — ACETAMINOPHEN 1000 MG: 500 TABLET ORAL at 08:06

## 2022-06-25 RX ADMIN — OXYCODONE HYDROCHLORIDE 10 MG: 10 TABLET ORAL at 02:06

## 2022-06-25 RX ADMIN — ASPIRIN 81 MG CHEWABLE TABLET 81 MG: 81 TABLET CHEWABLE at 08:06

## 2022-06-25 NOTE — PROGRESS NOTES
Pt c/o pain at hematoma site. Team notified. Ordered to give another 5mg of Oxycodone now. Hematoma images attached. New bruising noticed and marked below. Team aware.

## 2022-06-25 NOTE — PLAN OF CARE
Plan of care discussed with pt. VSS. A&Ox4; independent. Denies c/o of CP or SOB. Telemetry monitor showing NSR. Chest CT completed at beginning of shift. Abd US ordered for this AM--NPO since midnight. Oxycodone given PRN for abd/hematoma pain. Bruising noted (see other note). Pt remains free of injury or falls. All questions addressed.

## 2022-06-25 NOTE — PLAN OF CARE
Report received from PACO [facility]. Pt arrived to unit via transport stretcher.  Pt vital signs stable.  Plan of care reviewed.  All questions answered.  Pt ambulatory in hallway, independent.  Pt stable, no complaints.  Pt monitored.

## 2022-06-25 NOTE — H&P
[facility] - Cardiology Stepdown  CTS  History & Physical      Subjective:     History of Present Illness:  Andrae Aviles is a 56 y.o. male with h/o HTN and RA who presented to an outside hospital with chest pain, found to have NSTEMI. LHC showed 80-90% LM disease, 90% Cx. Was started on plavix and heparin, developed a rectus sheath hematoma so heparin gtt was held. No complaints of chest pain currently. Echo showed EF 30-35%, mild MR.  Prior cigarette smoker, quit in 2016 and began vaping instead.   Drinks 12 beers per night. No history of withdrawal symptoms. Has not had alcohol since admission and has not had symptoms.   Patient active without issues prior to this event.      Current Facility-Administered Medications on File Prior to Encounter   Medication    [DISCONTINUED] acetaminophen tablet 650 mg    [DISCONTINUED] albuterol sulfate nebulizer solution 2.5 mg    [DISCONTINUED] albuterol-ipratropium 2.5 mg-0.5 mg/3 mL nebulizer solution 3 mL    [DISCONTINUED] aluminum-magnesium hydroxide-simethicone 200-200-20 mg/5 mL suspension 30 mL    [DISCONTINUED] aspirin chewable tablet 324 mg    [DISCONTINUED] aspirin EC tablet 81 mg    [DISCONTINUED] atorvastatin tablet 40 mg    [DISCONTINUED] azithromycin tablet 250 mg    [DISCONTINUED] benzonatate capsule 100 mg    [DISCONTINUED] diphenhydrAMINE capsule 25 mg    [DISCONTINUED] doxazosin tablet 2 mg    [DISCONTINUED] folic acid tablet 1,000 mcg    [DISCONTINUED] glucagon (human recombinant) injection 1 mg    [DISCONTINUED] glucose chewable tablet 16 g    [DISCONTINUED] glucose chewable tablet 24 g    [DISCONTINUED] guaiFENesin-codeine 100-10 mg/5 ml syrup 5 mL    [DISCONTINUED] heparin infusion 1,000 units/500 ml in 0.9% NaCl (pressure line flush)    [DISCONTINUED] insulin aspart U-100 pen 0-5 Units    [DISCONTINUED] iodixanoL (VISIPAQUE 320) injection    [DISCONTINUED] melatonin tablet 6 mg    [DISCONTINUED] metoprolol succinate (TOPROL-XL) 24 hr  split tablet 12.5 mg    [DISCONTINUED] morphine injection 2 mg    [DISCONTINUED] nitroGLYCERIN SL tablet 0.4 mg    [DISCONTINUED] ondansetron injection 4 mg    [DISCONTINUED] oxyCODONE immediate release tablet 10 mg    [DISCONTINUED] sertraline tablet 50 mg    [DISCONTINUED] simethicone chewable tablet 80 mg    [DISCONTINUED] sodium chloride 0.9% flush 10 mL    [DISCONTINUED] sodium chloride 0.9% flush 10 mL    [DISCONTINUED] sodium chloride 3% nebulizer solution 4 mL    [DISCONTINUED] thiamine tablet 100 mg     Current Outpatient Medications on File Prior to Encounter   Medication Sig    doxazosin (CARDURA) 2 MG tablet Take 1 tablet (2 mg total) by mouth nightly.    folic acid (FOLVITE) 1 MG tablet Take 1 tablet (1,000 mcg total) by mouth once daily. (Patient taking differently: Take 1,000 mcg by mouth once daily. Takes daily in AM)    methotrexate 2.5 MG Tab Take 6 tablets (15 mg total) by mouth every 7 days. (Patient taking differently: Take 15 mg by mouth every 7 days. Takes every monday)    predniSONE (DELTASONE) 20 MG tablet Take 20 mg by mouth daily as needed. Not taking    sertraline (ZOLOFT) 50 MG tablet Take 1 tablet (50 mg total) by mouth every evening.       Review of patient's allergies indicates:  No Known Allergies    Past Medical History:   Diagnosis Date    Arthritis     rheumatoid    Hypertension      Past Surgical History:   Procedure Laterality Date    COLONOSCOPY      age 42    COLONOSCOPY N/A 11/22/2021    Procedure: COLONOSCOPY  Golytely   Rapid Covid;  Surgeon: Shane Guzman MD;  Location: [facility] ENDO;  Service: Endoscopy;  Laterality: N/A;    LEFT HEART CATHETERIZATION N/A 6/20/2022    Procedure: Left heart cath;  Surgeon: Marlen Thompson MD;  Location: [facility] CATH LAB/EP;  Service: Cardiology;  Laterality: N/A;    VASECTOMY       Family History     Problem Relation (Age of Onset)    No Known Problems Daughter, Son        Tobacco Use    Smoking status: Former  Smoker    Smokeless tobacco: Never Used   Substance and Sexual Activity    Alcohol use: Yes     Comment: social    Drug use: Never    Sexual activity: Not on file     Review of Systems   Constitutional: Negative for activity change, chills and fever.   Respiratory: Negative for cough and shortness of breath.    Cardiovascular: Negative for chest pain and palpitations.   Gastrointestinal: Negative for abdominal distention and abdominal pain.   Musculoskeletal: Negative for arthralgias and myalgias.   Neurological: Negative for dizziness and headaches.   Psychiatric/Behavioral: Negative for agitation and confusion.     Objective:     Vital Signs (Most Recent):  Temp: 98.7 °F (37.1 °C) (06/25/22 0443)  Pulse: 86 (06/25/22 0553)  Resp: 18 (06/25/22 0443)  BP: 99/61 (06/25/22 0443)  SpO2: (!) 91 % (06/25/22 0443) Vital Signs (24h Range):  Temp:  [98 °F (36.7 °C)-99 °F (37.2 °C)] 98.7 °F (37.1 °C)  Pulse:  [75-97] 86  Resp:  [14-20] 18  SpO2:  [91 %-98 %] 91 %  BP: ()/(61-79) 99/61     Weight: 96.1 kg (211 lb 13.8 oz)  Body mass index is 30.4 kg/m².    Physical Exam  Constitutional:       General: He is not in acute distress.     Appearance: He is well-developed.   Cardiovascular:      Rate and Rhythm: Normal rate and regular rhythm.   Pulmonary:      Effort: Pulmonary effort is normal. No respiratory distress.   Abdominal:      General: There is no distension.      Palpations: Abdomen is soft.      Tenderness: There is no abdominal tenderness.   Skin:     General: Skin is warm and dry.   Neurological:      Mental Status: He is alert and oriented to person, place, and time.   Psychiatric:         Behavior: Behavior normal.         Significant Labs:  I have reviewed all pertinent lab results within the past 24 hours.  CBC:   Recent Labs   Lab 06/25/22  0330   WBC 11.52   RBC 3.29*   HGB 11.2*   HCT 32.4*      MCV 99*   MCH 34.0*   MCHC 34.6     CMP:   Recent Labs   Lab 06/24/22  0230 06/25/22  0330   GLU  114* 92   CALCIUM 8.8 8.6*   ALBUMIN 2.9*  --    PROT 6.8  --    * 132*   K 3.9 4.0   CO2 22* 23    97   BUN 10 11   CREATININE 0.8 0.7   ALKPHOS 58  --    ALT 33  --    AST 71*  --    BILITOT 1.1*  --        Significant Diagnostics:  I have reviewed all pertinent imaging results/findings within the past 24 hours.    Assessment/Plan:     Continue work up for CABG- CT chest, liver US  Likely restart heparin drip and observe retroperitoneal hematoma  Cardiac diet  ASA  Nitro PRN    Alisha Lugo MD, PGY-6  Cardiothoracic Surgery   [phone]

## 2022-06-25 NOTE — PLAN OF CARE
Pt assessed this am.  Pt denies chest pain or shortness of breath.  Pt vital signs stable.  Pt plan of care reviewed.  Pt had ultrasound this am. Pt assessed by Dr Lugo, pt started on heparin gtt, hematoma to left upper abdomen, circular area drawn.  Large purplish area to left side of hematoma radiating to back.  [place] drawn to toño bruised area.  Dr Lugo stated to monitor hematoma and bruised ecchymotic area for increase in size, if this happens to stop heparin gtt and call CTS.  Pt assessed throughout shift.  Pt complained of generalized pain, hematoma assessed no change, but increased amount of ecchymotic, bruised area radiating down the left side to pt waist.  CTS called and made aware of pt status, and requested CTS come to assess pt.  Pt monitored.

## 2022-06-25 NOTE — NURSING TRANSFER
Nursing Transfer Note      6/25/2022   1845    Reason patient is being transferred: CT chest    Transfer To: CT   From:     Transfer via wheelchair    Transfer with cardiac monitoring    Transported by pt transport    Notified: spouse    Patient reassessed at: 6/24/22, 1920     Upon arrival to floor: cardiac monitor applied, patient oriented to room, call bell in reach and bed in lowest position

## 2022-06-26 LAB
ANION GAP SERPL CALC-SCNC: 10 MMOL/L (ref 8–16)
APTT BLDCRRT: 26.5 SEC (ref 21–32)
BASOPHILS # BLD AUTO: 0.04 K/UL (ref 0–0.2)
BASOPHILS NFR BLD: 0.4 % (ref 0–1.9)
BUN SERPL-MCNC: 9 MG/DL (ref 6–20)
CALCIUM SERPL-MCNC: 8.9 MG/DL (ref 8.7–10.5)
CHLORIDE SERPL-SCNC: 102 MMOL/L (ref 95–110)
CO2 SERPL-SCNC: 24 MMOL/L (ref 23–29)
CREAT SERPL-MCNC: 0.7 MG/DL (ref 0.5–1.4)
DIFFERENTIAL METHOD: ABNORMAL
EOSINOPHIL # BLD AUTO: 0.1 K/UL (ref 0–0.5)
EOSINOPHIL NFR BLD: 1.3 % (ref 0–8)
ERYTHROCYTE [DISTWIDTH] IN BLOOD BY AUTOMATED COUNT: 13.2 % (ref 11.5–14.5)
EST. GFR  (AFRICAN AMERICAN): >60 ML/MIN/1.73 M^2
EST. GFR  (NON AFRICAN AMERICAN): >60 ML/MIN/1.73 M^2
GLUCOSE SERPL-MCNC: 95 MG/DL (ref 70–110)
HCT VFR BLD AUTO: 34.7 % (ref 40–54)
HGB BLD-MCNC: 11.6 G/DL (ref 14–18)
IMM GRANULOCYTES # BLD AUTO: 0.11 K/UL (ref 0–0.04)
IMM GRANULOCYTES NFR BLD AUTO: 1.2 % (ref 0–0.5)
LYMPHOCYTES # BLD AUTO: 0.9 K/UL (ref 1–4.8)
LYMPHOCYTES NFR BLD: 9.8 % (ref 18–48)
MCH RBC QN AUTO: 34.6 PG (ref 27–31)
MCHC RBC AUTO-ENTMCNC: 33.4 G/DL (ref 32–36)
MCV RBC AUTO: 104 FL (ref 82–98)
MONOCYTES # BLD AUTO: 1.1 K/UL (ref 0.3–1)
MONOCYTES NFR BLD: 11 % (ref 4–15)
NEUTROPHILS # BLD AUTO: 7.3 K/UL (ref 1.8–7.7)
NEUTROPHILS NFR BLD: 76.3 % (ref 38–73)
NRBC BLD-RTO: 0 /100 WBC
PLATELET # BLD AUTO: 229 K/UL (ref 150–450)
PMV BLD AUTO: 11.4 FL (ref 9.2–12.9)
POTASSIUM SERPL-SCNC: 3.8 MMOL/L (ref 3.5–5.1)
RBC # BLD AUTO: 3.35 M/UL (ref 4.6–6.2)
SODIUM SERPL-SCNC: 136 MMOL/L (ref 136–145)
TROPONIN I SERPL DL<=0.01 NG/ML-MCNC: 0.33 NG/ML (ref 0–0.03)
WBC # BLD AUTO: 9.55 K/UL (ref 3.9–12.7)

## 2022-06-26 PROCEDURE — 93005 ELECTROCARDIOGRAM TRACING: CPT

## 2022-06-26 PROCEDURE — 85730 THROMBOPLASTIN TIME PARTIAL: CPT | Performed by: STUDENT IN AN ORGANIZED HEALTH CARE EDUCATION/TRAINING PROGRAM

## 2022-06-26 PROCEDURE — 84484 ASSAY OF TROPONIN QUANT: CPT | Performed by: STUDENT IN AN ORGANIZED HEALTH CARE EDUCATION/TRAINING PROGRAM

## 2022-06-26 PROCEDURE — 25000003 PHARM REV CODE 250: Performed by: STUDENT IN AN ORGANIZED HEALTH CARE EDUCATION/TRAINING PROGRAM

## 2022-06-26 PROCEDURE — 63600175 PHARM REV CODE 636 W HCPCS: Performed by: STUDENT IN AN ORGANIZED HEALTH CARE EDUCATION/TRAINING PROGRAM

## 2022-06-26 PROCEDURE — 93010 EKG 12-LEAD: ICD-10-PCS | Mod: ,,, | Performed by: INTERNAL MEDICINE

## 2022-06-26 PROCEDURE — 93010 ELECTROCARDIOGRAM REPORT: CPT | Mod: ,,, | Performed by: INTERNAL MEDICINE

## 2022-06-26 PROCEDURE — 36415 COLL VENOUS BLD VENIPUNCTURE: CPT | Performed by: STUDENT IN AN ORGANIZED HEALTH CARE EDUCATION/TRAINING PROGRAM

## 2022-06-26 PROCEDURE — 80048 BASIC METABOLIC PNL TOTAL CA: CPT | Performed by: STUDENT IN AN ORGANIZED HEALTH CARE EDUCATION/TRAINING PROGRAM

## 2022-06-26 PROCEDURE — 20600001 HC STEP DOWN PRIVATE ROOM

## 2022-06-26 PROCEDURE — 25000003 PHARM REV CODE 250

## 2022-06-26 PROCEDURE — 85025 COMPLETE CBC W/AUTO DIFF WBC: CPT | Performed by: STUDENT IN AN ORGANIZED HEALTH CARE EDUCATION/TRAINING PROGRAM

## 2022-06-26 RX ORDER — DIPHENHYDRAMINE HCL 25 MG
25 CAPSULE ORAL NIGHTLY PRN
Status: DISCONTINUED | OUTPATIENT
Start: 2022-06-26 | End: 2022-07-01

## 2022-06-26 RX ORDER — BENZONATATE 100 MG/1
100 CAPSULE ORAL ONCE
Status: COMPLETED | OUTPATIENT
Start: 2022-06-26 | End: 2022-06-26

## 2022-06-26 RX ADMIN — OXYCODONE 5 MG: 5 TABLET ORAL at 05:06

## 2022-06-26 RX ADMIN — OXYCODONE HYDROCHLORIDE 10 MG: 10 TABLET ORAL at 08:06

## 2022-06-26 RX ADMIN — BENZONATATE 100 MG: 100 CAPSULE ORAL at 12:06

## 2022-06-26 RX ADMIN — HYDROMORPHONE HYDROCHLORIDE 0.5 MG: 1 INJECTION, SOLUTION INTRAMUSCULAR; INTRAVENOUS; SUBCUTANEOUS at 05:06

## 2022-06-26 RX ADMIN — ACETAMINOPHEN 1000 MG: 500 TABLET ORAL at 05:06

## 2022-06-26 RX ADMIN — DIPHENHYDRAMINE HYDROCHLORIDE 25 MG: 25 CAPSULE ORAL at 10:06

## 2022-06-26 RX ADMIN — SERTRALINE HYDROCHLORIDE 50 MG: 50 TABLET ORAL at 08:06

## 2022-06-26 RX ADMIN — ACETAMINOPHEN 1000 MG: 500 TABLET ORAL at 10:06

## 2022-06-26 RX ADMIN — OXYCODONE HYDROCHLORIDE 10 MG: 10 TABLET ORAL at 12:06

## 2022-06-26 RX ADMIN — ASPIRIN 81 MG CHEWABLE TABLET 81 MG: 81 TABLET CHEWABLE at 08:06

## 2022-06-26 RX ADMIN — ACETAMINOPHEN 1000 MG: 500 TABLET ORAL at 12:06

## 2022-06-26 NOTE — PLAN OF CARE
Plan of care discussed with pt. VSS. A&Ox4; independent. Denies c/o SOB. Telemetry monitor showing NSR. Ecchymosis on left lateral side continued to spread throughout shift. (see other notes) Pt remains free of injury or falls. All questions addressed.

## 2022-06-26 NOTE — PLAN OF CARE
Pt assessed this am.  Pt denies chest pain or shortness of breath.  Pt ecchymotic site to left side of his hematoma unchanged.  Pt plan of care reviewed.  Pt assessed by CTS.  Pt vital signs stable.  Pt medicated for pain.  Pt monitored.

## 2022-06-26 NOTE — PROGRESS NOTES
Pt wife upset since they have not seen a doctor since the heparin gtt has been turned off/worsening of bruising. Attempted to contact CTS, however, they are busy in ICU. Explained to pt's wife CTS would be down here as soon as he could. Charge nurse, Jonathon, also explained to pt wife the situation. Pt wife at  demanding for situation to be escalated. Pt wife screaming in [place], slamming on  and slammed pt's door. Security called to room. House supervisor and CTS notified.

## 2022-06-26 NOTE — NURSING
CTS updated on pt reported symptoms.  Pt states he feels cold and in increased pain in his arms and hands, and left side where ecchymotic area is.  Pt vital signs stable.  CTS stated I am putting in labs and will come to assess pt as soon as possible.

## 2022-06-26 NOTE — CONSULTS
Cardiothoracic Surgery Consult Note      SUBJECTIVE:     History of Present Illness:  Andare Aviles is a 56 y.o. male with h/o HTN and RA who presented to an outside hospital with chest pain, found to have NSTEMI. LHC showed 80-90% LM disease, 90% Cx. Was started on plavix and heparin, developed a rectus sheath hematoma so heparin gtt was held. No complaints of chest pain currently. Echo showed EF 30-35%, mild MR.  Prior cigarette smoker, quit in 2016 and began vaping instead.   Drinks 12 beers per night. No history of withdrawal symptoms. Has not had alcohol since admission and has not had symptoms.     Past Medical History:   Diagnosis Date    Arthritis     rheumatoid    Hypertension      Past Surgical History:   Procedure Laterality Date    COLONOSCOPY      age 42    COLONOSCOPY N/A 11/22/2021    Procedure: COLONOSCOPY  Golytely   Rapid Covid;  Surgeon: Shane Guzman MD;  Location: [facility] ENDO;  Service: Endoscopy;  Laterality: N/A;    LEFT HEART CATHETERIZATION N/A 6/20/2022    Procedure: Left heart cath;  Surgeon: Marlen Thompson MD;  Location: [facility] CATH LAB/EP;  Service: Cardiology;  Laterality: N/A;    VASECTOMY       Family History   Problem Relation Age of Onset    No Known Problems Daughter     No Known Problems Son      Social History     Tobacco Use    Smoking status: Former Smoker    Smokeless tobacco: Never Used   Substance Use Topics    Alcohol use: Yes     Comment: social    Drug use: Never      Review of patient's allergies indicates:  No Known Allergies  Current medications reviewed    Review of Systems:  Constitutional: Negative for fever, chills, distress  Skin: no acute disorders vs admission. Negative for rash, itching  HEENT: unremarkable.  Negative for sore throat, congestion  Cardiovascular: Positive for chest pain .  Negative for orthopnea lower extremity edema .  Respiratory:  Negative for shortness of breath.  GI:  unremarkable.  Negative for abdominal pain,  vomiting.  Positive for rectus sheath hematoma  :  Negative for hematuria, flank pain  Musculoskeletal:  Negative for falls, myalgias  Neuro:  Negative for dizziness, LOC  Psych:  Negative for substance abuse, memory loss        OBJECTIVE:     Vital Signs (Most Recent)  Temp: 98.3 °F (36.8 °C) (06/26/22 0727)  Pulse: 82 (06/26/22 0727)  Resp: 18 (06/26/22 0727)  BP: 104/72 (06/26/22 0727)  SpO2: (!) 91 % (06/26/22 0727)  Vital signs reviewed    Physical Exam:  General Appearance: no acute distress.  Normal for age  Skin: no acute lesions, minor bruises from phlebotomy  HEENT: no masses/hematoma, Jugular veins: not distended  Resp:  excursion/effort normal; clear to auscultation  CV:  Rate:  regular  Rhythm: regular  Murmur:  no significant murmur  GI: Bowel sounds: present; abdo nondistended, nontender, and positive for mass palpable on left side (rectus sheath hematoma)  Extrem: Edema: minimal  Pulses: normal  Groin: no hematoma  Neuro: Alert and oriented; no focal deficit  Psych: no acute delirium noted  : voiding well  MSK: no acute findings, ranges of motion unchanged vs previous      I have personally reviewed the imaging, electrocardiogram, and pertinent lab findings    ASSESSMENT/PLAN:     I have personally taken the history and examined this patient.     Mr. Avlies is a pleasant 56 year old male former smoker (1ppd x 30 years, quit in 2016 but still vapes) and alcohol abuser (12 beers daily) with newly diagnosed ischemic cardiomyopathy and heart failure reduced ejection fraction (30% ejection fraction), rheumatoid arthritis, fatty liver disease vs chronic liver disease, hypertension, and BMI of 30 who presented in mid June with NSTEMI (troponin 18.379 max), underwent coronary angiogram showing severe multivessel disease, and additionally had a spontaneous rectus sheath hematoma (seen by [facility] - conservative management).  He was transferred to [facility] once his hematocrit  stabilized due to concerns about the rectus sheath hematoma.  Coronary angiogram details: 90% ostial and mid left main disease with a moderate to large mid and distal LAD, 90% mid LCx with small and diffusely diseased obtuse marginal vessels, and totally occluded mid RCA with a small to moderate sized PDA (fills from left to right collaterals).  The transthoracic echo shows 30-35% ejection fraction with LVEDD of 6.1cm, and mild mitral regurgitation.      CT chest/abdomen/pelvis noncontrast shows minimal ascending aortic and moderate aortic arch calcifications, a 10cm x 6cm x 26cm rectus sheath hematoma.  Carotid ultrasound shows nonsignificant disease.    Liver ultrasound shows hepatic steatosis and/or chronic liver disease.    Given the severity of disease and the symptoms, I recommend coronary artery bypass surgery x 2 (LIMA-LAD, SVG-PDA), pending full work up of his liver disease.  We had a lengthy discussion with him about the risks vs. benefits of the surgery.  We discussed the risks including the predicted chance of mortality as well as morbidity such as stroke, kidney injury, respiratory failure, limb ischemia, myocardial infarction, sternal wound infection, and bleeding.  The Society of Thoracic Surgery (STS) risk score was also discussed.  With this history, I noted the patient has a higher chance of liver failure and death due to his alcoholic liver disease.  He has a high chance of worsening rectus sheath hematoma and blood transfusions due to the need for heparinization and his current large hematoma.  Additionally, we discussed the likely length of stay in the ICU and in the hospital, as well as the overall recovery period.      We will complete the liver work up firstly.  Also, in order to decrease the chance of worsening rectus sheath hematoma, we need some time from the inciting event (initial bleed) until the date of surgery to ensure full heparinization will be tolerated.  These factors will  dictate timing of surgery.      Raymond Grissom MD  Cardiothoracic Surgery  [facility]

## 2022-06-26 NOTE — PROGRESS NOTES
Pt c/o cough with green productive sputum. Provided pt with a cup and told to call when he has a sample. Also c/o R-sided, sharp, chest pain. Rated a 4/10-- not radiating elsewhere. CTS notified. STAT EKG and tessalon ladonna ordered. [place] and charge nurse updated.

## 2022-06-26 NOTE — PROGRESS NOTES
"Pt bruising increasing below prior ecchymotic area on left lateral side. Pt states "it hurts to move" and "he doesn't feel right". New bruising area marked and tender to touch. Pt states he also feels "cold and needs extra blankets". Extremities warm. Pedal and radial pulses 2+. VSS. Pain rated 8/10 at hematoma site and left lateral abdomen. CTS notified. STAT labs and breakthrough pain meds ordered. [place] and charge nurse notified of change in pt status. Venipuncture notified about STAT labs.          "

## 2022-06-27 DIAGNOSIS — I25.10 CORONARY ARTERY DISEASE, UNSPECIFIED VESSEL OR LESION TYPE, UNSPECIFIED WHETHER ANGINA PRESENT, UNSPECIFIED WHETHER NATIVE OR TRANSPLANTED HEART: Primary | ICD-10-CM

## 2022-06-27 PROBLEM — R93.2 ABNORMAL LIVER ULTRASOUND: Status: ACTIVE | Noted: 2022-06-27

## 2022-06-27 LAB
ANION GAP SERPL CALC-SCNC: 7 MMOL/L (ref 8–16)
BASOPHILS # BLD AUTO: 0.06 K/UL (ref 0–0.2)
BASOPHILS NFR BLD: 0.6 % (ref 0–1.9)
BUN SERPL-MCNC: 9 MG/DL (ref 6–20)
CALCIUM SERPL-MCNC: 9 MG/DL (ref 8.7–10.5)
CERULOPLASMIN SERPL-MCNC: 38 MG/DL (ref 15–45)
CHLORIDE SERPL-SCNC: 101 MMOL/L (ref 95–110)
CO2 SERPL-SCNC: 28 MMOL/L (ref 23–29)
CREAT SERPL-MCNC: 0.8 MG/DL (ref 0.5–1.4)
DIFFERENTIAL METHOD: ABNORMAL
EOSINOPHIL # BLD AUTO: 0.3 K/UL (ref 0–0.5)
EOSINOPHIL NFR BLD: 2.8 % (ref 0–8)
ERYTHROCYTE [DISTWIDTH] IN BLOOD BY AUTOMATED COUNT: 13 % (ref 11.5–14.5)
EST. GFR  (AFRICAN AMERICAN): >60 ML/MIN/1.73 M^2
EST. GFR  (NON AFRICAN AMERICAN): >60 ML/MIN/1.73 M^2
FERRITIN SERPL-MCNC: 1580 NG/ML (ref 20–300)
GLUCOSE SERPL-MCNC: 84 MG/DL (ref 70–110)
HCT VFR BLD AUTO: 34.6 % (ref 40–54)
HGB BLD-MCNC: 11.6 G/DL (ref 14–18)
IGG SERPL-MCNC: 1169 MG/DL (ref 650–1600)
IMM GRANULOCYTES # BLD AUTO: 0.14 K/UL (ref 0–0.04)
IMM GRANULOCYTES NFR BLD AUTO: 1.5 % (ref 0–0.5)
IRON SERPL-MCNC: 35 UG/DL (ref 45–160)
LYMPHOCYTES # BLD AUTO: 1.2 K/UL (ref 1–4.8)
LYMPHOCYTES NFR BLD: 12.7 % (ref 18–48)
MCH RBC QN AUTO: 34.7 PG (ref 27–31)
MCHC RBC AUTO-ENTMCNC: 33.5 G/DL (ref 32–36)
MCV RBC AUTO: 104 FL (ref 82–98)
MONOCYTES # BLD AUTO: 1 K/UL (ref 0.3–1)
MONOCYTES NFR BLD: 10.1 % (ref 4–15)
NEUTROPHILS # BLD AUTO: 6.8 K/UL (ref 1.8–7.7)
NEUTROPHILS NFR BLD: 72.3 % (ref 38–73)
NRBC BLD-RTO: 0 /100 WBC
PLATELET # BLD AUTO: 307 K/UL (ref 150–450)
PMV BLD AUTO: 11.6 FL (ref 9.2–12.9)
POTASSIUM SERPL-SCNC: 3.6 MMOL/L (ref 3.5–5.1)
RBC # BLD AUTO: 3.34 M/UL (ref 4.6–6.2)
SATURATED IRON: 17 % (ref 20–50)
SODIUM SERPL-SCNC: 136 MMOL/L (ref 136–145)
TOTAL IRON BINDING CAPACITY: 212 UG/DL (ref 250–450)
TRANSFERRIN SERPL-MCNC: 143 MG/DL (ref 200–375)
WBC # BLD AUTO: 9.44 K/UL (ref 3.9–12.7)

## 2022-06-27 PROCEDURE — 25000003 PHARM REV CODE 250

## 2022-06-27 PROCEDURE — 36415 COLL VENOUS BLD VENIPUNCTURE: CPT | Performed by: STUDENT IN AN ORGANIZED HEALTH CARE EDUCATION/TRAINING PROGRAM

## 2022-06-27 PROCEDURE — 82103 ALPHA-1-ANTITRYPSIN TOTAL: CPT | Performed by: NURSE PRACTITIONER

## 2022-06-27 PROCEDURE — 80074 ACUTE HEPATITIS PANEL: CPT | Performed by: NURSE PRACTITIONER

## 2022-06-27 PROCEDURE — 82728 ASSAY OF FERRITIN: CPT | Performed by: NURSE PRACTITIONER

## 2022-06-27 PROCEDURE — 84466 ASSAY OF TRANSFERRIN: CPT | Performed by: NURSE PRACTITIONER

## 2022-06-27 PROCEDURE — 20600001 HC STEP DOWN PRIVATE ROOM

## 2022-06-27 PROCEDURE — 82784 ASSAY IGA/IGD/IGG/IGM EACH: CPT | Performed by: NURSE PRACTITIONER

## 2022-06-27 PROCEDURE — 99900035 HC TECH TIME PER 15 MIN (STAT)

## 2022-06-27 PROCEDURE — 25000003 PHARM REV CODE 250: Performed by: STUDENT IN AN ORGANIZED HEALTH CARE EDUCATION/TRAINING PROGRAM

## 2022-06-27 PROCEDURE — 80048 BASIC METABOLIC PNL TOTAL CA: CPT | Performed by: STUDENT IN AN ORGANIZED HEALTH CARE EDUCATION/TRAINING PROGRAM

## 2022-06-27 PROCEDURE — 82390 ASSAY OF CERULOPLASMIN: CPT | Performed by: NURSE PRACTITIONER

## 2022-06-27 PROCEDURE — 63600175 PHARM REV CODE 636 W HCPCS: Performed by: STUDENT IN AN ORGANIZED HEALTH CARE EDUCATION/TRAINING PROGRAM

## 2022-06-27 PROCEDURE — 94761 N-INVAS EAR/PLS OXIMETRY MLT: CPT

## 2022-06-27 PROCEDURE — 86256 FLUORESCENT ANTIBODY TITER: CPT | Performed by: NURSE PRACTITIONER

## 2022-06-27 PROCEDURE — 85025 COMPLETE CBC W/AUTO DIFF WBC: CPT | Performed by: STUDENT IN AN ORGANIZED HEALTH CARE EDUCATION/TRAINING PROGRAM

## 2022-06-27 PROCEDURE — 86038 ANTINUCLEAR ANTIBODIES: CPT | Performed by: NURSE PRACTITIONER

## 2022-06-27 PROCEDURE — 99222 PR INITIAL HOSPITAL CARE,LEVL II: ICD-10-PCS | Mod: ,,, | Performed by: INTERNAL MEDICINE

## 2022-06-27 PROCEDURE — 99222 1ST HOSP IP/OBS MODERATE 55: CPT | Mod: ,,, | Performed by: INTERNAL MEDICINE

## 2022-06-27 RX ADMIN — ASPIRIN 81 MG CHEWABLE TABLET 81 MG: 81 TABLET CHEWABLE at 09:06

## 2022-06-27 RX ADMIN — SERTRALINE HYDROCHLORIDE 50 MG: 50 TABLET ORAL at 08:06

## 2022-06-27 RX ADMIN — ACETAMINOPHEN 1000 MG: 500 TABLET ORAL at 08:06

## 2022-06-27 RX ADMIN — OXYCODONE HYDROCHLORIDE 10 MG: 10 TABLET ORAL at 06:06

## 2022-06-27 RX ADMIN — DIPHENHYDRAMINE HYDROCHLORIDE 25 MG: 25 CAPSULE ORAL at 08:06

## 2022-06-27 RX ADMIN — ACETAMINOPHEN 1000 MG: 500 TABLET ORAL at 02:06

## 2022-06-27 RX ADMIN — HYDROMORPHONE HYDROCHLORIDE 0.5 MG: 1 INJECTION, SOLUTION INTRAMUSCULAR; INTRAVENOUS; SUBCUTANEOUS at 08:06

## 2022-06-27 NOTE — CONSULTS
[facility] Step[facility]  Gastroenterology  Consult Note    Patient Name: Andrae Aviles  MRN: [id number]  Admission Date: 6/24/2022  Hospital Length of Stay: 3 days  Code Status: Full Code   Attending Provider: Raymond Grissom MD   Consulting Provider: Yayo Wright MD  Primary Care Physician: Shane Lewis MD  Principal Problem:<principal problem not specified>    Inpatient consult to Hepatology  Consult performed by: Yayo Wright MD  Consult ordered by: Alisha Lugo MD        Subjective:     HPI:  56 M w/ PMH HTN, RA, EtOH abuse admitted after presentation to OSH with chest pain, NSTEMI.  LHC at OSH showing significant left main disease, left circumflex disease. Started on ASA/plavix/heparin gtt prior to transfer, developed rectus sheath hematoma.  ECHO showing EF 30-35%.  On transfer, RUQ u/s showing hepatic steatosis, c/f possible cirrhosis.  Indeterminate 4cm hepatic lesion.  Labs notable for Plt 307, INR 1.1, Bili 1.1, AST 71, ALT 33.    On interview, patient reports drinking approximately 12 beers daily, has been doing so for several years.  Denies DTs or seizures but does notice shakes if he stops drinking.       Past Medical History:   Diagnosis Date    Arthritis     rheumatoid    Hypertension        Past Surgical History:   Procedure Laterality Date    COLONOSCOPY      age 42    COLONOSCOPY N/A 11/22/2021    Procedure: COLONOSCOPY  Golytely   Rapid Covid;  Surgeon: Shane Guzman MD;  Location: [facility] ENDO;  Service: Endoscopy;  Laterality: N/A;    LEFT HEART CATHETERIZATION N/A 6/20/2022    Procedure: Left heart cath;  Surgeon: Marlen Thompson MD;  Location: [facility] CATH LAB/EP;  Service: Cardiology;  Laterality: N/A;    VASECTOMY         Review of patient's allergies indicates:  No Known Allergies  Family History       Problem Relation (Age of Onset)    No Known Problems Daughter, Son          Tobacco Use    Smoking status: Former Smoker    Smokeless tobacco:  Never Used   Substance and Sexual Activity    Alcohol use: Yes     Comment: social    Drug use: Never    Sexual activity: Not on file     Review of Systems   Constitutional: Negative.    HENT: Negative.     Eyes: Negative.    Respiratory: Negative.     Cardiovascular:  Positive for chest pain.   Gastrointestinal: Negative.    Endocrine: Negative.    Genitourinary: Negative.    Musculoskeletal: Negative.    Skin: Negative.    Allergic/Immunologic: Negative.    Neurological: Negative.    Hematological: Negative.    Psychiatric/Behavioral: Negative.     Objective:     Vital Signs (Most Recent):  Temp: 98.2 °F (36.8 °C) (06/27/22 1122)  Pulse: 65 (06/27/22 1122)  Resp: 18 (06/27/22 1122)  BP: 127/83 (06/27/22 1122)  SpO2: 95 % (06/27/22 1122) Vital Signs (24h Range):  Temp:  [97.6 °F (36.4 °C)-98.5 °F (36.9 °C)] 98.2 °F (36.8 °C)  Pulse:  [65-83] 65  Resp:  [16-20] 18  SpO2:  [94 %-97 %] 95 %  BP: (104-127)/(59-83) 127/83     Weight: 96.1 kg (211 lb 13.8 oz) (06/25/22 0443)  Body mass index is 30.4 kg/m².    No intake or output data in the 24 hours ending 06/27/22 1207    Lines/Drains/Airways       Peripheral Intravenous Line  Duration                  Peripheral IV - Single Lumen 06/25/22 1353 20 G Distal;Posterior;Right Forearm 1 day                    Physical Exam    Gen: NAD, lying comfortably  HENT: NCAT, oropharynx clear  Eyes: anicteric sclerae, EOMI grossly  Neck: supple, no visible masses/goiter  Cardiac: RRR, no M/R/G, S1/S2 present  Lungs: CTAB, no crackles, no wheezes  Abd: soft, NT/ND, normoactive BS, no rebound  Ext: no LE edema, warm, well perfused  Skin: skin intact on exposed body parts, no visible rashes, lesions  Neuro: A&Ox4, neuro exam grossly intact, moves all extremities  Psych: appropriate mood, affect      Significant Labs:  CBC:   Recent Labs   Lab 06/25/22 2014 06/26/22  0444 06/27/22  0511   WBC 10.75 9.55 9.44   HGB 10.5* 11.6* 11.6*   HCT 30.6* 34.7* 34.6*    229 307     CMP:    Recent Labs   Lab 06/27/22  0511   GLU 84   CALCIUM 9.0      K 3.6   CO2 28      BUN 9   CREATININE 0.8     Coagulation:   Recent Labs   Lab 06/25/22 2014 06/26/22  0444   INR 1.1  --    APTT 26.8 26.5       Significant Imaging:  Imaging results within the past 24 hours have been reviewed.    Assessment/Plan:     Abnormal liver ultrasound  Patient with multi-vessel coronary disease with planning for CABG, with history of significant EtOH abuse and findings on u/s of hepatic steatosis versus cirrhosis.  Hepatology consulted for risk stratification prior to cardiac surgery.  No prior history of known liver disease.    Recommendations:  -acute hepatitis panel, JAMMIE, ASMA, AMA, iron panel, ferritin, ceruloplasmin, A1AT, IgG  -transjugular liver biopsy  -encourage EtOH abstinence  -monitor for EtOH withdrawal  -trend CBC, CMP, INR        Thank you for your consult. I will follow-up with patient. Please contact us if you have any additional questions.    Yayo Wright MD  Gastroenterology  [facility]

## 2022-06-27 NOTE — ASSESSMENT & PLAN NOTE
- Daily labs  - Awaiting hepatology consult   - Tylenol for pain  - ASA  - Sertaline daily  - Will determine need for surgical intervention once hepatology consult is completed

## 2022-06-27 NOTE — PLAN OF CARE
[facility]  Initial Discharge Assessment       Primary Care Provider: Shane Lewis MD    Admission Diagnosis: CAD (coronary artery disease) [I25.10]    Admission Date: 6/24/2022  Expected Discharge Date:     Discharge Barriers Identified: None    Payor: BLUE CROSS [facility] / Plan: BCBS ALL OUT OF STATE / Product Type: PPO /     Extended Emergency Contact Information  Primary Emergency Contact: Caroline Aviles  Address: [id number] [place] [address]  Home Phone: [phone]  Mobile Phone: [phone]  Relation: Spouse  Preferred language: English   needed? No    Discharge Plan A: Home with family  Discharge Plan B: Home      [id number] - [place], [place] - [address] [facility]  [address] [facility] [address]  Phone: [phone] Fax: [phone]      Initial Assessment (most recent)     Adult Discharge Assessment - 06/27/22 1536        Discharge Assessment    Assessment Type Discharge Planning Assessment     Confirmed/corrected address, phone number and insurance Yes     Confirmed Demographics Correct on Facesheet     Source of Information patient     Communicated TURNER with patient/caregiver Yes     Lives With spouse     Do you expect to return to your current living situation? Yes     Do you have help at home or someone to help you manage your care at home? Yes     Who are your caregiver(s) and their phone number(s)? spouse     Prior to hospitilization cognitive status: Alert/Oriented     Current cognitive status: Alert/Oriented     Walking or Climbing Stairs Difficulty none     Dressing/Bathing Difficulty none     Equipment Currently Used at Home none     Readmission within 30 days? No     Patient currently being followed by outpatient case management? No     Do you currently have service(s) that help you manage your care at home? No     Do you take prescription medications? Yes     Do  you have prescription coverage? Yes     Coverage BCBS     Do you have any problems affording any of your prescribed medications? No     Is the patient taking medications as prescribed? yes     Who is going to help you get home at discharge? spouse     How do you get to doctors appointments? family or friend will provide;car, drives self     Are you on dialysis? No     Do you take coumadin? No     Discharge Plan A Home with family     Discharge Plan B Home     DME Needed Upon Discharge  none     Discharge Barriers Identified None                    Pt admitted 6/24/2022  4:15 PM    For CAD (coronary artery disease) [I25.10]       DPEA completed with pt who lives at home with spouse, plan is home with no needs.    Discharge packet provided to pt, all questions answered prior to leaving room and contact number provided to reach CM.      PCP is Shane Lewis MD  [phone] (p)  [phone] (f)    Future Appointments   Date Time Provider Department Center   8/1/2022  9:00 AM Shane Lewis MD [facility]         LISA Alexandra, RN   Case Management [phone]

## 2022-06-27 NOTE — ASSESSMENT & PLAN NOTE
Patient with multi-vessel coronary disease with planning for CABG, with history of significant EtOH abuse and findings on u/s of hepatic steatosis versus cirrhosis.  Hepatology consulted for risk stratification prior to cardiac surgery.  No prior history of known liver disease.    Recommendations:  -acute hepatitis panel, JAMMIE, ASMA, AMA, iron panel, ferritin, ceruloplasmin, A1AT, IgG  -transjugular liver biopsy  -encourage EtOH abstinence  -monitor for EtOH withdrawal  -trend CBC, CMP, INR

## 2022-06-27 NOTE — MEDICAL/APP STUDENT
"Hepatology Progress Note    Andrae Aviles is a 56 y.o. male admitted to hospital 6/24/2022 (Hospital Day: 4) due to Hepatic Steatosis vs. Chronic Liver Disease     Subjective:  Andrae Aviles is a 56 y.o. male with history of HTN and RA, alcohol abuse, and newly diagnosed ischemic cardiomyopathy  who presented to an outside hospital with chest pain, found to have NSTEMI. LHC showed 80-90% LM disease, 90% Cx. Was started on plavix and heparin, developed a rectus sheath hematoma so heparin gtt was held. No complaints of chest pain currently. Liver Ultrasound showed concerns for hepatic steatosis vs. Chronic liver disease on 6/24.      Prior cigarette smoker, quit in 2016 and began vaping instead.   Drinks 12 beers per night. No history of withdrawal symptoms. Has not had alcohol since admission and has not had symptoms.     Interval History  No acute events overnight. Patient at baseline, no chest pain, patient noticed new hematoma growing over the suprapubic region overnight.     Objective  Temp:  [97.6 °F (36.4 °C)-98.5 °F (36.9 °C)] 97.9 °F (36.6 °C) (06/27 0717)  Pulse:  [66-83] 68 (06/27 0726)  BP: (104-113)/(59-77) 110/73 (06/27 0717)  Resp:  [16-20] 18 (06/27 0801)  SpO2:  [94 %-97 %] 96 % (06/27 0717)    /73 (Patient Position: Lying)   Pulse 68   Temp 97.9 °F (36.6 °C) (Oral)   Resp 18   Ht 5' 10" (1.778 m)   Wt 96.1 kg (211 lb 13.8 oz)   SpO2 96%   BMI 30.40 kg/m²         General: Alert, Oriented x3, no distress  Eyes:scleral icterus absent  Neurologic: Asterixis absent  Abdomen: Normoactive bowel sounds. Non-distended. Normal tympany. Soft. Tender to palpation over LUQ due to Hematoma. No peritoneal signs.  Extremities: edema absent    Laboratory    Lab Results   Component Value Date    WBC 9.44 06/27/2022    HGB 11.6 (L) 06/27/2022    HCT 34.6 (L) 06/27/2022     (H) 06/27/2022     06/27/2022       Lab Results   Component Value Date     06/27/2022    K 3.6 06/27/2022    CL " 101 06/27/2022    CO2 28 06/27/2022    BUN 9 06/27/2022    CREATININE 0.8 06/27/2022    CALCIUM 9.0 06/27/2022       Lab Results   Component Value Date    ALBUMIN 2.9 (L) 06/24/2022    ALT 33 06/24/2022    AST 71 (H) 06/24/2022    ALKPHOS 58 06/24/2022    BILITOT 1.1 (H) 06/24/2022       Lab Results   Component Value Date    INR 1.1 06/25/2022    INR 1.1 06/23/2022    INR 1.1 06/19/2022     Significant Imaging:  US Abdomen (6/24)  Impression:  Left abdominal wall rectus hematoma with measurements as above.  Better characterized on recent CT.  Heterogeneous hepatic echotexture and increased echogenicity, likely represents steatosis and/or chronic liver disease.  Indeterminate 4 cm relatively well-circumscribed hepatic lesion.  Recommend further characterization with contrast enhanced MRI abdomen.  Subcentimeter adherent gallbladder polyps versus stones.    CT Abdomen Pelvis 6/23/22  Impression:     Enlarged left rectus sheath hematoma as described above now spanning the course of the rectus muscle to the left of midline.  No evidence of intraperitoneal acute hemorrhage or hematoma.     Interval enlargement of the lateral left abdominal musculature could be related to edematous changes or infiltration of the hematoma into the flank musculature.     Additional incidental and stable nonacute findings are discussed above in detail.    MELD-Na score: 8 at 6/25/2022  3:30 AM  MELD score: 8 at 6/26/2022  4:44 AM  Calculated from:  Serum Creatinine: 0.7 mg/dL (Using min of 1 mg/dL) at 6/25/2022  3:30 AM  Serum Sodium: 132 mmol/L at 6/25/2022  3:30 AM  Total Bilirubin: 1.1 mg/dL at 6/24/2022  2:30 AM  INR(ratio): 1.1 at 6/23/2022  1:37 AM  Age: 56 years    Assessment  56 year old male patient consulted for hepatic steatosis vs chronic liver pathology identified on U/S of abdomen. Patient currently being evaluated for CABG procedure due to recently diagnosed ischemic cardiomyopathy. Due to patients drinking history, and  elevation of AST>ALT greater than 2, alcoholic hepatitis/cirrhosis is provisional etiology. Recommend further workup to confirm diagnosis and cause.     Plan  -Plan:  1. Lab Evaluation- Hepatitis Panel, Autoimmune-JAMMIE, ASMA, Globulin Level, Anti-Mitochondrial(PBC), PSC- JAMMIE+anti-Smooth, Transferrin Sat, Serum Ferritin(HHC), Serum Ceruloplasmin(Thad), Alpha 1- Antitrypsin  2. Liver Biopsy for Confirmation    - please obtain daily CBC, BMP, LFT, INR      Thank you for involving us in the care of Andrae Aviles. Please call with any additional concerns or questions.    Axel Guzman-MS4

## 2022-06-27 NOTE — CONSULTS
Radiology Consult    Andrae Aviles is a 56 y.o. male with severe CAD admitted primarily for cardiac workup, but given hepatic disease noted on recent imaging request was made and planning for transjugular biopsy for evaluation of possible cirrhosis prior to possible cardiac procedures.      Past Medical History:   Diagnosis Date    Arthritis     rheumatoid    Hypertension      Past Surgical History:   Procedure Laterality Date    COLONOSCOPY      age 42    COLONOSCOPY N/A 11/22/2021    Procedure: COLONOSCOPY  Golytely   Rapid Covid;  Surgeon: Shane Guzman MD;  Location: [facility] ENDO;  Service: Endoscopy;  Laterality: N/A;    LEFT HEART CATHETERIZATION N/A 6/20/2022    Procedure: Left heart cath;  Surgeon: Marlen Thompson MD;  Location: [facility] CATH LAB/EP;  Service: Cardiology;  Laterality: N/A;    VASECTOMY         Discussed with primary team, MARCELLE Granado.    Imaging reviewed with Radiology staff, Dr. Brumfield.     Procedure: transjugular liver biopsy    Scheduled Meds:    acetaminophen  1,000 mg Oral Q8H    aspirin  81 mg Oral Daily    sertraline  50 mg Oral QHS     Continuous Infusions:   PRN Meds:diphenhydrAMINE, HYDROmorphone, nitroGLYCERIN, oxyCODONE, oxyCODONE, sodium chloride 0.9%    Allergies: Review of patient's allergies indicates:  No Known Allergies    Labs:  Recent Labs   Lab 06/25/22 2014   INR 1.1       Recent Labs   Lab 06/27/22  0511   WBC 9.44   HGB 11.6*   HCT 34.6*   *         Recent Labs   Lab 06/24/22  0230 06/25/22  0330 06/27/22  0511   *   < > 84   *   < > 136   K 3.9   < > 3.6      < > 101   CO2 22*   < > 28   BUN 10   < > 9   CREATININE 0.8   < > 0.8   CALCIUM 8.8   < > 9.0   ALT 33  --   --    AST 71*  --   --    ALBUMIN 2.9*  --   --    BILITOT 1.1*  --   --     < > = values in this interval not displayed.         Vitals (Most Recent):  Temp: 98.2 °F (36.8 °C) (06/27/22 1122)  Pulse: 65 (06/27/22 1122)  Resp: 18 (06/27/22 1122)  BP: 127/83  (06/27/22 1122)  SpO2: 95 % (06/27/22 1122)    Plan:   1. NPO after midnight.  2. Hold anticoagulants.  3. Patient scheduled for transjugular liver biopsy tomorrow 6/28/22        Brian Klein MD, MS  Radiology  PGY-4  Pager: [phone]

## 2022-06-27 NOTE — PROGRESS NOTES
[facility] Step[facility]  Cardiothoracic Surgery  Progress Note    Patient Name: Andrae Aviles  MRN: [id number]  Admission Date: 6/24/2022  Hospital Length of Stay: 3 days  Code Status: Full Code   Attending Physician: Raymond Grissom MD   Referring Provider: Marlen Thompson MD  Principal Problem:<principal problem not specified>    Subjective:     Post-Op Info:  * No surgery found *         Interval History: Awaiting hepatology consult to determine if patient will have surgical intervention vs high risk PCI    Review of Systems   Constitutional: Negative for malaise/fatigue.   Cardiovascular:  Negative for chest pain, claudication, dyspnea on exertion, irregular heartbeat, leg swelling and palpitations.   Respiratory:  Negative for cough and shortness of breath.    Hematologic/Lymphatic: Negative for bleeding problem.   Gastrointestinal:  Negative for abdominal pain.   Genitourinary:  Negative for dysuria.   Neurological:  Negative for headaches and weakness.   Medications:  Continuous Infusions:  Scheduled Meds:   acetaminophen  1,000 mg Oral Q8H    aspirin  81 mg Oral Daily    sertraline  50 mg Oral QHS     PRN Meds:diphenhydrAMINE, HYDROmorphone, nitroGLYCERIN, oxyCODONE, oxyCODONE, sodium chloride 0.9%     Objective:     Vital Signs (Most Recent):  Temp: 97.9 °F (36.6 °C) (06/27/22 0717)  Pulse: 68 (06/27/22 0726)  Resp: 18 (06/27/22 0801)  BP: 110/73 (06/27/22 0717)  SpO2: 96 % (06/27/22 0717) Vital Signs (24h Range):  Temp:  [97.6 °F (36.4 °C)-98.5 °F (36.9 °C)] 97.9 °F (36.6 °C)  Pulse:  [66-83] 68  Resp:  [16-20] 18  SpO2:  [94 %-97 %] 96 %  BP: (104-113)/(59-77) 110/73     Weight: 96.1 kg (211 lb 13.8 oz)  Body mass index is 30.4 kg/m².    SpO2: 96 %  O2 Device (Oxygen Therapy): room air    Intake/Output - Last 3 Shifts         06/25 0700 06/26 0659 06/26 0700 06/27 0659 06/27 0700 06/28 0659    P.O. 720      Total Intake(mL/kg) 720 (7.5)      Urine (mL/kg/hr) 1700 (0.7)      Total Output  1700      Net -980             Urine Occurrence  3 x     Stool Occurrence  1 x             Lines/Drains/Airways       Peripheral Intravenous Line  Duration                  Peripheral IV - Single Lumen 06/25/22 1353 20 G Distal;Posterior;Right Forearm 1 day                    Physical Exam  HENT:      Head: Normocephalic and atraumatic.   Eyes:      Extraocular Movements: Extraocular movements intact.   Cardiovascular:      Rate and Rhythm: Normal rate and regular rhythm.   Pulmonary:      Effort: Pulmonary effort is normal.   Abdominal:      General: Abdomen is flat.      Palpations: Abdomen is soft.   Musculoskeletal:         General: Normal range of motion.      Cervical back: Normal range of motion.   Skin:     General: Skin is warm and dry.      Capillary Refill: Capillary refill takes less than 2 seconds.   Neurological:      General: No focal deficit present.       Significant Labs:  BMP:   Recent Labs   Lab 06/27/22  0511   GLU 84      K 3.6      CO2 28   BUN 9   CREATININE 0.8   CALCIUM 9.0     CBC:   Recent Labs   Lab 06/27/22  0511   WBC 9.44   RBC 3.34*   HGB 11.6*   HCT 34.6*      *   MCH 34.7*   MCHC 33.5     CMP:   Recent Labs   Lab 06/27/22  0511   GLU 84   CALCIUM 9.0      K 3.6   CO2 28      BUN 9   CREATININE 0.8     Coagulation:   Recent Labs   Lab 06/25/22 2014 06/26/22  0444   INR 1.1  --    APTT 26.8 26.5       Significant Diagnostics:  I have reviewed and interpreted all pertinent imaging results/findings within the past 24 hours.    Assessment/Plan:     CAD (coronary artery disease)  - Daily labs  - Awaiting hepatology consult   - Tylenol for pain  - ASA  - Sertaline daily  - Will determine need for surgical intervention once hepatology consult is completed        Lesli Granado NP  Cardiothoracic Surgery  [facility]

## 2022-06-27 NOTE — HPI
56 M w/ PMH HTN, RA, EtOH abuse admitted after presentation to OSH with chest pain, NSTEMI.  LHC at OSH showing significant left main disease, left circumflex disease. Started on ASA/plavix/heparin gtt prior to transfer, developed rectus sheath hematoma.  ECHO showing EF 30-35%.  On transfer, RUQ u/s showing hepatic steatosis, c/f possible cirrhosis.  Indeterminate 4cm hepatic lesion.  Labs notable for Plt 307, INR 1.1, Bili 1.1, AST 71, ALT 33.    On interview, patient reports drinking approximately 12 beers daily, has been doing so for several years.  Denies DTs or seizures but does notice shakes if he stops drinking.

## 2022-06-27 NOTE — SUBJECTIVE & OBJECTIVE
Past Medical History:   Diagnosis Date    Arthritis     rheumatoid    Hypertension        Past Surgical History:   Procedure Laterality Date    COLONOSCOPY      age 42    COLONOSCOPY N/A 11/22/2021    Procedure: COLONOSCOPY  Golytely   Rapid Covid;  Surgeon: Shane Guzman MD;  Location: [facility] ENDO;  Service: Endoscopy;  Laterality: N/A;    LEFT HEART CATHETERIZATION N/A 6/20/2022    Procedure: Left heart cath;  Surgeon: Marlen Thompson MD;  Location: [facility] CATH LAB/EP;  Service: Cardiology;  Laterality: N/A;    VASECTOMY         Review of patient's allergies indicates:  No Known Allergies  Family History       Problem Relation (Age of Onset)    No Known Problems Daughter, Son          Tobacco Use    Smoking status: Former Smoker    Smokeless tobacco: Never Used   Substance and Sexual Activity    Alcohol use: Yes     Comment: social    Drug use: Never    Sexual activity: Not on file     Review of Systems   Constitutional: Negative.    HENT: Negative.     Eyes: Negative.    Respiratory: Negative.     Cardiovascular:  Positive for chest pain.   Gastrointestinal: Negative.    Endocrine: Negative.    Genitourinary: Negative.    Musculoskeletal: Negative.    Skin: Negative.    Allergic/Immunologic: Negative.    Neurological: Negative.    Hematological: Negative.    Psychiatric/Behavioral: Negative.     Objective:     Vital Signs (Most Recent):  Temp: 98.2 °F (36.8 °C) (06/27/22 1122)  Pulse: 65 (06/27/22 1122)  Resp: 18 (06/27/22 1122)  BP: 127/83 (06/27/22 1122)  SpO2: 95 % (06/27/22 1122) Vital Signs (24h Range):  Temp:  [97.6 °F (36.4 °C)-98.5 °F (36.9 °C)] 98.2 °F (36.8 °C)  Pulse:  [65-83] 65  Resp:  [16-20] 18  SpO2:  [94 %-97 %] 95 %  BP: (104-127)/(59-83) 127/83     Weight: 96.1 kg (211 lb 13.8 oz) (06/25/22 0443)  Body mass index is 30.4 kg/m².    No intake or output data in the 24 hours ending 06/27/22 1207    Lines/Drains/Airways       Peripheral Intravenous Line  Duration                  Peripheral  IV - Single Lumen 06/25/22 1353 20 G Distal;Posterior;Right Forearm 1 day                    Physical Exam    Gen: NAD, lying comfortably  HENT: NCAT, oropharynx clear  Eyes: anicteric sclerae, EOMI grossly  Neck: supple, no visible masses/goiter  Cardiac: RRR, no M/R/G, S1/S2 present  Lungs: CTAB, no crackles, no wheezes  Abd: soft, NT/ND, normoactive BS, no rebound  Ext: no LE edema, warm, well perfused  Skin: skin intact on exposed body parts, no visible rashes, lesions  Neuro: A&Ox4, neuro exam grossly intact, moves all extremities  Psych: appropriate mood, affect      Significant Labs:  CBC:   Recent Labs   Lab 06/25/22 2014 06/26/22 0444 06/27/22  0511   WBC 10.75 9.55 9.44   HGB 10.5* 11.6* 11.6*   HCT 30.6* 34.7* 34.6*    229 307     CMP:   Recent Labs   Lab 06/27/22  0511   GLU 84   CALCIUM 9.0      K 3.6   CO2 28      BUN 9   CREATININE 0.8     Coagulation:   Recent Labs   Lab 06/25/22 2014 06/26/22 0444   INR 1.1  --    APTT 26.8 26.5       Significant Imaging:  Imaging results within the past 24 hours have been reviewed.

## 2022-06-27 NOTE — SUBJECTIVE & OBJECTIVE
Interval History: Awaiting hepatology consult to determine if patient will have surgical intervention vs high risk PCI    Review of Systems   Constitutional: Negative for malaise/fatigue.   Cardiovascular:  Negative for chest pain, claudication, dyspnea on exertion, irregular heartbeat, leg swelling and palpitations.   Respiratory:  Negative for cough and shortness of breath.    Hematologic/Lymphatic: Negative for bleeding problem.   Gastrointestinal:  Negative for abdominal pain.   Genitourinary:  Negative for dysuria.   Neurological:  Negative for headaches and weakness.   Medications:  Continuous Infusions:  Scheduled Meds:   acetaminophen  1,000 mg Oral Q8H    aspirin  81 mg Oral Daily    sertraline  50 mg Oral QHS     PRN Meds:diphenhydrAMINE, HYDROmorphone, nitroGLYCERIN, oxyCODONE, oxyCODONE, sodium chloride 0.9%     Objective:     Vital Signs (Most Recent):  Temp: 97.9 °F (36.6 °C) (06/27/22 0717)  Pulse: 68 (06/27/22 0726)  Resp: 18 (06/27/22 0801)  BP: 110/73 (06/27/22 0717)  SpO2: 96 % (06/27/22 0717) Vital Signs (24h Range):  Temp:  [97.6 °F (36.4 °C)-98.5 °F (36.9 °C)] 97.9 °F (36.6 °C)  Pulse:  [66-83] 68  Resp:  [16-20] 18  SpO2:  [94 %-97 %] 96 %  BP: (104-113)/(59-77) 110/73     Weight: 96.1 kg (211 lb 13.8 oz)  Body mass index is 30.4 kg/m².    SpO2: 96 %  O2 Device (Oxygen Therapy): room air    Intake/Output - Last 3 Shifts         06/25 0700 06/26 0659 06/26 0700 06/27 0659 06/27 0700 06/28 0659    P.O. 720      Total Intake(mL/kg) 720 (7.5)      Urine (mL/kg/hr) 1700 (0.7)      Total Output 1700      Net -980             Urine Occurrence  3 x     Stool Occurrence  1 x             Lines/Drains/Airways       Peripheral Intravenous Line  Duration                  Peripheral IV - Single Lumen 06/25/22 1353 20 G Distal;Posterior;Right Forearm 1 day                    Physical Exam  HENT:      Head: Normocephalic and atraumatic.   Eyes:      Extraocular Movements: Extraocular movements intact.    Cardiovascular:      Rate and Rhythm: Normal rate and regular rhythm.   Pulmonary:      Effort: Pulmonary effort is normal.   Abdominal:      General: Abdomen is flat.      Palpations: Abdomen is soft.   Musculoskeletal:         General: Normal range of motion.      Cervical back: Normal range of motion.   Skin:     General: Skin is warm and dry.      Capillary Refill: Capillary refill takes less than 2 seconds.   Neurological:      General: No focal deficit present.       Significant Labs:  BMP:   Recent Labs   Lab 06/27/22  0511   GLU 84      K 3.6      CO2 28   BUN 9   CREATININE 0.8   CALCIUM 9.0     CBC:   Recent Labs   Lab 06/27/22  0511   WBC 9.44   RBC 3.34*   HGB 11.6*   HCT 34.6*      *   MCH 34.7*   MCHC 33.5     CMP:   Recent Labs   Lab 06/27/22  0511   GLU 84   CALCIUM 9.0      K 3.6   CO2 28      BUN 9   CREATININE 0.8     Coagulation:   Recent Labs   Lab 06/25/22 2014 06/26/22  0444   INR 1.1  --    APTT 26.8 26.5       Significant Diagnostics:  I have reviewed and interpreted all pertinent imaging results/findings within the past 24 hours.

## 2022-06-27 NOTE — PLAN OF CARE
Pt assessed this am.  NPO, pending hepatology consult.  Pt plan of care reviewed.  Vital signs stable.  Pt medicated for pain.  Pt denies chest pain or shortness of breath.  Pt ambulatory on unit.  Pt pending biopsy tomorrow and will be NPO at midnight per order.  Pt monitored for change.

## 2022-06-27 NOTE — NURSING
"Attempt to reach MD (unable to reach MD)  to notify them about new ecchymotic area on patient lower abdomen. Nurse marked area with maker,Will continue to monitor.      Charge nurse was able to speak with MD on call and instructed to continue monitoring area.      BP (!) 104/59   Pulse 68   Temp 97.7 °F (36.5 °C)   Resp 20   Ht 5' 10" (1.778 m)   Wt 96.1 kg (211 lb 13.8 oz)   SpO2 97%   BMI 30.40 kg/m²     "

## 2022-06-28 LAB
ALBUMIN SERPL BCP-MCNC: 2.9 G/DL (ref 3.5–5.2)
ALP SERPL-CCNC: 90 U/L (ref 55–135)
ALT SERPL W/O P-5'-P-CCNC: 38 U/L (ref 10–44)
ANA SER QL IF: NORMAL
ANION GAP SERPL CALC-SCNC: 8 MMOL/L (ref 8–16)
AST SERPL-CCNC: 68 U/L (ref 10–40)
BASOPHILS # BLD AUTO: 0.06 K/UL (ref 0–0.2)
BASOPHILS NFR BLD: 0.8 % (ref 0–1.9)
BILIRUB SERPL-MCNC: 1.5 MG/DL (ref 0.1–1)
BUN SERPL-MCNC: 10 MG/DL (ref 6–20)
CALCIUM SERPL-MCNC: 8.9 MG/DL (ref 8.7–10.5)
CHLORIDE SERPL-SCNC: 102 MMOL/L (ref 95–110)
CO2 SERPL-SCNC: 29 MMOL/L (ref 23–29)
CREAT SERPL-MCNC: 0.8 MG/DL (ref 0.5–1.4)
DIFFERENTIAL METHOD: ABNORMAL
EOSINOPHIL # BLD AUTO: 0.3 K/UL (ref 0–0.5)
EOSINOPHIL NFR BLD: 4 % (ref 0–8)
ERYTHROCYTE [DISTWIDTH] IN BLOOD BY AUTOMATED COUNT: 13.2 % (ref 11.5–14.5)
EST. GFR  (AFRICAN AMERICAN): >60 ML/MIN/1.73 M^2
EST. GFR  (NON AFRICAN AMERICAN): >60 ML/MIN/1.73 M^2
GLUCOSE SERPL-MCNC: 82 MG/DL (ref 70–110)
HAV IGM SERPL QL IA: NEGATIVE
HBV CORE IGM SERPL QL IA: NEGATIVE
HBV SURFACE AG SERPL QL IA: NEGATIVE
HCT VFR BLD AUTO: 37.3 % (ref 40–54)
HCV AB SERPL QL IA: NEGATIVE
HGB BLD-MCNC: 12.6 G/DL (ref 14–18)
IMM GRANULOCYTES # BLD AUTO: 0.11 K/UL (ref 0–0.04)
IMM GRANULOCYTES NFR BLD AUTO: 1.5 % (ref 0–0.5)
INR PPP: 1 (ref 0.8–1.2)
LYMPHOCYTES # BLD AUTO: 1.2 K/UL (ref 1–4.8)
LYMPHOCYTES NFR BLD: 15.5 % (ref 18–48)
MCH RBC QN AUTO: 34.1 PG (ref 27–31)
MCHC RBC AUTO-ENTMCNC: 33.8 G/DL (ref 32–36)
MCV RBC AUTO: 101 FL (ref 82–98)
MONOCYTES # BLD AUTO: 0.8 K/UL (ref 0.3–1)
MONOCYTES NFR BLD: 11 % (ref 4–15)
NEUTROPHILS # BLD AUTO: 5.1 K/UL (ref 1.8–7.7)
NEUTROPHILS NFR BLD: 67.2 % (ref 38–73)
NRBC BLD-RTO: 0 /100 WBC
PLATELET # BLD AUTO: 325 K/UL (ref 150–450)
PMV BLD AUTO: 10.8 FL (ref 9.2–12.9)
POTASSIUM SERPL-SCNC: 4.2 MMOL/L (ref 3.5–5.1)
PROT SERPL-MCNC: 6.4 G/DL (ref 6–8.4)
PROTHROMBIN TIME: 10.8 SEC (ref 9–12.5)
RBC # BLD AUTO: 3.69 M/UL (ref 4.6–6.2)
SODIUM SERPL-SCNC: 139 MMOL/L (ref 136–145)
WBC # BLD AUTO: 7.55 K/UL (ref 3.9–12.7)

## 2022-06-28 PROCEDURE — 88307 TISSUE EXAM BY PATHOLOGIST: CPT | Mod: 26,,, | Performed by: PATHOLOGY

## 2022-06-28 PROCEDURE — 25000003 PHARM REV CODE 250: Performed by: STUDENT IN AN ORGANIZED HEALTH CARE EDUCATION/TRAINING PROGRAM

## 2022-06-28 PROCEDURE — 85025 COMPLETE CBC W/AUTO DIFF WBC: CPT | Performed by: STUDENT IN AN ORGANIZED HEALTH CARE EDUCATION/TRAINING PROGRAM

## 2022-06-28 PROCEDURE — 88307 TISSUE EXAM BY PATHOLOGIST: CPT | Performed by: PATHOLOGY

## 2022-06-28 PROCEDURE — 94761 N-INVAS EAR/PLS OXIMETRY MLT: CPT

## 2022-06-28 PROCEDURE — 36415 COLL VENOUS BLD VENIPUNCTURE: CPT | Performed by: NURSE PRACTITIONER

## 2022-06-28 PROCEDURE — 25000003 PHARM REV CODE 250

## 2022-06-28 PROCEDURE — 63600175 PHARM REV CODE 636 W HCPCS: Performed by: STUDENT IN AN ORGANIZED HEALTH CARE EDUCATION/TRAINING PROGRAM

## 2022-06-28 PROCEDURE — 85610 PROTHROMBIN TIME: CPT | Performed by: NURSE PRACTITIONER

## 2022-06-28 PROCEDURE — 63600175 PHARM REV CODE 636 W HCPCS: Performed by: NURSE PRACTITIONER

## 2022-06-28 PROCEDURE — 88313 SPECIAL STAINS GROUP 2: CPT | Mod: 59 | Performed by: PATHOLOGY

## 2022-06-28 PROCEDURE — 88313 PR  SPECIAL STAINS,GROUP II: ICD-10-PCS | Mod: 26,,, | Performed by: PATHOLOGY

## 2022-06-28 PROCEDURE — 20600001 HC STEP DOWN PRIVATE ROOM

## 2022-06-28 PROCEDURE — 88313 SPECIAL STAINS GROUP 2: CPT | Mod: 26,,, | Performed by: PATHOLOGY

## 2022-06-28 PROCEDURE — 88307 PR  SURG PATH,LEVEL V: ICD-10-PCS | Mod: 26,,, | Performed by: PATHOLOGY

## 2022-06-28 PROCEDURE — 25500020 PHARM REV CODE 255: Performed by: THORACIC SURGERY (CARDIOTHORACIC VASCULAR SURGERY)

## 2022-06-28 PROCEDURE — 80053 COMPREHEN METABOLIC PANEL: CPT | Performed by: NURSE PRACTITIONER

## 2022-06-28 RX ORDER — FENTANYL CITRATE 50 UG/ML
INJECTION, SOLUTION INTRAMUSCULAR; INTRAVENOUS CODE/TRAUMA/SEDATION MEDICATION
Status: COMPLETED | OUTPATIENT
Start: 2022-06-28 | End: 2022-06-28

## 2022-06-28 RX ORDER — METHOTREXATE 2.5 MG/1
15 TABLET ORAL ONCE
Status: COMPLETED | OUTPATIENT
Start: 2022-06-28 | End: 2022-06-28

## 2022-06-28 RX ORDER — ONDANSETRON 2 MG/ML
4 INJECTION INTRAMUSCULAR; INTRAVENOUS EVERY 6 HOURS PRN
Status: DISCONTINUED | OUTPATIENT
Start: 2022-06-28 | End: 2022-07-02 | Stop reason: HOSPADM

## 2022-06-28 RX ORDER — LIDOCAINE HYDROCHLORIDE 10 MG/ML
INJECTION INFILTRATION; PERINEURAL CODE/TRAUMA/SEDATION MEDICATION
Status: COMPLETED | OUTPATIENT
Start: 2022-06-28 | End: 2022-06-28

## 2022-06-28 RX ORDER — MIDAZOLAM HYDROCHLORIDE 1 MG/ML
INJECTION INTRAMUSCULAR; INTRAVENOUS CODE/TRAUMA/SEDATION MEDICATION
Status: COMPLETED | OUTPATIENT
Start: 2022-06-28 | End: 2022-06-28

## 2022-06-28 RX ADMIN — OXYCODONE HYDROCHLORIDE 10 MG: 10 TABLET ORAL at 11:06

## 2022-06-28 RX ADMIN — SERTRALINE HYDROCHLORIDE 50 MG: 50 TABLET ORAL at 08:06

## 2022-06-28 RX ADMIN — MIDAZOLAM HYDROCHLORIDE 1 MG: 1 INJECTION, SOLUTION INTRAMUSCULAR; INTRAVENOUS at 04:06

## 2022-06-28 RX ADMIN — DIPHENHYDRAMINE HYDROCHLORIDE 25 MG: 25 CAPSULE ORAL at 08:06

## 2022-06-28 RX ADMIN — ASPIRIN 81 MG CHEWABLE TABLET 81 MG: 81 TABLET CHEWABLE at 11:06

## 2022-06-28 RX ADMIN — OXYCODONE HYDROCHLORIDE 10 MG: 10 TABLET ORAL at 08:06

## 2022-06-28 RX ADMIN — METHOTREXATE 15 MG: 2.5 TABLET ORAL at 11:06

## 2022-06-28 RX ADMIN — IOHEXOL 20 ML: 300 INJECTION, SOLUTION INTRAVENOUS at 05:06

## 2022-06-28 RX ADMIN — LIDOCAINE HYDROCHLORIDE 10 ML: 10 INJECTION, SOLUTION INFILTRATION; PERINEURAL at 04:06

## 2022-06-28 RX ADMIN — FENTANYL CITRATE 25 MCG: 0.05 INJECTION, SOLUTION INTRAMUSCULAR; INTRAVENOUS at 04:06

## 2022-06-28 RX ADMIN — ACETAMINOPHEN 1000 MG: 500 TABLET ORAL at 08:06

## 2022-06-28 NOTE — NURSING
Patient received in [facility] bay 7 s/p transjugular liver biopsy. Patient is AAOx4. VSS. Procedure site dressing is clean and dry. HOB 15 degrees per order. Patient to recover x1 hour and return to floor.

## 2022-06-28 NOTE — SUBJECTIVE & OBJECTIVE
Interval History: NPO for plans for liver biopsy today, will give weekly dose of methotrexate. Family update on plan of care and all questions answered    Review of Systems   Constitutional: Negative for malaise/fatigue.   Cardiovascular:  Negative for chest pain, claudication, dyspnea on exertion, irregular heartbeat, leg swelling and palpitations.   Respiratory:  Negative for cough and shortness of breath.    Hematologic/Lymphatic: Negative for bleeding problem.   Gastrointestinal:  Negative for abdominal pain.   Genitourinary:  Negative for dysuria.   Neurological:  Negative for headaches and weakness.   Medications:  Continuous Infusions:  Scheduled Meds:   acetaminophen  1,000 mg Oral Q8H    aspirin  81 mg Oral Daily    methotrexate  15 mg Oral Once    sertraline  50 mg Oral QHS     PRN Meds:diphenhydrAMINE, HYDROmorphone, nitroGLYCERIN, oxyCODONE, oxyCODONE, sodium chloride 0.9%     Objective:     Vital Signs (Most Recent):  Temp: 98.5 °F (36.9 °C) (06/28/22 1109)  Pulse: 85 (06/28/22 1109)  Resp: 20 (06/28/22 1109)  BP: 96/69 (06/28/22 1109)  SpO2: 98 % (06/28/22 0740) Vital Signs (24h Range):  Temp:  [96.2 °F (35.7 °C)-98.8 °F (37.1 °C)] 98.5 °F (36.9 °C)  Pulse:  [69-85] 85  Resp:  [17-20] 20  SpO2:  [94 %-99 %] 98 %  BP: ()/(66-75) 96/69     Weight: 96.1 kg (211 lb 13.8 oz)  Body mass index is 30.4 kg/m².    SpO2: 98 %  O2 Device (Oxygen Therapy): room air    Intake/Output - Last 3 Shifts         06/26 0700 06/27 0659 06/27 0700 06/28 0659 06/28 0700 06/29 0659    P.O.       Total Intake(mL/kg)       Urine (mL/kg/hr)  1700 (0.7) 650 (1.5)    Total Output  1700 650    Net  -1700 -650           Urine Occurrence 3 x 6 x     Stool Occurrence 1 x              Lines/Drains/Airways       Peripheral Intravenous Line  Duration                  Peripheral IV - Single Lumen 06/25/22 1353 20 G Distal;Posterior;Right Forearm 2 days                    Physical Exam  HENT:      Head: Normocephalic and  atraumatic.   Eyes:      Extraocular Movements: Extraocular movements intact.   Cardiovascular:      Rate and Rhythm: Normal rate and regular rhythm.   Pulmonary:      Effort: Pulmonary effort is normal.   Abdominal:      General: Abdomen is flat.      Palpations: Abdomen is soft.   Musculoskeletal:         General: Normal range of motion.      Cervical back: Normal range of motion.   Skin:     General: Skin is warm and dry.      Capillary Refill: Capillary refill takes less than 2 seconds.   Neurological:      General: No focal deficit present.       Significant Labs:  BMP:   Recent Labs   Lab 06/28/22 0446   GLU 82      K 4.2      CO2 29   BUN 10   CREATININE 0.8   CALCIUM 8.9     CBC:   Recent Labs   Lab 06/28/22 0446   WBC 7.55   RBC 3.69*   HGB 12.6*   HCT 37.3*      *   MCH 34.1*   MCHC 33.8     CMP:   Recent Labs   Lab 06/28/22 0446   GLU 82   CALCIUM 8.9   ALBUMIN 2.9*   PROT 6.4      K 4.2   CO2 29      BUN 10   CREATININE 0.8   ALKPHOS 90   ALT 38   AST 68*   BILITOT 1.5*     Coagulation:   Recent Labs   Lab 06/28/22 0446   INR 1.0       Significant Diagnostics:  I have reviewed and interpreted all pertinent imaging results/findings within the past 24 hours.

## 2022-06-28 NOTE — PLAN OF CARE
Transjugular liver biopsy complete. Pt tolerated well. VSS. No signs or symptoms of distress noted.  Site CDI.  Pt will be transferred to ROCU bed to recover for 1 hour and report to be given at bedside.

## 2022-06-28 NOTE — PROGRESS NOTES
[facility] - [facility]iology Stepdown  Cardiothoracic Surgery  Progress Note    Patient Name: Andrae Aviles  MRN: [id number]  Admission Date: 6/24/2022  Hospital Length of Stay: 4 days  Code Status: Full Code   Attending Physician: Raymond Grissom MD   Referring Provider: Marlen Thompson MD  Principal Problem:<principal problem not specified>    Subjective:     Post-Op Info:  Procedure(s) (LRB):  CORONARY ARTERY BYPASS GRAFT (CABG) (Left)  HARVEST-VEIN-ENDOVASCULAR (Left)         Interval History: NPO for plans for liver biopsy today, will give weekly dose of methotrexate. Family update on plan of care and all questions answered    Review of Systems   Constitutional: Negative for malaise/fatigue.   Cardiovascular:  Negative for chest pain, claudication, dyspnea on exertion, irregular heartbeat, leg swelling and palpitations.   Respiratory:  Negative for cough and shortness of breath.    Hematologic/Lymphatic: Negative for bleeding problem.   Gastrointestinal:  Negative for abdominal pain.   Genitourinary:  Negative for dysuria.   Neurological:  Negative for headaches and weakness.   Medications:  Continuous Infusions:  Scheduled Meds:   acetaminophen  1,000 mg Oral Q8H    aspirin  81 mg Oral Daily    methotrexate  15 mg Oral Once    sertraline  50 mg Oral QHS     PRN Meds:diphenhydrAMINE, HYDROmorphone, nitroGLYCERIN, oxyCODONE, oxyCODONE, sodium chloride 0.9%     Objective:     Vital Signs (Most Recent):  Temp: 98.5 °F (36.9 °C) (06/28/22 1109)  Pulse: 85 (06/28/22 1109)  Resp: 20 (06/28/22 1109)  BP: 96/69 (06/28/22 1109)  SpO2: 98 % (06/28/22 0740) Vital Signs (24h Range):  Temp:  [96.2 °F (35.7 °C)-98.8 °F (37.1 °C)] 98.5 °F (36.9 °C)  Pulse:  [69-85] 85  Resp:  [17-20] 20  SpO2:  [94 %-99 %] 98 %  BP: ()/(66-75) 96/69     Weight: 96.1 kg (211 lb 13.8 oz)  Body mass index is 30.4 kg/m².    SpO2: 98 %  O2 Device (Oxygen Therapy): room air    Intake/Output - Last 3 Shifts         06/26 0700 06/27 0659  06/27 0700  06/28 0659 06/28 0700  06/29 0659    P.O.       Total Intake(mL/kg)       Urine (mL/kg/hr)  1700 (0.7) 650 (1.5)    Total Output  1700 650    Net  -1700 -650           Urine Occurrence 3 x 6 x     Stool Occurrence 1 x              Lines/Drains/Airways       Peripheral Intravenous Line  Duration                  Peripheral IV - Single Lumen 06/25/22 1353 20 G Distal;Posterior;Right Forearm 2 days                    Physical Exam  HENT:      Head: Normocephalic and atraumatic.   Eyes:      Extraocular Movements: Extraocular movements intact.   Cardiovascular:      Rate and Rhythm: Normal rate and regular rhythm.   Pulmonary:      Effort: Pulmonary effort is normal.   Abdominal:      General: Abdomen is flat.      Palpations: Abdomen is soft.   Musculoskeletal:         General: Normal range of motion.      Cervical back: Normal range of motion.   Skin:     General: Skin is warm and dry.      Capillary Refill: Capillary refill takes less than 2 seconds.   Neurological:      General: No focal deficit present.       Significant Labs:  BMP:   Recent Labs   Lab 06/28/22  0446   GLU 82      K 4.2      CO2 29   BUN 10   CREATININE 0.8   CALCIUM 8.9     CBC:   Recent Labs   Lab 06/28/22 0446   WBC 7.55   RBC 3.69*   HGB 12.6*   HCT 37.3*      *   MCH 34.1*   MCHC 33.8     CMP:   Recent Labs   Lab 06/28/22 0446   GLU 82   CALCIUM 8.9   ALBUMIN 2.9*   PROT 6.4      K 4.2   CO2 29      BUN 10   CREATININE 0.8   ALKPHOS 90   ALT 38   AST 68*   BILITOT 1.5*     Coagulation:   Recent Labs   Lab 06/28/22 0446   INR 1.0       Significant Diagnostics:  I have reviewed and interpreted all pertinent imaging results/findings within the past 24 hours.    Assessment/Plan:     CAD (coronary artery disease)  - Daily labs  - Plans for liver biopsy today  - NPO  - Okay to eat once biopsy is completed  - Tylenol for pain  - ASA  - Sertaline daily  - Methotrexate 15 mg weekly on Monday   - Will  determine need for surgical intervention once hepatology consult is completed        Lesli Granado NP  Cardiothoracic Surgery  [facility] - [facility] Step[facility]

## 2022-06-28 NOTE — PLAN OF CARE
Pt assessed this am.  Pt vital signs stable.  Pt denies chest pain or shortness of breath.  Pt ambulatory on unit with his spouse.  Pt NPO pending liver biopsy procedure with IR.  Pt medicated for pain.  Pt off the unit went for procedure.  Recovery RN called to report pt will be with her for one hour.  RN states pt is doing well and will continue to monitor.  Pt spouse inquiring about pt and is aware pt in recovery being monitored until he is able to come back to the floor.  Will continue to monitor.

## 2022-06-28 NOTE — ASSESSMENT & PLAN NOTE
- Daily labs  - Plans for liver biopsy today  - NPO  - Okay to eat once biopsy is completed  - Tylenol for pain  - ASA  - Sertaline daily  - Methotrexate 15 mg weekly on Monday   - Will determine need for surgical intervention once hepatology consult is completed

## 2022-06-28 NOTE — PLAN OF CARE
Pt arrived to Interventional Radiology room 188 via stretcher for transjugular liver biopsy.  Plan of care reviewed with patient, patient verbalized understanding.  Name verified using two identifiers.  Allergies verified.  Labs, orders and consent reviewed on chart.  Pt oriented to unit and staff.  See flow sheet for documentation, monitoring and medication administration. Will continue to monitor.

## 2022-06-28 NOTE — PROCEDURES
"  Pre Op Diagnosis: Liver dysfunction  Post Op Diagnosis: Same    Procedure: Transjugular liver biopsy    Procedure performed by: Octaviano    Written Informed Consent Obtained: Yes  Specimen Removed: YES 3 19 g cores  Estimated Blood Loss: Minimal    Findings:   Successful Xjug liver biopsy. Pressures also obtained.   Free: 12  R atrium: 6  Wedge: 18    Patient tolerated procedure well.    Wili Brumfield MD ([facility])  Interventional Radiology  [phone]        "

## 2022-06-28 NOTE — NURSING TRANSFER
Nursing Transfer Note      6/28/2022     Reason patient is being transferred: recovery completed    Transfer To: [id number]    Transfer via stretcher    Transfer with cardiac monitoring    Transported by Dnai, RT    Recovery completed, pt tolerated well. No apparent distress noted. Dressing CDI.

## 2022-06-29 LAB
ALBUMIN SERPL BCP-MCNC: 2.7 G/DL (ref 3.5–5.2)
ALP SERPL-CCNC: 72 U/L (ref 55–135)
ALT SERPL W/O P-5'-P-CCNC: 36 U/L (ref 10–44)
ANION GAP SERPL CALC-SCNC: 7 MMOL/L (ref 8–16)
AST SERPL-CCNC: 71 U/L (ref 10–40)
BASOPHILS # BLD AUTO: 0.05 K/UL (ref 0–0.2)
BASOPHILS NFR BLD: 0.7 % (ref 0–1.9)
BILIRUB SERPL-MCNC: 1.4 MG/DL (ref 0.1–1)
BUN SERPL-MCNC: 11 MG/DL (ref 6–20)
CALCIUM SERPL-MCNC: 9.2 MG/DL (ref 8.7–10.5)
CHLORIDE SERPL-SCNC: 102 MMOL/L (ref 95–110)
CO2 SERPL-SCNC: 28 MMOL/L (ref 23–29)
COMMENT: NORMAL
CREAT SERPL-MCNC: 0.8 MG/DL (ref 0.5–1.4)
DIFFERENTIAL METHOD: ABNORMAL
EOSINOPHIL # BLD AUTO: 0.2 K/UL (ref 0–0.5)
EOSINOPHIL NFR BLD: 3.2 % (ref 0–8)
ERYTHROCYTE [DISTWIDTH] IN BLOOD BY AUTOMATED COUNT: 12.9 % (ref 11.5–14.5)
EST. GFR  (AFRICAN AMERICAN): >60 ML/MIN/1.73 M^2
EST. GFR  (NON AFRICAN AMERICAN): >60 ML/MIN/1.73 M^2
FINAL PATHOLOGIC DIAGNOSIS: NORMAL
GLUCOSE SERPL-MCNC: 71 MG/DL (ref 70–110)
GROSS: NORMAL
HCT VFR BLD AUTO: 34.7 % (ref 40–54)
HGB BLD-MCNC: 11.7 G/DL (ref 14–18)
IMM GRANULOCYTES # BLD AUTO: 0.07 K/UL (ref 0–0.04)
IMM GRANULOCYTES NFR BLD AUTO: 0.9 % (ref 0–0.5)
INR PPP: 1.1 (ref 0.8–1.2)
LYMPHOCYTES # BLD AUTO: 1.1 K/UL (ref 1–4.8)
LYMPHOCYTES NFR BLD: 15 % (ref 18–48)
Lab: NORMAL
MCH RBC QN AUTO: 34 PG (ref 27–31)
MCHC RBC AUTO-ENTMCNC: 33.7 G/DL (ref 32–36)
MCV RBC AUTO: 101 FL (ref 82–98)
MICROSCOPIC EXAM: NORMAL
MONOCYTES # BLD AUTO: 0.9 K/UL (ref 0.3–1)
MONOCYTES NFR BLD: 11.6 % (ref 4–15)
NEUTROPHILS # BLD AUTO: 5.1 K/UL (ref 1.8–7.7)
NEUTROPHILS NFR BLD: 68.6 % (ref 38–73)
NRBC BLD-RTO: 0 /100 WBC
PLATELET # BLD AUTO: 343 K/UL (ref 150–450)
PMV BLD AUTO: 10.5 FL (ref 9.2–12.9)
POTASSIUM SERPL-SCNC: 4.7 MMOL/L (ref 3.5–5.1)
PROT SERPL-MCNC: 6.6 G/DL (ref 6–8.4)
PROTHROMBIN TIME: 11 SEC (ref 9–12.5)
RBC # BLD AUTO: 3.44 M/UL (ref 4.6–6.2)
SODIUM SERPL-SCNC: 137 MMOL/L (ref 136–145)
WBC # BLD AUTO: 7.4 K/UL (ref 3.9–12.7)

## 2022-06-29 PROCEDURE — 93005 ELECTROCARDIOGRAM TRACING: CPT

## 2022-06-29 PROCEDURE — 25000003 PHARM REV CODE 250

## 2022-06-29 PROCEDURE — 85610 PROTHROMBIN TIME: CPT | Performed by: NURSE PRACTITIONER

## 2022-06-29 PROCEDURE — 94761 N-INVAS EAR/PLS OXIMETRY MLT: CPT

## 2022-06-29 PROCEDURE — 93010 ELECTROCARDIOGRAM REPORT: CPT | Mod: ,,, | Performed by: INTERNAL MEDICINE

## 2022-06-29 PROCEDURE — 85025 COMPLETE CBC W/AUTO DIFF WBC: CPT | Performed by: STUDENT IN AN ORGANIZED HEALTH CARE EDUCATION/TRAINING PROGRAM

## 2022-06-29 PROCEDURE — 80053 COMPREHEN METABOLIC PANEL: CPT | Performed by: NURSE PRACTITIONER

## 2022-06-29 PROCEDURE — 99900035 HC TECH TIME PER 15 MIN (STAT)

## 2022-06-29 PROCEDURE — 25000003 PHARM REV CODE 250: Performed by: STUDENT IN AN ORGANIZED HEALTH CARE EDUCATION/TRAINING PROGRAM

## 2022-06-29 PROCEDURE — 36415 COLL VENOUS BLD VENIPUNCTURE: CPT | Performed by: NURSE PRACTITIONER

## 2022-06-29 PROCEDURE — 20600001 HC STEP DOWN PRIVATE ROOM

## 2022-06-29 PROCEDURE — 93010 EKG 12-LEAD: ICD-10-PCS | Mod: ,,, | Performed by: INTERNAL MEDICINE

## 2022-06-29 PROCEDURE — 25000242 PHARM REV CODE 250 ALT 637 W/ HCPCS: Performed by: STUDENT IN AN ORGANIZED HEALTH CARE EDUCATION/TRAINING PROGRAM

## 2022-06-29 RX ADMIN — OXYCODONE 5 MG: 5 TABLET ORAL at 08:06

## 2022-06-29 RX ADMIN — NITROGLYCERIN 0.4 MG: 0.4 TABLET, ORALLY DISINTEGRATING SUBLINGUAL at 09:06

## 2022-06-29 RX ADMIN — SERTRALINE HYDROCHLORIDE 50 MG: 50 TABLET ORAL at 08:06

## 2022-06-29 RX ADMIN — ACETAMINOPHEN 1000 MG: 500 TABLET ORAL at 01:06

## 2022-06-29 RX ADMIN — OXYCODONE HYDROCHLORIDE 10 MG: 10 TABLET ORAL at 08:06

## 2022-06-29 RX ADMIN — ASPIRIN 81 MG CHEWABLE TABLET 81 MG: 81 TABLET CHEWABLE at 08:06

## 2022-06-29 RX ADMIN — DIPHENHYDRAMINE HYDROCHLORIDE 25 MG: 25 CAPSULE ORAL at 08:06

## 2022-06-29 RX ADMIN — OXYCODONE HYDROCHLORIDE 10 MG: 10 TABLET ORAL at 04:06

## 2022-06-29 RX ADMIN — ACETAMINOPHEN 1000 MG: 500 TABLET ORAL at 06:06

## 2022-06-29 NOTE — ASSESSMENT & PLAN NOTE
- Daily labs  - Plans for liver biopsy today  - Cardiac Diet  - Tylenol for pain  - ASA  - Sertaline daily  - Methotrexate 15 mg weekly on Monday   - Will determine need for surgical intervention once hepatology consult is completed  - Developed chest pain with exertion on 6/29, EKG significant for PVCs.  Relieved with one nitro

## 2022-06-29 NOTE — SUBJECTIVE & OBJECTIVE
Interval History: Developed chest pain when ambulating this morning.  EKG changes significant for increasing PVCs. Given one nitro with relief of chest pain. Case discussed with Dr. Grissom    Review of Systems   Constitutional: Negative for malaise/fatigue.   Cardiovascular:  Negative for chest pain, claudication, dyspnea on exertion, irregular heartbeat, leg swelling and palpitations.   Respiratory:  Negative for cough and shortness of breath.    Hematologic/Lymphatic: Negative for bleeding problem.   Gastrointestinal:  Negative for abdominal pain.   Genitourinary:  Negative for dysuria.   Neurological:  Negative for headaches and weakness.   Medications:  Continuous Infusions:  Scheduled Meds:   acetaminophen  1,000 mg Oral Q8H    aspirin  81 mg Oral Daily    sertraline  50 mg Oral QHS     PRN Meds:diphenhydrAMINE, HYDROmorphone, nitroGLYCERIN, ondansetron, oxyCODONE, oxyCODONE, sodium chloride 0.9%     Objective:     Vital Signs (Most Recent):  Temp: 98 °F (36.7 °C) (06/29/22 0741)  Pulse: 67 (06/29/22 0741)  Resp: 16 (06/29/22 0846)  BP: 117/78 (06/29/22 0741)  SpO2: (!) 94 % (06/29/22 0741)   Vital Signs (24h Range):  Temp:  [97.5 °F (36.4 °C)-98.7 °F (37.1 °C)] 98 °F (36.7 °C)  Pulse:  [67-87] 67  Resp:  [13-20] 16  SpO2:  [94 %-97 %] 94 %  BP: ()/(56-78) 117/78     Weight: 96.1 kg (211 lb 13.8 oz)  Body mass index is 30.4 kg/m².    SpO2: (!) 94 %  O2 Device (Oxygen Therapy): room air    Intake/Output - Last 3 Shifts         06/27 0700 06/28 0659 06/28 0700 06/29 0659 06/29 0700 06/30 0659    Urine (mL/kg/hr) 1700 (0.7) 650 (0.3)     Total Output 1700 650     Net -1700 -650            Urine Occurrence 6 x              Lines/Drains/Airways       Peripheral Intravenous Line  Duration                  Peripheral IV - Single Lumen 06/25/22 1353 20 G Distal;Posterior;Right Forearm 3 days                    Physical Exam  HENT:      Head: Normocephalic and atraumatic.   Eyes:      Extraocular Movements:  Extraocular movements intact.   Cardiovascular:      Rate and Rhythm: Normal rate and regular rhythm.   Pulmonary:      Effort: Pulmonary effort is normal.   Abdominal:      General: Abdomen is flat.      Palpations: Abdomen is soft.   Musculoskeletal:         General: Normal range of motion.      Cervical back: Normal range of motion.   Skin:     General: Skin is warm and dry.      Capillary Refill: Capillary refill takes less than 2 seconds.   Neurological:      General: No focal deficit present.       Significant Labs:  BMP:   Recent Labs   Lab 06/29/22 0230   GLU 71      K 4.7      CO2 28   BUN 11   CREATININE 0.8   CALCIUM 9.2     CBC:   Recent Labs   Lab 06/29/22 0230   WBC 7.40   RBC 3.44*   HGB 11.7*   HCT 34.7*      *   MCH 34.0*   MCHC 33.7     CMP:   Recent Labs   Lab 06/29/22 0230   GLU 71   CALCIUM 9.2   ALBUMIN 2.7*   PROT 6.6      K 4.7   CO2 28      BUN 11   CREATININE 0.8   ALKPHOS 72   ALT 36   AST 71*   BILITOT 1.4*     Coagulation:   Recent Labs   Lab 06/29/22 0230   INR 1.1       Significant Diagnostics:  I have reviewed and interpreted all pertinent imaging results/findings within the past 24 hours.

## 2022-06-29 NOTE — PROGRESS NOTES
[facility] Step[facility]  Cardiothoracic Surgery  Progress Note    Patient Name: Andrae Aviles  MRN: [id number]  Admission Date: 6/24/2022  Hospital Length of Stay: 5 days  Code Status: Full Code   Attending Physician: Raymond Grissom MD   Referring Provider: Marlen Thompson MD  Principal Problem:<principal problem not specified>    Subjective:     Post-Op Info:  Procedure(s) (LRB):  CORONARY ARTERY BYPASS GRAFT (CABG) (Left)  HARVEST-VEIN-ENDOVASCULAR (Left)         Interval History: Developed chest pain when ambulating this morning.  EKG changes significant for increasing PVCs. Given one nitro with relief of chest pain. Case discussed with Dr. Grissom    Review of Systems   Constitutional: Negative for malaise/fatigue.   Cardiovascular:  Negative for chest pain, claudication, dyspnea on exertion, irregular heartbeat, leg swelling and palpitations.   Respiratory:  Negative for cough and shortness of breath.    Hematologic/Lymphatic: Negative for bleeding problem.   Gastrointestinal:  Negative for abdominal pain.   Genitourinary:  Negative for dysuria.   Neurological:  Negative for headaches and weakness.   Medications:  Continuous Infusions:  Scheduled Meds:   acetaminophen  1,000 mg Oral Q8H    aspirin  81 mg Oral Daily    sertraline  50 mg Oral QHS     PRN Meds:diphenhydrAMINE, HYDROmorphone, nitroGLYCERIN, ondansetron, oxyCODONE, oxyCODONE, sodium chloride 0.9%     Objective:     Vital Signs (Most Recent):  Temp: 98 °F (36.7 °C) (06/29/22 0741)  Pulse: 67 (06/29/22 0741)  Resp: 16 (06/29/22 0846)  BP: 117/78 (06/29/22 0741)  SpO2: (!) 94 % (06/29/22 0741)   Vital Signs (24h Range):  Temp:  [97.5 °F (36.4 °C)-98.7 °F (37.1 °C)] 98 °F (36.7 °C)  Pulse:  [67-87] 67  Resp:  [13-20] 16  SpO2:  [94 %-97 %] 94 %  BP: ()/(56-78) 117/78     Weight: 96.1 kg (211 lb 13.8 oz)  Body mass index is 30.4 kg/m².    SpO2: (!) 94 %  O2 Device (Oxygen Therapy): room air    Intake/Output - Last 3 Shifts          06/27 0700  06/28 0659 06/28 0700  06/29 0659 06/29 0700  06/30 0659    Urine (mL/kg/hr) 1700 (0.7) 650 (0.3)     Total Output 1700 650     Net -1700 -650            Urine Occurrence 6 x              Lines/Drains/Airways       Peripheral Intravenous Line  Duration                  Peripheral IV - Single Lumen 06/25/22 1353 20 G Distal;Posterior;Right Forearm 3 days                    Physical Exam  HENT:      Head: Normocephalic and atraumatic.   Eyes:      Extraocular Movements: Extraocular movements intact.   Cardiovascular:      Rate and Rhythm: Normal rate and regular rhythm.   Pulmonary:      Effort: Pulmonary effort is normal.   Abdominal:      General: Abdomen is flat.      Palpations: Abdomen is soft.   Musculoskeletal:         General: Normal range of motion.      Cervical back: Normal range of motion.   Skin:     General: Skin is warm and dry.      Capillary Refill: Capillary refill takes less than 2 seconds.   Neurological:      General: No focal deficit present.       Significant Labs:  BMP:   Recent Labs   Lab 06/29/22  0230   GLU 71      K 4.7      CO2 28   BUN 11   CREATININE 0.8   CALCIUM 9.2     CBC:   Recent Labs   Lab 06/29/22  0230   WBC 7.40   RBC 3.44*   HGB 11.7*   HCT 34.7*      *   MCH 34.0*   MCHC 33.7     CMP:   Recent Labs   Lab 06/29/22  0230   GLU 71   CALCIUM 9.2   ALBUMIN 2.7*   PROT 6.6      K 4.7   CO2 28      BUN 11   CREATININE 0.8   ALKPHOS 72   ALT 36   AST 71*   BILITOT 1.4*     Coagulation:   Recent Labs   Lab 06/29/22  0230   INR 1.1       Significant Diagnostics:  I have reviewed and interpreted all pertinent imaging results/findings within the past 24 hours.    Assessment/Plan:     CAD (coronary artery disease)  - Daily labs  - Plans for liver biopsy today  - Cardiac Diet  - Tylenol for pain  - ASA  - Sertaline daily  - Methotrexate 15 mg weekly on Monday   - Will determine need for surgical intervention once hepatology consult is  completed  - Developed chest pain with exertion on 6/29, EKG significant for PVCs.  Relieved with one nitro        Lesli Granado NP  Cardiothoracic Surgery  [facility]

## 2022-06-30 PROBLEM — K74.00 LIVER FIBROSIS: Status: ACTIVE | Noted: 2022-06-30

## 2022-06-30 PROBLEM — R93.2 ABNORMAL LIVER ULTRASOUND: Status: RESOLVED | Noted: 2022-06-27 | Resolved: 2022-06-30

## 2022-06-30 LAB
ALBUMIN SERPL BCP-MCNC: 2.8 G/DL (ref 3.5–5.2)
ALP SERPL-CCNC: 79 U/L (ref 55–135)
ALT SERPL W/O P-5'-P-CCNC: 40 U/L (ref 10–44)
ANION GAP SERPL CALC-SCNC: 7 MMOL/L (ref 8–16)
AST SERPL-CCNC: 83 U/L (ref 10–40)
BASOPHILS # BLD AUTO: 0.04 K/UL (ref 0–0.2)
BASOPHILS NFR BLD: 0.6 % (ref 0–1.9)
BILIRUB SERPL-MCNC: 1.2 MG/DL (ref 0.1–1)
BUN SERPL-MCNC: 10 MG/DL (ref 6–20)
CALCIUM SERPL-MCNC: 8.9 MG/DL (ref 8.7–10.5)
CHLORIDE SERPL-SCNC: 105 MMOL/L (ref 95–110)
CO2 SERPL-SCNC: 26 MMOL/L (ref 23–29)
CREAT SERPL-MCNC: 0.8 MG/DL (ref 0.5–1.4)
DIFFERENTIAL METHOD: ABNORMAL
EOSINOPHIL # BLD AUTO: 0.2 K/UL (ref 0–0.5)
EOSINOPHIL NFR BLD: 2.6 % (ref 0–8)
ERYTHROCYTE [DISTWIDTH] IN BLOOD BY AUTOMATED COUNT: 13 % (ref 11.5–14.5)
EST. GFR  (AFRICAN AMERICAN): >60 ML/MIN/1.73 M^2
EST. GFR  (NON AFRICAN AMERICAN): >60 ML/MIN/1.73 M^2
GLUCOSE SERPL-MCNC: 98 MG/DL (ref 70–110)
HCT VFR BLD AUTO: 36.2 % (ref 40–54)
HGB BLD-MCNC: 12.2 G/DL (ref 14–18)
IMM GRANULOCYTES # BLD AUTO: 0.04 K/UL (ref 0–0.04)
IMM GRANULOCYTES NFR BLD AUTO: 0.6 % (ref 0–0.5)
INR PPP: 1 (ref 0.8–1.2)
LYMPHOCYTES # BLD AUTO: 1 K/UL (ref 1–4.8)
LYMPHOCYTES NFR BLD: 15.5 % (ref 18–48)
MCH RBC QN AUTO: 34.3 PG (ref 27–31)
MCHC RBC AUTO-ENTMCNC: 33.7 G/DL (ref 32–36)
MCV RBC AUTO: 102 FL (ref 82–98)
MONOCYTES # BLD AUTO: 0.5 K/UL (ref 0.3–1)
MONOCYTES NFR BLD: 8.2 % (ref 4–15)
NEUTROPHILS # BLD AUTO: 4.5 K/UL (ref 1.8–7.7)
NEUTROPHILS NFR BLD: 72.5 % (ref 38–73)
NRBC BLD-RTO: 0 /100 WBC
PLATELET # BLD AUTO: 341 K/UL (ref 150–450)
PMV BLD AUTO: 10.6 FL (ref 9.2–12.9)
POTASSIUM SERPL-SCNC: 3.8 MMOL/L (ref 3.5–5.1)
PROT SERPL-MCNC: 6.8 G/DL (ref 6–8.4)
PROTHROMBIN TIME: 10.7 SEC (ref 9–12.5)
RBC # BLD AUTO: 3.56 M/UL (ref 4.6–6.2)
SMOOTH MUSCLE AB TITR SER IF: ABNORMAL {TITER}
SODIUM SERPL-SCNC: 138 MMOL/L (ref 136–145)
WBC # BLD AUTO: 6.25 K/UL (ref 3.9–12.7)

## 2022-06-30 PROCEDURE — 36415 COLL VENOUS BLD VENIPUNCTURE: CPT | Performed by: NURSE PRACTITIONER

## 2022-06-30 PROCEDURE — 94760 N-INVAS EAR/PLS OXIMETRY 1: CPT

## 2022-06-30 PROCEDURE — 63600175 PHARM REV CODE 636 W HCPCS: Performed by: STUDENT IN AN ORGANIZED HEALTH CARE EDUCATION/TRAINING PROGRAM

## 2022-06-30 PROCEDURE — 99233 PR SUBSEQUENT HOSPITAL CARE,LEVL III: ICD-10-PCS | Mod: ,,, | Performed by: INTERNAL MEDICINE

## 2022-06-30 PROCEDURE — 25000003 PHARM REV CODE 250: Performed by: STUDENT IN AN ORGANIZED HEALTH CARE EDUCATION/TRAINING PROGRAM

## 2022-06-30 PROCEDURE — 99223 1ST HOSP IP/OBS HIGH 75: CPT | Mod: ,,, | Performed by: INTERNAL MEDICINE

## 2022-06-30 PROCEDURE — 80053 COMPREHEN METABOLIC PANEL: CPT | Performed by: NURSE PRACTITIONER

## 2022-06-30 PROCEDURE — 99223 PR INITIAL HOSPITAL CARE,LEVL III: ICD-10-PCS | Mod: ,,, | Performed by: INTERNAL MEDICINE

## 2022-06-30 PROCEDURE — 85025 COMPLETE CBC W/AUTO DIFF WBC: CPT | Performed by: STUDENT IN AN ORGANIZED HEALTH CARE EDUCATION/TRAINING PROGRAM

## 2022-06-30 PROCEDURE — 20600001 HC STEP DOWN PRIVATE ROOM

## 2022-06-30 PROCEDURE — 99233 SBSQ HOSP IP/OBS HIGH 50: CPT | Mod: ,,, | Performed by: INTERNAL MEDICINE

## 2022-06-30 PROCEDURE — 85610 PROTHROMBIN TIME: CPT | Performed by: NURSE PRACTITIONER

## 2022-06-30 PROCEDURE — 25000003 PHARM REV CODE 250

## 2022-06-30 RX ORDER — ATORVASTATIN CALCIUM 20 MG/1
40 TABLET, FILM COATED ORAL NIGHTLY
Status: DISCONTINUED | OUTPATIENT
Start: 2022-06-30 | End: 2022-07-02 | Stop reason: HOSPADM

## 2022-06-30 RX ORDER — SODIUM CHLORIDE 0.9 % (FLUSH) 0.9 %
10 SYRINGE (ML) INJECTION
Status: DISCONTINUED | OUTPATIENT
Start: 2022-06-30 | End: 2022-07-02 | Stop reason: HOSPADM

## 2022-06-30 RX ORDER — ENOXAPARIN SODIUM 100 MG/ML
40 INJECTION SUBCUTANEOUS EVERY 24 HOURS
Status: DISCONTINUED | OUTPATIENT
Start: 2022-06-30 | End: 2022-06-30

## 2022-06-30 RX ORDER — METOPROLOL SUCCINATE 25 MG/1
25 TABLET, EXTENDED RELEASE ORAL DAILY
Status: DISCONTINUED | OUTPATIENT
Start: 2022-06-30 | End: 2022-07-02 | Stop reason: HOSPADM

## 2022-06-30 RX ORDER — CLOPIDOGREL BISULFATE 75 MG/1
75 TABLET ORAL DAILY
Status: DISCONTINUED | OUTPATIENT
Start: 2022-06-30 | End: 2022-07-01

## 2022-06-30 RX ADMIN — OXYCODONE 5 MG: 5 TABLET ORAL at 05:06

## 2022-06-30 RX ADMIN — OXYCODONE HYDROCHLORIDE 10 MG: 10 TABLET ORAL at 09:06

## 2022-06-30 RX ADMIN — ATORVASTATIN CALCIUM 40 MG: 20 TABLET, FILM COATED ORAL at 09:06

## 2022-06-30 RX ADMIN — METOPROLOL SUCCINATE 25 MG: 25 TABLET, EXTENDED RELEASE ORAL at 04:06

## 2022-06-30 RX ADMIN — CLOPIDOGREL 75 MG: 75 TABLET, FILM COATED ORAL at 04:06

## 2022-06-30 RX ADMIN — ASPIRIN 81 MG CHEWABLE TABLET 81 MG: 81 TABLET CHEWABLE at 04:06

## 2022-06-30 RX ADMIN — SERTRALINE HYDROCHLORIDE 50 MG: 50 TABLET ORAL at 09:06

## 2022-06-30 RX ADMIN — DIPHENHYDRAMINE HYDROCHLORIDE 25 MG: 25 CAPSULE ORAL at 09:06

## 2022-06-30 NOTE — SUBJECTIVE & OBJECTIVE
Interval History: Liver biopsy positive for F3 fibrosis. Will consult IC for stent placement. Discussed with general cards who will take over patient as primary. Discussed with patient and his wife how agree with the POC.  All questions answered    Review of Systems   Constitutional: Negative for malaise/fatigue.   Cardiovascular:  Negative for chest pain, claudication, dyspnea on exertion, irregular heartbeat, leg swelling and palpitations.   Respiratory:  Negative for cough and shortness of breath.    Hematologic/Lymphatic: Negative for bleeding problem.   Gastrointestinal:  Negative for abdominal pain.   Genitourinary:  Negative for dysuria.   Neurological:  Negative for headaches and weakness.   Medications:  Continuous Infusions:  Scheduled Meds:   acetaminophen  1,000 mg Oral Q8H    aspirin  81 mg Oral Daily    sertraline  50 mg Oral QHS     PRN Meds:diphenhydrAMINE, HYDROmorphone, nitroGLYCERIN, ondansetron, oxyCODONE, oxyCODONE, sodium chloride 0.9%     Objective:     Vital Signs (Most Recent):  Temp: 98.6 °F (37 °C) (06/30/22 1140)  Pulse: 73 (06/30/22 1140)  Resp: 18 (06/30/22 1140)  BP: 120/76 (06/30/22 1140)  SpO2: 98 % (06/30/22 1140) Vital Signs (24h Range):  Temp:  [98.2 °F (36.8 °C)-98.6 °F (37 °C)] 98.6 °F (37 °C)  Pulse:  [66-74] 73  Resp:  [16-20] 18  SpO2:  [95 %-98 %] 98 %  BP: (105-120)/(71-85) 120/76     Weight: 96.1 kg (211 lb 13.8 oz)  Body mass index is 30.4 kg/m².    SpO2: 98 %  O2 Device (Oxygen Therapy): room air    Intake/Output - Last 3 Shifts         06/28 0700 06/29 0659 06/29 0700 06/30 0659 06/30 0700 07/01 0659    Urine (mL/kg/hr) 650 (0.3)      Total Output 650      Net -650                     Lines/Drains/Airways       Peripheral Intravenous Line  Duration                  Peripheral IV - Single Lumen 06/25/22 1353 20 G Distal;Posterior;Right Forearm 4 days                    Physical Exam  HENT:      Head: Normocephalic and atraumatic.   Eyes:      Extraocular Movements:  Extraocular movements intact.   Cardiovascular:      Rate and Rhythm: Normal rate and regular rhythm.   Pulmonary:      Effort: Pulmonary effort is normal.   Abdominal:      General: Abdomen is flat.      Palpations: Abdomen is soft.   Musculoskeletal:         General: Normal range of motion.      Cervical back: Normal range of motion.   Skin:     General: Skin is warm and dry.      Capillary Refill: Capillary refill takes less than 2 seconds.   Neurological:      General: No focal deficit present.       Significant Labs:  BMP:   Recent Labs   Lab 06/30/22  0509   GLU 98      K 3.8      CO2 26   BUN 10   CREATININE 0.8   CALCIUM 8.9     CBC:   Recent Labs   Lab 06/30/22  0509   WBC 6.25   RBC 3.56*   HGB 12.2*   HCT 36.2*      *   MCH 34.3*   MCHC 33.7     CMP:   Recent Labs   Lab 06/30/22  0509   GLU 98   CALCIUM 8.9   ALBUMIN 2.8*   PROT 6.8      K 3.8   CO2 26      BUN 10   CREATININE 0.8   ALKPHOS 79   ALT 40   AST 83*   BILITOT 1.2*       Significant Diagnostics:  I have reviewed and interpreted all pertinent imaging results/findings within the past 24 hours.

## 2022-06-30 NOTE — H&P
[facility]  Cardiology  History and Physical     Patient Name: Andrae Aviles  MRN: [id number]  Admission Date: 6/24/2022  Code Status: Full Code   Attending Provider: Jaime Cameron MD   Primary Care Physician: Shane Lewis MD  Principal Problem:CAD (coronary artery disease)    Patient information was obtained from patient and ER records.     Subjective:     Chief Complaint:  Chest Pain     HPI:  57 yo with hx of HTN, RA (on methotrexate), and former smoker who is admitted to [facility] for evaluation of NSTEMI. He presented to OS ([facility]) for heavy, substernal chest pain associated with nausea, diaphoresis, and radiation down his left arm. For the previous two weeks he had been having intermittent chest pain with exertion relieved with rest. At [facility] he was started on ACS protocol and transferred to [place]. C there showed LM 80-90%, 90% LCX< 80% OM2,  RCA with collaterals. TTE with EF of 30-35%. At [place], he developed a rectus sheath hematoma so heparin was held. He was transferred to [facility] for CABG evaluation but declined due to fibrotic liver disease. He has intermittent chest pain daily but denies dyspnea, lower extremity swelling, or orthopnea. Transferred to CCU for high risk PCI.       Past Medical History:   Diagnosis Date    Arthritis     rheumatoid    Hypertension        Past Surgical History:   Procedure Laterality Date    COLONOSCOPY      age 42    COLONOSCOPY N/A 11/22/2021    Procedure: COLONOSCOPY  Golytely   Rapid Covid;  Surgeon: Shane Guzman MD;  Location: [facility] ENDO;  Service: Endoscopy;  Laterality: N/A;    LEFT HEART CATHETERIZATION N/A 6/20/2022    Procedure: Left heart cath;  Surgeon: Marlen Thompson MD;  Location: [facility] CATH LAB/EP;  Service: Cardiology;  Laterality: N/A;    VASECTOMY         Review of patient's allergies indicates:  No Known Allergies    No current facility-administered medications on file prior to encounter.      Current Outpatient Medications on File Prior to Encounter   Medication Sig    doxazosin (CARDURA) 2 MG tablet Take 1 tablet (2 mg total) by mouth nightly.    folic acid (FOLVITE) 1 MG tablet Take 1 tablet (1,000 mcg total) by mouth once daily. (Patient taking differently: Take 1,000 mcg by mouth once daily. Takes daily in AM)    methotrexate 2.5 MG Tab Take 6 tablets (15 mg total) by mouth every 7 days. (Patient taking differently: Take 15 mg by mouth every 7 days. Takes every monday)    predniSONE (DELTASONE) 20 MG tablet Take 20 mg by mouth daily as needed. Not taking    sertraline (ZOLOFT) 50 MG tablet Take 1 tablet (50 mg total) by mouth every evening.     Family History       Problem Relation (Age of Onset)    No Known Problems Daughter, Son          Tobacco Use    Smoking status: Former Smoker    Smokeless tobacco: Never Used   Substance and Sexual Activity    Alcohol use: Yes     Comment: social    Drug use: Never    Sexual activity: Not on file     Review of Systems   Constitutional: Negative for chills and fever.   HENT:  Negative for nosebleeds.    Cardiovascular:  Positive for chest pain and palpitations. Negative for leg swelling, near-syncope and orthopnea.   Respiratory:  Negative for cough and shortness of breath.    Endocrine: Negative for cold intolerance and heat intolerance.   Gastrointestinal:  Positive for abdominal pain. Negative for constipation and diarrhea.   Genitourinary:  Negative for dysuria.   Objective:     Vital Signs (Most Recent):  Temp: 98.2 °F (36.8 °C) (06/30/22 1519)  Pulse: 92 (06/30/22 1519)  Resp: 18 (06/30/22 1519)  BP: 126/87 (06/30/22 1519)  SpO2: 95 % (06/30/22 1519) Vital Signs (24h Range):  Temp:  [98.2 °F (36.8 °C)-98.6 °F (37 °C)] 98.2 °F (36.8 °C)  Pulse:  [66-92] 92  Resp:  [18-20] 18  SpO2:  [95 %-98 %] 95 %  BP: (105-126)/(71-87) 126/87     Weight: 96.1 kg (211 lb 13.8 oz)  Body mass index is 30.4 kg/m².    SpO2: 95 %  O2 Device (Oxygen Therapy):  room air    No intake or output data in the 24 hours ending 06/30/22 1649    Lines/Drains/Airways       Peripheral Intravenous Line  Duration                  Peripheral IV - Single Lumen 06/25/22 1353 20 G Distal;Posterior;Right Forearm 5 days                    Physical Exam  Vitals and nursing note reviewed.   Constitutional:       General: He is not in acute distress.     Appearance: Normal appearance. He is not ill-appearing.   HENT:      Head: Normocephalic and atraumatic.      Mouth/Throat:      Mouth: Mucous membranes are moist.   Eyes:      Extraocular Movements: Extraocular movements intact.      Pupils: Pupils are equal, round, and reactive to light.   Cardiovascular:      Rate and Rhythm: Normal rate and regular rhythm.      Pulses: Normal pulses.      Heart sounds: No murmur heard.  Pulmonary:      Effort: Pulmonary effort is normal.      Breath sounds: Normal breath sounds.   Abdominal:      Comments: Left abdominal wall hematoma   Musculoskeletal:      Right lower leg: No edema.      Left lower leg: No edema.   Lymphadenopathy:      Cervical: No cervical adenopathy.   Skin:     General: Skin is warm.      Capillary Refill: Capillary refill takes less than 2 seconds.   Neurological:      General: No focal deficit present.      Mental Status: He is oriented to person, place, and time.       Significant Labs: All pertinent lab results from the last 24 hours have been reviewed.    Significant Imaging:  Reviewed    Assessment and Plan:     * CAD (coronary artery disease)  Hx of tobacco use, ETOH abuse, RA, and HTN with NSTEMI. LHC with 90% ostial and mid left main disease with a moderate to large mid and distal LAD, 90% mid LCx with small and diffusely diseased obtuse marginal vessels, and totally occluded mid RCA with a small to moderate sized PDA. TTE with EF 30-35%. Declined for CABG.    - transfer to CCU  - aspirin 81 mg + plavix 75 mg qd - needs plavix load prior to complex PCI?  - atorvastatin 40 mg  qd   - toprol 25 mg qd  - Interventional consulted, appreciate assistance  - npo@[place]    Liver fibrosis  RF reduction      Hematoma of rectus sheath  Occurred while on heparin gtt. Has not required transfusion.     - monitor while on DAPT      Acute combined systolic and diastolic heart failure  Not decompensated. EF 30-35%, ICM    - toprol 25 mg qd  - introduce GDMT as able  - strict intake/output  - cardiac diet    Anxiety  - sertraline     Rheumatoid arthritis  On methotrexate. RF for accelerated CAD      Primary hypertension  Home meds: doxazosin    - will start on GDMT for HFrEF and BP control  - goal <130/80        VTE Risk Mitigation (From admission, onward)         Ordered     enoxaparin injection 40 mg  Daily         06/30/22 1329     IP VTE HIGH RISK PATIENT  Once         06/24/22 1627     Place sequential compression device  Until discontinued         06/24/22 1627                Fatmata Alexandra DO  Cardiology   [facility] - Cardiology Stepdown

## 2022-06-30 NOTE — SUBJECTIVE & OBJECTIVE
Past Medical History:   Diagnosis Date    Arthritis     rheumatoid    Hypertension        Past Surgical History:   Procedure Laterality Date    COLONOSCOPY      age 42    COLONOSCOPY N/A 11/22/2021    Procedure: COLONOSCOPY  Golytely   Rapid Covid;  Surgeon: Shane Guzman MD;  Location: [facility] ENDO;  Service: Endoscopy;  Laterality: N/A;    LEFT HEART CATHETERIZATION N/A 6/20/2022    Procedure: Left heart cath;  Surgeon: Marlen Thompson MD;  Location: [facility] CATH LAB/EP;  Service: Cardiology;  Laterality: N/A;    VASECTOMY         Review of patient's allergies indicates:  No Known Allergies    PTA Medications   Medication Sig    doxazosin (CARDURA) 2 MG tablet Take 1 tablet (2 mg total) by mouth nightly.    folic acid (FOLVITE) 1 MG tablet Take 1 tablet (1,000 mcg total) by mouth once daily. (Patient taking differently: Take 1,000 mcg by mouth once daily. Takes daily in AM)    methotrexate 2.5 MG Tab Take 6 tablets (15 mg total) by mouth every 7 days. (Patient taking differently: Take 15 mg by mouth every 7 days. Takes every monday)    predniSONE (DELTASONE) 20 MG tablet Take 20 mg by mouth daily as needed. Not taking    sertraline (ZOLOFT) 50 MG tablet Take 1 tablet (50 mg total) by mouth every evening.     Family History       Problem Relation (Age of Onset)    No Known Problems Daughter, Son          Tobacco Use    Smoking status: Former Smoker    Smokeless tobacco: Never Used   Substance and Sexual Activity    Alcohol use: Yes     Comment: social    Drug use: Never    Sexual activity: Not on file     Review of Systems   All other systems reviewed and are negative.  Objective:     Vital Signs (Most Recent):  Temp: 98.6 °F (37 °C) (06/30/22 1140)  Pulse: 73 (06/30/22 1140)  Resp: 18 (06/30/22 1140)  BP: 120/76 (06/30/22 1140)  SpO2: 98 % (06/30/22 1140) Vital Signs (24h Range):  Temp:  [98.2 °F (36.8 °C)-98.6 °F (37 °C)] 98.6 °F (37 °C)  Pulse:  [66-74] 73  Resp:  [16-20] 18  SpO2:  [95 %-98 %] 98 %  BP:  (105-120)/(71-85) 120/76     Weight: 96.1 kg (211 lb 13.8 oz)  Body mass index is 30.4 kg/m².    SpO2: 98 %  O2 Device (Oxygen Therapy): room air    No intake or output data in the 24 hours ending 06/30/22 1412    Lines/Drains/Airways       Peripheral Intravenous Line  Duration                  Peripheral IV - Single Lumen 06/25/22 1353 20 G Distal;Posterior;Right Forearm 5 days                    Physical Exam  Constitutional:       General: He is not in acute distress.     Appearance: He is well-developed.   Neck:      Vascular: No JVD.   Cardiovascular:      Rate and Rhythm: Normal rate and regular rhythm.      Heart sounds: No murmur heard.    No gallop.   Pulmonary:      Effort: Pulmonary effort is normal. No respiratory distress.      Breath sounds: Normal breath sounds. No wheezing.   Abdominal:      General: Bowel sounds are normal. There is no distension.      Palpations: Abdomen is soft.      Tenderness: There is no abdominal tenderness.   Musculoskeletal:      Cervical back: Normal range of motion and neck supple.   Skin:     General: Skin is warm and dry.      Comments: Hematoma to left side abdomen   Neurological:      Mental Status: He is alert and oriented to person, place, and time.   Psychiatric:         Behavior: Behavior normal.         Thought Content: Thought content normal.         Judgment: Judgment normal.       Significant Labs: All pertinent lab results from the last 24 hours have been reviewed.

## 2022-06-30 NOTE — ASSESSMENT & PLAN NOTE
Not decompensated. EF 30-35%, ICM    - toprol 25 mg qd  - introduce GDMT as able  - strict intake/output  - cardiac diet

## 2022-06-30 NOTE — TREATMENT PLAN
Hepatology Treatment Plan    Andrae Aviles is a 56 y.o. male admitted to hospital 6/24/2022 (Hospital Day: 7) due to <principal problem not specified>.     Interval History  Liver biopsy resulted showing hepatic steatosis, stage 3 fibrosis, no evidence of cirrhosis.    Objective  Temp:  [98 °F (36.7 °C)-98.6 °F (37 °C)] 98.2 °F (36.8 °C) (06/30 0751)  Pulse:  [66-74] 68 (06/30 0751)  BP: ()/(60-85) 105/74 (06/30 0751)  Resp:  [16-20] 18 (06/30 0751)  SpO2:  [95 %-98 %] 97 % (06/30 0751)    Laboratory    Lab Results   Component Value Date    WBC 6.25 06/30/2022    HGB 12.2 (L) 06/30/2022    HCT 36.2 (L) 06/30/2022     (H) 06/30/2022     06/30/2022       Lab Results   Component Value Date     06/30/2022    K 3.8 06/30/2022     06/30/2022    CO2 26 06/30/2022    BUN 10 06/30/2022    CREATININE 0.8 06/30/2022    CALCIUM 8.9 06/30/2022       Lab Results   Component Value Date    ALBUMIN 2.8 (L) 06/30/2022    ALT 40 06/30/2022    AST 83 (H) 06/30/2022    ALKPHOS 79 06/30/2022    BILITOT 1.2 (H) 06/30/2022       Lab Results   Component Value Date    INR 1.0 06/30/2022    INR 1.1 06/29/2022    INR 1.0 06/28/2022       MELD-Na score: 7 at 6/30/2022  5:09 AM  MELD score: 7 at 6/30/2022  5:09 AM  Calculated from:  Serum Creatinine: 0.8 mg/dL (Using min of 1 mg/dL) at 6/30/2022  5:09 AM  Serum Sodium: 138 mmol/L (Using max of 137 mmol/L) at 6/30/2022  5:09 AM  Total Bilirubin: 1.2 mg/dL at 6/30/2022  5:09 AM  INR(ratio): 1.0 at 6/30/2022  5:09 AM  Age: 56 years    Plan  - No evidence of cirrhosis on liver biopsy  - continue to encourage abstinence given findings of steatohepatitis seen on liver biospy  - please obtain daily CBC, BMP, LFT, INR  - Futher recommendations per CT surgery.  Hepatology will sign off.    Thank you for involving us in the care of Andrae Aviles. Please call with any additional concerns or questions.    Yayo Wright MD  Gastroenterology Fellow

## 2022-06-30 NOTE — PROGRESS NOTES
[facility]iology Stepdown  Cardiothoracic Surgery  Progress Note    Patient Name: Andrae Aviles  MRN: [id number]  Admission Date: 6/24/2022  Hospital Length of Stay: 6 days  Code Status: Full Code   Attending Physician: Raymond Grissom MD   Referring Provider: Marlen Thompson MD  Principal Problem:<principal problem not specified>    Subjective:     Post-Op Info:  Procedure(s) (LRB):  CORONARY ARTERY BYPASS GRAFT (CABG) (Left)  HARVEST-VEIN-ENDOVASCULAR (Left)         Interval History: Liver biopsy positive for F3 fibrosis. Will consult IC for stent placement. Discussed with general cards who will take over patient as primary. Discussed with patient and his wife how agree with the POC.  All questions answered    Review of Systems   Constitutional: Negative for malaise/fatigue.   Cardiovascular:  Negative for chest pain, claudication, dyspnea on exertion, irregular heartbeat, leg swelling and palpitations.   Respiratory:  Negative for cough and shortness of breath.    Hematologic/Lymphatic: Negative for bleeding problem.   Gastrointestinal:  Negative for abdominal pain.   Genitourinary:  Negative for dysuria.   Neurological:  Negative for headaches and weakness.   Medications:  Continuous Infusions:  Scheduled Meds:   acetaminophen  1,000 mg Oral Q8H    aspirin  81 mg Oral Daily    sertraline  50 mg Oral QHS     PRN Meds:diphenhydrAMINE, HYDROmorphone, nitroGLYCERIN, ondansetron, oxyCODONE, oxyCODONE, sodium chloride 0.9%     Objective:     Vital Signs (Most Recent):  Temp: 98.6 °F (37 °C) (06/30/22 1140)  Pulse: 73 (06/30/22 1140)  Resp: 18 (06/30/22 1140)  BP: 120/76 (06/30/22 1140)  SpO2: 98 % (06/30/22 1140) Vital Signs (24h Range):  Temp:  [98.2 °F (36.8 °C)-98.6 °F (37 °C)] 98.6 °F (37 °C)  Pulse:  [66-74] 73  Resp:  [16-20] 18  SpO2:  [95 %-98 %] 98 %  BP: (105-120)/(71-85) 120/76     Weight: 96.1 kg (211 lb 13.8 oz)  Body mass index is 30.4 kg/m².    SpO2: 98 %  O2 Device (Oxygen Therapy): room  air    Intake/Output - Last 3 Shifts         06/28 0700 06/29 0659 06/29 0700 06/30 0659 06/30 0700 07/01 0659    Urine (mL/kg/hr) 650 (0.3)      Total Output 650      Net -650                     Lines/Drains/Airways       Peripheral Intravenous Line  Duration                  Peripheral IV - Single Lumen 06/25/22 1353 20 G Distal;Posterior;Right Forearm 4 days                    Physical Exam  HENT:      Head: Normocephalic and atraumatic.   Eyes:      Extraocular Movements: Extraocular movements intact.   Cardiovascular:      Rate and Rhythm: Normal rate and regular rhythm.   Pulmonary:      Effort: Pulmonary effort is normal.   Abdominal:      General: Abdomen is flat.      Palpations: Abdomen is soft.   Musculoskeletal:         General: Normal range of motion.      Cervical back: Normal range of motion.   Skin:     General: Skin is warm and dry.      Capillary Refill: Capillary refill takes less than 2 seconds.   Neurological:      General: No focal deficit present.       Significant Labs:  BMP:   Recent Labs   Lab 06/30/22  0509   GLU 98      K 3.8      CO2 26   BUN 10   CREATININE 0.8   CALCIUM 8.9     CBC:   Recent Labs   Lab 06/30/22  0509   WBC 6.25   RBC 3.56*   HGB 12.2*   HCT 36.2*      *   MCH 34.3*   MCHC 33.7     CMP:   Recent Labs   Lab 06/30/22  0509   GLU 98   CALCIUM 8.9   ALBUMIN 2.8*   PROT 6.8      K 3.8   CO2 26      BUN 10   CREATININE 0.8   ALKPHOS 79   ALT 40   AST 83*   BILITOT 1.2*       Significant Diagnostics:  I have reviewed and interpreted all pertinent imaging results/findings within the past 24 hours.    Assessment/Plan:     CAD (coronary artery disease)  - Daily labs  - NPO  - Tylenol for pain  - ASA  - Sertaline daily  - Methotrexate 15 mg weekly on Monday   - Will determine need for surgical intervention once hepatology consult is completed  - Discussed with cardiology who will accept the patient on their service        Lesli Granado,  NP  Cardiothoracic Surgery  [facility] - Cardiology Stepdown

## 2022-06-30 NOTE — HPI
55 yo with hx of HTN, RA (on methotrexate), and former smoker who is admitted to [facility] for evaluation of NSTEMI. He presented to OS ([facility]) for heavy, substernal chest pain associated with nausea, diaphoresis, and radiation down his left arm. For the previous two weeks he had been having intermittent chest pain with exertion relieved with rest. At [facility] he was started on ACS protocol and transferred to [place]. LHC there showed LM 80-90%, 90% LCX< 80% OM2,  RCA with collaterals. TTE with EF of 30-35%. At [place], he developed a rectus sheath hematoma so heparin was held. He was transferred to [facility] for CABG evaluation but declined due to fibrotic liver disease. He has intermittent chest pain daily but denies dyspnea, lower extremity swelling, or orthopnea. Transferred to CCU for high risk PCI.

## 2022-06-30 NOTE — CONSULTS
[facility] - Cardiology Stepdown  Interventional Cardiology  Consult Note    Patient Name: Andrae Aviles  MRN: [id number]  Admission Date: 6/24/2022  Hospital Length of Stay: 6 days  Code Status: Full Code   Attending Provider: Jaime Cameron MD  Consulting Provider: Julia Pickering MD  Primary Care Physician: Shane Lewis MD  Principal Problem:CAD (coronary artery disease)    Patient information was obtained from patient, past medical records and ER records.     Inpatient consult to Interventional Cardiology  Consult performed by: Julia Pickering MD  Consult ordered by: Eduardo Aguirre MD  Reason for consult: multivessel CAD, not candidate for bypass        Subjective:     Chief Complaint:  Chest pain, NSTEMI, 3V-disease     HPI:  Andrae Aviles is a 56 y.o. male with h/o former smoker, HTN and RA who presented to an outside hospital with chest pain, found to have NSTEMI. Newly dx ICM (EF 30-35%, mild MR) -- LHC showed 80-90% LM disease, 90% Cx, 80% O2M, mid RCA  with L-R collaterals. Was started on plavix and heparin, developed a rectus sheath hematoma so heparin gtt was held. He was transferred to [facility] once his hematocrit stabilized due to concerns about the rectus sheath hematoma No complaints of chest pain currently.     Prior cigarette smoker, quit in 2016 and began vaping instead.   Drinks 12 beers per night. No history of withdrawal symptoms. Has not had alcohol since admission and has not had symptoms.   Trop- Mildly trop 0.699, 0.332, Cr 0.8, Hb 12.2    TTE 6/20/22  · The left ventricle is mildly enlarged with eccentric hypertrophy and moderately decreased systolic function.  · The estimated ejection fraction is 30-35%.  · There are segmental left ventricular wall motion abnormalities.  · Grade I left ventricular diastolic dysfunction.  · With normal right ventricular systolic function.  · Mild mitral regurgitation.  · The estimated PA systolic pressure is 9  mmHg.  · Normal central venous pressure (3 mmHg).        Past Medical History:   Diagnosis Date    Arthritis     rheumatoid    Hypertension        Past Surgical History:   Procedure Laterality Date    COLONOSCOPY      age 42    COLONOSCOPY N/A 11/22/2021    Procedure: COLONOSCOPY  [place]   Rapid Covid;  Surgeon: Shane Guzman MD;  Location: [facility] ENDO;  Service: Endoscopy;  Laterality: N/A;    LEFT HEART CATHETERIZATION N/A 6/20/2022    Procedure: Left heart cath;  Surgeon: Marlen Thompson MD;  Location: [facility] CATH LAB/EP;  Service: Cardiology;  Laterality: N/A;    VASECTOMY         Review of patient's allergies indicates:  No Known Allergies    PTA Medications   Medication Sig    doxazosin (CARDURA) 2 MG tablet Take 1 tablet (2 mg total) by mouth nightly.    folic acid (FOLVITE) 1 MG tablet Take 1 tablet (1,000 mcg total) by mouth once daily. (Patient taking differently: Take 1,000 mcg by mouth once daily. Takes daily in AM)    methotrexate 2.5 MG Tab Take 6 tablets (15 mg total) by mouth every 7 days. (Patient taking differently: Take 15 mg by mouth every 7 days. Takes every monday)    predniSONE (DELTASONE) 20 MG tablet Take 20 mg by mouth daily as needed. Not taking    sertraline (ZOLOFT) 50 MG tablet Take 1 tablet (50 mg total) by mouth every evening.     Family History       Problem Relation (Age of Onset)    No Known Problems Daughter, Son          Tobacco Use    Smoking status: Former Smoker    Smokeless tobacco: Never Used   Substance and Sexual Activity    Alcohol use: Yes     Comment: social    Drug use: Never    Sexual activity: Not on file     Review of Systems   All other systems reviewed and are negative.  Objective:     Vital Signs (Most Recent):  Temp: 98.6 °F (37 °C) (06/30/22 1140)  Pulse: 73 (06/30/22 1140)  Resp: 18 (06/30/22 1140)  BP: 120/76 (06/30/22 1140)  SpO2: 98 % (06/30/22 1140) Vital Signs (24h Range):  Temp:  [98.2 °F (36.8 °C)-98.6 °F (37 °C)] 98.6 °F (37  °C)  Pulse:  [66-74] 73  Resp:  [16-20] 18  SpO2:  [95 %-98 %] 98 %  BP: (105-120)/(71-85) 120/76     Weight: 96.1 kg (211 lb 13.8 oz)  Body mass index is 30.4 kg/m².    SpO2: 98 %  O2 Device (Oxygen Therapy): room air    No intake or output data in the 24 hours ending 06/30/22 1412    Lines/Drains/Airways       Peripheral Intravenous Line  Duration                  Peripheral IV - Single Lumen 06/25/22 1353 20 G Distal;Posterior;Right Forearm 5 days                    Physical Exam  Constitutional:       General: He is not in acute distress.     Appearance: He is well-developed.   Neck:      Vascular: No JVD.   Cardiovascular:      Rate and Rhythm: Normal rate and regular rhythm.      Heart sounds: No murmur heard.    No gallop.   Pulmonary:      Effort: Pulmonary effort is normal. No respiratory distress.      Breath sounds: Normal breath sounds. No wheezing.   Abdominal:      General: Bowel sounds are normal. There is no distension.      Palpations: Abdomen is soft.      Tenderness: There is no abdominal tenderness.   Musculoskeletal:      Cervical back: Normal range of motion and neck supple.   Skin:     General: Skin is warm and dry.      Comments: Hematoma to left side abdomen   Neurological:      Mental Status: He is alert and oriented to person, place, and time.   Psychiatric:         Behavior: Behavior normal.         Thought Content: Thought content normal.         Judgment: Judgment normal.       Significant Labs: All pertinent lab results from the last 24 hours have been reviewed.      Assessment and Plan:     * CAD (coronary artery disease)  Mr. Aviles is a pleasant 56 year old male former smoker (1ppd x 30 years, quit in 2016 but still vapes) and alcohol abuser (12 beers daily) with newly diagnosed ischemic cardiomyopathy and heart failure reduced ejection fraction (30% ejection fraction), rheumatoid arthritis, fatty liver disease vs chronic liver disease, hypertension, and BMI of 30 who presented in  mid June with NSTEMI (troponin 18.379 max), underwent coronary angiogram showing severe multivessel disease, and additionally had a spontaneous rectus sheath hematoma (seen by [facility] - conservative management).  He was transferred to [facility] once his hematocrit stabilized due to concerns about the rectus sheath hematoma.  Coronary angiogram details: 90% ostial and mid left main disease with a moderate to large mid and distal LAD, 90% mid LCx with small and diffusely diseased obtuse marginal vessels, and totally occluded mid RCA with a small to moderate sized PDA (fills from left to right collaterals).  The transthoracic echo shows 30-35% ejection fraction with LVEDD of 6.1cm, and mild mitral regurgitation.      - Plan is for LHC/angiogram +/- PCI.   - TTE: 30-35%  - Anti-platelet therapy: ASA and Plavix  - Access: R radial  - Catheters: Jorge  - Creatinine/CrCl: 0.8  - Hemoglobin: 12.2  - Allergies: no contrast allergies. Zoloft and Latex.  - Pre-op hydration: 3 cc/kg/hr x 1 hour  - Pre-op med: 50 mg Benadryl     All patient's questions were answered.   The risks, benefits and alternatives of the procedure were explained to the patient.    The risks of coronary angiography include but are not limited to: bleeding, infection, heart rhythm abnormalities, allergic reactions, kidney injury and potential need for dialysis, stroke and death.    Should stenting be indicated, the patient has agreed to dual anti-platelet therapy for 1-consecutive year with a drug-eluting stent and a minimum of 1-month with the use of a bare metal stent   Additionally, pt is aware that non-compliance is likely to result in stent clotting with heart attack, heart failure, and/or death   The risks of moderate sedation include hypotension, respiratory depression, arrhythmias, bronchospasm, and death.    Informed consent was obtained and the  patient is agreeable to proceed with the procedure.                   VTE Risk Mitigation (From admission, onward)         Ordered     enoxaparin injection 40 mg  Daily         06/30/22 1329     IP VTE HIGH RISK PATIENT  Once         06/24/22 1627     Place sequential compression device  Until discontinued         06/24/22 1627                Thank you for your consult. I will follow-up with patient. Please contact us if you have any additional questions.    Julia Pickering MD  Interventional Cardiology   [facility] - Cardiology Stepdown

## 2022-06-30 NOTE — PLAN OF CARE
"Pt assessed this am.  Ambulatory on unit with his spouse.  Pt states, "I was doing leg lifts while I was walking and started to develop chest pain, similar to the pain earlier in admission.  Vannesa LANDEROS aware, assessed pt  and ordered stat EKG and stated give nitro.  Pt assessed and stated pain had already subsided prior to nitro administration.  Nitro administered and pt monitored for change.  No further episode of chest pain throughout the shift noted.  Pt monitored for change.  "

## 2022-06-30 NOTE — ASSESSMENT & PLAN NOTE
Mr. Aviles is a pleasant 56 year old male former smoker (1ppd x 30 years, quit in 2016 but still vapes) and alcohol abuser (12 beers daily) with newly diagnosed ischemic cardiomyopathy and heart failure reduced ejection fraction (30% ejection fraction), rheumatoid arthritis, fatty liver disease vs chronic liver disease, hypertension, and BMI of 30 who presented in mid June with NSTEMI (troponin 18.379 max), underwent coronary angiogram showing severe multivessel disease, and additionally had a spontaneous rectus sheath hematoma (seen by [facility] - conservative management).  He was transferred to [facility] once his hematocrit stabilized due to concerns about the rectus sheath hematoma.  Coronary angiogram details: 90% ostial and mid left main disease with a moderate to large mid and distal LAD, 90% mid LCx with small and diffusely diseased obtuse marginal vessels, and totally occluded mid RCA with a small to moderate sized PDA (fills from left to right collaterals).  The transthoracic echo shows 30-35% ejection fraction with LVEDD of 6.1cm, and mild mitral regurgitation.      - Plan is for LHC/angiogram +/- PCI.   - TTE: 30-35%  - Anti-platelet therapy: ASA and Plavix  - Access: R radial  - Catheters: Jorge  - Creatinine/CrCl: 0.8  - Hemoglobin: 12.2  - Allergies: no contrast allergies. Zoloft and Latex.  - Pre-op hydration: 3 cc/kg/hr x 1 hour  - Pre-op med: 50 mg Benadryl     All patient's questions were answered.   The risks, benefits and alternatives of the procedure were explained to the patient.    The risks of coronary angiography include but are not limited to: bleeding, infection, heart rhythm abnormalities, allergic reactions, kidney injury and potential need for dialysis, stroke and death.    Should stenting be indicated, the patient has agreed to dual anti-platelet therapy for 1-consecutive year with a drug-eluting stent and a minimum of 1-month with the use of a bare metal  stent   Additionally, pt is aware that non-compliance is likely to result in stent clotting with heart attack, heart failure, and/or death   The risks of moderate sedation include hypotension, respiratory depression, arrhythmias, bronchospasm, and death.    Informed consent was obtained and the  patient is agreeable to proceed with the procedure.

## 2022-06-30 NOTE — HPI
Andrae Aviles is a 56 y.o. male with h/o former smoker, HTN and RA who presented to an outside hospital with chest pain, found to have NSTEMI. Newly dx ICM (EF 30-35%, mild MR) -- C showed 80-90% LM disease, 90% Cx, 80% O2M, mid RCA  with L-R collaterals. Was started on plavix and heparin, developed a rectus sheath hematoma so heparin gtt was held. He was transferred to [facility] once his hematocrit stabilized due to concerns about the rectus sheath hematoma No complaints of chest pain currently.     Prior cigarette smoker, quit in 2016 and began vaping instead.   Drinks 12 beers per night. No history of withdrawal symptoms. Has not had alcohol since admission and has not had symptoms.   Trop- Mildly trop 0.699, 0.332, Cr 0.8, Hb 12.2    TTE 6/20/22  The left ventricle is mildly enlarged with eccentric hypertrophy and moderately decreased systolic function.  The estimated ejection fraction is 30-35%.  There are segmental left ventricular wall motion abnormalities.  Grade I left ventricular diastolic dysfunction.  With normal right ventricular systolic function.  Mild mitral regurgitation.  The estimated PA systolic pressure is 9 mmHg.  Normal central venous pressure (3 mmHg).

## 2022-06-30 NOTE — ASSESSMENT & PLAN NOTE
- Daily labs  - NPO  - Tylenol for pain  - ASA  - Sertaline daily  - Methotrexate 15 mg weekly on Monday   - Will determine need for surgical intervention once hepatology consult is completed  - Discussed with cardiology who will accept the patient on their service

## 2022-06-30 NOTE — SUBJECTIVE & OBJECTIVE
Past Medical History:   Diagnosis Date    Arthritis     rheumatoid    Hypertension        Past Surgical History:   Procedure Laterality Date    COLONOSCOPY      age 42    COLONOSCOPY N/A 11/22/2021    Procedure: COLONOSCOPY  [place]   Rapid Covid;  Surgeon: Shane Guzman MD;  Location: [facility] ENDO;  Service: Endoscopy;  Laterality: N/A;    LEFT HEART CATHETERIZATION N/A 6/20/2022    Procedure: Left heart cath;  Surgeon: Marlen Thompson MD;  Location: [facility] CATH LAB/EP;  Service: Cardiology;  Laterality: N/A;    VASECTOMY         Review of patient's allergies indicates:  No Known Allergies    No current facility-administered medications on file prior to encounter.     Current Outpatient Medications on File Prior to Encounter   Medication Sig    doxazosin (CARDURA) 2 MG tablet Take 1 tablet (2 mg total) by mouth nightly.    folic acid (FOLVITE) 1 MG tablet Take 1 tablet (1,000 mcg total) by mouth once daily. (Patient taking differently: Take 1,000 mcg by mouth once daily. Takes daily in AM)    methotrexate 2.5 MG Tab Take 6 tablets (15 mg total) by mouth every 7 days. (Patient taking differently: Take 15 mg by mouth every 7 days. Takes every monday)    predniSONE (DELTASONE) 20 MG tablet Take 20 mg by mouth daily as needed. Not taking    sertraline (ZOLOFT) 50 MG tablet Take 1 tablet (50 mg total) by mouth every evening.     Family History       Problem Relation (Age of Onset)    No Known Problems Daughter, Son          Tobacco Use    Smoking status: Former Smoker    Smokeless tobacco: Never Used   Substance and Sexual Activity    Alcohol use: Yes     Comment: social    Drug use: Never    Sexual activity: Not on file     Review of Systems   Constitutional: Negative for chills and fever.   HENT:  Negative for nosebleeds.    Cardiovascular:  Positive for chest pain and palpitations. Negative for leg swelling, near-syncope and orthopnea.   Respiratory:  Negative for cough and shortness of breath.    Endocrine:  Negative for cold intolerance and heat intolerance.   Gastrointestinal:  Positive for abdominal pain. Negative for constipation and diarrhea.   Genitourinary:  Negative for dysuria.   Objective:     Vital Signs (Most Recent):  Temp: 98.2 °F (36.8 °C) (06/30/22 1519)  Pulse: 92 (06/30/22 1519)  Resp: 18 (06/30/22 1519)  BP: 126/87 (06/30/22 1519)  SpO2: 95 % (06/30/22 1519) Vital Signs (24h Range):  Temp:  [98.2 °F (36.8 °C)-98.6 °F (37 °C)] 98.2 °F (36.8 °C)  Pulse:  [66-92] 92  Resp:  [18-20] 18  SpO2:  [95 %-98 %] 95 %  BP: (105-126)/(71-87) 126/87     Weight: 96.1 kg (211 lb 13.8 oz)  Body mass index is 30.4 kg/m².    SpO2: 95 %  O2 Device (Oxygen Therapy): room air    No intake or output data in the 24 hours ending 06/30/22 1649    Lines/Drains/Airways       Peripheral Intravenous Line  Duration                  Peripheral IV - Single Lumen 06/25/22 1353 20 G Distal;Posterior;Right Forearm 5 days                    Physical Exam  Vitals and nursing note reviewed.   Constitutional:       General: He is not in acute distress.     Appearance: Normal appearance. He is not ill-appearing.   HENT:      Head: Normocephalic and atraumatic.      Mouth/Throat:      Mouth: Mucous membranes are moist.   Eyes:      Extraocular Movements: Extraocular movements intact.      Pupils: Pupils are equal, round, and reactive to light.   Cardiovascular:      Rate and Rhythm: Normal rate and regular rhythm.      Pulses: Normal pulses.      Heart sounds: No murmur heard.  Pulmonary:      Effort: Pulmonary effort is normal.      Breath sounds: Normal breath sounds.   Abdominal:      Comments: Left abdominal wall hematoma   Musculoskeletal:      Right lower leg: No edema.      Left lower leg: No edema.   Lymphadenopathy:      Cervical: No cervical adenopathy.   Skin:     General: Skin is warm.      Capillary Refill: Capillary refill takes less than 2 seconds.   Neurological:      General: No focal deficit present.      Mental Status: He  is oriented to person, place, and time.       Significant Labs: All pertinent lab results from the last 24 hours have been reviewed.    Significant Imaging:  Reviewed

## 2022-06-30 NOTE — ASSESSMENT & PLAN NOTE
Hx of tobacco use, ETOH abuse, RA, and HTN with NSTEMI. LHC with 90% ostial and mid left main disease with a moderate to large mid and distal LAD, 90% mid LCx with small and diffusely diseased obtuse marginal vessels, and totally occluded mid RCA with a small to moderate sized PDA. TTE with EF 30-35%. Declined for CABG.    - transfer to CCU  - aspirin 81 mg + plavix 75 mg qd - needs plavix load prior to complex PCI?  - atorvastatin 40 mg qd   - toprol 25 mg qd  - Interventional consulted, appreciate assistance  - npo@[place]

## 2022-07-01 ENCOUNTER — TELEPHONE (OUTPATIENT)
Dept: CARDIAC REHAB | Facility: CLINIC | Age: 57
End: 2022-07-01
Payer: COMMERCIAL

## 2022-07-01 DIAGNOSIS — I25.10 CORONARY ARTERY DISEASE, UNSPECIFIED VESSEL OR LESION TYPE, UNSPECIFIED WHETHER ANGINA PRESENT, UNSPECIFIED WHETHER NATIVE OR TRANSPLANTED HEART: ICD-10-CM

## 2022-07-01 DIAGNOSIS — Z98.61 POSTSURGICAL PERCUTANEOUS TRANSLUMINAL CORONARY ANGIOPLASTY STATUS: Primary | ICD-10-CM

## 2022-07-01 LAB
A1AT PHENOTYP SERPL-IMP: ABNORMAL
A1AT SERPL NEPH-MCNC: 253 MG/DL (ref 100–190)
ABO + RH BLD: NORMAL
ALBUMIN SERPL BCP-MCNC: 2.9 G/DL (ref 3.5–5.2)
ALP SERPL-CCNC: 85 U/L (ref 55–135)
ALT SERPL W/O P-5'-P-CCNC: 43 U/L (ref 10–44)
ANION GAP SERPL CALC-SCNC: 7 MMOL/L (ref 8–16)
AST SERPL-CCNC: 93 U/L (ref 10–40)
BASOPHILS # BLD AUTO: 0.05 K/UL (ref 0–0.2)
BASOPHILS NFR BLD: 0.6 % (ref 0–1.9)
BILIRUB SERPL-MCNC: 1.2 MG/DL (ref 0.1–1)
BLD GP AB SCN CELLS X3 SERPL QL: NORMAL
BUN SERPL-MCNC: 10 MG/DL (ref 6–20)
CALCIUM SERPL-MCNC: 9.2 MG/DL (ref 8.7–10.5)
CHLORIDE SERPL-SCNC: 106 MMOL/L (ref 95–110)
CO2 SERPL-SCNC: 25 MMOL/L (ref 23–29)
CREAT SERPL-MCNC: 0.8 MG/DL (ref 0.5–1.4)
DIFFERENTIAL METHOD: ABNORMAL
EOSINOPHIL # BLD AUTO: 0.2 K/UL (ref 0–0.5)
EOSINOPHIL NFR BLD: 2.1 % (ref 0–8)
ERYTHROCYTE [DISTWIDTH] IN BLOOD BY AUTOMATED COUNT: 13 % (ref 11.5–14.5)
EST. GFR  (AFRICAN AMERICAN): >60 ML/MIN/1.73 M^2
EST. GFR  (NON AFRICAN AMERICAN): >60 ML/MIN/1.73 M^2
GLUCOSE SERPL-MCNC: 83 MG/DL (ref 70–110)
HCT VFR BLD AUTO: 38.9 % (ref 40–54)
HGB BLD-MCNC: 13 G/DL (ref 14–18)
IMM GRANULOCYTES # BLD AUTO: 0.07 K/UL (ref 0–0.04)
IMM GRANULOCYTES NFR BLD AUTO: 0.9 % (ref 0–0.5)
INR PPP: 1.1 (ref 0.8–1.2)
LYMPHOCYTES # BLD AUTO: 1.2 K/UL (ref 1–4.8)
LYMPHOCYTES NFR BLD: 14.8 % (ref 18–48)
MAGNESIUM SERPL-MCNC: 2.1 MG/DL (ref 1.6–2.6)
MCH RBC QN AUTO: 34.1 PG (ref 27–31)
MCHC RBC AUTO-ENTMCNC: 33.4 G/DL (ref 32–36)
MCV RBC AUTO: 102 FL (ref 82–98)
MONOCYTES # BLD AUTO: 0.7 K/UL (ref 0.3–1)
MONOCYTES NFR BLD: 8.6 % (ref 4–15)
NEUTROPHILS # BLD AUTO: 5.9 K/UL (ref 1.8–7.7)
NEUTROPHILS NFR BLD: 73 % (ref 38–73)
NRBC BLD-RTO: 0 /100 WBC
PLATELET # BLD AUTO: 354 K/UL (ref 150–450)
PMV BLD AUTO: 10.3 FL (ref 9.2–12.9)
POTASSIUM SERPL-SCNC: 4.9 MMOL/L (ref 3.5–5.1)
PROT SERPL-MCNC: 7.1 G/DL (ref 6–8.4)
PROTHROMBIN TIME: 10.9 SEC (ref 9–12.5)
RBC # BLD AUTO: 3.81 M/UL (ref 4.6–6.2)
SODIUM SERPL-SCNC: 138 MMOL/L (ref 136–145)
WBC # BLD AUTO: 8.02 K/UL (ref 3.9–12.7)

## 2022-07-01 PROCEDURE — 36415 COLL VENOUS BLD VENIPUNCTURE: CPT | Performed by: NURSE PRACTITIONER

## 2022-07-01 PROCEDURE — 86901 BLOOD TYPING SEROLOGIC RH(D): CPT | Performed by: INTERNAL MEDICINE

## 2022-07-01 PROCEDURE — 25000003 PHARM REV CODE 250: Performed by: STUDENT IN AN ORGANIZED HEALTH CARE EDUCATION/TRAINING PROGRAM

## 2022-07-01 PROCEDURE — 93005 ELECTROCARDIOGRAM TRACING: CPT

## 2022-07-01 PROCEDURE — 85347 COAGULATION TIME ACTIVATED: CPT | Performed by: INTERNAL MEDICINE

## 2022-07-01 PROCEDURE — 85610 PROTHROMBIN TIME: CPT | Performed by: NURSE PRACTITIONER

## 2022-07-01 PROCEDURE — C1874 STENT, COATED/COV W/DEL SYS: HCPCS | Performed by: INTERNAL MEDICINE

## 2022-07-01 PROCEDURE — 80053 COMPREHEN METABOLIC PANEL: CPT | Performed by: NURSE PRACTITIONER

## 2022-07-01 PROCEDURE — 93010 EKG 12-LEAD: ICD-10-PCS | Mod: ,,, | Performed by: INTERNAL MEDICINE

## 2022-07-01 PROCEDURE — C1725 CATH, TRANSLUMIN NON-LASER: HCPCS | Performed by: INTERNAL MEDICINE

## 2022-07-01 PROCEDURE — C1760 CLOSURE DEV, VASC: HCPCS | Performed by: INTERNAL MEDICINE

## 2022-07-01 PROCEDURE — 36415 COLL VENOUS BLD VENIPUNCTURE: CPT | Performed by: INTERNAL MEDICINE

## 2022-07-01 PROCEDURE — 92928 PRQ TCAT PLMT NTRAC ST 1 LES: CPT | Mod: LM,,, | Performed by: INTERNAL MEDICINE

## 2022-07-01 PROCEDURE — 99152 MOD SED SAME PHYS/QHP 5/>YRS: CPT | Performed by: INTERNAL MEDICINE

## 2022-07-01 PROCEDURE — 99231 PR SUBSEQUENT HOSPITAL CARE,LEVL I: ICD-10-PCS | Mod: ,,, | Performed by: INTERNAL MEDICINE

## 2022-07-01 PROCEDURE — 25000003 PHARM REV CODE 250: Performed by: INTERNAL MEDICINE

## 2022-07-01 PROCEDURE — 99231 SBSQ HOSP IP/OBS SF/LOW 25: CPT | Mod: ,,, | Performed by: INTERNAL MEDICINE

## 2022-07-01 PROCEDURE — 99152 MOD SED SAME PHYS/QHP 5/>YRS: CPT | Mod: ,,, | Performed by: INTERNAL MEDICINE

## 2022-07-01 PROCEDURE — 63600175 PHARM REV CODE 636 W HCPCS: Performed by: INTERNAL MEDICINE

## 2022-07-01 PROCEDURE — C1887 CATHETER, GUIDING: HCPCS | Performed by: INTERNAL MEDICINE

## 2022-07-01 PROCEDURE — 85025 COMPLETE CBC W/AUTO DIFF WBC: CPT | Performed by: STUDENT IN AN ORGANIZED HEALTH CARE EDUCATION/TRAINING PROGRAM

## 2022-07-01 PROCEDURE — 99152 PR MOD CONSCIOUS SEDATION, SAME PHYS, 5+ YRS, FIRST 15 MIN: ICD-10-PCS | Mod: ,,, | Performed by: INTERNAL MEDICINE

## 2022-07-01 PROCEDURE — 83735 ASSAY OF MAGNESIUM: CPT | Performed by: STUDENT IN AN ORGANIZED HEALTH CARE EDUCATION/TRAINING PROGRAM

## 2022-07-01 PROCEDURE — C9600 PERC DRUG-EL COR STENT SING: HCPCS | Mod: LM | Performed by: INTERNAL MEDICINE

## 2022-07-01 PROCEDURE — 93010 ELECTROCARDIOGRAM REPORT: CPT | Mod: ,,, | Performed by: INTERNAL MEDICINE

## 2022-07-01 PROCEDURE — 27201423 OPTIME MED/SURG SUP & DEVICES STERILE SUPPLY: Performed by: INTERNAL MEDICINE

## 2022-07-01 PROCEDURE — C1769 GUIDE WIRE: HCPCS | Performed by: INTERNAL MEDICINE

## 2022-07-01 PROCEDURE — 25500020 PHARM REV CODE 255: Performed by: INTERNAL MEDICINE

## 2022-07-01 PROCEDURE — C1894 INTRO/SHEATH, NON-LASER: HCPCS | Performed by: INTERNAL MEDICINE

## 2022-07-01 PROCEDURE — 20600001 HC STEP DOWN PRIVATE ROOM

## 2022-07-01 PROCEDURE — 92928 PR STENT: ICD-10-PCS | Mod: LM,,, | Performed by: INTERNAL MEDICINE

## 2022-07-01 DEVICE — STENT RONYX45012UX RESOLUTE ONYX 4.50X12
Type: IMPLANTABLE DEVICE | Site: CORONARY | Status: FUNCTIONAL
Brand: RESOLUTE ONYX™

## 2022-07-01 RX ORDER — FENTANYL CITRATE 50 UG/ML
INJECTION, SOLUTION INTRAMUSCULAR; INTRAVENOUS
Status: DISCONTINUED | OUTPATIENT
Start: 2022-07-01 | End: 2022-07-02 | Stop reason: HOSPADM

## 2022-07-01 RX ORDER — SODIUM CHLORIDE 9 MG/ML
INJECTION, SOLUTION INTRAVENOUS ONCE
Status: COMPLETED | OUTPATIENT
Start: 2022-07-01 | End: 2022-07-01

## 2022-07-01 RX ORDER — CLOPIDOGREL BISULFATE 75 MG/1
75 TABLET ORAL DAILY
Status: DISCONTINUED | OUTPATIENT
Start: 2022-07-02 | End: 2022-07-02 | Stop reason: HOSPADM

## 2022-07-01 RX ORDER — SODIUM CHLORIDE 9 MG/ML
INJECTION, SOLUTION INTRAVENOUS
Status: DISCONTINUED | OUTPATIENT
Start: 2022-07-01 | End: 2022-07-02 | Stop reason: HOSPADM

## 2022-07-01 RX ORDER — HEPARIN SOD,PORCINE/0.9 % NACL 1000/500ML
INTRAVENOUS SOLUTION INTRAVENOUS
Status: DISCONTINUED | OUTPATIENT
Start: 2022-07-01 | End: 2022-07-02 | Stop reason: HOSPADM

## 2022-07-01 RX ORDER — DIPHENHYDRAMINE HCL 50 MG
50 CAPSULE ORAL
Status: DISCONTINUED | OUTPATIENT
Start: 2022-07-01 | End: 2022-07-02 | Stop reason: HOSPADM

## 2022-07-01 RX ORDER — CLOPIDOGREL 300 MG/1
600 TABLET, FILM COATED ORAL ONCE
Status: COMPLETED | OUTPATIENT
Start: 2022-07-01 | End: 2022-07-01

## 2022-07-01 RX ORDER — LIDOCAINE HYDROCHLORIDE 20 MG/ML
INJECTION, SOLUTION EPIDURAL; INFILTRATION; INTRACAUDAL; PERINEURAL
Status: DISCONTINUED | OUTPATIENT
Start: 2022-07-01 | End: 2022-07-02 | Stop reason: HOSPADM

## 2022-07-01 RX ORDER — SODIUM CHLORIDE 9 MG/ML
INJECTION, SOLUTION INTRAVENOUS CONTINUOUS
Status: ACTIVE | OUTPATIENT
Start: 2022-07-01 | End: 2022-07-01

## 2022-07-01 RX ORDER — HEPARIN SODIUM 1000 [USP'U]/ML
INJECTION, SOLUTION INTRAVENOUS; SUBCUTANEOUS
Status: DISCONTINUED | OUTPATIENT
Start: 2022-07-01 | End: 2022-07-02 | Stop reason: HOSPADM

## 2022-07-01 RX ORDER — CLOPIDOGREL 300 MG/1
300 TABLET, FILM COATED ORAL ONCE
Status: DISCONTINUED | OUTPATIENT
Start: 2022-07-01 | End: 2022-07-01

## 2022-07-01 RX ORDER — MIDAZOLAM HYDROCHLORIDE 1 MG/ML
INJECTION, SOLUTION INTRAMUSCULAR; INTRAVENOUS
Status: DISCONTINUED | OUTPATIENT
Start: 2022-07-01 | End: 2022-07-02 | Stop reason: HOSPADM

## 2022-07-01 RX ADMIN — SODIUM CHLORIDE: 0.9 INJECTION, SOLUTION INTRAVENOUS at 03:07

## 2022-07-01 RX ADMIN — ATORVASTATIN CALCIUM 40 MG: 20 TABLET, FILM COATED ORAL at 08:07

## 2022-07-01 RX ADMIN — CLOPIDOGREL BISULFATE 600 MG: 300 TABLET, FILM COATED ORAL at 08:07

## 2022-07-01 RX ADMIN — SODIUM CHLORIDE: 0.9 INJECTION, SOLUTION INTRAVENOUS at 12:07

## 2022-07-01 RX ADMIN — OXYCODONE HYDROCHLORIDE 10 MG: 10 TABLET ORAL at 10:07

## 2022-07-01 RX ADMIN — DIPHENHYDRAMINE HYDROCHLORIDE 50 MG: 50 CAPSULE ORAL at 12:07

## 2022-07-01 RX ADMIN — OXYCODONE 5 MG: 5 TABLET ORAL at 06:07

## 2022-07-01 RX ADMIN — ASPIRIN 81 MG CHEWABLE TABLET 81 MG: 81 TABLET CHEWABLE at 08:07

## 2022-07-01 RX ADMIN — SERTRALINE HYDROCHLORIDE 50 MG: 50 TABLET ORAL at 08:07

## 2022-07-01 RX ADMIN — OXYCODONE 5 MG: 5 TABLET ORAL at 08:07

## 2022-07-01 RX ADMIN — METOPROLOL SUCCINATE 25 MG: 25 TABLET, EXTENDED RELEASE ORAL at 08:07

## 2022-07-01 NOTE — ASSESSMENT & PLAN NOTE
Home meds: doxazosin    - will start on GDMT (valsartan vs entresto) for HFrEF and BP control  - goal <130/80

## 2022-07-01 NOTE — ASSESSMENT & PLAN NOTE
Hx of tobacco use, ETOH abuse, RA, and HTN with NSTEMI. LHC with 90% ostial and mid left main disease with a moderate to large mid and distal LAD, 90% mid LCx with small and diffusely diseased obtuse marginal vessels, and totally occluded mid RCA with a small to moderate sized PDA. TTE with EF 30-35%. Declined for CABG due to liver disease.  7/1 - high risk PCI to ostial LM with VILMA x1.    - aspirin/plavix x 12 months, then aspirin indefinitely   - atorvastatin 40 mg qd (LDL 92)  - toprol 25 mg qd  - cardiac diet  - cardiac rehab on discharge; ambulatory referral to cardiology

## 2022-07-01 NOTE — SUBJECTIVE & OBJECTIVE
Interval History: No events overnight, hemodynamically stable without chest pain. NPO for PCI today. He denies dyspnea, lower extremity swelling, or worsening abdominal pain.    Review of Systems   Constitutional: Negative for chills and fever.   HENT:  Negative for nosebleeds.    Cardiovascular:  Negative for chest pain, leg swelling, near-syncope, orthopnea and palpitations.   Respiratory:  Negative for cough and shortness of breath.    Endocrine: Negative for cold intolerance and heat intolerance.   Gastrointestinal:  Positive for abdominal pain. Negative for constipation and diarrhea.   Genitourinary:  Negative for dysuria.   Objective:     Vital Signs (Most Recent):  Temp: 97.6 °F (36.4 °C) (07/01/22 1122)  Pulse: (!) 53 (07/01/22 1122)  Resp: 20 (07/01/22 1122)  BP: 101/67 (07/01/22 1122)  SpO2: 96 % (07/01/22 1122)   Vital Signs (24h Range):  Temp:  [97.6 °F (36.4 °C)-98.3 °F (36.8 °C)] 97.6 °F (36.4 °C)  Pulse:  [53-92] 53  Resp:  [16-20] 20  SpO2:  [94 %-98 %] 96 %  BP: (101-135)/(65-90) 101/67     Weight: 96.1 kg (211 lb 13.8 oz)  Body mass index is 30.4 kg/m².     SpO2: 96 %  O2 Device (Oxygen Therapy): nasal cannula      Intake/Output Summary (Last 24 hours) at 7/1/2022 1432  Last data filed at 6/30/2022 2345  Gross per 24 hour   Intake 500 ml   Output --   Net 500 ml       Lines/Drains/Airways       Peripheral Intravenous Line  Duration                  Peripheral IV - Single Lumen 06/25/22 1353 20 G Distal;Posterior;Right Forearm 6 days                    Physical Exam  Vitals and nursing note reviewed.   Constitutional:       General: He is not in acute distress.     Appearance: Normal appearance. He is not ill-appearing.   HENT:      Head: Normocephalic and atraumatic.      Mouth/Throat:      Mouth: Mucous membranes are moist.   Eyes:      Extraocular Movements: Extraocular movements intact.      Pupils: Pupils are equal, round, and reactive to light.   Cardiovascular:      Rate and Rhythm: Normal  rate and regular rhythm.      Pulses: Normal pulses.      Heart sounds: No murmur heard.  Pulmonary:      Effort: Pulmonary effort is normal.      Breath sounds: Normal breath sounds.   Abdominal:      Comments: Left abdominal wall hematoma   Musculoskeletal:      Right lower leg: No edema.      Left lower leg: No edema.   Lymphadenopathy:      Cervical: No cervical adenopathy.   Skin:     General: Skin is warm.      Capillary Refill: Capillary refill takes less than 2 seconds.   Neurological:      General: No focal deficit present.      Mental Status: He is oriented to person, place, and time.       Significant Labs: All pertinent lab results from the last 24 hours have been reviewed.    Significant Imaging:  Reviewed

## 2022-07-01 NOTE — PLAN OF CARE
[facility] - [facility] Step[facility]  Discharge Reassessment    Primary Care Provider: Shane Lewis MD    Expected Discharge Date: 7/5/2022    Reassessment (most recent)     Discharge Reassessment - 07/01/22 1058        Discharge Reassessment    Assessment Type Discharge Planning Reassessment     Communicated TURNER with patient/caregiver Date not available/Unable to determine     Discharge Plan A Home;Home with family     Discharge Plan B Home Health     Discharge Barriers Identified None     Why the patient remains in the hospital Requires continued medical care               Per treatment team pt is getting a cath today and is not medically ready to discharge.  Will continue to follow.    Dede Edwards LMSW  [facility]  [id number]

## 2022-07-01 NOTE — ASSESSMENT & PLAN NOTE
Not decompensated. EF 30-35%, ICM, with wall motion abnormalities to lateral wall.     - toprol 25 mg qd  - start RAAS inhibition when able  - strict intake/output  - cardiac diet

## 2022-07-01 NOTE — HOSPITAL COURSE
Patient transferred from CTS to CCU for evaluation for high risk PCI. He was not in acute decompensated heart failure. He was re-loaded with plavix prior to his PCI but heparin gtt was stopped while on the CTS service due to rectus sheath hematoma. Interventional was consulted and patient underwent ostial LM PCI with VILMA x1 on 7/1. His TTE showed EF of 30-35% with wall motion abnormalities in the circumflex territory. He was started on toprol for GDMT. In regards to his liver disease workup, his biopsy demonstrated fibrosis without cirrhosis. His anti-smooth muscle Ab was positive but with a low titer so no further workup needed per hepatology. As an outpatient, will need to follow up with his rheumatologist for consideration of changing methotrexate to another medication due to its effects on the liver.

## 2022-07-01 NOTE — BRIEF OP NOTE
Brief Operative Note:    : Miguelito Barney MD     Referring Physician: Marlen Thompson     All Operators: Surgeon(s):  MD Miguelito Cuellar MD     Preoperative Diagnosis: NSTEMI (non-ST elevated myocardial infarction) [I21.4]     Postop Diagnosis: NSTEMI (non-ST elevated myocardial infarction) [I21.4]    Treatments/Procedures: Procedure(s) (LRB):  Angiogram, Coronary, with Left Heart Cath (N/A)  Stent, Drug Eluting, Single Vessel, Coronary    Access: Right CFA    Findings:Severe coronary artery disease is present.     See catheterization report for full details.    Intervention: Ostial LM PCI VILMA X 1     See catheterization report for full details.    Closure device: Perclose       Plan:  - Post cath protocol   - IVF @ 100 cc/kg/hr x 4 hours  - Bed rest x 2 hours   - Continue aspirin 81 mg daily indefinitely  - Continue Plavix 75  for minimum 12 months  - Continue high intensity statin therapy (LDL goal < 70)  - Risk factor reduction (BP <130/80 mmHg, glycemic control, etc)  - Cardiac rehab referral  - Follow up with outpatient cardiologist    Estimated Blood loss: 20 cc    Specimens removed: Ebony Shaw MD

## 2022-07-01 NOTE — PHYSICIAN QUERY
PT Name: Andrae Aviles  MR #: [id number]     DOCUMENTATION CLARIFICATION     CDS/: Russel Haynes Jr, RN CCDS               Contact information:[email or web address]  Query Withdrawn 7/11 1300 rbr  This form is a permanent document in the medical record.     Query Date: July 1, 2022    By submitting this query, we are merely seeking further clarification of documentation.  Please utilize your independent clinical judgment when addressing the question(s) below.    The Medical Record contains the following   Indicators Supporting Clinical Findings Location in Medical Record   x Heart Failure documented newly diagnosed ischemic cardiomyopathy and heart failure reduced ejection fraction (30% ejection fraction),    Acute combined systolic and diastolic heart failure  Not decompensated. EF 30-35%, ICM   6/30 Interventional Cardiology Consult          6/30 Cardiology H&P    BNP     x EF/Echo Echo showed EF 30-35%, mild MR   6/25 Cardiothoracic Surgery H&P   x Radiology findings Atelectasis or scarring in the bilateral lower lobes.  Otherwise no acute cardiopulmonary abnormality.   6/24 CT of Chest   x Subjective/Objective Respiratory Conditions Respiratory:  Negative for shortness of breath.   6/26 Cardiothoracic Surgery Progress Note   x Recent/Current MI 56 y.o. male with h/o HTN and RA who presented to an outside hospital with chest pain, found to have NSTEMI   6/25 Cardiothoracic Surgery H&P    Heart Transplant, LVAD     x Edema, JVD Cardiovascular: Positive for chest pain .  Negative for orthopnea lower extremity edema  6/26 Cardiothoracic Surgery Progress Note    Ascites      Diuretics/Meds     x Other Treatment metoprolol succinate (TOPROL-XL) 24 hr tablet 25 mg 6/30 MAR    Other       Heart failure is a clinical diagnosis which includes symptomatic fluid retention, elevated intracardiac pressures, and/or the inability of the heart to deliver adequate blood flow.     Heart Failure with reduced Ejection  Fraction (HFrEF) or Systolic Heart Failure (loses ability to contract normally, EF is <40%)     Heart Failure with preserved Ejection Fraction (HFpEF) or Diastolic Heart Failure (stiff ventricles, does not relax properly, EF is >50%)      Heart Failure with Combined Systolic and Diastolic Failure (stiff ventricles, does not relax properly and EF is <50%)     Mid-range or mildly reduced ejection fraction (HFmrEF) is classified as systolic heart failure.   Common clues to acute exacerbation:  Rapidly progressive symptoms (w/in 2 weeks of presentation), using IV diuretics, using supplemental O2, pulmonary edema on Xray, new or worsening pleural effusion, +JVD or other signs of volume overload, MI w/in 4 weeks, and/or BNP >500  The clinical guidelines noted are only system guidelines, and do not replace the providers clinical judgment.    Provider, please specify the diagnosis associated with the above clinical findings.    [   ]  Acute Systolic Heart Failure (HFrEF or HFmrEF) - new diagnosis   [   ]  Acute on Chronic Systolic Heart Failure (HFrEF or HFmrEF) - worsening of CHF signs/symptoms in preexisting CHF   [   ]  Chronic Systolic Heart Failure (HFrEF or HFmrEF) - preexisting and stable   [   ]  Acute Diastolic Heart Failure (HFpEF) - new diagnosis   [   ]  Acute on Chronic Diastolic Heart Failure (HFpEF) - worsening of CHF signs/symptoms in preexisting CHF   [   ]  Chronic Diastolic Heart Failure (HFpEF) - preexisting and stable   [   ]  Acute Combined Systolic and Diastolic Heart Failure - new diagnosis   [   ]  Acute on Chronic Combined Systolic and Diastolic Heart Failure - worsening of CHF signs/symptoms in preexisting CHF   [   ]  Chronic Combined Systolic and Diastolic Heart Failure - pre-existing and stable   [   ]  End Stage Heart Failure (Stage D Heart Failure)   [   ]  Heart Failure Ruled Out   [   ]  Other (please specify): ___________________________________   [  ]  Clinically Undetermined        Present on admission (POA) status:  [   ]  Yes (Y)   [   ]  No (N)   [   ]  Documentation insufficient to determine if condition is POA (U)   [  ]  Clinically Undetermined (W)     Please document in your progress notes daily for the duration of treatment until resolved and include in your discharge summary.    References:  American Heart Association editorial staff. (2017, May). Ejection Fraction Heart Failure Measurement. American Heart Association. https://www.heart.org/en/health-topics/heart-failure/diagnosing-heart-failure/ejection-fraction-heart-failure-measurement#:~:text=Ejection%20fraction%20(EF)%20is%20a,pushed%20out%20with%20each%20heartbeat  JEFFRY Porras (2020, December 15). Heart failure with preserved ejection fraction: Clinical manifestations and diagnosis. "[employer]ToDate. https://www.[employer].com/contents/heart-failure-with-preserved-ejection-fraction-clinical-manifestations-and-diagnosis.  ICD-10-CM/PCS Coding Clinic Third Quarter ICD-10, Effective with discharges: September 8, 2020 [facility] § Heart failure with mid-range or mildly reduced ejection fraction (2020).  Form No. 22165

## 2022-07-01 NOTE — PROGRESS NOTES
[facility]  Cardiology  Progress Note    Patient Name: Andrae Aviles  MRN: [id number]  Admission Date: 6/24/2022  Hospital Length of Stay: 7 days  Code Status: Full Code   Attending Physician: Elana Osborne MD   Primary Care Physician: Shane Lewis MD  Expected Discharge Date: 7/5/2022  Principal Problem:CAD (coronary artery disease)    Subjective:     Hospital Course:   Patient transferred from CTS to CCU for evaluation for high risk PCI. He was not in acute decompensated heart failure. He was re-loaded with plavix prior to his PCI but heparin gtt was stopped while on the CTS service due to rectus sheath hematoma. Interventional was consulted and patient underwent ostial LM PCI with VILMA x1 on 7/1. His TTE showed EF of 30-35% with wall motion abnormalities in the circumflex territory. He was started on toprol for GDMT. In regards to his liver disease workup, his biopsy demonstrated fibrosis without cirrhosis. His anti-smooth muscle Ab was positive but with a low titer so no further workup needed per hepatology. As an outpatient, will need to follow up with his rheumatologist for consideration of changing methotrexate to another medication due to its effects on the liver.       Interval History: No events overnight, hemodynamically stable without chest pain. NPO for PCI today. He denies dyspnea, lower extremity swelling, or worsening abdominal pain.    Review of Systems   Constitutional: Negative for chills and fever.   HENT:  Negative for nosebleeds.    Cardiovascular:  Negative for chest pain, leg swelling, near-syncope, orthopnea and palpitations.   Respiratory:  Negative for cough and shortness of breath.    Endocrine: Negative for cold intolerance and heat intolerance.   Gastrointestinal:  Positive for abdominal pain. Negative for constipation and diarrhea.   Genitourinary:  Negative for dysuria.   Objective:     Vital Signs (Most Recent):  Temp: 97.6 °F (36.4 °C) (07/01/22  1122)  Pulse: (!) 53 (07/01/22 1122)  Resp: 20 (07/01/22 1122)  BP: 101/67 (07/01/22 1122)  SpO2: 96 % (07/01/22 1122)   Vital Signs (24h Range):  Temp:  [97.6 °F (36.4 °C)-98.3 °F (36.8 °C)] 97.6 °F (36.4 °C)  Pulse:  [53-92] 53  Resp:  [16-20] 20  SpO2:  [94 %-98 %] 96 %  BP: (101-135)/(65-90) 101/67     Weight: 96.1 kg (211 lb 13.8 oz)  Body mass index is 30.4 kg/m².     SpO2: 96 %  O2 Device (Oxygen Therapy): nasal cannula      Intake/Output Summary (Last 24 hours) at 7/1/2022 1432  Last data filed at 6/30/2022 2345  Gross per 24 hour   Intake 500 ml   Output --   Net 500 ml       Lines/Drains/Airways       Peripheral Intravenous Line  Duration                  Peripheral IV - Single Lumen 06/25/22 1353 20 G Distal;Posterior;Right Forearm 6 days                    Physical Exam  Vitals and nursing note reviewed.   Constitutional:       General: He is not in acute distress.     Appearance: Normal appearance. He is not ill-appearing.   HENT:      Head: Normocephalic and atraumatic.      Mouth/Throat:      Mouth: Mucous membranes are moist.   Eyes:      Extraocular Movements: Extraocular movements intact.      Pupils: Pupils are equal, round, and reactive to light.   Cardiovascular:      Rate and Rhythm: Normal rate and regular rhythm.      Pulses: Normal pulses.      Heart sounds: No murmur heard.  Pulmonary:      Effort: Pulmonary effort is normal.      Breath sounds: Normal breath sounds.   Abdominal:      Comments: Left abdominal wall hematoma   Musculoskeletal:      Right lower leg: No edema.      Left lower leg: No edema.   Lymphadenopathy:      Cervical: No cervical adenopathy.   Skin:     General: Skin is warm.      Capillary Refill: Capillary refill takes less than 2 seconds.   Neurological:      General: No focal deficit present.      Mental Status: He is oriented to person, place, and time.       Significant Labs: All pertinent lab results from the last 24 hours have been reviewed.    Significant  Imaging:  Reviewed    Assessment and Plan:       * CAD (coronary artery disease)  Hx of tobacco use, ETOH abuse, RA, and HTN with NSTEMI. LHC with 90% ostial and mid left main disease with a moderate to large mid and distal LAD, 90% mid LCx with small and diffusely diseased obtuse marginal vessels, and totally occluded mid RCA with a small to moderate sized PDA. TTE with EF 30-35%. Declined for CABG due to liver disease.  7/1 - high risk PCI to ostial LM with VILMA x1.    - aspirin/plavix x 12 months, then aspirin indefinitely   - atorvastatin 40 mg qd (LDL 92)  - toprol 25 mg qd  - cardiac diet  - cardiac rehab on discharge; ambulatory referral to cardiology    Liver fibrosis  RF reduction  May need to stop methotrexate  Hepatology follow up on discharge    Hematoma of rectus sheath  Occurred while on heparin gtt. Has not required transfusion. Stable    - monitor while on DAPT      Acute combined systolic and diastolic heart failure  Not decompensated. EF 30-35%, ICM, with wall motion abnormalities to lateral wall.     - toprol 25 mg qd  - start RAAS inhibition when able  - strict intake/output  - cardiac diet    Anxiety  - sertraline     Rheumatoid arthritis  On methotrexate. RF for accelerated CAD    - given his liver fibrosis, patient will need to hold methotrexate until follow up with rheumatology for consideration of changing medication    Primary hypertension  Home meds: doxazosin    - will start on GDMT (valsartan vs entresto) for HFrEF and BP control  - goal <130/80        VTE Risk Mitigation (From admission, onward)         Ordered     heparin (porcine) injection  As needed (PRN)         07/01/22 1329     heparin infusion 1,000 units/500 ml in 0.9% NaCl (on sterile field)  As needed (PRN)         07/01/22 1311     heparin (porcine) 750 Units, verapamiL 1.25 mg, nitroGLYCERIN 1.25 mg in sodium chloride 0.9% 250 mL  As needed (PRN)         07/01/22 1311     IP VTE HIGH RISK PATIENT  Once         06/24/22 1627      Place sequential compression device  Until discontinued         06/24/22 6003                Fatmata Alexandra DO  Cardiology  [facility] - [facility] Step[facility]

## 2022-07-01 NOTE — ASSESSMENT & PLAN NOTE
On methotrexate. RF for accelerated CAD    - given his liver fibrosis, patient will need to hold methotrexate until follow up with rheumatology for consideration of changing medication

## 2022-07-01 NOTE — TELEPHONE ENCOUNTER
"Information packet sent to patient, which includes "Your guide to living with heart disease". Letter was also sent to patient. Patient lives in [facility], contact information for [facility] sent to patient.    Oz Chilel RN  Cardiac Rehab Nurse  "

## 2022-07-02 VITALS
OXYGEN SATURATION: 96 % | DIASTOLIC BLOOD PRESSURE: 66 MMHG | HEIGHT: 70 IN | TEMPERATURE: 97 F | SYSTOLIC BLOOD PRESSURE: 100 MMHG | BODY MASS INDEX: 30.33 KG/M2 | RESPIRATION RATE: 20 BRPM | WEIGHT: 211.88 LBS | HEART RATE: 78 BPM

## 2022-07-02 PROBLEM — R07.9 CHEST PAIN: Status: RESOLVED | Noted: 2022-06-19 | Resolved: 2022-07-02

## 2022-07-02 LAB
ALBUMIN SERPL BCP-MCNC: 2.8 G/DL (ref 3.5–5.2)
ALP SERPL-CCNC: 90 U/L (ref 55–135)
ALT SERPL W/O P-5'-P-CCNC: 44 U/L (ref 10–44)
ANION GAP SERPL CALC-SCNC: 7 MMOL/L (ref 8–16)
AST SERPL-CCNC: 96 U/L (ref 10–40)
BASOPHILS # BLD AUTO: 0.06 K/UL (ref 0–0.2)
BASOPHILS NFR BLD: 0.8 % (ref 0–1.9)
BILIRUB SERPL-MCNC: 1.2 MG/DL (ref 0.1–1)
BUN SERPL-MCNC: 9 MG/DL (ref 6–20)
CALCIUM SERPL-MCNC: 8.8 MG/DL (ref 8.7–10.5)
CHLORIDE SERPL-SCNC: 107 MMOL/L (ref 95–110)
CO2 SERPL-SCNC: 22 MMOL/L (ref 23–29)
CREAT SERPL-MCNC: 0.7 MG/DL (ref 0.5–1.4)
DIFFERENTIAL METHOD: ABNORMAL
EOSINOPHIL # BLD AUTO: 0.1 K/UL (ref 0–0.5)
EOSINOPHIL NFR BLD: 1.7 % (ref 0–8)
ERYTHROCYTE [DISTWIDTH] IN BLOOD BY AUTOMATED COUNT: 12.9 % (ref 11.5–14.5)
EST. GFR  (AFRICAN AMERICAN): >60 ML/MIN/1.73 M^2
EST. GFR  (NON AFRICAN AMERICAN): >60 ML/MIN/1.73 M^2
GLUCOSE SERPL-MCNC: 83 MG/DL (ref 70–110)
HCT VFR BLD AUTO: 38.9 % (ref 40–54)
HGB BLD-MCNC: 12.5 G/DL (ref 14–18)
IMM GRANULOCYTES # BLD AUTO: 0.04 K/UL (ref 0–0.04)
IMM GRANULOCYTES NFR BLD AUTO: 0.5 % (ref 0–0.5)
INR PPP: 1 (ref 0.8–1.2)
LYMPHOCYTES # BLD AUTO: 1.1 K/UL (ref 1–4.8)
LYMPHOCYTES NFR BLD: 14 % (ref 18–48)
MAGNESIUM SERPL-MCNC: 2 MG/DL (ref 1.6–2.6)
MCH RBC QN AUTO: 33.9 PG (ref 27–31)
MCHC RBC AUTO-ENTMCNC: 32.1 G/DL (ref 32–36)
MCV RBC AUTO: 105 FL (ref 82–98)
MONOCYTES # BLD AUTO: 0.7 K/UL (ref 0.3–1)
MONOCYTES NFR BLD: 8.9 % (ref 4–15)
NEUTROPHILS # BLD AUTO: 5.8 K/UL (ref 1.8–7.7)
NEUTROPHILS NFR BLD: 74.1 % (ref 38–73)
NRBC BLD-RTO: 0 /100 WBC
PLATELET # BLD AUTO: 358 K/UL (ref 150–450)
PMV BLD AUTO: 10.5 FL (ref 9.2–12.9)
POTASSIUM SERPL-SCNC: 4 MMOL/L (ref 3.5–5.1)
PROT SERPL-MCNC: 6.8 G/DL (ref 6–8.4)
PROTHROMBIN TIME: 10.9 SEC (ref 9–12.5)
RBC # BLD AUTO: 3.69 M/UL (ref 4.6–6.2)
SODIUM SERPL-SCNC: 136 MMOL/L (ref 136–145)
WBC # BLD AUTO: 7.87 K/UL (ref 3.9–12.7)

## 2022-07-02 PROCEDURE — 36415 COLL VENOUS BLD VENIPUNCTURE: CPT | Performed by: NURSE PRACTITIONER

## 2022-07-02 PROCEDURE — 80053 COMPREHEN METABOLIC PANEL: CPT | Performed by: NURSE PRACTITIONER

## 2022-07-02 PROCEDURE — 25000003 PHARM REV CODE 250: Performed by: INTERNAL MEDICINE

## 2022-07-02 PROCEDURE — 99239 PR HOSPITAL DISCHARGE DAY,>30 MIN: ICD-10-PCS | Mod: ,,, | Performed by: INTERNAL MEDICINE

## 2022-07-02 PROCEDURE — 99239 HOSP IP/OBS DSCHRG MGMT >30: CPT | Mod: ,,, | Performed by: INTERNAL MEDICINE

## 2022-07-02 PROCEDURE — 85610 PROTHROMBIN TIME: CPT | Performed by: NURSE PRACTITIONER

## 2022-07-02 PROCEDURE — 85025 COMPLETE CBC W/AUTO DIFF WBC: CPT | Performed by: STUDENT IN AN ORGANIZED HEALTH CARE EDUCATION/TRAINING PROGRAM

## 2022-07-02 PROCEDURE — 25000003 PHARM REV CODE 250: Performed by: STUDENT IN AN ORGANIZED HEALTH CARE EDUCATION/TRAINING PROGRAM

## 2022-07-02 PROCEDURE — 83735 ASSAY OF MAGNESIUM: CPT | Performed by: STUDENT IN AN ORGANIZED HEALTH CARE EDUCATION/TRAINING PROGRAM

## 2022-07-02 RX ORDER — NITROGLYCERIN 0.4 MG/1
0.4 TABLET SUBLINGUAL EVERY 5 MIN PRN
Qty: 30 TABLET | Refills: 3 | Status: SHIPPED | OUTPATIENT
Start: 2022-07-02 | End: 2024-02-28

## 2022-07-02 RX ORDER — NAPROXEN SODIUM 220 MG/1
81 TABLET, FILM COATED ORAL DAILY
Qty: 90 TABLET | Refills: 3 | Status: SHIPPED | OUTPATIENT
Start: 2022-07-03 | End: 2024-02-23

## 2022-07-02 RX ORDER — VALSARTAN 40 MG/1
40 TABLET ORAL 2 TIMES DAILY
Qty: 180 TABLET | Refills: 3 | Status: SHIPPED | OUTPATIENT
Start: 2022-07-02 | End: 2022-07-11

## 2022-07-02 RX ORDER — VALSARTAN 40 MG/1
40 TABLET ORAL 2 TIMES DAILY
Status: DISCONTINUED | OUTPATIENT
Start: 2022-07-02 | End: 2022-07-02 | Stop reason: HOSPADM

## 2022-07-02 RX ORDER — ATORVASTATIN CALCIUM 40 MG/1
40 TABLET, FILM COATED ORAL NIGHTLY
Qty: 90 TABLET | Refills: 3 | Status: SHIPPED | OUTPATIENT
Start: 2022-07-02 | End: 2023-06-12 | Stop reason: SDUPTHER

## 2022-07-02 RX ORDER — METOPROLOL SUCCINATE 25 MG/1
25 TABLET, EXTENDED RELEASE ORAL DAILY
Qty: 90 TABLET | Refills: 11 | Status: SHIPPED | OUTPATIENT
Start: 2022-07-02 | End: 2024-03-05

## 2022-07-02 RX ORDER — CLOPIDOGREL BISULFATE 75 MG/1
75 TABLET ORAL DAILY
Qty: 90 TABLET | Refills: 11 | Status: SHIPPED | OUTPATIENT
Start: 2022-07-03 | End: 2024-02-23

## 2022-07-02 RX ADMIN — CLOPIDOGREL 75 MG: 75 TABLET, FILM COATED ORAL at 08:07

## 2022-07-02 RX ADMIN — OXYCODONE HYDROCHLORIDE 10 MG: 10 TABLET ORAL at 04:07

## 2022-07-02 RX ADMIN — OXYCODONE 5 MG: 5 TABLET ORAL at 08:07

## 2022-07-02 RX ADMIN — VALSARTAN 40 MG: 40 TABLET, FILM COATED ORAL at 08:07

## 2022-07-02 RX ADMIN — ASPIRIN 81 MG CHEWABLE TABLET 81 MG: 81 TABLET CHEWABLE at 08:07

## 2022-07-02 NOTE — DISCHARGE SUMMARY
Interventional Cardiology Discharge Summary  [facility]    Admit Date: 6/27/2022  Discharge Date: 7/2/2022    Admit Provider: Xavier Shultz MD  Discharge Provider: Elana Osborne MD     HPI:   57 yo with hx of HTN, RA (on methotrexate), and former smoker who is admitted to [facility] for evaluation of NSTEMI. He presented to OSH ([facility]) for heavy, substernal chest pain associated with nausea, diaphoresis, and radiation down his left arm. For the previous two weeks he had been having intermittent chest pain with exertion relieved with rest. At [facility] he was started on ACS protocol and transferred to [facility]. C there showed LM 80-90%, 90% LCX< 80% OM2,  RCA with collaterals. TTE with EF of 30-35%. At [facility], he developed a rectus sheath hematoma so heparin was held. He was transferred to [facility] for CABG evaluation.      but declined due to fibrotic liver disease. He has intermittent chest pain daily but denies dyspnea, lower extremity swelling, or orthopnea. Transferred to CCU for high risk PCI.     Hospital Course:   On presentation Mr Aviles was without chest pain, hemodynamically stable and not in acute heart failure. He was initially admitted to cardiothoracic surgery for CABG eval. He had persistent transaminitis and ultimately underwent liver biopsy given history of alcohol use and methotrexate. He was found to have active inflammation and stage III bridging fibrosis. He was compensated from a hepatic standpoint however declined for CABG due to this. He was transferred to CCU service for complex PCI. On 7/1 he underwent left heart cath with PCI to left main with good angiographic result. Unfortunately his LCx artery was very tortuous and unable to cross lesion for PCI. His RCA  was not intervened. He remained stable and asymptomatic, ultimately discharged on 7/2 with appropriate GDMT. He will follow up with general and interventional cardiology for  further management. Additionally given history of liver fibrosis and methotrexate use we advised prompt follow up with both rheumatology and hepatology on discharge.     Vitals:    07/02/22 1143   BP:    Pulse: 78   Resp:    Temp:      Physical Exam:   Constitutional: no acute distress  HEENT: NCAT, EOMI, no scleral icterus  Cardiovascular: Regular rate and rhythm, no murmurs appreciated. 2+ carotid, radial, DP pulses bilaterally    Pulmonary: Clear to auscultation bilaterally   Abdomen: nontender, non-distended   Neuro: alert and oriented, no focal deficits  Extremities: warm, no edema   MSK: no deformities  Integument: intact, no rashes          Medication List      START taking these medications    aspirin 81 MG Chew  Take 1 tablet (81 mg total) by mouth once daily.  Start taking on: July 3, 2022     atorvastatin 40 MG tablet  Commonly known as: LIPITOR  Take 1 tablet (40 mg total) by mouth every evening.     clopidogreL 75 mg tablet  Commonly known as: PLAVIX  Take 1 tablet (75 mg total) by mouth once daily.  Start taking on: July 3, 2022     metoprolol succinate 25 MG 24 hr tablet  Commonly known as: TOPROL-XL  Take 1 tablet (25 mg total) by mouth once daily.     nitroGLYCERIN 0.4 MG SL tablet  Commonly known as: NITROSTAT  Place 1 tablet (0.4 mg total) under the tongue every 5 (five) minutes as needed for Chest pain.     valsartan 40 MG tablet  Commonly known as: DIOVAN  Take 1 tablet (40 mg total) by mouth 2 (two) times daily.        CHANGE how you take these medications    folic acid 1 MG tablet  Commonly known as: FOLVITE  Take 1 tablet (1,000 mcg total) by mouth once daily.  What changed: additional instructions     methotrexate 2.5 MG Tab  Take 6 tablets (15 mg total) by mouth every 7 days.  What changed: additional instructions        CONTINUE taking these medications    predniSONE 20 MG tablet  Commonly known as: DELTASONE     sertraline 50 MG tablet  Commonly known as: ZOLOFT  Take 1 tablet (50 mg  total) by mouth every evening.        STOP taking these medications    doxazosin 2 MG tablet  Commonly known as: CARDURA           Where to Get Your Medications      These medications were sent to [facility]/pharmacy #[id number] - [address] AT CORNER OF [address] [address]    Phone: [phone]   · aspirin 81 MG Chew  · atorvastatin 40 MG tablet  · clopidogreL 75 mg tablet  · metoprolol succinate 25 MG 24 hr tablet  · nitroGLYCERIN 0.4 MG SL tablet  · valsartan 40 MG tablet               Lee Santana MD  [facility]  Cardiovascular Disease, PGY-IV

## 2022-07-02 NOTE — NURSING
Pt and wife verbalized understanding of dc paperwork and medications. MD went over referal process with pt and he and his wife verbalized understanding. Both of pt ivs were removed as well as the tele box. Pt vitals wnl and is excited to go home. Pt was wheeled down to car with all of his belongings.

## 2022-07-02 NOTE — CONSULTS
Nutrition-Related Cardiac Education      Time Spent: 10 min    Learners: Pt and Family    Nutrition Education with handouts: Cardiac TLC handout provided detailing how to limit salt and fat intake while abiding by any applicable fluid restriction. Emphasized the importance of diet adherence. Pt verbalized understanding. All questions/concerns addressed. No further needs identified.    Comments: Pt reports good appetite. Noted 6lb (6%) wt loss in a week, which is significant, unsure of accuracy. Pt appears nourished physically. Pt do not meet criteria for malnutrition at this time, but is at risk if PO intake decrease.     Barriers to Learning: No    Follow up: Yes    Please consult as needed.  Thank you!    Chula HIGUERA

## 2022-07-02 NOTE — PLAN OF CARE
Problem: Adult Inpatient Plan of Care  Goal: Plan of Care Review  Outcome: Ongoing, Progressing  Goal: Patient-Specific Goal (Individualized)  Outcome: Ongoing, Progressing  Goal: Readiness for Transition of Care  Outcome: Ongoing, Progressing     Problem: Fall Injury Risk  Goal: Absence of Fall and Fall-Related Injury  Outcome: Ongoing, Progressing

## 2022-07-04 NOTE — PLAN OF CARE
[facility]  Discharge Final Note    Primary Care Provider: Shane Lewis MD    Expected Discharge Date: 7/2/2022    Final Discharge Note (most recent)     Final Note - 07/04/22 0824        Final Note    Assessment Type Final Discharge Note     Anticipated Discharge Disposition Home or Self Care     Hospital Resources/Appts/Education Provided Provided patient/caregiver with written discharge plan information;Appointments scheduled by Navigator/Coordinator;Post-Acute resouces added to AVS                 Important Message from Medicare             Contact Info     [facility] - [facility]   Specialty: Cardiology    [address]  [place] [address]   Phone: [phone]       Next Steps: Follow up in 1 week(s)    [facility] INTERVENTIONAL CARDIOLOGY   Specialty: Interventional Cardiology    [address] [facility] [address] [address]   Phone: [phone]       Next Steps: Follow up in 1 month(s)    [facility] HEPATOLOGY   Specialty: Hepatology    [address] [facility] [address]   Phone: [phone]       Next Steps: Follow up in 1 month(s)      LISA Alexandra, RN    [phone]

## 2022-07-05 LAB
POC ACTIVATED CLOTTING TIME K: 219 SEC (ref 74–137)
SAMPLE: ABNORMAL

## 2022-07-06 ENCOUNTER — OFFICE VISIT (OUTPATIENT)
Dept: HEPATOLOGY | Facility: CLINIC | Age: 57
End: 2022-07-06
Payer: COMMERCIAL

## 2022-07-06 ENCOUNTER — LAB VISIT (OUTPATIENT)
Dept: LAB | Facility: HOSPITAL | Age: 57
End: 2022-07-06
Payer: COMMERCIAL

## 2022-07-06 VITALS
BODY MASS INDEX: 29.76 KG/M2 | TEMPERATURE: 97 F | HEIGHT: 70 IN | WEIGHT: 207.88 LBS | RESPIRATION RATE: 18 BRPM | HEART RATE: 84 BPM | OXYGEN SATURATION: 96 % | DIASTOLIC BLOOD PRESSURE: 62 MMHG | SYSTOLIC BLOOD PRESSURE: 96 MMHG

## 2022-07-06 DIAGNOSIS — F51.4 NIGHT TERRORS: ICD-10-CM

## 2022-07-06 DIAGNOSIS — K76.9 LIVER LESION: ICD-10-CM

## 2022-07-06 DIAGNOSIS — K74.02 HEPATIC FIBROSIS, STAGE 3: ICD-10-CM

## 2022-07-06 DIAGNOSIS — R79.89 ELEVATED FERRITIN: ICD-10-CM

## 2022-07-06 DIAGNOSIS — K76.9 LIVER DISEASE, UNSPECIFIED: ICD-10-CM

## 2022-07-06 DIAGNOSIS — K74.02 HEPATIC FIBROSIS, STAGE 3: Primary | ICD-10-CM

## 2022-07-06 DIAGNOSIS — R93.2 ABNORMAL LIVER ULTRASOUND: ICD-10-CM

## 2022-07-06 PROBLEM — K74.00 LIVER FIBROSIS: Status: RESOLVED | Noted: 2022-06-30 | Resolved: 2022-07-06

## 2022-07-06 LAB — AFP SERPL-MCNC: 3.7 NG/ML (ref 0–8.4)

## 2022-07-06 PROCEDURE — 1159F PR MEDICATION LIST DOCUMENTED IN MEDICAL RECORD: ICD-10-PCS | Mod: CPTII,S$GLB,, | Performed by: NURSE PRACTITIONER

## 2022-07-06 PROCEDURE — 81256 HFE GENE: CPT | Performed by: NURSE PRACTITIONER

## 2022-07-06 PROCEDURE — 3074F PR MOST RECENT SYSTOLIC BLOOD PRESSURE < 130 MM HG: ICD-10-PCS | Mod: CPTII,S$GLB,, | Performed by: NURSE PRACTITIONER

## 2022-07-06 PROCEDURE — 3078F PR MOST RECENT DIASTOLIC BLOOD PRESSURE < 80 MM HG: ICD-10-PCS | Mod: CPTII,S$GLB,, | Performed by: NURSE PRACTITIONER

## 2022-07-06 PROCEDURE — 82105 ALPHA-FETOPROTEIN SERUM: CPT | Performed by: NURSE PRACTITIONER

## 2022-07-06 PROCEDURE — 3074F SYST BP LT 130 MM HG: CPT | Mod: CPTII,S$GLB,, | Performed by: NURSE PRACTITIONER

## 2022-07-06 PROCEDURE — 1111F PR DISCHARGE MEDS RECONCILED W/ CURRENT OUTPATIENT MED LIST: ICD-10-PCS | Mod: CPTII,S$GLB,, | Performed by: NURSE PRACTITIONER

## 2022-07-06 PROCEDURE — 99204 OFFICE O/P NEW MOD 45 MIN: CPT | Mod: S$GLB,,, | Performed by: NURSE PRACTITIONER

## 2022-07-06 PROCEDURE — 3078F DIAST BP <80 MM HG: CPT | Mod: CPTII,S$GLB,, | Performed by: NURSE PRACTITIONER

## 2022-07-06 PROCEDURE — 1159F MED LIST DOCD IN RCRD: CPT | Mod: CPTII,S$GLB,, | Performed by: NURSE PRACTITIONER

## 2022-07-06 PROCEDURE — 1160F PR REVIEW ALL MEDS BY PRESCRIBER/CLIN PHARMACIST DOCUMENTED: ICD-10-PCS | Mod: CPTII,S$GLB,, | Performed by: NURSE PRACTITIONER

## 2022-07-06 PROCEDURE — 99999 PR PBB SHADOW E&M-EST. PATIENT-LVL V: ICD-10-PCS | Mod: PBBFAC,,, | Performed by: NURSE PRACTITIONER

## 2022-07-06 PROCEDURE — 3044F HG A1C LEVEL LT 7.0%: CPT | Mod: CPTII,S$GLB,, | Performed by: NURSE PRACTITIONER

## 2022-07-06 PROCEDURE — 3044F PR MOST RECENT HEMOGLOBIN A1C LEVEL <7.0%: ICD-10-PCS | Mod: CPTII,S$GLB,, | Performed by: NURSE PRACTITIONER

## 2022-07-06 PROCEDURE — 1160F RVW MEDS BY RX/DR IN RCRD: CPT | Mod: CPTII,S$GLB,, | Performed by: NURSE PRACTITIONER

## 2022-07-06 PROCEDURE — 1111F DSCHRG MED/CURRENT MED MERGE: CPT | Mod: CPTII,S$GLB,, | Performed by: NURSE PRACTITIONER

## 2022-07-06 PROCEDURE — 4010F ACE/ARB THERAPY RXD/TAKEN: CPT | Mod: CPTII,S$GLB,, | Performed by: NURSE PRACTITIONER

## 2022-07-06 PROCEDURE — 99999 PR PBB SHADOW E&M-EST. PATIENT-LVL V: CPT | Mod: PBBFAC,,, | Performed by: NURSE PRACTITIONER

## 2022-07-06 PROCEDURE — 3008F BODY MASS INDEX DOCD: CPT | Mod: CPTII,S$GLB,, | Performed by: NURSE PRACTITIONER

## 2022-07-06 PROCEDURE — 4010F PR ACE/ARB THEARPY RXD/TAKEN: ICD-10-PCS | Mod: CPTII,S$GLB,, | Performed by: NURSE PRACTITIONER

## 2022-07-06 PROCEDURE — 99204 PR OFFICE/OUTPT VISIT, NEW, LEVL IV, 45-59 MIN: ICD-10-PCS | Mod: S$GLB,,, | Performed by: NURSE PRACTITIONER

## 2022-07-06 PROCEDURE — 3008F PR BODY MASS INDEX (BMI) DOCUMENTED: ICD-10-PCS | Mod: CPTII,S$GLB,, | Performed by: NURSE PRACTITIONER

## 2022-07-06 NOTE — Clinical Note
Bassam Lewis: He had a liver biopsy recently that noted near cirrhosis (with some concerns for progression to cirrhosis based on labs). Therefore, he may benefit from a medication other than MTX because of the liver fibrosis risk. Just FYI Thanks

## 2022-07-06 NOTE — PATIENT INSTRUCTIONS
This is a web site that you may find helpful about cirrhosis : https://cirrhosiscare.ca/    Because you have near cirrhosis on your biopsy, it is important to attend clinic visits every 6 months with an Ultrasound and blood tests every 6 months to screen for liver cancer (you are at risk of developing liver cancer due to scar tissue in the liver)    Signs and symptoms of worsening liver disease include jaundice, fluid in the belly (ascites), and confusion/disorientation/slowed thought processes due to hepatic encephalopathy (toxins building up because of liver problems).   You should seek medical attention if any of these things occur.    Also, possible bleeding from esophageal varices (blood vessels in the stomach and foodpipe can burst and cause fatal bleeding).  Therefore, if you have symptoms of vomiting blood, blood in your stool, dark or black stools or vomiting coffee ground vomit, YOU SHOULD GO TO THE EMERGENCY ROOM IMMEDIATELY.     Cirrhosis can increase the risk of liver cancer, liver failure, and death. However, we will watch your liver function score (MELD score) closely with each clinic visit. A normal MELD score is 6, highest is 40. Your last one was an 7. We will check this with every clinic visit. A MELD 15 or higher is when we start to consider transplant because MELD 15 or higher indicates that the liver is not functioning as well     Cirrhosis Counseling  - NO alcohol use (includes beer, wine, and/or liquor)  - avoid non-steroidal anti-inflammatory drugs (NSAIDs) such as ibuprofen, Motrin, naprosyn, Alleve due to the risk of kidney damage  - can take acetaminophen (Tylenol), no more than 2000 mg per day  - low sodium (salt) 2 gram per day diet  - high protein diet: 110 grams per day to prevent muscle mass loss. Drink at least 1 protein shake daily (Premier Protein is best option because it is very high protein and low sugar). Ok to use this as nighttime snack to fit it in   - resistance exercises  for muscle strength  - avoid raw seafoods due to the risk of fatal Vibrio vulnificus infection  - ultrasound of the liver every 6 months for liver cancer screening (you are at risk of developing liver cancer due to scar tissue in the liver)  - Upper endoscopy every 1-2 years to screen for varices in the stomach and foodpipe which can burst and cause fatal bleeding

## 2022-07-06 NOTE — PROGRESS NOTES
"[facility] VISIT NEW PT NOTE    REFERRING PROVIDER:  Lesli Granado  PCP: Shane Lewis MD     CHIEF COMPLAINT: pre-cirrhosis, liver lesion     HPI: This is a 56 y.o. 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 per biopsy 6/2022     Diagnosis of pre-cirrhosis based on biopsy done 6/2022 while inpatient for MI  However, concern for cirrhosis given elevated bili and AST>ALT    Previous serologic w/u was negative for Thad's, alpha-1 antitrypsin deficiency, autoimmune etiology, and viral hepatitis A, B and C  Markedly elevated ferritin, low iron, will obtain HH DNA although likely r/t alcohol   No past PETH given known heavy alcohol history    Prior serologic workup:   Lab Results   Component Value Date    SMOOTHMUSCAB Positive 1:40 (A) 06/27/2022    IGGSERUM 1169 06/27/2022    ANASCREEN Negative <1:80 06/27/2022    FERRITIN 1,580 (H) 06/27/2022    FESATURATED 17 (L) 06/27/2022    [id number] SEE BELOW 06/27/2022    VSYBA4NQSKVV 253 (H) 06/27/2022    CERULOPLSM 38.0 06/27/2022    HEPBSAG Negative 06/27/2022    HEPCAB Negative 06/27/2022    HEPAIGM Negative 06/27/2022     Risk factors for NAFLD include obesity, HTN, HLD, DM    Interval HPI: Presents today with significant other. Has been taking Statins for year. Reports he had a "blood test for scar tissue" in 2004 which noted pre-cirrhosis and was told to stop alcohol at that time and stopped for a short period of time but resumed 8-12 beers daily for years. Recently 6 months ago had been drinking ~ 12-16 beers daily but stopped all alcohol since hospitalization in June TJ liver biopsy while hospitalized noted mild PH and near cirrhosis   No s/s of hepatic decompensation: no ascites, HE or h/o variceal bleeding  On MTX long term (~8 years), followed by Dr. Lewis rheumatology     Abd U/S done 6/2022 showed fatty liver, a 4.1 cm relatively well-circumscribed, mildly " hypoechoic lesion in the right hepatic lobe of unclear etiology. No splenomegaly     Lab Results   Component Value Date    ALT 44 07/02/2022    AST 96 (H) 07/02/2022    ALKPHOS 90 07/02/2022    BILITOT 1.2 (H) 07/02/2022    ALBUMIN 2.8 (L) 07/02/2022    INR 1.0 07/02/2022     07/02/2022     MELD-Na score: 7 at 7/2/2022  4:35 AM  MELD score: 7 at 7/2/2022  4:35 AM  Calculated from:  Serum Creatinine: 0.7 mg/dL (Using min of 1 mg/dL) at 7/2/2022  4:35 AM  Serum Sodium: 136 mmol/L at 7/2/2022  4:35 AM  Total Bilirubin: 1.2 mg/dL at 7/2/2022  4:35 AM  INR(ratio): 1.0 at 7/2/2022  4:35 AM  Age: 56 years  MELD has been low, indicating no benefit to liver transplant currently    Cirrhosis Health Maintenance:   -- Last EGD : none, will arrange given concern for possible cirrhosis on labs  -- Due for next colonoscopy : 2031  -- HCC screening   U/S: needs MRI soon    AFP will do today   No results found for: AFP    -- Immunity to Hep A and B - will check today     + heavy alcohol consumption  Social History     Substance and Sexual Activity   Alcohol Use Not Currently    Comment: previousl;y 8+ beers daily for many years, stopped 6/2022            Allergy and medication list reviewed and updated     PMHX:  has a past medical history of Acute combined systolic and diastolic heart failure (6/21/2022), Arthritis, CAD (coronary artery disease), Hypertension, NSTEMI (non-ST elevated myocardial infarction) (6/19/2022), Primary hypertension (10/11/2021), and Rheumatoid arthritis (10/11/2021).    PSHX:  has a past surgical history that includes Vasectomy; Colonoscopy; Colonoscopy (N/A, 11/22/2021); Left heart catheterization (N/A, 6/20/2022); and Coronary angiography (N/A, 7/1/2022).    FAMILY HISTORY: Updated and reviewed in [facility]    SOCIAL HISTORY:   Social History     Substance and Sexual Activity   Alcohol Use Not Currently    Comment: previousl;y 8+ beers daily for many years, stopped 6/2022       Social History  "    Substance and Sexual Activity   Drug Use Never       ROS:   GENERAL: Denies fatigue  CARDIOVASCULAR: Denies edema  GI: Denies abdominal pain  SKIN: Denies rash, itching   NEURO: Denies confusion, memory loss, or mood changes    PHYSICAL EXAM:   Friendly White male, in no acute distress; alert and oriented to person, place and time  VITALS: BP 96/62 (BP Location: Right arm, Patient Position: Sitting, BP Method: Medium (Automatic))   Pulse 84   Temp 97 °F (36.1 °C) (Oral)   Resp 18   Ht 5' 10" (1.778 m)   Wt 94.3 kg (207 lb 14.3 oz)   SpO2 96%   BMI 29.83 kg/m²   EYES: Sclerae anicteric  GI: Soft, non-tender, non-distended. No ascites.  EXTREMITIES:  No edema.  SKIN: Warm and dry. No jaundice. No telangectasias noted. No palmar erythema.  NEURO:  No asterixis.  PSYCH:  Thought and speech pattern appropriate. Behavior normal      EDUCATION:  See instructions discussed with patient in Instructions section of the After Visit Summary     ASSESSMENT & PLAN:  56 y.o. White male with:  1.  Pre-Cirrhosis, likely due to alcohol, well compensated  --- pre-cirrhosis diagnosis based on liver biopsy. However, concern for cirrhosis given elevated bili and AST>ALT  -- MELD-Na score: 7 at 7/2/2022  4:35 AM  MELD score: 7 at 7/2/2022  4:35 AM  Calculated from:  Serum Creatinine: 0.7 mg/dL (Using min of 1 mg/dL) at 7/2/2022  4:35 AM  Serum Sodium: 136 mmol/L at 7/2/2022  4:35 AM  Total Bilirubin: 1.2 mg/dL at 7/2/2022  4:35 AM  INR(ratio): 1.0 at 7/2/2022  4:35 AM  Age: 56 years  -- HCC screening: AFP and abd. U/S.. U/S showed liver lesion, needs MRI, AFP to be done today   -- Immunity to Hep A and B - . Will check immunity markers for HBV/HAV and arrange for vaccination if needed   -- EGD none, will arrange given concern for possible cirrhosis on labs  --- Serological workup in \[facility] [place] [facility]\""  -- Cirrhosis counseling as noted above and discussed with patient    2. Elevated ferritin  -- likely due to alcohol but will obtain HH " DNA    3. MTX use  -- do not recommend given pre-cirrhosis, concern for cirrhosis and potential for worsening fibrosis with MTX, message sent to Dr. Lewis    4.   Liver mass  -- MRI soon  -- AFP today     5. Alcohol use in the past  -- recommend abstinence for life, pt aware, doing well so far. Aware to reach out for addiction treatment if he ever has desire to drink alcohol again     6. Insomnia, night terrors  -- referral to sleep medicine clinic, given contact phone number for pt to call and schedule     7. Portal HTN  -- mild PH on TJ biopsy portal pressure measurements        Follow up in about 2 weeks (around 7/20/2022). with MRI before  Orders Placed This Encounter   Procedures    MRI Abdomen W WO Contrast    AFP Tumor Marker    Comprehensive Metabolic Panel    Hemochromatosis DNA Analysis (PCR)    AFP Tumor Marker    CBC Without Differential    Comprehensive Metabolic Panel    Phosphatidylethanol (PETH)    Protime-INR    Ferritin    Iron and TIBC    Ambulatory referral/consult to Sleep Disorders        Thank you for allowing me to participate in the care of Andrae Aidenbunny    GABE Franco    I spent a total of 45 minutes on the day of the visit.This includes face to face time and non-face to face time preparing to see the patient (eg, review of tests), obtaining and/or reviewing separately obtained history, documenting clinical information in the electronic or other health record, independently interpreting results and communicating results to the patient/family/caregiver, and coordinating care.         CC'ed note to:   Lesli Granado, NP  Shane Lewis MD

## 2022-07-07 ENCOUNTER — TELEPHONE (OUTPATIENT)
Dept: CARDIOLOGY | Facility: CLINIC | Age: 57
End: 2022-07-07
Payer: COMMERCIAL

## 2022-07-07 NOTE — TELEPHONE ENCOUNTER
----- Message from Ric Chavez III, MD sent at 7/7/2022  9:34 AM CDT -----  Regarding: FW: appt req  Contact: Pt    ----- Message -----  From: Mariam Campuzano  Sent: 7/7/2022   9:29 AM CDT  To: [facility] Clinical Support  Subject: appt req                                         Type: Appointment Request    Caller is requesting an appointment for a new patient.    Name of Caller:  Pt  Reason for appointment:  cardiac rehab  Would the patient rather a call back or a response via [facility]? Call back  Best Call Back Number:  [phone]  Additional Information: n/a

## 2022-07-08 ENCOUNTER — PATIENT MESSAGE (OUTPATIENT)
Dept: HEPATOLOGY | Facility: CLINIC | Age: 57
End: 2022-07-08
Payer: COMMERCIAL

## 2022-07-08 RX ORDER — SULFASALAZINE 500 MG/1
500 TABLET, DELAYED RELEASE ORAL 2 TIMES DAILY
Qty: 60 TABLET | Refills: 3 | Status: SHIPPED | OUTPATIENT
Start: 2022-07-08 | End: 2022-09-06 | Stop reason: CLARIF

## 2022-07-08 NOTE — TELEPHONE ENCOUNTER
Message received from rheum, changed from MTX to sulfasalazine, will arrange monthly labs x3 months, will arrange at f/u appt in July

## 2022-07-11 ENCOUNTER — OFFICE VISIT (OUTPATIENT)
Dept: CARDIOLOGY | Facility: CLINIC | Age: 57
End: 2022-07-11
Payer: COMMERCIAL

## 2022-07-11 VITALS
DIASTOLIC BLOOD PRESSURE: 62 MMHG | WEIGHT: 207.13 LBS | HEART RATE: 80 BPM | HEIGHT: 70 IN | BODY MASS INDEX: 29.65 KG/M2 | SYSTOLIC BLOOD PRESSURE: 80 MMHG | OXYGEN SATURATION: 97 %

## 2022-07-11 DIAGNOSIS — I10 PRIMARY HYPERTENSION: Primary | ICD-10-CM

## 2022-07-11 DIAGNOSIS — M06.9 RHEUMATOID ARTHRITIS, INVOLVING UNSPECIFIED SITE, UNSPECIFIED WHETHER RHEUMATOID FACTOR PRESENT: ICD-10-CM

## 2022-07-11 DIAGNOSIS — I21.4 NSTEMI (NON-ST ELEVATED MYOCARDIAL INFARCTION): ICD-10-CM

## 2022-07-11 DIAGNOSIS — K74.02 HEPATIC FIBROSIS, STAGE 3: ICD-10-CM

## 2022-07-11 DIAGNOSIS — E78.00 PURE HYPERCHOLESTEROLEMIA: ICD-10-CM

## 2022-07-11 DIAGNOSIS — I50.41 ACUTE COMBINED SYSTOLIC AND DIASTOLIC HEART FAILURE: ICD-10-CM

## 2022-07-11 DIAGNOSIS — I25.10 CORONARY ARTERY DISEASE INVOLVING NATIVE CORONARY ARTERY OF NATIVE HEART WITHOUT ANGINA PECTORIS: ICD-10-CM

## 2022-07-11 LAB
GENETICIST REVIEW: NORMAL
HFE GENE MUT ANL BLD/T: NORMAL
HFE RELEASED BY: NORMAL
HFE RESULT SUMMARY: NORMAL
REF LAB TEST METHOD: NORMAL
SPECIMEN SOURCE: NORMAL
SPECIMEN,  HEMOCHROMATOSIS: NORMAL

## 2022-07-11 PROCEDURE — 1111F DSCHRG MED/CURRENT MED MERGE: CPT | Mod: CPTII,S$GLB,, | Performed by: INTERNAL MEDICINE

## 2022-07-11 PROCEDURE — 1111F PR DISCHARGE MEDS RECONCILED W/ CURRENT OUTPATIENT MED LIST: ICD-10-PCS | Mod: CPTII,S$GLB,, | Performed by: INTERNAL MEDICINE

## 2022-07-11 PROCEDURE — 99999 PR PBB SHADOW E&M-EST. PATIENT-LVL IV: CPT | Mod: PBBFAC,,, | Performed by: INTERNAL MEDICINE

## 2022-07-11 PROCEDURE — 3008F BODY MASS INDEX DOCD: CPT | Mod: CPTII,S$GLB,, | Performed by: INTERNAL MEDICINE

## 2022-07-11 PROCEDURE — 1159F PR MEDICATION LIST DOCUMENTED IN MEDICAL RECORD: ICD-10-PCS | Mod: CPTII,S$GLB,, | Performed by: INTERNAL MEDICINE

## 2022-07-11 PROCEDURE — 99215 OFFICE O/P EST HI 40 MIN: CPT | Mod: S$GLB,,, | Performed by: INTERNAL MEDICINE

## 2022-07-11 PROCEDURE — 4010F ACE/ARB THERAPY RXD/TAKEN: CPT | Mod: CPTII,S$GLB,, | Performed by: INTERNAL MEDICINE

## 2022-07-11 PROCEDURE — 3044F PR MOST RECENT HEMOGLOBIN A1C LEVEL <7.0%: ICD-10-PCS | Mod: CPTII,S$GLB,, | Performed by: INTERNAL MEDICINE

## 2022-07-11 PROCEDURE — 99215 PR OFFICE/OUTPT VISIT, EST, LEVL V, 40-54 MIN: ICD-10-PCS | Mod: S$GLB,,, | Performed by: INTERNAL MEDICINE

## 2022-07-11 PROCEDURE — 3044F HG A1C LEVEL LT 7.0%: CPT | Mod: CPTII,S$GLB,, | Performed by: INTERNAL MEDICINE

## 2022-07-11 PROCEDURE — 99999 PR PBB SHADOW E&M-EST. PATIENT-LVL IV: ICD-10-PCS | Mod: PBBFAC,,, | Performed by: INTERNAL MEDICINE

## 2022-07-11 PROCEDURE — 3074F PR MOST RECENT SYSTOLIC BLOOD PRESSURE < 130 MM HG: ICD-10-PCS | Mod: CPTII,S$GLB,, | Performed by: INTERNAL MEDICINE

## 2022-07-11 PROCEDURE — 3008F PR BODY MASS INDEX (BMI) DOCUMENTED: ICD-10-PCS | Mod: CPTII,S$GLB,, | Performed by: INTERNAL MEDICINE

## 2022-07-11 PROCEDURE — 3078F PR MOST RECENT DIASTOLIC BLOOD PRESSURE < 80 MM HG: ICD-10-PCS | Mod: CPTII,S$GLB,, | Performed by: INTERNAL MEDICINE

## 2022-07-11 PROCEDURE — 3074F SYST BP LT 130 MM HG: CPT | Mod: CPTII,S$GLB,, | Performed by: INTERNAL MEDICINE

## 2022-07-11 PROCEDURE — 4010F PR ACE/ARB THEARPY RXD/TAKEN: ICD-10-PCS | Mod: CPTII,S$GLB,, | Performed by: INTERNAL MEDICINE

## 2022-07-11 PROCEDURE — 1159F MED LIST DOCD IN RCRD: CPT | Mod: CPTII,S$GLB,, | Performed by: INTERNAL MEDICINE

## 2022-07-11 PROCEDURE — 3078F DIAST BP <80 MM HG: CPT | Mod: CPTII,S$GLB,, | Performed by: INTERNAL MEDICINE

## 2022-07-11 RX ORDER — VALSARTAN 40 MG/1
40 TABLET ORAL DAILY
Qty: 90 TABLET | Refills: 3
Start: 2022-07-11 | End: 2023-07-06 | Stop reason: SDUPTHER

## 2022-07-11 NOTE — PROGRESS NOTES
[facility]     Subjective:    Patient ID:  Andrae Aviles is a 56 y.o. male who presents for follow-up of No chief complaint on file.    Review of patient's allergies indicates:  No Known Allergies   He had recent left main stent July 4th for 99% calcific stenosis.  A 4.5 diameter stent 12 mm was placed in the proximal non ostial position.  There was unsuccessful stenting/PTCA to mid circumflex.  The wire crossed the lesion but the lesion could not be crossed with a balloon.  There was tortuous anatomy calcific disease.  He has totaled right coronary artery.  His ejection fraction by echo was 35% with LV size 6.1 mm. His peak troponin was 18. He developed classic heart attack symptoms and presented to [facility].  Heart catheterization confirmed left main disease and he was transferred for bypass.  Unfortunately he was turned down for surgery due to liver disease.    Since hospital discharge he has not had shortness of breath or chest pain.  He never had classic chest pain but had nausea sweats in marked shortness of breath at rest on day of heart attack.  He is on low-dose metoprolol low-dose Diovan 40 mg b.i.d..  His blood pressure today is 80 systolic.  He does have rheumatoid arthritis and was taking methotrexate.  Hepatology saw him and advised close follow-up with possible subsequent stabilization of cirrhosis with dietary management.  His INR is normal.        Review of Systems   Constitutional: Negative for chills, decreased appetite, diaphoresis, fever, malaise/fatigue, night sweats, weight gain and weight loss.   HENT: Negative for congestion, ear discharge, ear pain, hearing loss, hoarse voice, nosebleeds, odynophagia, sore throat, stridor and tinnitus.    Eyes: Negative for blurred vision, discharge, double vision, pain, photophobia, redness, vision loss in left eye, vision loss in right eye, visual disturbance and  visual halos.   Cardiovascular: Negative for chest pain, claudication, cyanosis, dyspnea on exertion, irregular heartbeat, leg swelling, near-syncope, orthopnea, palpitations, paroxysmal nocturnal dyspnea and syncope.   Respiratory: Negative for cough, hemoptysis, shortness of breath, sleep disturbances due to breathing, snoring, sputum production and wheezing.    Endocrine: Negative for cold intolerance, heat intolerance, polydipsia, polyphagia and polyuria.   Hematologic/Lymphatic: Negative for adenopathy and bleeding problem. Does not bruise/bleed easily.   Skin: Negative for color change, dry skin, flushing, itching, nail changes, poor wound healing, rash, skin cancer, suspicious lesions and unusual hair distribution.   Musculoskeletal: Negative for arthritis, back pain, falls, gout, joint pain, joint swelling, muscle cramps, muscle weakness, myalgias, neck pain and stiffness.   Gastrointestinal: Negative for bloating, abdominal pain, anorexia, change in bowel habit, bowel incontinence, constipation, diarrhea, dysphagia, excessive appetite, flatus, heartburn, hematemesis, hematochezia, hemorrhoids, jaundice, melena, nausea and vomiting.   Genitourinary: Negative for bladder incontinence, decreased libido, dysuria, flank pain, frequency, genital sores, hematuria, hesitancy, incomplete emptying, nocturia and urgency.   Neurological: Negative for aphonia, brief paralysis, difficulty with concentration, disturbances in coordination, excessive daytime sleepiness, dizziness, focal weakness, headaches, light-headedness, loss of balance, numbness, paresthesias, seizures, sensory change, tremors, vertigo and weakness.   Psychiatric/Behavioral: Negative for altered mental status, depression, hallucinations, memory loss, substance abuse, suicidal ideas and thoughts of violence. The patient does not have insomnia and is not nervous/anxious.    Allergic/Immunologic: Negative for hives and persistent infections.       "  Objective:       Vitals:    07/11/22 0839   BP: (!) 80/62   Pulse: 80   SpO2: 97%   Weight: 93.9 kg (207 lb 1.6 oz)   Height: 5' 10" (1.778 m)    Physical Exam  Constitutional:       General: He is not in acute distress.     Appearance: He is well-developed. He is not diaphoretic.   HENT:      Head: Normocephalic and atraumatic.      Nose: Nose normal.   Eyes:      General: No scleral icterus.        Right eye: No discharge.      Conjunctiva/sclera: Conjunctivae normal.      Pupils: Pupils are equal, round, and reactive to light.   Neck:      Thyroid: No thyromegaly.      Vascular: No JVD.      Trachea: No tracheal deviation.   Cardiovascular:      Rate and Rhythm: Normal rate and regular rhythm.      Pulses:           Carotid pulses are 2+ on the right side and 2+ on the left side.       Radial pulses are 2+ on the right side and 2+ on the left side.        Dorsalis pedis pulses are 2+ on the right side and 2+ on the left side.        Posterior tibial pulses are 2+ on the right side and 2+ on the left side.      Heart sounds: Normal heart sounds. No murmur heard.    No friction rub. No gallop.   Pulmonary:      Effort: Pulmonary effort is normal. No respiratory distress.      Breath sounds: Normal breath sounds. No stridor. No wheezing or rales.   Chest:      Chest wall: No tenderness.   Abdominal:      General: Bowel sounds are normal. There is no distension.      Palpations: Abdomen is soft. There is no mass.      Tenderness: There is no abdominal tenderness. There is no guarding or rebound.   Musculoskeletal:         General: No tenderness. Normal range of motion.      Cervical back: Normal range of motion and neck supple.   Lymphadenopathy:      Cervical: No cervical adenopathy.   Skin:     General: Skin is warm and dry.      Coloration: Skin is not pale.      Findings: No erythema or rash.   Neurological:      Mental Status: He is alert and oriented to person, place, and time.      Cranial Nerves: No cranial " nerve deficit.      Coordination: Coordination normal.   Psychiatric:         Behavior: Behavior normal.         Thought Content: Thought content normal.         Judgment: Judgment normal.           Assessment:       1. Primary hypertension    2. NSTEMI (non-ST elevated myocardial infarction)    3. Acute combined systolic and diastolic heart failure    4. Coronary artery disease involving native coronary artery of native heart without angina pectoris    5. Pure hypercholesterolemia    6. Hepatic fibrosis, stage 3    7. Rheumatoid arthritis, involving unspecified site, unspecified whether rheumatoid factor present      Results for orders placed or performed in visit on 07/06/22   Hemochromatosis DNA Analysis (PCR)   Result Value Ref Range    HFE Result Summary COMPLEX (SEE RESULT AND INTERPRETATION)     Hereditary Hemochromatosis SEE BELOW     Interpretation SEE BELOW     Specimen WB Whole Blood     Source Test Not Performed     Method SEE BELOW     HFE Released By Cesar Modi, Ph.D.    AFP Tumor Marker   Result Value Ref Range    AFP 3.7 0.0 - 8.4 ng/mL         Current Outpatient Medications:     aspirin 81 MG Chew, Take 1 tablet (81 mg total) by mouth once daily., Disp: 90 tablet, Rfl: 3    atorvastatin (LIPITOR) 40 MG tablet, Take 1 tablet (40 mg total) by mouth every evening., Disp: 90 tablet, Rfl: 3    clopidogreL (PLAVIX) 75 mg tablet, Take 1 tablet (75 mg total) by mouth once daily., Disp: 90 tablet, Rfl: 11    folic acid (FOLVITE) 1 MG tablet, Take 1 tablet (1,000 mcg total) by mouth once daily. (Patient taking differently: Take 1,000 mcg by mouth once daily. Takes daily in AM), Disp: 90 tablet, Rfl: 2    metoprolol succinate (TOPROL-XL) 25 MG 24 hr tablet, Take 1 tablet (25 mg total) by mouth once daily., Disp: 90 tablet, Rfl: 11    nitroGLYCERIN (NITROSTAT) 0.4 MG SL tablet, Place 1 tablet (0.4 mg total) under the tongue every 5 (five) minutes as needed for Chest pain., Disp: 30 tablet, Rfl:  3    predniSONE (DELTASONE) 20 MG tablet, Take 20 mg by mouth daily as needed. Not taking, Disp: , Rfl:     sertraline (ZOLOFT) 50 MG tablet, Take 1 tablet (50 mg total) by mouth every evening., Disp: 90 tablet, Rfl: 3    sulfaSALAzine (AZULFIDINE) 500 MG EC tablet, Take 1 tablet (500 mg total) by mouth 2 (two) times daily., Disp: 60 tablet, Rfl: 3    valsartan (DIOVAN) 40 MG tablet, Take 1 tablet (40 mg total) by mouth once daily., Disp: 90 tablet, Rfl: 3     Lab Results   Component Value Date    WBC 7.87 07/02/2022    RBC 3.69 (L) 07/02/2022    HGB 12.5 (L) 07/02/2022    HCT 38.9 (L) 07/02/2022     (H) 07/02/2022    MCH 33.9 (H) 07/02/2022    MCHC 32.1 07/02/2022    RDW 12.9 07/02/2022     07/02/2022    MPV 10.5 07/02/2022    GRAN 5.8 07/02/2022    GRAN 74.1 (H) 07/02/2022    LYMPH 1.1 07/02/2022    LYMPH 14.0 (L) 07/02/2022    MONO 0.7 07/02/2022    MONO 8.9 07/02/2022    EOS 0.1 07/02/2022    BASO 0.06 07/02/2022    EOSINOPHIL 1.7 07/02/2022    BASOPHIL 0.8 07/02/2022    MG 2.0 07/02/2022        CMP  Lab Results   Component Value Date     07/02/2022    K 4.0 07/02/2022     07/02/2022    CO2 22 (L) 07/02/2022    GLU 83 07/02/2022    BUN 9 07/02/2022    CREATININE 0.7 07/02/2022    CALCIUM 8.8 07/02/2022    PROT 6.8 07/02/2022    ALBUMIN 2.8 (L) 07/02/2022    BILITOT 1.2 (H) 07/02/2022    ALKPHOS 90 07/02/2022    AST 96 (H) 07/02/2022    ALT 44 07/02/2022    ANIONGAP 7 (L) 07/02/2022    ESTGFRAFRICA >60.0 07/02/2022    EGFRNONAA >60.0 07/02/2022        No results found for: LABBLOO, LABURIN, RESPIRATORYC, GSRESP         Results for orders placed or performed during the hospital encounter of 06/24/22   EKG 12-LEAD on arrival to floor    Collection Time: 07/01/22  5:58 PM    Narrative    Test Reason : I25.10,I21.4,    Vent. Rate : 079 BPM     Atrial Rate : 079 BPM     P-R Int : 146 ms          QRS Dur : 096 ms      QT Int : 370 ms       P-R-T Axes : 000 145 179 degrees     QTc Int : 424  ms     Suspect arm lead reversal, interpretation assumes no reversal  Sinus rhythm with occasional Premature ventricular complexes  Lateral infarct ,age undetermined  Inferior infarct ,age undetermined  Abnormal ECG  When compared with ECG of 29-JUN-2022 10:20,  Significant changes have occurred  Confirmed by KAVON DUARTE MD ([id number]) on 7/2/2022 10:26:30 PM    Referred By: LANETTE SOMMER           Confirmed By:KAVON DUARTE MD        IR Transjugular Liver Biopsy 06/28/2022 [id number] Final   US Abdomen Limited 06/25/2022 [id number] Final   CT Chest Without Contrast 06/24/2022 [id number] Final            Plan:       Problem List Items Addressed This Visit        Cardiac/Vascular    Primary hypertension - Primary     His blood pressure is quite low today.  Valsartan reduced to 40 mg, he was on 40 mg b.i.d..  He will continue Toprol 25 mg. He is asymptomatic.             Relevant Medications    valsartan (DIOVAN) 40 MG tablet    NSTEMI (non-ST elevated myocardial infarction)     Troponin 18. Symptomatic left main disease as culprit lesion causing NSTEMI supported by presentation.  He is feeling better at this time and has no cardiac symptoms reported.  He has been on low level activity since hospital discharge.             Relevant Orders    Echo    Cardiac PET Scan Stress    Cardiac Rehab Phase II    Acute combined systolic and diastolic heart failure     Ejection fraction 35% range.  LV size 6.1 mm.  Repeat echo and PET stress ordered today.  Hopefully ejection fraction will improve-this is his 1st presentation, no previous assessments of ejection fraction to compare.  Given degree of left main stenosis, he will likely improve ejection fraction closer to normal.           CAD (coronary artery disease)     Sent to [facility] is his 1st presentation of coronary artery disease.  There is RCA total occlusion, high-grade circumflex mid vessel stenosis, non dominant vessel..  We discussed management options.  Follow-up imaging studies  will dictate management.  The biggest problem is that he was turned down for CABG due to liver disease.  He is working with Gastroenterology/hepatology.           Pure hypercholesterolemia     We discussed his lipid status, LDL 93 mg%.  Atorvastatin 40 mg ordered.  He will check with hepatology regarding their opinion for long-term usage.  I told him fortunately his LDL is not markedly elevated. Repatha would be another option.              Immunology/Multi System    Rheumatoid arthritis     A chronic condition.  Medications adjusted including withdrawal of methotrexate.              GI    Hepatic fibrosis, stage 3     He is tolerating aspirin and Plavix without bleeding.  Labs reviewed, INR in normal range.                    An echocardiogram and PET stress are ordered in a month.  He will call if there are any problems.  I have received his Diovan dosing to 40 mg daily.  He will start measuring his blood pressures.  He is on atorvastatin 40 mg.  Pretreatment LDL 93. He will be following up with hepatology.  I will see him back in 2 months.    Most likely, a 2nd attempt at stenting to circumflex to be planned in the future.  I would like his left ventricular function to recover and allow more time for stent maturation/endothelialization in left main prior to attempting stenting to circumflex.  Angiographic films reviewed.  The right coronary artery has a fairly short segment of occlusion but there are good collaterals.               Phani Brown MD  07/12/2022   9:20 AM

## 2022-07-12 PROBLEM — E78.00 PURE HYPERCHOLESTEROLEMIA: Status: ACTIVE | Noted: 2022-07-12

## 2022-07-12 NOTE — ASSESSMENT & PLAN NOTE
We discussed his lipid status, LDL 93 mg%.  Atorvastatin 40 mg ordered.  He will check with hepatology regarding their opinion for long-term usage.  I told him fortunately his LDL is not markedly elevated. Repatha would be another option.

## 2022-07-12 NOTE — ASSESSMENT & PLAN NOTE
His blood pressure is quite low today.  Valsartan reduced to 40 mg, he was on 40 mg b.i.d..  He will continue Toprol 25 mg. He is asymptomatic.

## 2022-07-12 NOTE — ASSESSMENT & PLAN NOTE
Ejection fraction 35% range.  LV size 6.1 mm.  Repeat echo and PET stress ordered today.  Hopefully ejection fraction will improve-this is his 1st presentation, no previous assessments of ejection fraction to compare.  Given degree of left main stenosis, he will likely improve ejection fraction closer to normal.

## 2022-07-12 NOTE — ASSESSMENT & PLAN NOTE
Sent to left main is his 1st presentation of coronary artery disease.  There is RCA total occlusion, high-grade circumflex mid vessel stenosis, non dominant vessel..  We discussed management options.  Follow-up imaging studies will dictate management.  The biggest problem is that he was turned down for CABG due to liver disease.  He is working with Gastroenterology/hepatology.

## 2022-07-12 NOTE — ASSESSMENT & PLAN NOTE
Troponin 18. Symptomatic left main disease as culprit lesion causing NSTEMI supported by presentation.  He is feeling better at this time and has no cardiac symptoms reported.  He has been on low level activity since hospital discharge.

## 2022-07-14 ENCOUNTER — HOSPITAL ENCOUNTER (OUTPATIENT)
Dept: RADIOLOGY | Facility: HOSPITAL | Age: 57
Discharge: HOME | End: 2022-07-14
Attending: NURSE PRACTITIONER
Payer: COMMERCIAL

## 2022-07-14 DIAGNOSIS — K76.9 LIVER DISEASE, UNSPECIFIED: ICD-10-CM

## 2022-07-14 DIAGNOSIS — K76.9 LIVER LESION: ICD-10-CM

## 2022-07-14 DIAGNOSIS — K74.02 HEPATIC FIBROSIS, STAGE 3: ICD-10-CM

## 2022-07-14 PROCEDURE — 74183 MRI ABD W/O CNTR FLWD CNTR: CPT | Mod: TC,PO

## 2022-07-14 PROCEDURE — A9585 GADOBUTROL INJECTION: HCPCS | Mod: PO | Performed by: NURSE PRACTITIONER

## 2022-07-14 PROCEDURE — 25500020 PHARM REV CODE 255: Mod: PO | Performed by: NURSE PRACTITIONER

## 2022-07-14 RX ORDER — GADOBUTROL 604.72 MG/ML
10 INJECTION INTRAVENOUS
Status: COMPLETED | OUTPATIENT
Start: 2022-07-14 | End: 2022-07-14

## 2022-07-14 RX ADMIN — GADOBUTROL 10 ML: 604.72 INJECTION INTRAVENOUS at 09:07

## 2022-07-15 ENCOUNTER — PATIENT MESSAGE (OUTPATIENT)
Dept: HEPATOLOGY | Facility: CLINIC | Age: 57
End: 2022-07-15
Payer: COMMERCIAL

## 2022-07-18 ENCOUNTER — PATIENT MESSAGE (OUTPATIENT)
Dept: CARDIOLOGY | Facility: CLINIC | Age: 57
End: 2022-07-18
Payer: COMMERCIAL

## 2022-08-01 ENCOUNTER — OFFICE VISIT (OUTPATIENT)
Dept: RHEUMATOLOGY | Facility: CLINIC | Age: 57
End: 2022-08-01
Payer: COMMERCIAL

## 2022-08-01 VITALS
HEART RATE: 67 BPM | WEIGHT: 206.81 LBS | SYSTOLIC BLOOD PRESSURE: 87 MMHG | DIASTOLIC BLOOD PRESSURE: 57 MMHG | OXYGEN SATURATION: 97 % | HEIGHT: 70 IN | RESPIRATION RATE: 20 BRPM | BODY MASS INDEX: 29.61 KG/M2

## 2022-08-01 DIAGNOSIS — S76.311A STRAIN OF RIGHT HAMSTRING, INITIAL ENCOUNTER: ICD-10-CM

## 2022-08-01 DIAGNOSIS — M05.69 RHEU ARTHRITIS MULT SITE W INVOLV OF ORGANS AND SYSTEMS: Primary | ICD-10-CM

## 2022-08-01 DIAGNOSIS — Z71.89 COUNSELING AND COORDINATION OF CARE: ICD-10-CM

## 2022-08-01 DIAGNOSIS — Z79.899 ENCOUNTER FOR LONG-TERM (CURRENT) USE OF OTHER MEDICATIONS: ICD-10-CM

## 2022-08-01 PROCEDURE — 1111F DSCHRG MED/CURRENT MED MERGE: CPT | Mod: CPTII,S$GLB,, | Performed by: INTERNAL MEDICINE

## 2022-08-01 PROCEDURE — 3074F SYST BP LT 130 MM HG: CPT | Mod: CPTII,S$GLB,, | Performed by: INTERNAL MEDICINE

## 2022-08-01 PROCEDURE — 3044F HG A1C LEVEL LT 7.0%: CPT | Mod: CPTII,S$GLB,, | Performed by: INTERNAL MEDICINE

## 2022-08-01 PROCEDURE — 3078F DIAST BP <80 MM HG: CPT | Mod: CPTII,S$GLB,, | Performed by: INTERNAL MEDICINE

## 2022-08-01 PROCEDURE — 3008F BODY MASS INDEX DOCD: CPT | Mod: CPTII,S$GLB,, | Performed by: INTERNAL MEDICINE

## 2022-08-01 PROCEDURE — 3044F PR MOST RECENT HEMOGLOBIN A1C LEVEL <7.0%: ICD-10-PCS | Mod: CPTII,S$GLB,, | Performed by: INTERNAL MEDICINE

## 2022-08-01 PROCEDURE — 99214 OFFICE O/P EST MOD 30 MIN: CPT | Mod: S$GLB,,, | Performed by: INTERNAL MEDICINE

## 2022-08-01 PROCEDURE — 99999 PR PBB SHADOW E&M-EST. PATIENT-LVL IV: CPT | Mod: PBBFAC,,, | Performed by: INTERNAL MEDICINE

## 2022-08-01 PROCEDURE — 99999 PR PBB SHADOW E&M-EST. PATIENT-LVL IV: ICD-10-PCS | Mod: PBBFAC,,, | Performed by: INTERNAL MEDICINE

## 2022-08-01 PROCEDURE — 99214 PR OFFICE/OUTPT VISIT, EST, LEVL IV, 30-39 MIN: ICD-10-PCS | Mod: S$GLB,,, | Performed by: INTERNAL MEDICINE

## 2022-08-01 PROCEDURE — 3008F PR BODY MASS INDEX (BMI) DOCUMENTED: ICD-10-PCS | Mod: CPTII,S$GLB,, | Performed by: INTERNAL MEDICINE

## 2022-08-01 PROCEDURE — 3074F PR MOST RECENT SYSTOLIC BLOOD PRESSURE < 130 MM HG: ICD-10-PCS | Mod: CPTII,S$GLB,, | Performed by: INTERNAL MEDICINE

## 2022-08-01 PROCEDURE — 1159F MED LIST DOCD IN RCRD: CPT | Mod: CPTII,S$GLB,, | Performed by: INTERNAL MEDICINE

## 2022-08-01 PROCEDURE — 4010F ACE/ARB THERAPY RXD/TAKEN: CPT | Mod: CPTII,S$GLB,, | Performed by: INTERNAL MEDICINE

## 2022-08-01 PROCEDURE — 1159F PR MEDICATION LIST DOCUMENTED IN MEDICAL RECORD: ICD-10-PCS | Mod: CPTII,S$GLB,, | Performed by: INTERNAL MEDICINE

## 2022-08-01 PROCEDURE — 3078F PR MOST RECENT DIASTOLIC BLOOD PRESSURE < 80 MM HG: ICD-10-PCS | Mod: CPTII,S$GLB,, | Performed by: INTERNAL MEDICINE

## 2022-08-01 PROCEDURE — 4010F PR ACE/ARB THEARPY RXD/TAKEN: ICD-10-PCS | Mod: CPTII,S$GLB,, | Performed by: INTERNAL MEDICINE

## 2022-08-01 PROCEDURE — 1111F PR DISCHARGE MEDS RECONCILED W/ CURRENT OUTPATIENT MED LIST: ICD-10-PCS | Mod: CPTII,S$GLB,, | Performed by: INTERNAL MEDICINE

## 2022-08-01 RX ORDER — IBUPROFEN 800 MG/1
800 TABLET ORAL 3 TIMES DAILY
Qty: 30 TABLET | Refills: 0 | Status: ON HOLD | OUTPATIENT
Start: 2022-08-01 | End: 2022-09-15 | Stop reason: HOSPADM

## 2022-08-01 RX ORDER — CYCLOBENZAPRINE HCL 5 MG
5 TABLET ORAL 3 TIMES DAILY PRN
Qty: 30 TABLET | Refills: 0 | Status: SHIPPED | OUTPATIENT
Start: 2022-08-01 | End: 2022-09-06 | Stop reason: CLARIF

## 2022-08-01 NOTE — PROGRESS NOTES
RHEUMATOLOGY OUTPATIENT CLINIC NOTE    8/1/2022    Attending Rheumatologist: Shane Lewis  Primary Care Provider: Edgar Aviles MD   Physician Requesting Consultation: No referring provider defined for this encounter.  Chief Complaint/Reason For Consultation:  No chief complaint on file.      Subjective:       HPI  Andrae Aviles is a 56 y.o. White male with medical history noted below presents for evaluation of rheumatoid arthritis.  Patient just recently moved from [place]. Seeking to establish with Rheumatology. Patient was following with Dr. Mateo Charles (Rheumatology) for RA. Paced on MTX 3 years ago and has been in remission sense. Methotrexate 6 tabs weekly. No other therapies. Patient notes that for the last 3 weeks knee pain and swelling which has improved slightly. No other complaints.     Today  Patient here for follow up.   Last visit management for RA continued. Hepatology reached out with concerns of MTX and we switched to SSZ. He notes his joints have been stable, no joint swelling or prolonged stiffness since the change. He notes right knee pain over the weekend used a brace and yesterday right hamstring felt as he pulled it. In addition had ACS June 2022.     Review of Systems   Constitutional: Negative for chills, fatigue, fever and unexpected weight change.   HENT: Negative for mouth sores and trouble swallowing.    Eyes: Negative for redness and eye dryness.   Respiratory: Negative for cough and shortness of breath.    Cardiovascular: Negative for chest pain.   Gastrointestinal: Negative for abdominal distention, constipation, diarrhea, nausea, vomiting and reflux.   Genitourinary: Negative for genital sores.   Musculoskeletal: Positive for arthralgias and myalgias. Negative for back pain, gait problem, joint swelling, leg pain, neck pain, neck stiffness and joint deformity.   Integumentary:  Negative for rash.   Neurological: Negative for weakness, numbness, headaches and  memory loss.   Hematological: Negative for adenopathy. Does not bruise/bleed easily.   Psychiatric/Behavioral: Negative for confusion, decreased concentration, sleep disturbance and suicidal ideas. The patient is not nervous/anxious.    All other systems reviewed and are negative.       Chronic comorbid conditions affecting medical decision making today:  Past Medical History:   Diagnosis Date    Acute combined systolic and diastolic heart failure 6/21/2022    Arthritis     rheumatoid    CAD (coronary artery disease)     Hypertension     NSTEMI (non-ST elevated myocardial infarction) 6/19/2022    Primary hypertension 10/11/2021    Rheumatoid arthritis 10/11/2021     Past Surgical History:   Procedure Laterality Date    COLONOSCOPY      age 42    COLONOSCOPY N/A 11/22/2021    Procedure: COLONOSCOPY  Golytely   Rapid Covid;  Surgeon: Shane Guzman MD;  Location: [facility] ENDO;  Service: Endoscopy;  Laterality: N/A;    CORONARY ANGIOGRAPHY N/A 7/1/2022    Procedure: Angiogram, Coronary, with Left Heart Cath;  Surgeon: Miguelito Barney MD;  Location: [facility] CATH LAB;  Service: Cardiology;  Laterality: N/A;    LEFT HEART CATHETERIZATION N/A 6/20/2022    Procedure: Left heart cath;  Surgeon: Marlen Thompson MD;  Location: [facility] CATH LAB/EP;  Service: Cardiology;  Laterality: N/A;    VASECTOMY       Family History   Problem Relation Age of Onset    No Known Problems Daughter     No Known Problems Son      Social History     Substance and Sexual Activity   Alcohol Use Not Currently    Comment: previousl;y 8+ beers daily for many years, stopped 6/2022     Social History     Tobacco Use   Smoking Status Former Smoker   Smokeless Tobacco Never Used     Social History     Substance and Sexual Activity   Drug Use Never       Current Outpatient Medications:     aspirin 81 MG Chew, Take 1 tablet (81 mg total) by mouth once daily., Disp: 90 tablet, Rfl: 3    atorvastatin (LIPITOR) 40 MG tablet, Take 1 tablet  (40 mg total) by mouth every evening., Disp: 90 tablet, Rfl: 3    clopidogreL (PLAVIX) 75 mg tablet, Take 1 tablet (75 mg total) by mouth once daily., Disp: 90 tablet, Rfl: 11    cyclobenzaprine (FLEXERIL) 5 MG tablet, Take 1 tablet (5 mg total) by mouth 3 (three) times daily as needed for Muscle spasms., Disp: 30 tablet, Rfl: 0    folic acid (FOLVITE) 1 MG tablet, Take 1 tablet (1,000 mcg total) by mouth once daily. (Patient taking differently: Take 1,000 mcg by mouth once daily. Takes daily in AM), Disp: 90 tablet, Rfl: 2    ibuprofen (ADVIL,MOTRIN) 800 MG tablet, Take 1 tablet (800 mg total) by mouth 3 (three) times daily., Disp: 30 tablet, Rfl: 0    metoprolol succinate (TOPROL-XL) 25 MG 24 hr tablet, Take 1 tablet (25 mg total) by mouth once daily., Disp: 90 tablet, Rfl: 11    nitroGLYCERIN (NITROSTAT) 0.4 MG SL tablet, Place 1 tablet (0.4 mg total) under the tongue every 5 (five) minutes as needed for Chest pain., Disp: 30 tablet, Rfl: 3    predniSONE (DELTASONE) 20 MG tablet, Take 20 mg by mouth daily as needed. Not taking, Disp: , Rfl:     sertraline (ZOLOFT) 50 MG tablet, Take 1 tablet (50 mg total) by mouth every evening., Disp: 90 tablet, Rfl: 3    sulfaSALAzine (AZULFIDINE) 500 MG EC tablet, Take 1 tablet (500 mg total) by mouth 2 (two) times daily., Disp: 60 tablet, Rfl: 3    valsartan (DIOVAN) 40 MG tablet, Take 1 tablet (40 mg total) by mouth once daily., Disp: 90 tablet, Rfl: 3     Objective:         Vitals:    08/01/22 0908   BP: (!) 87/57   Pulse: 67   Resp: 20     Physical Exam   Musculoskeletal:      Right shoulder: Normal.      Left shoulder: Normal.      Right elbow: Normal.      Left elbow: Normal.      Right wrist: Normal.      Left wrist: Normal.      Left knee: Normal.      Comments: Right distal hamstring TTP       Right Side Rheumatological Exam     Examination finds the shoulder, elbow, wrist, 1st PIP, 1st MCP, 2nd PIP, 2nd MCP, 3rd PIP, 3rd MCP, 4th PIP, 4th MCP, 5th PIP and  5th MCP normal.    Left Side Rheumatological Exam     Examination finds the shoulder, elbow, wrist, knee, 1st PIP, 1st MCP, 2nd PIP, 2nd MCP, 3rd PIP, 3rd MCP, 4th PIP, 4th MCP, 5th PIP and 5th MCP normal.          Reviewed old and all outside pertinent medical records available.    All lab results personally reviewed and interpreted by me.  Lab Results   Component Value Date    WBC 7.87 07/02/2022    HGB 12.5 (L) 07/02/2022    HCT 38.9 (L) 07/02/2022     (H) 07/02/2022    MCH 33.9 (H) 07/02/2022    MCHC 32.1 07/02/2022    RDW 12.9 07/02/2022     07/02/2022    MPV 10.5 07/02/2022       Lab Results   Component Value Date     07/02/2022    K 4.0 07/02/2022     07/02/2022    CO2 22 (L) 07/02/2022    GLU 83 07/02/2022    BUN 9 07/02/2022    CALCIUM 8.8 07/02/2022    PROT 6.8 07/02/2022    ALBUMIN 2.8 (L) 07/02/2022    BILITOT 1.2 (H) 07/02/2022    AST 96 (H) 07/02/2022    ALKPHOS 90 07/02/2022    ALT 44 07/02/2022       No results found for: COLORU, APPEARANCEUA, SPECGRAV, PHUR, PHUA, PROTEINUA, GLUCOSEU, KETONESU, BLOODU, LEUKOCYTESUR, NITRITE, UROBILINOGEN    Lab Results   Component Value Date    CRP 0.78 08/02/2021       Lab Results   Component Value Date    SEDRATE 17 (H) 08/02/2021       Lab Results   Component Value Date    .0 (H) 08/02/2021    SEDRATE 17 (H) 08/02/2021       No components found for: 25OHVITDTOT, 44CYZSXI2, 69UGHEJO8, METHODNOTE    Lab Results   Component Value Date    URICACID 8.0 (H) 08/02/2021    URICACID 8.0 (H) 08/02/2021       No components found for: TSPOTTB        Imaging:  All imaging reviewed and independently interpreted by me.         ASSESSMENT / PLAN:     Andrae Aviles is a 56 y.o. White male with:      1. Rheumatoid arthritis involving multiple sites, unspecified whether rheumatoid factor present  - patient with Hx of RA  - doing well with SSZ change  - continue SSZ 1gm daily  - labs pending  - reassurance     2. Right Hamstring Strain   - NSAIDs +  Muscle Relaxer   - Ice/Heat alternate   - reassurance     3. DMARD Toxicity Monitoring  - monitor labs     4. Other specified counseling  - over 10 minutes spent regarding below topics:  - Immunization counseling done.  - Weight loss counseling done.  - Nutrition and exercise counseling.  - Limitation of alcohol consumption.  - Regular exercise:  Aerobic and resistance.  - Medication counseling provided.        Follow up in about 3 months (around 11/1/2022).    Method of contact with patient concerns: [facility]    Disclaimer:  This note is prepared using voice recognition software and as such is likely to have errors and has not been proof read. Please contact me for questions.     Time spent: 30 minutes in face to face discussion concerning diagnosis, prognosis, review of lab and test results, benefits of treatment as well as management of disease, counseling of patient and coordination of care between various health care providers.  Greater than half the time spent was used for coordination of care and counseling of patient.    Shane Lewis M.D.  Rheumatology Department   [facility]

## 2022-08-16 ENCOUNTER — PATIENT MESSAGE (OUTPATIENT)
Dept: CARDIOLOGY | Facility: CLINIC | Age: 57
End: 2022-08-16
Payer: COMMERCIAL

## 2022-08-16 ENCOUNTER — TELEPHONE (OUTPATIENT)
Dept: CARDIOLOGY | Facility: CLINIC | Age: 57
End: 2022-08-16
Payer: COMMERCIAL

## 2022-08-16 ENCOUNTER — HOSPITAL ENCOUNTER (OUTPATIENT)
Dept: CARDIOLOGY | Facility: HOSPITAL | Age: 57
Discharge: HOME | End: 2022-08-16
Attending: INTERNAL MEDICINE
Payer: COMMERCIAL

## 2022-08-16 VITALS
DIASTOLIC BLOOD PRESSURE: 59 MMHG | HEIGHT: 70 IN | BODY MASS INDEX: 29.49 KG/M2 | RESPIRATION RATE: 18 BRPM | HEART RATE: 69 BPM | SYSTOLIC BLOOD PRESSURE: 95 MMHG | WEIGHT: 206 LBS

## 2022-08-16 VITALS — HEIGHT: 70 IN | BODY MASS INDEX: 29.49 KG/M2 | WEIGHT: 206 LBS

## 2022-08-16 DIAGNOSIS — I21.4 NSTEMI (NON-ST ELEVATED MYOCARDIAL INFARCTION): ICD-10-CM

## 2022-08-16 LAB
AORTIC ROOT ANNULUS: 2.94 CM
AV INDEX (PROSTH): 0.79
AV MEAN GRADIENT: 5 MMHG
AV PEAK GRADIENT: 8 MMHG
AV VALVE AREA: 3.59 CM2
AV VELOCITY RATIO: 0.83
BSA FOR ECHO PROCEDURE: 2.15 M2
CFR FLOW - ANTERIOR: 2.43
CFR FLOW - INFERIOR: 1.09
CFR FLOW - LATERAL: 1.66
CFR FLOW - MAX: 3.46
CFR FLOW - MIN: 0.6
CFR FLOW - SEPTAL: 2.61
CFR FLOW - WHOLE HEART: 1.95
CFR FLOW- DEFECT 1: 0.98
CV ECHO LV RWT: 0.37 CM
CV STRESS BASE HR: 71 BPM
DIASTOLIC BLOOD PRESSURE: 57 MMHG
DOP CALC AO PEAK VEL: 1.39 M/S
DOP CALC AO VTI: 22.63 CM
DOP CALC LVOT AREA: 4.5 CM2
DOP CALC LVOT DIAMETER: 2.4 CM
DOP CALC LVOT PEAK VEL: 1.15 M/S
DOP CALC LVOT STROKE VOLUME: 81.3 CM3
DOP CALC MV VTI: 19.91 CM
DOP CALCLVOT PEAK VEL VTI: 17.98 CM
E WAVE DECELERATION TIME: 230.99 MSEC
E/A RATIO: 0.68
E/E' RATIO: 7.33 M/S
ECHO LV POSTERIOR WALL: 1.12 CM (ref 0.6–1.1)
EJECTION FRACTION- HIGH: 65 %
EJECTION FRACTION: 55 %
END DIASTOLIC INDEX-HIGH: 153 ML/M2
END DIASTOLIC INDEX-LOW: 93 ML/M2
END SYSTOLIC INDEX-HIGH: 71 ML/M2
END SYSTOLIC INDEX-LOW: 31 ML/M2
FRACTIONAL SHORTENING: 29 % (ref 28–44)
INTERVENTRICULAR SEPTUM: 0.86 CM (ref 0.6–1.1)
LA MAJOR: 5.3 CM
LA MINOR: 4.77 CM
LA WIDTH: 3.82 CM
LEFT ATRIUM SIZE: 4.41 CM
LEFT ATRIUM VOLUME INDEX MOD: 25.8 ML/M2
LEFT ATRIUM VOLUME INDEX: 34.1 ML/M2
LEFT ATRIUM VOLUME MOD: 54.38 CM3
LEFT ATRIUM VOLUME: 71.9 CM3
LEFT INTERNAL DIMENSION IN SYSTOLE: 4.31 CM (ref 2.1–4)
LEFT VENTRICLE DIASTOLIC VOLUME INDEX: 88.4 ML/M2
LEFT VENTRICLE DIASTOLIC VOLUME: 186.52 ML
LEFT VENTRICLE MASS INDEX: 118 G/M2
LEFT VENTRICLE SYSTOLIC VOLUME INDEX: 39.6 ML/M2
LEFT VENTRICLE SYSTOLIC VOLUME: 83.56 ML
LEFT VENTRICULAR INTERNAL DIMENSION IN DIASTOLE: 6.09 CM (ref 3.5–6)
LEFT VENTRICULAR MASS: 249.94 G
LV LATERAL E/E' RATIO: 5.5 M/S
LV SEPTAL E/E' RATIO: 11 M/S
MV PEAK A VEL: 0.65 M/S
MV PEAK E VEL: 0.44 M/S
MV PEAK GRADIENT: 3 MMHG
MV STENOSIS PRESSURE HALF TIME: 66.99 MS
MV VALVE AREA BY CONTINUITY EQUATION: 4.08 CM2
MV VALVE AREA P 1/2 METHOD: 3.28 CM2
NUC REST DIASTOLIC VOLUME INDEX: 143
NUC REST EJECTION FRACTION: 52
NUC REST SYSTOLIC VOLUME INDEX: 68
NUC STRESS DIASTOLIC VOLUME INDEX: 161
NUC STRESS EJECTION FRACTION: 54 %
NUC STRESS SYSTOLIC VOLUME INDEX: 74
OHS CV CPX 1 MINUTE RECOVERY HEART RATE: 83 BPM
OHS CV CPX 85 PERCENT MAX PREDICTED HEART RATE MALE: 141
OHS CV CPX MAX PREDICTED HEART RATE: 166
OHS CV CPX PATIENT IS FEMALE: 0
OHS CV CPX PATIENT IS MALE: 1
OHS CV CPX PEAK DIASTOLIC BLOOD PRESSURE: 38 MMHG
OHS CV CPX PEAK HEAR RATE: 72 BPM
OHS CV CPX PEAK RATE PRESSURE PRODUCT: 6264
OHS CV CPX PEAK SYSTOLIC BLOOD PRESSURE: 87 MMHG
OHS CV CPX PERCENT MAX PREDICTED HEART RATE ACHIEVED: 43
OHS CV CPX RATE PRESSURE PRODUCT PRESENTING: 7029
PERFUSION DEFECT 1 SIZE IN %: 29 %
PULM VEIN S/D RATIO: 1.24
PV PEAK D VEL: 0.34 M/S
PV PEAK S VEL: 0.42 M/S
RA MAJOR: 4.24 CM
RA PRESSURE: 3 MMHG
RA WIDTH: 3.99 CM
REST FLOW - ANTERIOR: 0.46 CC/MIN/G
REST FLOW - INFERIOR: 0.38 CC/MIN/G
REST FLOW - LATERAL: 0.49 CC/MIN/G
REST FLOW - MAX: 0.61 CC/MIN/G
REST FLOW - MIN: 0.25 CC/MIN/G
REST FLOW - SEPTAL: 0.4 CC/MIN/G
REST FLOW - WHOLE HEART: 0.43 CC/MIN/G
REST FLOW- DEFECT 1: 0.41 CC/MIN/G
RETIRED EF AND QEF - SEE NOTES: 53 %
RIGHT VENTRICULAR END-DIASTOLIC DIMENSION: 3.17 CM
SINUS: 2.5 CM
STRESS FLOW - ANTERIOR: 1.12 CC/MIN/G
STRESS FLOW - INFERIOR: 0.42 CC/MIN/G
STRESS FLOW - LATERAL: 0.82 CC/MIN/G
STRESS FLOW - MAX: 1.62 CC/MIN/G
STRESS FLOW - MIN: 0.22 CC/MIN/G
STRESS FLOW - SEPTAL: 1.04 CC/MIN/G
STRESS FLOW - WHOLE HEART: 0.85 CC/MIN/G
STRESS FLOW- DEFECT 1: 0.4 CC/MIN/G
SYSTOLIC BLOOD PRESSURE: 99 MMHG
TDI LATERAL: 0.08 M/S
TDI SEPTAL: 0.04 M/S
TDI: 0.06 M/S

## 2022-08-16 PROCEDURE — 93018 CARDIAC PET SCAN STRESS (CUPID ONLY): ICD-10-PCS | Mod: ,,, | Performed by: INTERNAL MEDICINE

## 2022-08-16 PROCEDURE — 93306 TTE W/DOPPLER COMPLETE: CPT | Mod: PO

## 2022-08-16 PROCEDURE — 78434 AQMBF PET REST & RX STRESS: CPT | Mod: 26,,, | Performed by: INTERNAL MEDICINE

## 2022-08-16 PROCEDURE — 63600175 PHARM REV CODE 636 W HCPCS: Performed by: INTERNAL MEDICINE

## 2022-08-16 PROCEDURE — 93018 CV STRESS TEST I&R ONLY: CPT | Mod: ,,, | Performed by: INTERNAL MEDICINE

## 2022-08-16 PROCEDURE — 78434 AQMBF PET REST & RX STRESS: CPT

## 2022-08-16 PROCEDURE — 93016 CV STRESS TEST SUPVJ ONLY: CPT | Mod: ,,, | Performed by: INTERNAL MEDICINE

## 2022-08-16 PROCEDURE — 78431 MYOCRD IMG PET RST&STRS CT: CPT | Mod: 26,,, | Performed by: INTERNAL MEDICINE

## 2022-08-16 PROCEDURE — 93306 TTE W/DOPPLER COMPLETE: CPT | Mod: 26,,, | Performed by: INTERNAL MEDICINE

## 2022-08-16 PROCEDURE — 78431 CARDIAC PET SCAN STRESS (CUPID ONLY): ICD-10-PCS | Mod: 26,,, | Performed by: INTERNAL MEDICINE

## 2022-08-16 PROCEDURE — 78434 CARDIAC PET SCAN STRESS (CUPID ONLY): ICD-10-PCS | Mod: 26,,, | Performed by: INTERNAL MEDICINE

## 2022-08-16 PROCEDURE — 93306 ECHO (CUPID ONLY): ICD-10-PCS | Mod: 26,,, | Performed by: INTERNAL MEDICINE

## 2022-08-16 PROCEDURE — 93016 CARDIAC PET SCAN STRESS (CUPID ONLY): ICD-10-PCS | Mod: ,,, | Performed by: INTERNAL MEDICINE

## 2022-08-16 RX ORDER — CAFFEINE CITRATE 20 MG/ML
60 SOLUTION INTRAVENOUS ONCE
Status: COMPLETED | OUTPATIENT
Start: 2022-08-16 | End: 2022-08-16

## 2022-08-16 RX ORDER — REGADENOSON 0.08 MG/ML
0.4 INJECTION, SOLUTION INTRAVENOUS ONCE
Status: COMPLETED | OUTPATIENT
Start: 2022-08-16 | End: 2022-08-16

## 2022-08-16 RX ADMIN — REGADENOSON 0.4 MG: 0.08 INJECTION, SOLUTION INTRAVENOUS at 02:08

## 2022-08-16 RX ADMIN — CAFFEINE CITRATE 60 MG: 20 INJECTION, SOLUTION INTRAVENOUS at 02:08

## 2022-08-19 ENCOUNTER — PATIENT MESSAGE (OUTPATIENT)
Dept: CARDIOLOGY | Facility: CLINIC | Age: 57
End: 2022-08-19
Payer: COMMERCIAL

## 2022-08-22 ENCOUNTER — PATIENT MESSAGE (OUTPATIENT)
Dept: CARDIOLOGY | Facility: CLINIC | Age: 57
End: 2022-08-22
Payer: COMMERCIAL

## 2022-08-22 NOTE — TELEPHONE ENCOUNTER
I spoke to the patient and he will call Krista to have her fax over his release of information and we will send over the requested information.

## 2022-08-23 ENCOUNTER — PATIENT MESSAGE (OUTPATIENT)
Dept: CARDIOLOGY | Facility: CLINIC | Age: 57
End: 2022-08-23
Payer: COMMERCIAL

## 2022-08-30 ENCOUNTER — PATIENT MESSAGE (OUTPATIENT)
Dept: CARDIOLOGY | Facility: CLINIC | Age: 57
End: 2022-08-30
Payer: COMMERCIAL

## 2022-08-30 NOTE — TELEPHONE ENCOUNTER
I spoke to the patient to let him know that we havent received a request to have information sent to workers comp.  Patient notified that as soon as we get it we will send the requested information.

## 2022-09-02 DIAGNOSIS — I85.00 ESOPHAGEAL VARICES WITHOUT BLEEDING, UNSPECIFIED ESOPHAGEAL VARICES TYPE: Primary | ICD-10-CM

## 2022-09-06 DIAGNOSIS — I25.10 CORONARY ARTERY DISEASE INVOLVING NATIVE CORONARY ARTERY, UNSPECIFIED WHETHER ANGINA PRESENT, UNSPECIFIED WHETHER NATIVE OR TRANSPLANTED HEART: Primary | ICD-10-CM

## 2022-09-06 DIAGNOSIS — I25.10 CORONARY ARTERY DISEASE INVOLVING NATIVE CORONARY ARTERY OF NATIVE HEART WITHOUT ANGINA PECTORIS: ICD-10-CM

## 2022-09-06 RX ORDER — SODIUM CHLORIDE 9 MG/ML
INJECTION, SOLUTION INTRAVENOUS CONTINUOUS
Status: CANCELLED | OUTPATIENT
Start: 2022-09-06 | End: 2022-09-06

## 2022-09-08 ENCOUNTER — PATIENT MESSAGE (OUTPATIENT)
Dept: CARDIOLOGY | Facility: CLINIC | Age: 57
End: 2022-09-08
Payer: COMMERCIAL

## 2022-09-09 ENCOUNTER — PATIENT MESSAGE (OUTPATIENT)
Dept: CARDIOLOGY | Facility: CLINIC | Age: 57
End: 2022-09-09
Payer: COMMERCIAL

## 2022-09-12 ENCOUNTER — TELEPHONE (OUTPATIENT)
Dept: CARDIOLOGY | Facility: CLINIC | Age: 57
End: 2022-09-12
Payer: COMMERCIAL

## 2022-09-13 ENCOUNTER — PATIENT MESSAGE (OUTPATIENT)
Dept: CARDIOLOGY | Facility: CLINIC | Age: 57
End: 2022-09-13
Payer: COMMERCIAL

## 2022-09-14 ENCOUNTER — LAB VISIT (OUTPATIENT)
Dept: LAB | Facility: HOSPITAL | Age: 57
End: 2022-09-14
Attending: INTERNAL MEDICINE
Payer: COMMERCIAL

## 2022-09-14 DIAGNOSIS — Z01.818 PREOP TESTING: ICD-10-CM

## 2022-09-14 LAB
ANION GAP SERPL CALC-SCNC: 8 MMOL/L (ref 8–16)
BASOPHILS # BLD AUTO: 0.04 K/UL (ref 0–0.2)
BASOPHILS NFR BLD: 0.9 % (ref 0–1.9)
CALCIUM SERPL-MCNC: 8.9 MG/DL (ref 8.7–10.5)
CHLORIDE SERPL-SCNC: 103 MMOL/L (ref 95–110)
CO2 SERPL-SCNC: 28 MMOL/L (ref 23–29)
CREAT SERPL-MCNC: 0.85 MG/DL (ref 0.5–1.4)
DIFFERENTIAL METHOD: ABNORMAL
EOSINOPHIL # BLD AUTO: 0.1 K/UL (ref 0–0.5)
EOSINOPHIL NFR BLD: 1.8 % (ref 0–8)
ERYTHROCYTE [DISTWIDTH] IN BLOOD BY AUTOMATED COUNT: 11.7 % (ref 11.5–14.5)
EST. GFR  (NO RACE VARIABLE): >60 ML/MIN/1.73 M^2
GLUCOSE SERPL-MCNC: 135 MG/DL (ref 70–110)
HCT VFR BLD AUTO: 42.1 % (ref 40–54)
HGB BLD-MCNC: 14.3 G/DL (ref 14–18)
IMM GRANULOCYTES # BLD AUTO: 0.01 K/UL (ref 0–0.04)
IMM GRANULOCYTES NFR BLD AUTO: 0.2 % (ref 0–0.5)
LYMPHOCYTES # BLD AUTO: 0.9 K/UL (ref 1–4.8)
LYMPHOCYTES NFR BLD: 19.9 % (ref 18–48)
MCH RBC QN AUTO: 32 PG (ref 27–31)
MCHC RBC AUTO-ENTMCNC: 34 G/DL (ref 32–36)
MCV RBC AUTO: 94 FL (ref 82–98)
MONOCYTES # BLD AUTO: 0.4 K/UL (ref 0.3–1)
MONOCYTES NFR BLD: 9.7 % (ref 4–15)
NEUTROPHILS # BLD AUTO: 3.1 K/UL (ref 1.8–7.7)
NEUTROPHILS NFR BLD: 67.5 % (ref 38–73)
NRBC BLD-RTO: 0 /100 WBC
PLATELET # BLD AUTO: 195 K/UL (ref 150–450)
PMV BLD AUTO: 10.7 FL (ref 9.2–12.9)
POTASSIUM SERPL-SCNC: 3.5 MMOL/L (ref 3.5–5.1)
RBC # BLD AUTO: 4.47 M/UL (ref 4.6–6.2)
SODIUM SERPL-SCNC: 139 MMOL/L (ref 136–145)
UUN UR-MCNC: 3 MG/DL (ref 2–20)
WBC # BLD AUTO: 4.53 K/UL (ref 3.9–12.7)

## 2022-09-14 PROCEDURE — 85025 COMPLETE CBC W/AUTO DIFF WBC: CPT | Mod: PO | Performed by: INTERNAL MEDICINE

## 2022-09-14 PROCEDURE — 80048 BASIC METABOLIC PNL TOTAL CA: CPT | Mod: PO | Performed by: INTERNAL MEDICINE

## 2022-09-14 PROCEDURE — 36415 COLL VENOUS BLD VENIPUNCTURE: CPT | Mod: PO | Performed by: INTERNAL MEDICINE

## 2022-09-15 ENCOUNTER — HOSPITAL ENCOUNTER (OUTPATIENT)
Facility: HOSPITAL | Age: 57
Discharge: HOME | End: 2022-09-15
Attending: INTERNAL MEDICINE | Admitting: INTERNAL MEDICINE
Payer: COMMERCIAL

## 2022-09-15 VITALS
TEMPERATURE: 98 F | WEIGHT: 204 LBS | HEART RATE: 57 BPM | SYSTOLIC BLOOD PRESSURE: 125 MMHG | HEIGHT: 70 IN | OXYGEN SATURATION: 97 % | DIASTOLIC BLOOD PRESSURE: 74 MMHG | BODY MASS INDEX: 29.2 KG/M2 | RESPIRATION RATE: 18 BRPM

## 2022-09-15 DIAGNOSIS — I25.10 CORONARY ARTERY DISEASE INVOLVING NATIVE CORONARY ARTERY OF NATIVE HEART WITHOUT ANGINA PECTORIS: ICD-10-CM

## 2022-09-15 DIAGNOSIS — Z98.62 H/O ANGIOPLASTY: ICD-10-CM

## 2022-09-15 DIAGNOSIS — I25.10 CORONARY ARTERY DISEASE INVOLVING NATIVE CORONARY ARTERY, UNSPECIFIED WHETHER ANGINA PRESENT, UNSPECIFIED WHETHER NATIVE OR TRANSPLANTED HEART: ICD-10-CM

## 2022-09-15 DIAGNOSIS — Z01.818 PREOP TESTING: Primary | ICD-10-CM

## 2022-09-15 DIAGNOSIS — Z01.818 PRE-OP TESTING: ICD-10-CM

## 2022-09-15 DIAGNOSIS — M06.9 RHEUMATOID ARTHRITIS INVOLVING MULTIPLE SITES, UNSPECIFIED WHETHER RHEUMATOID FACTOR PRESENT: ICD-10-CM

## 2022-09-15 LAB
POC ACTIVATED CLOTTING TIME K: 173 SEC (ref 74–137)
POC ACTIVATED CLOTTING TIME K: 202 SEC (ref 74–137)
POC ACTIVATED CLOTTING TIME K: 486 SEC (ref 74–137)
SAMPLE: ABNORMAL

## 2022-09-15 PROCEDURE — C1769 GUIDE WIRE: HCPCS | Performed by: INTERNAL MEDICINE

## 2022-09-15 PROCEDURE — 63600175 PHARM REV CODE 636 W HCPCS: Performed by: INTERNAL MEDICINE

## 2022-09-15 PROCEDURE — 92928 PR STENT: ICD-10-PCS | Mod: LC,,, | Performed by: INTERNAL MEDICINE

## 2022-09-15 PROCEDURE — 93010 EKG 12-LEAD: ICD-10-PCS | Mod: ,,, | Performed by: INTERNAL MEDICINE

## 2022-09-15 PROCEDURE — C1874 STENT, COATED/COV W/DEL SYS: HCPCS | Performed by: INTERNAL MEDICINE

## 2022-09-15 PROCEDURE — 25000003 PHARM REV CODE 250: Performed by: INTERNAL MEDICINE

## 2022-09-15 PROCEDURE — 85347 COAGULATION TIME ACTIVATED: CPT | Performed by: INTERNAL MEDICINE

## 2022-09-15 PROCEDURE — C1894 INTRO/SHEATH, NON-LASER: HCPCS | Performed by: INTERNAL MEDICINE

## 2022-09-15 PROCEDURE — 93010 ELECTROCARDIOGRAM REPORT: CPT | Mod: ,,, | Performed by: INTERNAL MEDICINE

## 2022-09-15 PROCEDURE — 99152 PR MOD CONSCIOUS SEDATION, SAME PHYS, 5+ YRS, FIRST 15 MIN: ICD-10-PCS | Mod: ,,, | Performed by: INTERNAL MEDICINE

## 2022-09-15 PROCEDURE — 27201423 OPTIME MED/SURG SUP & DEVICES STERILE SUPPLY: Performed by: INTERNAL MEDICINE

## 2022-09-15 PROCEDURE — C9600 PERC DRUG-EL COR STENT SING: HCPCS | Mod: LC | Performed by: INTERNAL MEDICINE

## 2022-09-15 PROCEDURE — 93005 ELECTROCARDIOGRAM TRACING: CPT

## 2022-09-15 PROCEDURE — 99152 MOD SED SAME PHYS/QHP 5/>YRS: CPT | Mod: ,,, | Performed by: INTERNAL MEDICINE

## 2022-09-15 PROCEDURE — 99152 MOD SED SAME PHYS/QHP 5/>YRS: CPT | Performed by: INTERNAL MEDICINE

## 2022-09-15 PROCEDURE — 92928 PRQ TCAT PLMT NTRAC ST 1 LES: CPT | Mod: LC,,, | Performed by: INTERNAL MEDICINE

## 2022-09-15 PROCEDURE — 99153 MOD SED SAME PHYS/QHP EA: CPT | Performed by: INTERNAL MEDICINE

## 2022-09-15 PROCEDURE — C1887 CATHETER, GUIDING: HCPCS | Performed by: INTERNAL MEDICINE

## 2022-09-15 PROCEDURE — C1725 CATH, TRANSLUMIN NON-LASER: HCPCS | Performed by: INTERNAL MEDICINE

## 2022-09-15 DEVICE — STENT RONYX30015UX RESOLUTE ONYX 3.00X15
Type: IMPLANTABLE DEVICE | Site: HEART | Status: FUNCTIONAL
Brand: RESOLUTE ONYX™

## 2022-09-15 RX ORDER — SODIUM CHLORIDE 9 MG/ML
INJECTION, SOLUTION INTRAMUSCULAR; INTRAVENOUS; SUBCUTANEOUS
Status: DISCONTINUED | OUTPATIENT
Start: 2022-09-15 | End: 2022-09-15 | Stop reason: HOSPADM

## 2022-09-15 RX ORDER — SODIUM CHLORIDE 9 MG/ML
INJECTION, SOLUTION INTRAVENOUS CONTINUOUS
Status: ACTIVE | OUTPATIENT
Start: 2022-09-15 | End: 2022-09-15

## 2022-09-15 RX ORDER — ONDANSETRON 4 MG/1
8 TABLET, ORALLY DISINTEGRATING ORAL EVERY 8 HOURS PRN
Status: DISCONTINUED | OUTPATIENT
Start: 2022-09-15 | End: 2022-09-15 | Stop reason: HOSPADM

## 2022-09-15 RX ORDER — FENTANYL CITRATE 50 UG/ML
INJECTION, SOLUTION INTRAMUSCULAR; INTRAVENOUS
Status: DISCONTINUED | OUTPATIENT
Start: 2022-09-15 | End: 2022-09-15 | Stop reason: HOSPADM

## 2022-09-15 RX ORDER — HEPARIN SODIUM 200 [USP'U]/100ML
INJECTION, SOLUTION INTRAVENOUS
Status: DISCONTINUED | OUTPATIENT
Start: 2022-09-15 | End: 2022-09-15 | Stop reason: HOSPADM

## 2022-09-15 RX ORDER — LIDOCAINE HYDROCHLORIDE 10 MG/ML
INJECTION INFILTRATION; PERINEURAL
Status: DISCONTINUED | OUTPATIENT
Start: 2022-09-15 | End: 2022-09-15 | Stop reason: HOSPADM

## 2022-09-15 RX ORDER — MIDAZOLAM HYDROCHLORIDE 1 MG/ML
INJECTION, SOLUTION INTRAMUSCULAR; INTRAVENOUS
Status: DISCONTINUED | OUTPATIENT
Start: 2022-09-15 | End: 2022-09-15 | Stop reason: HOSPADM

## 2022-09-15 RX ORDER — ACETAMINOPHEN 325 MG/1
650 TABLET ORAL EVERY 4 HOURS PRN
Status: DISCONTINUED | OUTPATIENT
Start: 2022-09-15 | End: 2022-09-15 | Stop reason: HOSPADM

## 2022-09-15 RX ORDER — HEPARIN SODIUM 1000 [USP'U]/ML
INJECTION, SOLUTION INTRAVENOUS; SUBCUTANEOUS
Status: DISCONTINUED | OUTPATIENT
Start: 2022-09-15 | End: 2022-09-15 | Stop reason: HOSPADM

## 2022-09-15 RX ADMIN — SODIUM CHLORIDE: 0.9 INJECTION, SOLUTION INTRAVENOUS at 07:09

## 2022-09-15 NOTE — DISCHARGE SUMMARY
Discharge Summary  Interventional Cardiology      Admit Date: 9/15/2022    Discharge Date:  9/15/2022    Attending Physician: Phani Brown MD    Discharge Physician: Gricel Salmon MD    Principal Diagnoses: <principal problem not specified>  Indication for Admission: Left heart cath (Left), ANGIOGRAM, CORONARY ARTERY (Right), PTCA, Single Vessel, Stent, Drug Eluting, Single Vessel, Coronary    Discharged Condition: Good    Hospital Course:   Patient presented for outpatient coronary angiogram which went without complication. Coronary angiogram revealed obstructive Lcx mid lesion s/p PCI . See full cath report in Epic for details. Hemostasis of patient's R CFA access site was achieved with manual pressure. Patient was monitored per post-cath protocol, and his R groin access site was c/d/i with no hematoma. Patient was able to ambulate without difficulty. He was feeling well and anticipating discharge home today.     Outpatient Plan:  - There were no medication changes    Diet: Cardiac diet    Activity: Ad anthony, wound care instructions provided    Disposition: Home or Self Care    Discharge Medications:      Medication List        CHANGE how you take these medications      folic acid 1 MG tablet  Commonly known as: FOLVITE  Take 1 tablet (1,000 mcg total) by mouth once daily.  What changed: additional instructions            CONTINUE taking these medications      aspirin 81 MG Chew  Take 1 tablet (81 mg total) by mouth once daily.     atorvastatin 40 MG tablet  Commonly known as: LIPITOR  Take 1 tablet (40 mg total) by mouth every evening.     clopidogreL 75 mg tablet  Commonly known as: PLAVIX  Take 1 tablet (75 mg total) by mouth once daily.     metoprolol succinate 25 MG 24 hr tablet  Commonly known as: TOPROL-XL  Take 1 tablet (25 mg total) by mouth once daily.     nitroGLYCERIN 0.4 MG SL tablet  Commonly known as: NITROSTAT  Place 1 tablet (0.4 mg total) under the tongue every 5 (five) minutes as needed for  Chest pain.     predniSONE 20 MG tablet  Commonly known as: DELTASONE     sertraline 50 MG tablet  Commonly known as: ZOLOFT  Take 1 tablet (50 mg total) by mouth every evening.     valsartan 40 MG tablet  Commonly known as: DIOVAN  Take 1 tablet (40 mg total) by mouth once daily.            STOP taking these medications      doxazosin 2 MG tablet  Commonly known as: CARDURA     ibuprofen 800 MG tablet  Commonly known as: LANDON VALDES            Follow Up: Dr. Brown

## 2022-09-15 NOTE — DISCHARGE SUMMARY
[facility] (Hospital)  Discharge Note  Short Stay    Procedure(s) (LRB):  Left heart cath (Left)  ANGIOGRAM, CORONARY ARTERY (Right)  PTCA, Single Vessel  Stent, Drug Eluting, Single Vessel, Coronary    OUTCOME: Patient tolerated treatment/procedure well without complication and is now ready for discharge.    DISPOSITION: Home or Self Care    FINAL DIAGNOSIS:  <principal problem not specified>    FOLLOWUP: In clinic    DISCHARGE INSTRUCTIONS:    Discharge Procedure Orders   CBC W/ AUTO DIFFERENTIAL   Standing Status: Future Number of Occurrences: 1 Standing Exp. Date: 11/05/23   Order Comments: Preop     BASIC METABOLIC PANEL   Standing Status: Future Number of Occurrences: 1 Standing Exp. Date: 11/05/23   Order Comments: Preop        TIME SPENT ON DISCHARGE 30 minutes

## 2022-09-15 NOTE — PLAN OF CARE
Patient transferred to recovery cath lab via stretcher with sr up x2.  Pt awake and able to follow commands.  VSS. Right groin sheath in place, dressing c.d.i. No drainage or shadowing noted.  No redness, bruising, or hematoma noted around site. Pt c/o pain to insertion site of right groin sheat when palpated. Dr. Brown at the bedside and aware of c/o. Fall risk precautions given and patient acknowledges.  AIDET completed to pt.  Updated pt's wife in sx waiting room.  Will continue to monitor pt.

## 2022-09-15 NOTE — PLAN OF CARE
Report received at bedside from Haley JONES RN for monitoring.    ACT drawn [id number], resulted 173. Cath Lab staff notified of ACT for sheath removal.

## 2022-09-15 NOTE — PLAN OF CARE
Discharge instructions given and explained. Getting dressed with no complications.  Stent card given to pt

## 2022-09-15 NOTE — Clinical Note
An angiography was performed of the left coronary arteries. The angiography was performed via hand injection with 20 mL of contrast.

## 2022-09-15 NOTE — BRIEF OP NOTE
Successful Stent to Circumflex Mid vessel. 3.0 X 15. Calcific Disease multiple pre-Dilatations required. Left Main stent widely patent.  Groin access, Home today

## 2022-09-15 NOTE — DISCHARGE INSTRUCTIONS
Discharge Instructions:     Do not drive a car, operate heavy equipment, care for a young child, etc for the next 12-24 hours.   Avoid drinking alcohol for 24 hours.  Do not make any important decisions for 24 hours.     Drink fluids to keep hydrated. Resume your usual diet as tolerated.      Rest for today then activity as tolerated.   Do not lift anything over 5 pounds for the first 3 days after procedure.     Remove dressing tomorrow 9/16/22 after 1 pm, then may shower with warm soapy water. Do not scrub site. Pat dry.   May apply bandaid for 2 days.  No tub baths.  Do not submerge wound in water for 3 days.      Call MD for any unrelieved pain, excessive nausea or vomiting, redness around site, bleeding, or pus or foul smelling drainage, or any other questions or concerns.     Go to the ER for any difficulty breathing or chest pain.        If site swells or bleeds, hold direct pressure to area for 10 full minutes.   If site continues to bleed, continue to hold pressure to site and have someone bring you to the ER.       Follow any additional instructions given to you by MD.      Call MD to schedule a follow up appointment, or follow up as instructed.         Last Modified by Stephanie Espinoza RN at 6/1/22 5901

## 2022-09-15 NOTE — H&P
9/15/2022    History and physical:    Chief complaint coronary artery disease, recent left main stent and NSTEMI.    History:  He is a 56-year-old man that recently underwent stenting to left main at [facility].  There was circumflex stenosis that could not be treated successfully due to the length of the procedure and complex city of calcific disease of circumflex.  He has occluded right coronary artery.  His ejection fraction has improved.  He is admitted with plans for intervention to circumflex.    His history is significant for troponin 18 NSTEMI with acute LV systolic dysfunction which has improved based on follow-up PET stress.  He has hypertension and he is on statin therapy, pretreatment LDL 93 mg%.  He has history of liver cirrhosis with mild coagulation profile.    Physical exam:  JVP normal lungs clear heart sounds regular rhythm no S3 gallop S1-S2 normal abdomen is soft extremities show no peripheral edema neurologic exam normal    Impression:  1 Coronary artery disease    2 hypertension    3 recent NSTEMI with recovery of LV systolic dysfunction      Plan:  Angiography and intervention to circumflex planned.  Treatment options risks benefits alternatives discussed with the patient.  Management discussed with the patient's wife yesterday.  He agrees to proceed.

## 2022-09-15 NOTE — PLAN OF CARE
Pt arrived independently with wife from home, ambulates without assistance. Patient lying in bed with no complaints of pain or distress noted. Monitors applied. VSS, AAOx4.  Yellow non-skid socks placed on patient. Fall risk reviewed with patient. Procedure and recovery process explained to patient and all questions answered.  Patient verbalized understanding, denies questions. Will continue to monitor for safety and needs.

## 2022-09-15 NOTE — BRIEF OP NOTE
Brief Operative Note:    : Phani Brown MD     Referring Physician: Phani Brown     All Operators: Surgeon(s):  Phani Brown MD     Preoperative Diagnosis: Coronary artery disease involving native coronary artery, unspecified whether angina present, unspecified whether native or transplanted heart [I25.10]     Postop Diagnosis: Coronary artery disease involving native coronary artery, unspecified whether angina present, unspecified whether native or transplanted heart [I25.10]    Treatments/Procedures: Procedure(s) (LRB):  Left heart cath (Left)  ANGIOGRAM, CORONARY ARTERY (Right)  PTCA, Single Vessel  Stent, Drug Eluting, Single Vessel, Coronary    Access: Rt femoral.    Findings:  Significant CAD disease is present.     See catheterization report for full details.    Intervention: S/P mid LCX PCI.    See catheterization report for full details.    Closure device: Manual pressure.      Plan:  - Post cath protocol   - Bed rest x 6 hours   - Continue aspirin 81 mg daily indefinitely  - Continue Plavix 75 mg daily.  - Continue high intensity statin therapy (LDL goal < 70)  - Risk factor reduction (BP <130/80 mmHg, glycemic control, etc)  - Cardiac rehab referral  - Follow up with outpatient cardiologist    Estimated Blood loss: 20 cc    Specimens removed: Ebony Salmon MD

## 2022-09-15 NOTE — Clinical Note
The catheter was inserted into the ostium   left main. An angiography was performed of the left coronary arteries. Multiple views were taken. The angiography was performed via hand injection with 20 mL of contrast.

## 2022-09-23 ENCOUNTER — OFFICE VISIT (OUTPATIENT)
Dept: CARDIOLOGY | Facility: CLINIC | Age: 57
End: 2022-09-23
Payer: COMMERCIAL

## 2022-09-23 VITALS
HEART RATE: 75 BPM | SYSTOLIC BLOOD PRESSURE: 113 MMHG | HEIGHT: 70 IN | DIASTOLIC BLOOD PRESSURE: 74 MMHG | OXYGEN SATURATION: 95 % | WEIGHT: 204.13 LBS | BODY MASS INDEX: 29.22 KG/M2

## 2022-09-23 DIAGNOSIS — M06.9 RHEUMATOID ARTHRITIS, INVOLVING UNSPECIFIED SITE, UNSPECIFIED WHETHER RHEUMATOID FACTOR PRESENT: ICD-10-CM

## 2022-09-23 DIAGNOSIS — I21.4 NSTEMI (NON-ST ELEVATED MYOCARDIAL INFARCTION): ICD-10-CM

## 2022-09-23 DIAGNOSIS — E78.00 PURE HYPERCHOLESTEROLEMIA: Primary | ICD-10-CM

## 2022-09-23 DIAGNOSIS — I25.10 CORONARY ARTERY DISEASE INVOLVING NATIVE CORONARY ARTERY OF NATIVE HEART WITHOUT ANGINA PECTORIS: ICD-10-CM

## 2022-09-23 DIAGNOSIS — I10 PRIMARY HYPERTENSION: ICD-10-CM

## 2022-09-23 DIAGNOSIS — K74.02 HEPATIC FIBROSIS, STAGE 3: ICD-10-CM

## 2022-09-23 DIAGNOSIS — I50.41 ACUTE COMBINED SYSTOLIC AND DIASTOLIC HEART FAILURE: ICD-10-CM

## 2022-09-23 PROCEDURE — 1159F MED LIST DOCD IN RCRD: CPT | Mod: CPTII,S$GLB,, | Performed by: INTERNAL MEDICINE

## 2022-09-23 PROCEDURE — 99214 OFFICE O/P EST MOD 30 MIN: CPT | Mod: S$GLB,,, | Performed by: INTERNAL MEDICINE

## 2022-09-23 PROCEDURE — 99214 PR OFFICE/OUTPT VISIT, EST, LEVL IV, 30-39 MIN: ICD-10-PCS | Mod: S$GLB,,, | Performed by: INTERNAL MEDICINE

## 2022-09-23 PROCEDURE — 4010F PR ACE/ARB THEARPY RXD/TAKEN: ICD-10-PCS | Mod: CPTII,S$GLB,, | Performed by: INTERNAL MEDICINE

## 2022-09-23 PROCEDURE — 99999 PR PBB SHADOW E&M-EST. PATIENT-LVL IV: CPT | Mod: PBBFAC,,, | Performed by: INTERNAL MEDICINE

## 2022-09-23 PROCEDURE — 3078F DIAST BP <80 MM HG: CPT | Mod: CPTII,S$GLB,, | Performed by: INTERNAL MEDICINE

## 2022-09-23 PROCEDURE — 3044F PR MOST RECENT HEMOGLOBIN A1C LEVEL <7.0%: ICD-10-PCS | Mod: CPTII,S$GLB,, | Performed by: INTERNAL MEDICINE

## 2022-09-23 PROCEDURE — 3074F SYST BP LT 130 MM HG: CPT | Mod: CPTII,S$GLB,, | Performed by: INTERNAL MEDICINE

## 2022-09-23 PROCEDURE — 99999 PR PBB SHADOW E&M-EST. PATIENT-LVL IV: ICD-10-PCS | Mod: PBBFAC,,, | Performed by: INTERNAL MEDICINE

## 2022-09-23 PROCEDURE — 3008F PR BODY MASS INDEX (BMI) DOCUMENTED: ICD-10-PCS | Mod: CPTII,S$GLB,, | Performed by: INTERNAL MEDICINE

## 2022-09-23 PROCEDURE — 1160F PR REVIEW ALL MEDS BY PRESCRIBER/CLIN PHARMACIST DOCUMENTED: ICD-10-PCS | Mod: CPTII,S$GLB,, | Performed by: INTERNAL MEDICINE

## 2022-09-23 PROCEDURE — 1159F PR MEDICATION LIST DOCUMENTED IN MEDICAL RECORD: ICD-10-PCS | Mod: CPTII,S$GLB,, | Performed by: INTERNAL MEDICINE

## 2022-09-23 PROCEDURE — 3078F PR MOST RECENT DIASTOLIC BLOOD PRESSURE < 80 MM HG: ICD-10-PCS | Mod: CPTII,S$GLB,, | Performed by: INTERNAL MEDICINE

## 2022-09-23 PROCEDURE — 3044F HG A1C LEVEL LT 7.0%: CPT | Mod: CPTII,S$GLB,, | Performed by: INTERNAL MEDICINE

## 2022-09-23 PROCEDURE — 1160F RVW MEDS BY RX/DR IN RCRD: CPT | Mod: CPTII,S$GLB,, | Performed by: INTERNAL MEDICINE

## 2022-09-23 PROCEDURE — 3074F PR MOST RECENT SYSTOLIC BLOOD PRESSURE < 130 MM HG: ICD-10-PCS | Mod: CPTII,S$GLB,, | Performed by: INTERNAL MEDICINE

## 2022-09-23 PROCEDURE — 3008F BODY MASS INDEX DOCD: CPT | Mod: CPTII,S$GLB,, | Performed by: INTERNAL MEDICINE

## 2022-09-23 PROCEDURE — 4010F ACE/ARB THERAPY RXD/TAKEN: CPT | Mod: CPTII,S$GLB,, | Performed by: INTERNAL MEDICINE

## 2022-09-23 NOTE — ASSESSMENT & PLAN NOTE
Labs ordered today including liver function test.  I told him that provided the liver function test do not increase from baseline that statin therapy would be continued unless his hepatologist has objections.  We discussed Repatha as an alternative.

## 2022-09-23 NOTE — PROGRESS NOTES
[facility] Cardiology     Subjective:    Patient ID:  Andrae Aviles is a 56 y.o. male who presents for follow-up of Coronary Artery Disease, Hyperlipidemia, and Hypertension    Review of patient's allergies indicates:  No Known Allergies   A recent stent was placed to circumflex drug-eluting type September 15, 2022.  The procedure was performed from the right groin.  He has mild ecchymosis and minimal tenderness at this point.  He has not had any angina.  He was angina free before his stent.  He has chronic RCA occlusion and recent left main stent in June 2022.  He has completed cardiac rehab and will be heading back to work soon.    He is on aspirin and Plavix.  Blood pressure today is normal.  His LDL was 93 pure hypercholesterolemia in June when atorvastatin 40 mg was ordered.  He has steatosis and scarring on liver biopsy.  He follows with a hepatologist.  On their last visit the doctor knew that he was taking statin therapy.  He has no bleeding or bruising.  His most recent assessment of ejection fraction by nuclear stress testing confirmed normal EF, 52%.      Review of Systems   Constitutional: Negative for chills, decreased appetite, diaphoresis, fever, malaise/fatigue, night sweats, weight gain and weight loss.   HENT:  Negative for congestion, ear discharge, ear pain, hearing loss, hoarse voice, nosebleeds, odynophagia, sore throat, stridor and tinnitus.    Eyes:  Negative for blurred vision, discharge, double vision, pain, photophobia, redness, vision loss in left eye, vision loss in right eye, visual disturbance and visual halos.   Cardiovascular:  Negative for chest pain, claudication, cyanosis, dyspnea on exertion, irregular heartbeat, leg swelling, near-syncope, orthopnea, palpitations, paroxysmal nocturnal dyspnea and syncope.   Respiratory:  Negative for cough, hemoptysis, shortness of breath, sleep disturbances due to breathing,  "snoring, sputum production and wheezing.    Endocrine: Negative for cold intolerance, heat intolerance, polydipsia, polyphagia and polyuria.   Hematologic/Lymphatic: Negative for adenopathy and bleeding problem. Does not bruise/bleed easily.   Skin:  Negative for color change, dry skin, flushing, itching, nail changes, poor wound healing, rash, skin cancer, suspicious lesions and unusual hair distribution.        Ecchymosis right groin   Musculoskeletal:  Negative for arthritis, back pain, falls, gout, joint pain, joint swelling, muscle cramps, muscle weakness, myalgias, neck pain and stiffness.   Gastrointestinal:  Negative for bloating, abdominal pain, anorexia, change in bowel habit, bowel incontinence, constipation, diarrhea, dysphagia, excessive appetite, flatus, heartburn, hematemesis, hematochezia, hemorrhoids, jaundice, melena, nausea and vomiting.   Genitourinary:  Negative for bladder incontinence, decreased libido, dysuria, flank pain, frequency, genital sores, hematuria, hesitancy, incomplete emptying, nocturia and urgency.   Neurological:  Negative for aphonia, brief paralysis, difficulty with concentration, disturbances in coordination, excessive daytime sleepiness, dizziness, focal weakness, headaches, light-headedness, loss of balance, numbness, paresthesias, seizures, sensory change, tremors, vertigo and weakness.   Psychiatric/Behavioral:  Negative for altered mental status, depression, hallucinations, memory loss, substance abuse, suicidal ideas and thoughts of violence. The patient does not have insomnia and is not nervous/anxious.    Allergic/Immunologic: Negative for hives and persistent infections.      Objective:       Vitals:    09/23/22 1009   BP: 113/74   BP Location: Right arm   BP Method: Large (Automatic)   Pulse: 75   SpO2: 95%   Weight: 92.6 kg (204 lb 2.3 oz)   Height: 5' 10" (1.778 m)    Physical Exam  Constitutional:       General: He is not in acute distress.     Appearance: He is " well-developed. He is not diaphoretic.   HENT:      Head: Normocephalic and atraumatic.      Nose: Nose normal.   Eyes:      General: No scleral icterus.        Right eye: No discharge.      Conjunctiva/sclera: Conjunctivae normal.      Pupils: Pupils are equal, round, and reactive to light.   Neck:      Thyroid: No thyromegaly.      Vascular: No JVD.      Trachea: No tracheal deviation.   Cardiovascular:      Rate and Rhythm: Normal rate and regular rhythm.      Pulses:           Carotid pulses are 2+ on the right side and 2+ on the left side.       Radial pulses are 2+ on the right side and 2+ on the left side.        Dorsalis pedis pulses are 2+ on the right side and 2+ on the left side.        Posterior tibial pulses are 2+ on the right side and 2+ on the left side.      Heart sounds: Normal heart sounds. No murmur heard.    No friction rub. No gallop.   Pulmonary:      Effort: Pulmonary effort is normal. No respiratory distress.      Breath sounds: Normal breath sounds. No stridor. No wheezing or rales.   Chest:      Chest wall: No tenderness.   Abdominal:      General: Bowel sounds are normal. There is no distension.      Palpations: Abdomen is soft. There is no mass.      Tenderness: There is no abdominal tenderness. There is no guarding or rebound.   Musculoskeletal:         General: No tenderness. Normal range of motion.      Cervical back: Normal range of motion and neck supple.   Lymphadenopathy:      Cervical: No cervical adenopathy.   Skin:     General: Skin is warm and dry.      Coloration: Skin is not pale.      Findings: No erythema or rash.   Neurological:      Mental Status: He is alert and oriented to person, place, and time.      Cranial Nerves: No cranial nerve deficit.      Coordination: Coordination normal.   Psychiatric:         Behavior: Behavior normal.         Thought Content: Thought content normal.         Judgment: Judgment normal.         Assessment:       1. Pure hypercholesterolemia     2. NSTEMI (non-ST elevated myocardial infarction)    3. Primary hypertension    4. Coronary artery disease involving native coronary artery of native heart without angina pectoris    5. Acute combined systolic and diastolic heart failure    6. Rheumatoid arthritis, involving unspecified site, unspecified whether rheumatoid factor present    7. Hepatic fibrosis, stage 3      Results for orders placed or performed during the hospital encounter of 09/15/22   ISTAT ACT-K   Result Value Ref Range    POC ACTIVATED CLOTTING TIME K 486 (H) 74 - 137 sec    Sample ARTERIAL    ISTAT ACT-K   Result Value Ref Range    POC ACTIVATED CLOTTING TIME K 202 (H) 74 - 137 sec    Sample ARTERIAL    ISTAT ACT-K   Result Value Ref Range    POC ACTIVATED CLOTTING TIME K 173 (H) 74 - 137 sec    Sample ARTERIAL          Current Outpatient Medications:     aspirin 81 MG Chew, Take 1 tablet (81 mg total) by mouth once daily., Disp: 90 tablet, Rfl: 3    atorvastatin (LIPITOR) 40 MG tablet, Take 1 tablet (40 mg total) by mouth every evening., Disp: 90 tablet, Rfl: 3    clopidogreL (PLAVIX) 75 mg tablet, Take 1 tablet (75 mg total) by mouth once daily., Disp: 90 tablet, Rfl: 11    folic acid (FOLVITE) 1 MG tablet, Take 1 tablet (1,000 mcg total) by mouth once daily., Disp: 90 tablet, Rfl: 2    metoprolol succinate (TOPROL-XL) 25 MG 24 hr tablet, Take 1 tablet (25 mg total) by mouth once daily., Disp: 90 tablet, Rfl: 11    nitroGLYCERIN (NITROSTAT) 0.4 MG SL tablet, Place 1 tablet (0.4 mg total) under the tongue every 5 (five) minutes as needed for Chest pain., Disp: 30 tablet, Rfl: 3    predniSONE (DELTASONE) 20 MG tablet, Take 20 mg by mouth daily as needed. Not taking, Disp: , Rfl:     sertraline (ZOLOFT) 50 MG tablet, Take 1 tablet (50 mg total) by mouth every evening., Disp: 90 tablet, Rfl: 3    valsartan (DIOVAN) 40 MG tablet, Take 1 tablet (40 mg total) by mouth once daily., Disp: 90 tablet, Rfl: 3     Lab Results   Component Value Date     WBC 4.53 09/14/2022    RBC 4.47 (L) 09/14/2022    HGB 14.3 09/14/2022    HCT 42.1 09/14/2022    MCV 94 09/14/2022    MCH 32.0 (H) 09/14/2022    MCHC 34.0 09/14/2022    RDW 11.7 09/14/2022     09/14/2022    MPV 10.7 09/14/2022    GRAN 3.1 09/14/2022    GRAN 67.5 09/14/2022    LYMPH 0.9 (L) 09/14/2022    LYMPH 19.9 09/14/2022    MONO 0.4 09/14/2022    MONO 9.7 09/14/2022    EOS 0.1 09/14/2022    BASO 0.04 09/14/2022    EOSINOPHIL 1.8 09/14/2022    BASOPHIL 0.9 09/14/2022    MG 2.0 07/02/2022        CMP  Lab Results   Component Value Date     09/14/2022    K 3.5 09/14/2022     09/14/2022    CO2 28 09/14/2022     (H) 09/14/2022    BUN 3 09/14/2022    CREATININE 0.85 09/14/2022    CALCIUM 8.9 09/14/2022    PROT 6.8 07/02/2022    ALBUMIN 2.8 (L) 07/02/2022    BILITOT 1.2 (H) 07/02/2022    ALKPHOS 90 07/02/2022    AST 96 (H) 07/02/2022    ALT 44 07/02/2022    ANIONGAP 8 09/14/2022    ESTGFRAFRICA >60.0 07/02/2022    EGFRNONAA >60.0 07/02/2022        No results found for: LABBLOO, LABURIN, RESPIRATORYC, GSRESP         Results for orders placed or performed during the hospital encounter of 09/15/22   EKG 12-LEAD on arrival to floor    Collection Time: 09/15/22 12:07 PM    Narrative    Test Reason : Z98.62,    Vent. Rate : 044 BPM     Atrial Rate : 044 BPM     P-R Int : 150 ms          QRS Dur : 084 ms      QT Int : 480 ms       P-R-T Axes : 026 037 039 degrees     QTc Int : 410 ms    Marked sinus bradycardia  Abnormal ECG  When compared with ECG of 15-SEP-2022 07:33,  No significant change was found  Confirmed by Cameron Villasenor MD ([id number]) on 9/19/2022 2:29:57 PM    Referred By: TREVOR ANGELA           Confirmed By:Cameron Villasenor MD                  Plan:       Problem List Items Addressed This Visit          Cardiac/Vascular    Primary hypertension     Blood pressure is well controlled.  He will continue Diovan and metoprolol.         NSTEMI (non-ST elevated myocardial infarction)     Reema  2022.         Acute combined systolic and diastolic heart failure     EF now 52%.  Condition improved post left main plaque rupture leading to stenting.         CAD (coronary artery disease)     He is cleared to return to work.  He has collateralized total occlusion right coronary artery but patent LAD and circumflex.  His left main stent showed no residual disease on most recent angiogram September 15, 2022.    Long-term dual anti-platelet therapy was discussed and planned.         Pure hypercholesterolemia - Primary     Labs ordered today including liver function test.  I told him that provided the liver function test do not increase from baseline that statin therapy would be continued unless his hepatologist has objections.  We discussed Repatha as an alternative.         Relevant Orders    Lipid Panel    Comprehensive Metabolic Panel       Immunology/Multi System    Rheumatoid arthritis     A chronic condition.            GI    Hepatic fibrosis, stage 3     He follows with hepatology.  Surgery declined CABG operation based on his liver disease status.             He is cleared to return to work after October 3, 2022.  I made a follow-up visit in 3 months.  Education provided to the patient regarding his current cardiac status and necessity for follow-up.  We reviewed his catheterization findings.               Phani Brown MD  09/23/2022   11:56 AM

## 2022-09-23 NOTE — ASSESSMENT & PLAN NOTE
He is cleared to return to work.  He has collateralized total occlusion right coronary artery but patent LAD and circumflex.  His left main stent showed no residual disease on most recent angiogram September 15, 2022.    Long-term dual anti-platelet therapy was discussed and planned.

## 2022-09-23 NOTE — PATIENT INSTRUCTIONS
I am his cardiologist and am clearing him to return to work without restriction on October 3 2022.     Please contact me further regarding this issue if needed.           Phani Brown MD, FACC

## 2022-10-04 RX ORDER — DOXAZOSIN 2 MG/1
TABLET ORAL
Qty: 90 TABLET | Refills: 0 | Status: SHIPPED | OUTPATIENT
Start: 2022-10-04 | End: 2022-11-07

## 2022-10-04 RX ORDER — SERTRALINE HYDROCHLORIDE 50 MG/1
TABLET, FILM COATED ORAL
Qty: 90 TABLET | Refills: 0 | Status: SHIPPED | OUTPATIENT
Start: 2022-10-04 | End: 2023-02-19

## 2022-11-07 ENCOUNTER — LAB VISIT (OUTPATIENT)
Dept: LAB | Facility: HOSPITAL | Age: 57
End: 2022-11-07
Attending: FAMILY MEDICINE
Payer: COMMERCIAL

## 2022-11-07 ENCOUNTER — OFFICE VISIT (OUTPATIENT)
Dept: FAMILY MEDICINE | Facility: CLINIC | Age: 57
End: 2022-11-07
Payer: COMMERCIAL

## 2022-11-07 VITALS
SYSTOLIC BLOOD PRESSURE: 106 MMHG | BODY MASS INDEX: 29.51 KG/M2 | WEIGHT: 206.13 LBS | HEART RATE: 93 BPM | OXYGEN SATURATION: 96 % | TEMPERATURE: 98 F | HEIGHT: 70 IN | DIASTOLIC BLOOD PRESSURE: 68 MMHG

## 2022-11-07 DIAGNOSIS — R79.89 ELEVATED LFTS: ICD-10-CM

## 2022-11-07 DIAGNOSIS — M17.11 ARTHRITIS OF KNEE, RIGHT: ICD-10-CM

## 2022-11-07 DIAGNOSIS — Z00.00 WELLNESS EXAMINATION: Primary | ICD-10-CM

## 2022-11-07 DIAGNOSIS — M19.90 ARTHRITIS: ICD-10-CM

## 2022-11-07 DIAGNOSIS — I25.10 CORONARY ARTERY DISEASE INVOLVING NATIVE CORONARY ARTERY OF NATIVE HEART WITHOUT ANGINA PECTORIS: ICD-10-CM

## 2022-11-07 DIAGNOSIS — K74.02 HEPATIC FIBROSIS, STAGE 3: ICD-10-CM

## 2022-11-07 DIAGNOSIS — M06.9 RHEUMATOID ARTHRITIS, INVOLVING UNSPECIFIED SITE, UNSPECIFIED WHETHER RHEUMATOID FACTOR PRESENT: ICD-10-CM

## 2022-11-07 LAB
ALBUMIN SERPL BCP-MCNC: 4.3 G/DL (ref 3.5–5.2)
ALP SERPL-CCNC: 79 U/L (ref 38–126)
ALT SERPL W/O P-5'-P-CCNC: 31 U/L (ref 10–44)
AST SERPL-CCNC: 47 U/L (ref 15–46)
BILIRUB SERPL-MCNC: 0.8 MG/DL (ref 0.1–1)
PROT SERPL-MCNC: 7.8 G/DL (ref 6–8.4)

## 2022-11-07 PROCEDURE — 3008F PR BODY MASS INDEX (BMI) DOCUMENTED: ICD-10-PCS | Mod: CPTII,S$GLB,, | Performed by: FAMILY MEDICINE

## 2022-11-07 PROCEDURE — 99396 PR PREVENTIVE VISIT,EST,40-64: ICD-10-PCS | Mod: S$GLB,,, | Performed by: FAMILY MEDICINE

## 2022-11-07 PROCEDURE — 3008F BODY MASS INDEX DOCD: CPT | Mod: CPTII,S$GLB,, | Performed by: FAMILY MEDICINE

## 2022-11-07 PROCEDURE — 1159F MED LIST DOCD IN RCRD: CPT | Mod: CPTII,S$GLB,, | Performed by: FAMILY MEDICINE

## 2022-11-07 PROCEDURE — 3074F PR MOST RECENT SYSTOLIC BLOOD PRESSURE < 130 MM HG: ICD-10-PCS | Mod: CPTII,S$GLB,, | Performed by: FAMILY MEDICINE

## 2022-11-07 PROCEDURE — 4010F ACE/ARB THERAPY RXD/TAKEN: CPT | Mod: CPTII,S$GLB,, | Performed by: FAMILY MEDICINE

## 2022-11-07 PROCEDURE — 99396 PREV VISIT EST AGE 40-64: CPT | Mod: S$GLB,,, | Performed by: FAMILY MEDICINE

## 2022-11-07 PROCEDURE — 1159F PR MEDICATION LIST DOCUMENTED IN MEDICAL RECORD: ICD-10-PCS | Mod: CPTII,S$GLB,, | Performed by: FAMILY MEDICINE

## 2022-11-07 PROCEDURE — 80076 HEPATIC FUNCTION PANEL: CPT | Mod: PO | Performed by: FAMILY MEDICINE

## 2022-11-07 PROCEDURE — 3044F HG A1C LEVEL LT 7.0%: CPT | Mod: CPTII,S$GLB,, | Performed by: FAMILY MEDICINE

## 2022-11-07 PROCEDURE — 36415 COLL VENOUS BLD VENIPUNCTURE: CPT | Mod: PO | Performed by: FAMILY MEDICINE

## 2022-11-07 PROCEDURE — 3078F PR MOST RECENT DIASTOLIC BLOOD PRESSURE < 80 MM HG: ICD-10-PCS | Mod: CPTII,S$GLB,, | Performed by: FAMILY MEDICINE

## 2022-11-07 PROCEDURE — 4010F PR ACE/ARB THEARPY RXD/TAKEN: ICD-10-PCS | Mod: CPTII,S$GLB,, | Performed by: FAMILY MEDICINE

## 2022-11-07 PROCEDURE — 3078F DIAST BP <80 MM HG: CPT | Mod: CPTII,S$GLB,, | Performed by: FAMILY MEDICINE

## 2022-11-07 PROCEDURE — 3074F SYST BP LT 130 MM HG: CPT | Mod: CPTII,S$GLB,, | Performed by: FAMILY MEDICINE

## 2022-11-07 PROCEDURE — 3044F PR MOST RECENT HEMOGLOBIN A1C LEVEL <7.0%: ICD-10-PCS | Mod: CPTII,S$GLB,, | Performed by: FAMILY MEDICINE

## 2022-11-07 RX ORDER — SULFASALAZINE 500 MG/1
500 TABLET, DELAYED RELEASE ORAL 2 TIMES DAILY
COMMUNITY
Start: 2022-10-04 | End: 2022-11-21 | Stop reason: SDUPTHER

## 2022-11-07 NOTE — PROGRESS NOTES
"Subjective:      Patient ID: Andrae Aviles is a 57 y.o. male.    Chief Complaint: Annual Exam      Vitals:    11/07/22 0909   BP: 106/68   Pulse: 93   Temp: 98.2 °F (36.8 °C)   TempSrc: Oral   SpO2: 96%   Weight: 93.5 kg (206 lb 2.1 oz)   Height: 5' 10" (1.778 m)        HPI   Wellness, had MI in June, saw Kevin, pt quit smoking  Now has 2 stents, left main and left circumflex, discharge notes read  Quit alcohol, was found to have fibroses on liver bx  Has RA, tx changed to sulfasalazine, stpped MTX    Right knee effusion, rheum aware  Work is physical, wearing a brace and ice on it  Hurts, on no meds, rubs it  Went through cardiac rehab  Due labs  And vaccines  Lost 30 pounds      Problem List  Patient Active Problem List   Diagnosis    Primary hypertension    Rheumatoid arthritis    Elevated uric acid in blood    Anxiety    NSTEMI (non-ST elevated myocardial infarction)    Alcohol dependence    Acute combined systolic and diastolic heart failure    Hematoma of rectus sheath    CAD (coronary artery disease)    Hepatic fibrosis, stage 3    Liver lesion    Night terrors    Pure hypercholesterolemia        ALLERGIES: Review of patient's allergies indicates:  No Known Allergies    MEDS:   Current Outpatient Medications:     aspirin 81 MG Chew, Take 1 tablet (81 mg total) by mouth once daily., Disp: 90 tablet, Rfl: 3    atorvastatin (LIPITOR) 40 MG tablet, Take 1 tablet (40 mg total) by mouth every evening., Disp: 90 tablet, Rfl: 3    clopidogreL (PLAVIX) 75 mg tablet, Take 1 tablet (75 mg total) by mouth once daily., Disp: 90 tablet, Rfl: 11    folic acid (FOLVITE) 1 MG tablet, Take 1 tablet (1,000 mcg total) by mouth once daily., Disp: 90 tablet, Rfl: 2    metoprolol succinate (TOPROL-XL) 25 MG 24 hr tablet, Take 1 tablet (25 mg total) by mouth once daily., Disp: 90 tablet, Rfl: 11    nitroGLYCERIN (NITROSTAT) 0.4 MG SL tablet, Place 1 tablet (0.4 mg total) under the tongue every 5 (five) minutes as needed for " Chest pain., Disp: 30 tablet, Rfl: 3    predniSONE (DELTASONE) 20 MG tablet, Take 20 mg by mouth daily as needed. Not taking, Disp: , Rfl:     sertraline (ZOLOFT) 50 MG tablet, TAKE 1 TABLET BY MOUTH EVERY EVENING, Disp: 90 tablet, Rfl: 0    sulfaSALAzine (AZULFIDINE) 500 MG EC tablet, Take 500 mg by mouth 2 (two) times daily., Disp: , Rfl:     valsartan (DIOVAN) 40 MG tablet, Take 1 tablet (40 mg total) by mouth once daily., Disp: 90 tablet, Rfl: 3    doxazosin (CARDURA) 2 MG tablet, TAKE 1 TABLET BY MOUTH NIGHTLY (Patient not taking: Reported on 11/7/2022), Disp: 90 tablet, Rfl: 0      History:  Current Providers as of 11/7/2022  PCP: Edgar Aviles MD  Care Team Provider: Shane Lewis MD  Encounter Provider: Edgar Aviles MD, starting on Mon Nov 7, 2022 12:00 AM  Referring Provider: not found, starting on Mon Nov 7, 2022 12:00 AM  Consulting Physician: Edgar Aviles MD, starting on Mon Nov 7, 2022  9:07 AM (Active)   Past Medical History:   Diagnosis Date    Acute combined systolic and diastolic heart failure 6/21/2022    Arthritis     rheumatoid    CAD (coronary artery disease)     Hypertension     NSTEMI (non-ST elevated myocardial infarction) 6/19/2022    Primary hypertension 10/11/2021    Rheumatoid arthritis 10/11/2021     Past Surgical History:   Procedure Laterality Date    COLONOSCOPY      age 42    COLONOSCOPY N/A 11/22/2021    Procedure: COLONOSCOPY  Golytely   Rapid Covid;  Surgeon: Shane Guzman MD;  Location: [facility] ENDO;  Service: Endoscopy;  Laterality: N/A;    CORONARY ANGIOGRAPHY N/A 7/1/2022    Procedure: Angiogram, Coronary, with Left Heart Cath;  Surgeon: Miguelito Barney MD;  Location: [facility] CATH LAB;  Service: Cardiology;  Laterality: N/A;    CORONARY ANGIOGRAPHY Right 9/15/2022    Procedure: ANGIOGRAM, CORONARY ARTERY;  Surgeon: Phani Brown MD;  Location: [facility] CATH LAB/EP;  Service: Cardiology;  Laterality: Right;    LEFT HEART CATHETERIZATION N/A 6/20/2022     Procedure: Left heart cath;  Surgeon: Marlen Thompson MD;  Location: [facility] CATH LAB/EP;  Service: Cardiology;  Laterality: N/A;    LEFT HEART CATHETERIZATION Left 9/15/2022    Procedure: Left heart cath;  Surgeon: Phani Brown MD;  Location: [facility] CATH LAB/EP;  Service: Cardiology;  Laterality: Left;    VASECTOMY       Social History     Tobacco Use    Smoking status: Former    Smokeless tobacco: Never   Substance Use Topics    Alcohol use: Not Currently     Comment: previousl;y 8+ beers daily for many years, stopped 6/2022    Drug use: Never         Review of Systems   Musculoskeletal:  Positive for arthralgias and joint swelling.   All other systems reviewed and are negative.  Objective:     Physical Exam        Assessment:     No diagnosis found.  Plan:        Medication List            Accurate as of November 7, 2022  9:21 AM. If you have any questions, ask your nurse or doctor.                CONTINUE taking these medications      aspirin 81 MG Chew  Take 1 tablet (81 mg total) by mouth once daily.     atorvastatin 40 MG tablet  Commonly known as: LIPITOR  Take 1 tablet (40 mg total) by mouth every evening.     clopidogreL 75 mg tablet  Commonly known as: PLAVIX  Take 1 tablet (75 mg total) by mouth once daily.     doxazosin 2 MG tablet  Commonly known as: CARDURA  TAKE 1 TABLET BY MOUTH NIGHTLY     folic acid 1 MG tablet  Commonly known as: FOLVITE  Take 1 tablet (1,000 mcg total) by mouth once daily.     metoprolol succinate 25 MG 24 hr tablet  Commonly known as: TOPROL-XL  Take 1 tablet (25 mg total) by mouth once daily.     nitroGLYCERIN 0.4 MG SL tablet  Commonly known as: NITROSTAT  Place 1 tablet (0.4 mg total) under the tongue every 5 (five) minutes as needed for Chest pain.     predniSONE 20 MG tablet  Commonly known as: DELTASONE     sertraline 50 MG tablet  Commonly known as: ZOLOFT  TAKE 1 TABLET BY MOUTH EVERY EVENING     sulfaSALAzine 500 MG EC tablet  Commonly known as: AZULFIDINE      valsartan 40 MG tablet  Commonly known as: DIOVAN  Take 1 tablet (40 mg total) by mouth once daily.            There are no diagnoses linked to this encounter.

## 2022-11-07 NOTE — PROGRESS NOTES
"Subjective:      Patient ID: Andrae Aviles is a 57 y.o. male.    Chief Complaint: Annual Exam        Vitals:    11/07/22 0909   BP: 106/68   Pulse: 93   Temp: 98.2 °F (36.8 °C)   TempSrc: Oral   SpO2: 96%   Weight: 93.5 kg (206 lb 2.1 oz)   Height: 5' 10" (1.778 m)        [facility]   wellness    Problem List  Patient Active Problem List   Diagnosis    Primary hypertension    Rheumatoid arthritis    Elevated uric acid in blood    Anxiety    NSTEMI (non-ST elevated myocardial infarction)    Alcohol dependence    Acute combined systolic and diastolic heart failure    Hematoma of rectus sheath    CAD (coronary artery disease)    Liver lesion    Night terrors    Pure hypercholesterolemia    Arthritis of knee, right    Elevated ferritin    Carrier of hemochromatosis HFE gene mutation    Other cirrhosis of liver        ALLERGIES: Review of patient's allergies indicates:  No Known Allergies    MEDS:   Current Outpatient Medications:     aspirin 81 MG Chew, Take 1 tablet (81 mg total) by mouth once daily., Disp: 90 tablet, Rfl: 3    atorvastatin (LIPITOR) 40 MG tablet, Take 1 tablet (40 mg total) by mouth every evening., Disp: 90 tablet, Rfl: 3    clopidogreL (PLAVIX) 75 mg tablet, Take 1 tablet (75 mg total) by mouth once daily., Disp: 90 tablet, Rfl: 11    metoprolol succinate (TOPROL-XL) 25 MG 24 hr tablet, Take 1 tablet (25 mg total) by mouth once daily., Disp: 90 tablet, Rfl: 11    nitroGLYCERIN (NITROSTAT) 0.4 MG SL tablet, Place 1 tablet (0.4 mg total) under the tongue every 5 (five) minutes as needed for Chest pain., Disp: 30 tablet, Rfl: 3    valsartan (DIOVAN) 40 MG tablet, Take 1 tablet (40 mg total) by mouth once daily., Disp: 90 tablet, Rfl: 3    colchicine (COLCRYS) 0.6 mg tablet, Take 1 tablet (0.6 mg total) by mouth once daily., Disp: 30 tablet, Rfl: 2    folic acid (FOLVITE) 1 MG tablet, Take 1 tablet (1,000 mcg total) by mouth once daily., Disp: 90 tablet, Rfl: 2    sertraline (ZOLOFT) 50 MG tablet, TAKE 1 " TABLET BY MOUTH EVERY DAY IN THE EVENING, Disp: 90 tablet, Rfl: 2    sulfaSALAzine (AZULFIDINE) 500 MG EC tablet, Take 1 tablet (500 mg total) by mouth 2 (two) times daily., Disp: 60 tablet, Rfl: 11      History:  Current Providers as of 11/7/2022  PCP: Edgar Aviles MD  Care Team Provider: Shane Lewis MD  Care Team Provider: Phani Brown MD  Care Team Provider: Kaitlyn Ribera NP  Care Team Provider: Shane Lewis MD  Encounter Provider: Edgar Aviles MD, starting on Mon Nov 7, 2022 12:00 AM  Referring Provider: not found, starting on Mon Nov 7, 2022 12:00 AM  Consulting Physician: Edgar Aviles MD, starting on Mon Nov 7, 2022  9:07 AM, ending on Sun Nov 27, 2022  8:18 PM (Inactive)   Past Medical History:   Diagnosis Date    Acute combined systolic and diastolic heart failure 6/21/2022    Arthritis     rheumatoid    CAD (coronary artery disease)     Carrier of hemochromatosis HFE gene mutation 1/13/2023    Hypertension     NSTEMI (non-ST elevated myocardial infarction) 6/19/2022    Other cirrhosis of liver 1/13/2023    Primary hypertension 10/11/2021    Rheumatoid arthritis 10/11/2021     Past Surgical History:   Procedure Laterality Date    COLONOSCOPY      age 42    COLONOSCOPY N/A 11/22/2021    Procedure: COLONOSCOPY  Golytely   Rapid Covid;  Surgeon: Shane Guzman MD;  Location: [facility] ENDO;  Service: Endoscopy;  Laterality: N/A;    CORONARY ANGIOGRAPHY N/A 07/01/2022    Procedure: Angiogram, Coronary, with Left Heart Cath;  Surgeon: Miguelito Barney MD;  Location: [facility] CATH LAB;  Service: Cardiology;  Laterality: N/A;    CORONARY ANGIOGRAPHY Right 09/15/2022    Procedure: ANGIOGRAM, CORONARY ARTERY;  Surgeon: Phani Brown MD;  Location: [facility] CATH LAB/EP;  Service: Cardiology;  Laterality: Right;    LEFT HEART CATHETERIZATION N/A 06/20/2022    Procedure: Left heart cath;  Surgeon: Marlen Thompson MD;  Location: [facility] CATH LAB/EP;  Service: Cardiology;  Laterality: N/A;     LEFT HEART CATHETERIZATION Left 09/15/2022    Procedure: Left heart cath;  Surgeon: Phani Brown MD;  Location: [facility] CATH LAB/EP;  Service: Cardiology;  Laterality: Left;    VASECTOMY       Social History     Tobacco Use    Smoking status: Former     Packs/day: 1.00     Years: 15.00     Pack years: 15.00     Types: Cigarettes, Vaping with nicotine     Start date: 10/14/1985     Quit date: 6/15/2016     Years since quittin.8    Smokeless tobacco: Never   Substance Use Topics    Alcohol use: Yes     Alcohol/week: 5.0 standard drinks     Types: 5 Cans of beer per week     Comment: previousl;y 8+ beers daily for many years, stopped 2022, but resumed 2022, aware to stop    Drug use: Never         Review of Systems   Constitutional:  Negative for appetite change, fatigue, fever and unexpected weight change.   HENT:  Negative for congestion, ear pain, sinus pressure and sore throat.    Eyes:  Negative for pain and visual disturbance.   Respiratory:  Negative for shortness of breath.    Cardiovascular:  Negative for chest pain.   Gastrointestinal:  Negative for abdominal pain, constipation and diarrhea.   Endocrine: Negative for polyuria.   Genitourinary:  Negative for difficulty urinating and frequency.   Musculoskeletal:  Positive for arthralgias and joint swelling. Negative for back pain and myalgias.   Skin:  Negative for color change.   Allergic/Immunologic: Negative.    Neurological:  Negative for syncope, weakness and headaches.   Hematological:  Does not bruise/bleed easily.   Psychiatric/Behavioral:  Negative for dysphoric mood and suicidal ideas. The patient is not nervous/anxious.    All other systems reviewed and are negative.  Objective:     Physical Exam  Vitals and nursing note reviewed.   Constitutional:       General: He is not in acute distress.     Appearance: He is well-developed. He is not diaphoretic.   HENT:      Head: Normocephalic and atraumatic.      Right Ear: External ear normal.       Left Ear: External ear normal.      Mouth/Throat:      Pharynx: No oropharyngeal exudate.   Eyes:      General: No scleral icterus.        Right eye: No discharge.         Left eye: No discharge.      Conjunctiva/sclera: Conjunctivae normal.      Pupils: Pupils are equal, round, and reactive to light.   Neck:      Thyroid: No thyromegaly.      Vascular: No JVD.      Trachea: No tracheal deviation.   Cardiovascular:      Rate and Rhythm: Normal rate and regular rhythm.      Heart sounds: No murmur heard.    No friction rub. No gallop.   Pulmonary:      Effort: Pulmonary effort is normal. No respiratory distress.      Breath sounds: Normal breath sounds. No stridor. No wheezing or rales.   Chest:      Chest wall: No tenderness.   Abdominal:      General: There is no distension.      Palpations: Abdomen is soft. There is no mass.      Tenderness: There is no abdominal tenderness. There is no guarding or rebound.   Musculoskeletal:         General: No tenderness. Normal range of motion.      Cervical back: Normal range of motion and neck supple.   Lymphadenopathy:      Cervical: No cervical adenopathy.   Skin:     General: Skin is warm and dry.      Coloration: Skin is not pale.      Findings: No erythema or rash.   Neurological:      Mental Status: He is alert and oriented to person, place, and time.      Cranial Nerves: No cranial nerve deficit.      Motor: No abnormal muscle tone.      Coordination: Coordination normal.      Deep Tendon Reflexes: Reflexes are normal and symmetric. Reflexes normal.   Psychiatric:         Behavior: Behavior normal.         Thought Content: Thought content normal.         Judgment: Judgment normal.           Assessment:     1. Wellness examination    2. Arthritis    3. Elevated LFTs    4. Hepatic fibrosis, stage 3    5. Arthritis of knee, right    6. Rheumatoid arthritis, involving unspecified site, unspecified whether rheumatoid factor present    7. Coronary artery disease  involving native coronary artery of native heart without angina pectoris      Plan:        Medication List            Accurate as of November 7, 2022 11:59 PM. If you have any questions, ask your nurse or doctor.                CONTINUE taking these medications      aspirin 81 MG Chew  Take 1 tablet (81 mg total) by mouth once daily.     atorvastatin 40 MG tablet  Commonly known as: LIPITOR  Take 1 tablet (40 mg total) by mouth every evening.     clopidogreL 75 mg tablet  Commonly known as: PLAVIX  Take 1 tablet (75 mg total) by mouth once daily.     folic acid 1 MG tablet  Commonly known as: FOLVITE  Take 1 tablet (1,000 mcg total) by mouth once daily.     metoprolol succinate 25 MG 24 hr tablet  Commonly known as: TOPROL-XL  Take 1 tablet (25 mg total) by mouth once daily.     nitroGLYCERIN 0.4 MG SL tablet  Commonly known as: NITROSTAT  Place 1 tablet (0.4 mg total) under the tongue every 5 (five) minutes as needed for Chest pain.     sertraline 50 MG tablet  Commonly known as: ZOLOFT  TAKE 1 TABLET BY MOUTH EVERY EVENING     sulfaSALAzine 500 MG EC tablet  Commonly known as: AZULFIDINE     valsartan 40 MG tablet  Commonly known as: DIOVAN  Take 1 tablet (40 mg total) by mouth once daily.            STOP taking these medications      doxazosin 2 MG tablet  Commonly known as: CARDURA  Stopped by: Edgar Aviles MD     predniSONE 20 MG tablet  Commonly known as: DELTASONE  Stopped by: Edgar Aviles MD            Wellness examination    Arthritis  -     Ambulatory referral/consult to Orthopedics; Future; Expected date: 11/14/2022    Elevated LFTs  -     Hepatic Function Panel; Future; Expected date: 11/07/2022    Hepatic fibrosis, stage 3  -     Hepatic Function Panel; Future; Expected date: 11/07/2022    Arthritis of knee, right    Rheumatoid arthritis, involving unspecified site, unspecified whether rheumatoid factor present    Coronary artery disease involving native coronary artery of native heart without  angina pectoris    Other orders  -     Cancel: Pneumococcal Conjugate Vaccine (20 Valent) (IM)  -     Cancel: Zoster Recombinant Vaccine

## 2022-11-21 RX ORDER — SULFASALAZINE 500 MG/1
500 TABLET, DELAYED RELEASE ORAL 2 TIMES DAILY
Qty: 60 TABLET | Refills: 1 | Status: SHIPPED | OUTPATIENT
Start: 2022-11-21 | End: 2023-01-20

## 2022-11-21 NOTE — TELEPHONE ENCOUNTER
----- Message from Angelita Mundo sent at 11/21/2022  1:43 PM CST -----  Contact: Andrae  .Type:  RX Refill Request    Who Called: Andrae   Refill or New Rx:refill   RX Name and Strength:sulfaSALAzine (AZULFIDINE) 500 MG EC tablet  How is the patient currently taking it? (ex. 1XDay):as precribed   Is this a 30 day or 90 day RX:90 days   Preferred Pharmacy with phone number:.  [facility]/pharmacy #[id number] - [address] AT [address] OF [address] [address]  Phone: [phone] Fax: [phone]  Local or Mail Order:local   Ordering Provider:Dr. Lewis   Would the patient rather a call back or a response via [facility]? Call   Best Call Back Number:.[phone]   Additional Information: Patient requesting medication. Reports completely out of medication.

## 2022-11-30 DIAGNOSIS — G89.29 CHRONIC KNEE PAIN, UNSPECIFIED LATERALITY: Primary | ICD-10-CM

## 2022-11-30 DIAGNOSIS — M25.569 CHRONIC KNEE PAIN, UNSPECIFIED LATERALITY: Primary | ICD-10-CM

## 2022-12-02 ENCOUNTER — OFFICE VISIT (OUTPATIENT)
Dept: ORTHOPEDICS | Facility: CLINIC | Age: 57
End: 2022-12-02
Payer: COMMERCIAL

## 2022-12-02 ENCOUNTER — HOSPITAL ENCOUNTER (OUTPATIENT)
Dept: RADIOLOGY | Facility: HOSPITAL | Age: 57
Discharge: HOME | End: 2022-12-02
Attending: ORTHOPAEDIC SURGERY
Payer: COMMERCIAL

## 2022-12-02 VITALS — WEIGHT: 206.13 LBS | BODY MASS INDEX: 29.51 KG/M2 | HEIGHT: 70 IN

## 2022-12-02 DIAGNOSIS — M25.569 CHRONIC KNEE PAIN, UNSPECIFIED LATERALITY: ICD-10-CM

## 2022-12-02 DIAGNOSIS — M19.90 ARTHRITIS: ICD-10-CM

## 2022-12-02 DIAGNOSIS — M25.461 EFFUSION OF RIGHT KNEE: Primary | ICD-10-CM

## 2022-12-02 DIAGNOSIS — G89.29 CHRONIC KNEE PAIN, UNSPECIFIED LATERALITY: ICD-10-CM

## 2022-12-02 PROCEDURE — 4010F PR ACE/ARB THEARPY RXD/TAKEN: ICD-10-PCS | Mod: CPTII,S$GLB,, | Performed by: ORTHOPAEDIC SURGERY

## 2022-12-02 PROCEDURE — 4010F ACE/ARB THERAPY RXD/TAKEN: CPT | Mod: CPTII,S$GLB,, | Performed by: ORTHOPAEDIC SURGERY

## 2022-12-02 PROCEDURE — 73564 X-RAY EXAM KNEE 4 OR MORE: CPT | Mod: 26,,, | Performed by: RADIOLOGY

## 2022-12-02 PROCEDURE — 1159F MED LIST DOCD IN RCRD: CPT | Mod: CPTII,S$GLB,, | Performed by: ORTHOPAEDIC SURGERY

## 2022-12-02 PROCEDURE — 3044F PR MOST RECENT HEMOGLOBIN A1C LEVEL <7.0%: ICD-10-PCS | Mod: CPTII,S$GLB,, | Performed by: ORTHOPAEDIC SURGERY

## 2022-12-02 PROCEDURE — 1160F PR REVIEW ALL MEDS BY PRESCRIBER/CLIN PHARMACIST DOCUMENTED: ICD-10-PCS | Mod: CPTII,S$GLB,, | Performed by: ORTHOPAEDIC SURGERY

## 2022-12-02 PROCEDURE — 73564 X-RAY EXAM KNEE 4 OR MORE: CPT | Mod: TC,50,FY

## 2022-12-02 PROCEDURE — 3008F BODY MASS INDEX DOCD: CPT | Mod: CPTII,S$GLB,, | Performed by: ORTHOPAEDIC SURGERY

## 2022-12-02 PROCEDURE — 1159F PR MEDICATION LIST DOCUMENTED IN MEDICAL RECORD: ICD-10-PCS | Mod: CPTII,S$GLB,, | Performed by: ORTHOPAEDIC SURGERY

## 2022-12-02 PROCEDURE — 3008F PR BODY MASS INDEX (BMI) DOCUMENTED: ICD-10-PCS | Mod: CPTII,S$GLB,, | Performed by: ORTHOPAEDIC SURGERY

## 2022-12-02 PROCEDURE — 3044F HG A1C LEVEL LT 7.0%: CPT | Mod: CPTII,S$GLB,, | Performed by: ORTHOPAEDIC SURGERY

## 2022-12-02 PROCEDURE — 99999 PR PBB SHADOW E&M-EST. PATIENT-LVL III: ICD-10-PCS | Mod: PBBFAC,,, | Performed by: ORTHOPAEDIC SURGERY

## 2022-12-02 PROCEDURE — 20610 DRAIN/INJ JOINT/BURSA W/O US: CPT | Mod: RT,S$GLB,, | Performed by: ORTHOPAEDIC SURGERY

## 2022-12-02 PROCEDURE — 1160F RVW MEDS BY RX/DR IN RCRD: CPT | Mod: CPTII,S$GLB,, | Performed by: ORTHOPAEDIC SURGERY

## 2022-12-02 PROCEDURE — 99203 OFFICE O/P NEW LOW 30 MIN: CPT | Mod: 25,S$GLB,, | Performed by: ORTHOPAEDIC SURGERY

## 2022-12-02 PROCEDURE — 20610 LARGE JOINT ASPIRATION/INJECTION: R KNEE: ICD-10-PCS | Mod: RT,S$GLB,, | Performed by: ORTHOPAEDIC SURGERY

## 2022-12-02 PROCEDURE — 99999 PR PBB SHADOW E&M-EST. PATIENT-LVL III: CPT | Mod: PBBFAC,,, | Performed by: ORTHOPAEDIC SURGERY

## 2022-12-02 PROCEDURE — 99203 PR OFFICE/OUTPT VISIT, NEW, LEVL III, 30-44 MIN: ICD-10-PCS | Mod: 25,S$GLB,, | Performed by: ORTHOPAEDIC SURGERY

## 2022-12-02 PROCEDURE — 73564 XR KNEE ORTHO BILAT WITH FLEXION: ICD-10-PCS | Mod: 26,,, | Performed by: RADIOLOGY

## 2022-12-02 RX ORDER — TRIAMCINOLONE ACETONIDE 40 MG/ML
40 INJECTION, SUSPENSION INTRA-ARTICULAR; INTRAMUSCULAR
Status: DISCONTINUED | OUTPATIENT
Start: 2022-12-02 | End: 2022-12-02 | Stop reason: HOSPADM

## 2022-12-02 RX ADMIN — TRIAMCINOLONE ACETONIDE 40 MG: 40 INJECTION, SUSPENSION INTRA-ARTICULAR; INTRAMUSCULAR at 02:12

## 2022-12-02 NOTE — PROCEDURES
Large Joint Aspiration/Injection: R knee    Date/Time: 12/2/2022 2:30 PM  Performed by: Smith Kwon MD  Authorized by: Smith Kwon MD     Location:  Knee  Site:  R knee  Medications:  40 mg triamcinolone acetonide 40 mg/mL      After explaining the procedure, the patient gave verbal consent for right knee aspiration. The site was identified and the skin was aseptically prepped.  One hundred cc's of clear fluid was obtained.     After injection the joint was injected with 40 mg of triamcinolone and 1 cc of 1% plain Xylocaine.  A sterile dressing was applied.  The patient was instructed to call if there were any problems at the aspiration sight.

## 2022-12-02 NOTE — PROGRESS NOTES
Subjective:      Patient ID: Andrae Aviles is a 57 y.o. male.    Chief Complaint: Swelling of the Right Ankle, Swelling of the Left Ankle, Consult, Knee Pain (bilateral ), and Joint Swelling (right )    HPI     They have experienced problems with their right knee over the past 1 year. The patient denies relevant history of injury/aggravation.  He does have a history of rheumatoid arthritis.  Pain is located diffusely Associated symptoms include swelling.  Symptoms are aggravated by all walking. They have been treated with unsuccessful attempted aspiration by another physician, bracing and oral medication.   Symptoms have recently stayed the same. Ambulation reportedly has been impaired. Self care ADLs are not painful.     Review of Systems   Constitutional: Negative for fever and weight loss.   HENT:  Negative for congestion.    Eyes:  Negative for visual disturbance.   Cardiovascular:  Negative for chest pain.   Respiratory:  Negative for shortness of breath.    Hematologic/Lymphatic: Negative for bleeding problem. Does not bruise/bleed easily.   Skin:  Negative for poor wound healing.   Musculoskeletal:  Positive for arthritis, joint pain and joint swelling.   Gastrointestinal:  Negative for abdominal pain.   Genitourinary:  Negative for dysuria.   Neurological:  Negative for seizures.   Psychiatric/Behavioral:  Negative for altered mental status.    Allergic/Immunologic: Negative for persistent infections.       Objective:      Ortho/SPM Exam      Right knee    [unfilled]    The patient is not in acute distress.   Sclerae normal  Body habitus is normal.  Respiratory distress:  none   The patient walks with a limp.  Hip irritability  negative.   The skin over the knee is intact.  Knee effusion 3+  Palpation- no focal tenderness  Range of motion- 0-110  Ligament laxity exam:  2+ valgus laxity   Patellar apprehension negative.  Popliteal cyst negative  Patellar crepitation absent.  Meniscal irritability not  applicable  Pulses DP present, PT present.  Motor normal 5/5 strength in all tested muscle groups.   Sensory normal.    I reviewed the relevant imaging for the patient's condition:  Right knee films show complete loss of medial joint space with osteophytes      Assessment:       Encounter Diagnoses   Name Primary?    Arthritis     Effusion of right knee Yes   The degenerative process in the right knee is advanced.  It has the typical appearance of osteoarthritis, despite the patient's underlying diagnosis of rheumatoid arthritis.  The acute symptoms are probably from the effusion.        Plan:       Andrae was seen today for consult, knee pain, joint swelling, swelling and swelling.    Diagnoses and all orders for this visit:    Effusion of right knee    Arthritis  -     Ambulatory referral/consult to Orthopedics      I explained my diagnostic impression and the reasoning behind it in detail, using layman's terms.  Models and/or pictures were used to help in the explanation.    Aspiration recommended and consent was given    Protein supplementation was recommended    I explained the potential role of surgery in the treatment of this condition to the patient.  They understand that if nonsurgical measures do not adequately control symptoms, surgery will be considered in the future.

## 2023-01-06 ENCOUNTER — LAB VISIT (OUTPATIENT)
Dept: LAB | Facility: HOSPITAL | Age: 58
End: 2023-01-06
Attending: NURSE PRACTITIONER
Payer: COMMERCIAL

## 2023-01-06 DIAGNOSIS — K76.9 LIVER LESION: ICD-10-CM

## 2023-01-06 DIAGNOSIS — R79.89 ELEVATED FERRITIN: ICD-10-CM

## 2023-01-06 DIAGNOSIS — K74.02 HEPATIC FIBROSIS, STAGE 3: ICD-10-CM

## 2023-01-06 LAB
ALBUMIN SERPL BCP-MCNC: 4.2 G/DL (ref 3.5–5.2)
ALP SERPL-CCNC: 109 U/L (ref 38–126)
ALT SERPL W/O P-5'-P-CCNC: 36 U/L (ref 10–44)
ANION GAP SERPL CALC-SCNC: 5 MMOL/L (ref 8–16)
AST SERPL-CCNC: 47 U/L (ref 15–46)
BILIRUB SERPL-MCNC: 0.6 MG/DL (ref 0.1–1)
CALCIUM SERPL-MCNC: 8.5 MG/DL (ref 8.7–10.5)
CHLORIDE SERPL-SCNC: 105 MMOL/L (ref 95–110)
CO2 SERPL-SCNC: 28 MMOL/L (ref 23–29)
CREAT SERPL-MCNC: 0.73 MG/DL (ref 0.5–1.4)
ERYTHROCYTE [DISTWIDTH] IN BLOOD BY AUTOMATED COUNT: 12.3 % (ref 11.5–14.5)
EST. GFR  (NO RACE VARIABLE): >60 ML/MIN/1.73 M^2
FERRITIN SERPL-MCNC: 602 NG/ML (ref 20–300)
GLUCOSE SERPL-MCNC: 112 MG/DL (ref 70–110)
HCT VFR BLD AUTO: 40.2 % (ref 40–54)
HGB BLD-MCNC: 13.6 G/DL (ref 14–18)
INR PPP: 1 (ref 0.8–1.2)
MCH RBC QN AUTO: 32.6 PG (ref 27–31)
MCHC RBC AUTO-ENTMCNC: 33.8 G/DL (ref 32–36)
MCV RBC AUTO: 96 FL (ref 82–98)
PLATELET # BLD AUTO: 198 K/UL (ref 150–450)
PMV BLD AUTO: 10.6 FL (ref 9.2–12.9)
POTASSIUM SERPL-SCNC: 4.5 MMOL/L (ref 3.5–5.1)
PROT SERPL-MCNC: 7.2 G/DL (ref 6–8.4)
PROTHROMBIN TIME: 10.4 SEC (ref 9–12.5)
RBC # BLD AUTO: 4.17 M/UL (ref 4.6–6.2)
SODIUM SERPL-SCNC: 138 MMOL/L (ref 136–145)
UUN UR-MCNC: 16 MG/DL (ref 2–20)
WBC # BLD AUTO: 4.57 K/UL (ref 3.9–12.7)

## 2023-01-06 PROCEDURE — 80321 ALCOHOLS BIOMARKERS 1OR 2: CPT | Mod: PO | Performed by: NURSE PRACTITIONER

## 2023-01-06 PROCEDURE — 80053 COMPREHEN METABOLIC PANEL: CPT | Mod: PO | Performed by: NURSE PRACTITIONER

## 2023-01-06 PROCEDURE — 85610 PROTHROMBIN TIME: CPT | Mod: PO | Performed by: NURSE PRACTITIONER

## 2023-01-06 PROCEDURE — 85027 COMPLETE CBC AUTOMATED: CPT | Mod: PO | Performed by: NURSE PRACTITIONER

## 2023-01-06 PROCEDURE — 82728 ASSAY OF FERRITIN: CPT | Performed by: NURSE PRACTITIONER

## 2023-01-06 PROCEDURE — 84466 ASSAY OF TRANSFERRIN: CPT | Mod: PO | Performed by: NURSE PRACTITIONER

## 2023-01-06 PROCEDURE — 82105 ALPHA-FETOPROTEIN SERUM: CPT | Mod: PO | Performed by: NURSE PRACTITIONER

## 2023-01-07 LAB
AFP SERPL-MCNC: 2.5 NG/ML (ref 0–8.4)
IRON SERPL-MCNC: 169 UG/DL (ref 45–160)
SATURATED IRON: 72 % (ref 20–50)
TOTAL IRON BINDING CAPACITY: 234 UG/DL (ref 250–450)
TRANSFERRIN SERPL-MCNC: 158 MG/DL (ref 200–375)

## 2023-01-11 LAB
CLINICAL BIOCHEMIST REVIEW: NORMAL
PLPETH BLD-MCNC: 142 NG/ML
POPETH BLD-MCNC: 159 NG/ML

## 2023-01-13 ENCOUNTER — CLINICAL SUPPORT (OUTPATIENT)
Dept: ENDOSCOPY | Facility: HOSPITAL | Age: 58
End: 2023-01-13
Attending: FAMILY MEDICINE
Payer: COMMERCIAL

## 2023-01-13 ENCOUNTER — OFFICE VISIT (OUTPATIENT)
Dept: HEPATOLOGY | Facility: CLINIC | Age: 58
End: 2023-01-13
Payer: COMMERCIAL

## 2023-01-13 ENCOUNTER — TELEPHONE (OUTPATIENT)
Dept: ENDOSCOPY | Facility: HOSPITAL | Age: 58
End: 2023-01-13

## 2023-01-13 VITALS
OXYGEN SATURATION: 96 % | DIASTOLIC BLOOD PRESSURE: 86 MMHG | HEIGHT: 70 IN | WEIGHT: 221.31 LBS | TEMPERATURE: 97 F | RESPIRATION RATE: 18 BRPM | SYSTOLIC BLOOD PRESSURE: 137 MMHG | HEART RATE: 91 BPM | BODY MASS INDEX: 31.68 KG/M2

## 2023-01-13 DIAGNOSIS — E78.00 PURE HYPERCHOLESTEROLEMIA: ICD-10-CM

## 2023-01-13 DIAGNOSIS — R79.89 ELEVATED FERRITIN: ICD-10-CM

## 2023-01-13 DIAGNOSIS — Z14.8 CARRIER OF HEMOCHROMATOSIS HFE GENE MUTATION: ICD-10-CM

## 2023-01-13 DIAGNOSIS — K76.9 LIVER LESION: ICD-10-CM

## 2023-01-13 DIAGNOSIS — K74.69 OTHER CIRRHOSIS OF LIVER: Primary | ICD-10-CM

## 2023-01-13 DIAGNOSIS — I85.00 ESOPHAGEAL VARICES WITHOUT BLEEDING, UNSPECIFIED ESOPHAGEAL VARICES TYPE: ICD-10-CM

## 2023-01-13 PROBLEM — K74.02 HEPATIC FIBROSIS, STAGE 3: Status: RESOLVED | Noted: 2022-06-30 | Resolved: 2023-01-13

## 2023-01-13 PROCEDURE — 3008F BODY MASS INDEX DOCD: CPT | Mod: CPTII,S$GLB,, | Performed by: NURSE PRACTITIONER

## 2023-01-13 PROCEDURE — 99999 PR PBB SHADOW E&M-EST. PATIENT-LVL V: ICD-10-PCS | Mod: PBBFAC,,, | Performed by: NURSE PRACTITIONER

## 2023-01-13 PROCEDURE — 3079F DIAST BP 80-89 MM HG: CPT | Mod: CPTII,S$GLB,, | Performed by: NURSE PRACTITIONER

## 2023-01-13 PROCEDURE — 99214 PR OFFICE/OUTPT VISIT, EST, LEVL IV, 30-39 MIN: ICD-10-PCS | Mod: S$GLB,,, | Performed by: NURSE PRACTITIONER

## 2023-01-13 PROCEDURE — 99999 PR PBB SHADOW E&M-EST. PATIENT-LVL V: CPT | Mod: PBBFAC,,, | Performed by: NURSE PRACTITIONER

## 2023-01-13 PROCEDURE — 3008F PR BODY MASS INDEX (BMI) DOCUMENTED: ICD-10-PCS | Mod: CPTII,S$GLB,, | Performed by: NURSE PRACTITIONER

## 2023-01-13 PROCEDURE — 1159F MED LIST DOCD IN RCRD: CPT | Mod: CPTII,S$GLB,, | Performed by: NURSE PRACTITIONER

## 2023-01-13 PROCEDURE — 99214 OFFICE O/P EST MOD 30 MIN: CPT | Mod: S$GLB,,, | Performed by: NURSE PRACTITIONER

## 2023-01-13 PROCEDURE — 3075F SYST BP GE 130 - 139MM HG: CPT | Mod: CPTII,S$GLB,, | Performed by: NURSE PRACTITIONER

## 2023-01-13 PROCEDURE — 1160F PR REVIEW ALL MEDS BY PRESCRIBER/CLIN PHARMACIST DOCUMENTED: ICD-10-PCS | Mod: CPTII,S$GLB,, | Performed by: NURSE PRACTITIONER

## 2023-01-13 PROCEDURE — 3079F PR MOST RECENT DIASTOLIC BLOOD PRESSURE 80-89 MM HG: ICD-10-PCS | Mod: CPTII,S$GLB,, | Performed by: NURSE PRACTITIONER

## 2023-01-13 PROCEDURE — 3075F PR MOST RECENT SYSTOLIC BLOOD PRESS GE 130-139MM HG: ICD-10-PCS | Mod: CPTII,S$GLB,, | Performed by: NURSE PRACTITIONER

## 2023-01-13 PROCEDURE — 1160F RVW MEDS BY RX/DR IN RCRD: CPT | Mod: CPTII,S$GLB,, | Performed by: NURSE PRACTITIONER

## 2023-01-13 PROCEDURE — 1159F PR MEDICATION LIST DOCUMENTED IN MEDICAL RECORD: ICD-10-PCS | Mod: CPTII,S$GLB,, | Performed by: NURSE PRACTITIONER

## 2023-01-13 NOTE — TELEPHONE ENCOUNTER
Hi,    Patient needs to have an EGD. He is on Plavix and will need to hold 5 days prior to procedure.   Can he hold his Plavix for 5 days per our Endoscopy protocol? Please advise.    Sincerely,  PACO Davis

## 2023-01-13 NOTE — Clinical Note
Please contact pt to schedule therapeutic phlebotomy once in the next 1-2 months with ferritin, iron and CBC a few days before.  Thanks !

## 2023-01-13 NOTE — PATIENT INSTRUCTIONS
Please call the endoscopy department at [phone] to schedule your procedures (EGD).   The endoscopy is to assess for enlarged blood vessels in the esophagus (called varices) that can be caused from cirrhosis  US soon and in 6 months   Set up phlebotomy for high ferritin/iron, every 3 months for now      This is a web site that you may find helpful about cirrhosis : [email or web address].ca/    Because you have cirrhosis, it is important to attend clinic visits every 6 months with an Ultrasound and blood tests every 6 months to screen for liver cancer (you are at risk of developing liver cancer due to scar tissue in the liver)    Signs and symptoms of worsening liver disease include jaundice, fluid in the belly (ascites), and confusion/disorientation/slowed thought processes due to hepatic encephalopathy (toxins building up because of liver problems).   You should seek medical attention if any of these things occur.    Also, possible bleeding from esophageal varices (blood vessels in the stomach and foodpipe can burst and cause fatal bleeding).  Therefore, if you have symptoms of vomiting blood, blood in your stool, dark or black stools or vomiting coffee ground vomit, YOU SHOULD GO TO THE EMERGENCY ROOM IMMEDIATELY.     Cirrhosis can increase the risk of liver cancer, liver failure, and death. However, we will watch your liver function score (MELD score) closely with each clinic visit. A normal MELD score is 6, highest is 40. Your last one was an 6. We will check this with every clinic visit. A MELD 15 or higher is when we start to consider transplant because MELD 15 or higher indicates that the liver is not functioning as well     Cirrhosis Counseling  - NO alcohol use (includes beer, wine, and/or liquor)  - avoid non-steroidal anti-inflammatory drugs (NSAIDs) such as ibuprofen, Motrin, naprosyn, Alleve due to the risk of kidney damage  - can take acetaminophen (Tylenol), no more than 2000 mg per day  - low  sodium (salt) 2 gram per day diet  - high protein diet: 120 grams per day to prevent muscle mass loss. Drink at least 1 protein shake daily (Premier Protein is best option because it is very high protein and low sugar). Ok to use this as nighttime snack to fit it in   - resistance exercises for muscle strength  - avoid raw seafoods due to the risk of fatal Vibrio vulnificus infection  - ultrasound of the liver every 6 months for liver cancer screening (you are at risk of developing liver cancer due to scar tissue in the liver)  - Upper endoscopy every 1-2 years to screen for varices in the stomach and foodpipe which can burst and cause fatal bleeding

## 2023-01-13 NOTE — PROGRESS NOTES
"[facility] VISIT FOLLOW UP  NOTE    PCP: Edgar Aviles MD     CHIEF COMPLAINT: cirrhosis, liver lesion, HH carrier, elevated ferritin     HPI: This is a 57 y.o. 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 per biopsy 6/2022     Diagnosis of pre-cirrhosis based on biopsy done 6/2022 while inpatient for MI  However, concern for cirrhosis given elevated bili and AST>ALT    Previous serologic w/u was negative for Thad's, alpha-1 antitrypsin deficiency, autoimmune etiology, and viral hepatitis A, B and C  Elevated ferritin, iron 72, HH DNA 1 copy of H63D - will arrange phlebotomy   PETH 150s  1/2023     Reports he had a "blood test for scar tissue" in 2004 which noted pre-cirrhosis and was told to stop alcohol at that time and stopped for a short period of time but resumed 8-12 beers daily for years.     Prior serologic workup:   Lab Results   Component Value Date    SMOOTHMUSCAB Positive 1:40 (A) 06/27/2022    IGGSERUM 1169 06/27/2022    ANASCREEN Negative <1:80 06/27/2022    FERRITIN 602 (H) 01/06/2023    FESATURATED 72 (H) 01/06/2023    [id number] SEE BELOW 06/27/2022    DOQAG4GVCKUG 253 (H) 06/27/2022    CERULOPLSM 38.0 06/27/2022    HEPBSAG Negative 06/27/2022    HEPCAB Negative 06/27/2022    HEPAIGM Negative 06/27/2022     Risk factors for NAFLD include obesity, HTN, HLD, DM    Interval HPI: Presents today alone. Abstinent from July - Oct then resumed and recently drinking 3-4 beers per night. Aware he needs to stop completely   Ferritin improving but remains >600, will arrange phlebotomy   TJ liver biopsy while hospitalized noted mild PH and near cirrhosis   No s/s of hepatic decompensation: no ascites, HE or h/o variceal bleeding  Did not do US before visit   AFP WNL    Abd U/S done 6/2022 showed fatty liver, a 4.1 cm relatively well-circumscribed, mildly hypoechoic lesion in the right hepatic lobe of unclear etiology. No " splenomegaly    MRI 7/2022 noted no concerning lesions      Lab Results   Component Value Date    ALT 36 01/06/2023    AST 47 (H) 01/06/2023    ALKPHOS 109 01/06/2023    BILITOT 0.6 01/06/2023    ALBUMIN 4.2 01/06/2023    INR 1.0 01/06/2023     01/06/2023     MELD-Na score: 6 at 1/6/2023  2:25 PM  MELD score: 6 at 1/6/2023  2:25 PM  Calculated from:  Serum Creatinine: 0.73 mg/dL (Using min of 1 mg/dL) at 1/6/2023  2:25 PM  Serum Sodium: 138 mmol/L (Using max of 137 mmol/L) at 1/6/2023  2:25 PM  Total Bilirubin: 0.6 mg/dL (Using min of 1 mg/dL) at 1/6/2023  2:25 PM  INR(ratio): 1.0 at 1/6/2023  2:25 PM  Age: 57 years  MELD has been low, indicating no benefit to liver transplant currently    Cirrhosis Health Maintenance:   -- Last EGD : none, order placed, pt instructed to call and schedule   -- Due for next colonoscopy : 2031  -- HCC screening - needs repeat US soon       AFP WNL, will repeat in   Lab Results   Component Value Date    AFP 2.5 01/06/2023       -- Immunity to Hep A and B - will check with next labs     + heavy alcohol consumption  Social History     Substance and Sexual Activity   Alcohol Use Not Currently    Comment: previousl;y 8+ beers daily for many years, stopped 6/2022, but resumed 11/2022, aware to stop            Allergy and medication list reviewed and updated     PMHX:  has a past medical history of Acute combined systolic and diastolic heart failure (6/21/2022), Arthritis, CAD (coronary artery disease), Hypertension, NSTEMI (non-ST elevated myocardial infarction) (6/19/2022), Primary hypertension (10/11/2021), and Rheumatoid arthritis (10/11/2021).    PSHX:  has a past surgical history that includes Vasectomy; Colonoscopy; Colonoscopy (N/A, 11/22/2021); Left heart catheterization (N/A, 6/20/2022); Coronary angiography (N/A, 7/1/2022); Left heart catheterization (Left, 9/15/2022); and Coronary angiography (Right, 9/15/2022).    FAMILY HISTORY: Updated and reviewed in EPIC    SOCIAL  "HISTORY:   Social History     Substance and Sexual Activity   Alcohol Use Not Currently    Comment: previousl;y 8+ beers daily for many years, stopped 6/2022, but resumed 11/2022, aware to stop       Social History     Substance and Sexual Activity   Drug Use Never       ROS:   GENERAL: Denies fatigue  CARDIOVASCULAR: Denies edema  GI: Denies abdominal pain  SKIN: Denies rash, itching   NEURO: Denies confusion, memory loss, or mood changes    PHYSICAL EXAM:   Friendly White male, in no acute distress; alert and oriented to person, place and time  VITALS: /86 (BP Location: Right arm, Patient Position: Sitting, BP Method: Medium (Automatic))   Pulse 91   Temp 97.3 °F (36.3 °C) (Oral)   Resp 18   Ht 5' 10" (1.778 m)   Wt 100.4 kg (221 lb 5.5 oz)   SpO2 96%   BMI 31.76 kg/m²   EYES: Sclerae anicteric  GI: Soft, non-tender, non-distended. No ascites.  EXTREMITIES:  No edema.  SKIN: Warm and dry. No jaundice. No telangectasias noted. No palmar erythema.  NEURO:  No asterixis.  PSYCH:  Thought and speech pattern appropriate. Behavior normal      EDUCATION:  See instructions discussed with patient in Instructions section of the After Visit Summary     ASSESSMENT & PLAN:  57 y.o. White male with:  1.  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 However, concern for cirrhosis given elevated bili and AST>ALT and PH on TJ biopsy, likely cirrhosis   -- MELD-Na score: 6 at 1/6/2023  2:25 PM  MELD score: 6 at 1/6/2023  2:25 PM  Calculated from:  Serum Creatinine: 0.73 mg/dL (Using min of 1 mg/dL) at 1/6/2023  2:25 PM  Serum Sodium: 138 mmol/L (Using max of 137 mmol/L) at 1/6/2023  2:25 PM  Total Bilirubin: 0.6 mg/dL (Using min of 1 mg/dL) at 1/6/2023  2:25 PM  INR(ratio): 1.0 at 1/6/2023  2:25 PM  Age: 57 years  -- HCC screening: AFP and abd. U/S.. See below  -- Immunity to Hep A and B - . Will check immunity markers for HBV/HAV and arrange for vaccination if " needed   -- EGD none, ordered, pt needs to schedule  --- Serological workup in HPI  -- Cirrhosis counseling as noted above and discussed with patient    2. Elevated ferritin, HH carrier  -- remains >600, will arrange phlebotomy now and trial q3 months for now along with stopping alcohol      3. Alcohol use   -- recommend abstinence for life, pt aware, plans to stop soon     4. Portal HTN  -- mild PH on TJ biopsy portal pressure measurements      US soon then   Follow up in about 6 months (around 7/13/2023). with labs and uS a few days before   Orders Placed This Encounter   Procedures    Phlebotomy therapeutic    US Abdomen Complete    US Abdomen Limited    AFP Tumor Marker    CBC Without Differential    Comprehensive Metabolic Panel    Phosphatidylethanol (PETH)    Protime-INR    Ferritin    Iron and TIBC    Hepatitis A antibody, IgG    Hepatitis B Core Antibody, Total    Hepatitis B Surface Ab, Qualitative        Thank you for allowing me to participate in the care of Andrae Ribera NP-C    I spent a total of 30 minutes on the day of the visit.This includes face to face time and non-face to face time preparing to see the patient (eg, review of tests), obtaining and/or reviewing separately obtained history, documenting clinical information in the electronic or other health record, independently interpreting results and communicating results to the patient/family/caregiver, and coordinating care.         CC'ed note to:

## 2023-01-13 NOTE — PLAN OF CARE
Patient is on Plavix and approval from cardiology is needed. Patient verbalized understanding and new PAT order placed

## 2023-01-17 ENCOUNTER — TELEPHONE (OUTPATIENT)
Dept: CARDIOLOGY | Facility: CLINIC | Age: 58
End: 2023-01-17
Payer: COMMERCIAL

## 2023-01-17 NOTE — TELEPHONE ENCOUNTER
I spoke to the patient today and explained that I do not support withdrawal of aspirin and Plavix for upcoming endoscopy planned.    All issues discussed with the patient.  He seems to be doing well.  June will be his 1 year post MI date.  I told him after that time.  Consideration for short-term withdrawal of aspirin and Plavix could be considered.    Gastroenterologic no reviewed, there is no urgency for the EDG as per patient, no bleeding issues currently.  He does have liver cirrhosis.  He no longer drinks alcohol.

## 2023-01-20 ENCOUNTER — TELEPHONE (OUTPATIENT)
Dept: HEPATOLOGY | Facility: CLINIC | Age: 58
End: 2023-01-20
Payer: COMMERCIAL

## 2023-01-20 NOTE — TELEPHONE ENCOUNTER
----- Message from Kaitlyn Ribera NP sent at 1/13/2023  3:53 PM CST -----  Please contact pt to schedule therapeutic phlebotomy once in the next 1-2 months with ferritin, iron and CBC a few days before.  Thanks !

## 2023-01-20 NOTE — TELEPHONE ENCOUNTER
----- Message from Kaitlyn Ribera NP-----  Please contact pt to schedule therapeutic phlebotomy once in the next 1-2 months with ferritin, iron and CBC a few days before.  Thanks !    Call placed to the patient at [phone].  Message relayed from Kaitlyn Ribera NP.  Inquired where he would like to go and get the Phlebotomy done?    He would like to go to [facility] location at [address]. Pt given the times at the location when they are open.  Patient given the telephone to call to see if the times have changed. Patient will call and go today.    Phlebotomy Orders, Lab Results for CBC, CMP, Ferritin and Iron Studies from 1/6/23 faxed to the [facility] - Fax # [phone] and Phone # [phone].  Received receipt of confirmation fax received.

## 2023-01-23 ENCOUNTER — PATIENT MESSAGE (OUTPATIENT)
Dept: ENDOSCOPY | Facility: HOSPITAL | Age: 58
End: 2023-01-23
Payer: COMMERCIAL

## 2023-01-23 ENCOUNTER — TELEPHONE (OUTPATIENT)
Dept: ENDOSCOPY | Facility: HOSPITAL | Age: 58
End: 2023-01-23
Payer: COMMERCIAL

## 2023-01-23 NOTE — TELEPHONE ENCOUNTER
Diann Rose RN routed conversation to You 1 minute ago (12:05 PM)      MD Diann Combs RN 6 days ago     Unable to hold until after June 2023       Diann Rose RN routed conversation to Phani Brown MD 10 days ago      You routed conversation to Phani Brown MD; Kevin Jeronimo Staff 10 days ago     You 10 days ago     SM  Hi,     Patient needs to have an EGD. He is on Plavix and will need to hold 5 days prior to procedure.   Can he hold his Plavix for 5 days per our Endoscopy protocol? Please advise.     Sincerely,  PACO Davis         Note

## 2023-01-23 NOTE — TELEPHONE ENCOUNTER
Patient is not able to hold his blood thinner until after June 2023 this year.  He will be set up for a new PAT appointment to speak to a nurse to discuss at the end of May

## 2023-01-24 ENCOUNTER — PATIENT MESSAGE (OUTPATIENT)
Dept: HEPATOLOGY | Facility: CLINIC | Age: 58
End: 2023-01-24
Payer: COMMERCIAL

## 2023-02-03 ENCOUNTER — OFFICE VISIT (OUTPATIENT)
Dept: ORTHOPEDICS | Facility: CLINIC | Age: 58
End: 2023-02-03
Payer: COMMERCIAL

## 2023-02-03 ENCOUNTER — PATIENT MESSAGE (OUTPATIENT)
Dept: CARDIOLOGY | Facility: CLINIC | Age: 58
End: 2023-02-03
Payer: COMMERCIAL

## 2023-02-03 VITALS — WEIGHT: 221.31 LBS | BODY MASS INDEX: 31.68 KG/M2 | HEIGHT: 70 IN

## 2023-02-03 DIAGNOSIS — M25.461 EFFUSION OF RIGHT KNEE: ICD-10-CM

## 2023-02-03 DIAGNOSIS — M17.5 OTHER SECONDARY OSTEOARTHRITIS OF RIGHT KNEE: Primary | ICD-10-CM

## 2023-02-03 PROCEDURE — 99214 PR OFFICE/OUTPT VISIT, EST, LEVL IV, 30-39 MIN: ICD-10-PCS | Mod: S$GLB,,, | Performed by: ORTHOPAEDIC SURGERY

## 2023-02-03 PROCEDURE — 1160F PR REVIEW ALL MEDS BY PRESCRIBER/CLIN PHARMACIST DOCUMENTED: ICD-10-PCS | Mod: CPTII,S$GLB,, | Performed by: ORTHOPAEDIC SURGERY

## 2023-02-03 PROCEDURE — 99999 PR PBB SHADOW E&M-EST. PATIENT-LVL III: ICD-10-PCS | Mod: PBBFAC,,, | Performed by: ORTHOPAEDIC SURGERY

## 2023-02-03 PROCEDURE — 3008F PR BODY MASS INDEX (BMI) DOCUMENTED: ICD-10-PCS | Mod: CPTII,S$GLB,, | Performed by: ORTHOPAEDIC SURGERY

## 2023-02-03 PROCEDURE — 1159F MED LIST DOCD IN RCRD: CPT | Mod: CPTII,S$GLB,, | Performed by: ORTHOPAEDIC SURGERY

## 2023-02-03 PROCEDURE — 99999 PR PBB SHADOW E&M-EST. PATIENT-LVL III: CPT | Mod: PBBFAC,,, | Performed by: ORTHOPAEDIC SURGERY

## 2023-02-03 PROCEDURE — 1160F RVW MEDS BY RX/DR IN RCRD: CPT | Mod: CPTII,S$GLB,, | Performed by: ORTHOPAEDIC SURGERY

## 2023-02-03 PROCEDURE — 99214 OFFICE O/P EST MOD 30 MIN: CPT | Mod: S$GLB,,, | Performed by: ORTHOPAEDIC SURGERY

## 2023-02-03 PROCEDURE — 3008F BODY MASS INDEX DOCD: CPT | Mod: CPTII,S$GLB,, | Performed by: ORTHOPAEDIC SURGERY

## 2023-02-03 PROCEDURE — 1159F PR MEDICATION LIST DOCUMENTED IN MEDICAL RECORD: ICD-10-PCS | Mod: CPTII,S$GLB,, | Performed by: ORTHOPAEDIC SURGERY

## 2023-02-03 RX ORDER — COLCHICINE 0.6 MG/1
0.6 TABLET ORAL DAILY
Qty: 30 TABLET | Refills: 2 | Status: SHIPPED | OUTPATIENT
Start: 2023-02-03 | End: 2023-04-30 | Stop reason: SDUPTHER

## 2023-02-03 NOTE — PROGRESS NOTES
Subjective:      Patient ID: Andrae Aviles is a 57 y.o. male.    Chief Complaint: Knee Pain (right )    HPI    Follow-up for arthritis.  The patient was drained and aspirated last month.  Reports moderate improvement.  He still has episodes of giving way and pseudo locking.  He has limitations with community ambulation because of his right knee.          Review of Systems   Constitutional: Negative for fever and weight loss.   HENT:  Negative for congestion.    Eyes:  Negative for visual disturbance.   Cardiovascular:  Negative for chest pain.   Respiratory:  Negative for shortness of breath.    Hematologic/Lymphatic: Negative for bleeding problem. Does not bruise/bleed easily.   Skin:  Negative for poor wound healing.   Musculoskeletal:  Positive for joint pain and joint swelling.   Gastrointestinal:  Negative for abdominal pain.   Genitourinary:  Negative for dysuria.   Neurological:  Negative for seizures.   Psychiatric/Behavioral:  Negative for altered mental status.    Allergic/Immunologic: Negative for persistent infections.       Objective:      Ortho/SPM Exam      Right knee     [unfilled]    The patient is not in acute distress.   Sclerae normal  Body habitus is normal.  Respiratory distress:  none   The patient walks with a limp.  Hip irritability  negative.   The skin over the knee is intact.  Knee effusion 2+  Palpation- no focal tenderness  Range of motion- 0-110  Ligament laxity exam:  2+ valgus laxity   Patellar apprehension negative.  Popliteal cyst negative  Patellar crepitation absent.  Meniscal irritability not applicable  Pulses DP present, PT present.  Motor normal 5/5 strength in all tested muscle groups.   Sensory normal    Previous radiographs show very advanced DJD of the right knee      Assessment:       Encounter Diagnoses   Name Primary?    Other secondary osteoarthritis of right knee Yes    Effusion of right knee         Although the patient has been diagnosed as rheumatoid, given  previous uric acid of 8.0, I strongly suspect that chronic gout placed some role in his persistent effusion.  His right knee is structurally so degenerated that the only treatment likely to enable the patient to preserve his usual level activity as consideration of arthroplasty.  This will partly depend on his cardiologist indicating that it is safe to proceed          Plan:       Andrae was seen today for knee pain.    Diagnoses and all orders for this visit:    Other secondary osteoarthritis of right knee    Effusion of right knee          I explained my diagnostic impression and the reasoning behind it in detail, using layman's terms.  Models and/or pictures were used to help in the explanation.    Colchicine usage recommended    Patient advised to discuss suitability for arthroplasty with his cardiologist at next visit

## 2023-02-09 ENCOUNTER — PATIENT MESSAGE (OUTPATIENT)
Dept: CARDIOLOGY | Facility: CLINIC | Age: 58
End: 2023-02-09
Payer: COMMERCIAL

## 2023-02-13 ENCOUNTER — OFFICE VISIT (OUTPATIENT)
Dept: RHEUMATOLOGY | Facility: CLINIC | Age: 58
End: 2023-02-13
Payer: COMMERCIAL

## 2023-02-13 VITALS
HEIGHT: 70 IN | DIASTOLIC BLOOD PRESSURE: 82 MMHG | WEIGHT: 218.81 LBS | BODY MASS INDEX: 31.32 KG/M2 | OXYGEN SATURATION: 98 % | SYSTOLIC BLOOD PRESSURE: 119 MMHG | HEART RATE: 86 BPM

## 2023-02-13 DIAGNOSIS — M06.9 RHEUMATOID ARTHRITIS, INVOLVING UNSPECIFIED SITE, UNSPECIFIED WHETHER RHEUMATOID FACTOR PRESENT: Primary | ICD-10-CM

## 2023-02-13 DIAGNOSIS — Z79.899 IMMUNODEFICIENCY DUE TO TREATMENT WITH IMMUNOSUPPRESSIVE MEDICATION: ICD-10-CM

## 2023-02-13 DIAGNOSIS — D84.821 IMMUNODEFICIENCY DUE TO TREATMENT WITH IMMUNOSUPPRESSIVE MEDICATION: ICD-10-CM

## 2023-02-13 DIAGNOSIS — Z71.89 COUNSELING AND COORDINATION OF CARE: ICD-10-CM

## 2023-02-13 PROCEDURE — 99214 OFFICE O/P EST MOD 30 MIN: CPT | Mod: S$GLB,,, | Performed by: INTERNAL MEDICINE

## 2023-02-13 PROCEDURE — 99999 PR PBB SHADOW E&M-EST. PATIENT-LVL III: CPT | Mod: PBBFAC,,, | Performed by: INTERNAL MEDICINE

## 2023-02-13 PROCEDURE — 1159F MED LIST DOCD IN RCRD: CPT | Mod: CPTII,S$GLB,, | Performed by: INTERNAL MEDICINE

## 2023-02-13 PROCEDURE — 99999 PR PBB SHADOW E&M-EST. PATIENT-LVL III: ICD-10-PCS | Mod: PBBFAC,,, | Performed by: INTERNAL MEDICINE

## 2023-02-13 PROCEDURE — 3079F DIAST BP 80-89 MM HG: CPT | Mod: CPTII,S$GLB,, | Performed by: INTERNAL MEDICINE

## 2023-02-13 PROCEDURE — 3008F BODY MASS INDEX DOCD: CPT | Mod: CPTII,S$GLB,, | Performed by: INTERNAL MEDICINE

## 2023-02-13 PROCEDURE — 1159F PR MEDICATION LIST DOCUMENTED IN MEDICAL RECORD: ICD-10-PCS | Mod: CPTII,S$GLB,, | Performed by: INTERNAL MEDICINE

## 2023-02-13 PROCEDURE — 3074F PR MOST RECENT SYSTOLIC BLOOD PRESSURE < 130 MM HG: ICD-10-PCS | Mod: CPTII,S$GLB,, | Performed by: INTERNAL MEDICINE

## 2023-02-13 PROCEDURE — 3008F PR BODY MASS INDEX (BMI) DOCUMENTED: ICD-10-PCS | Mod: CPTII,S$GLB,, | Performed by: INTERNAL MEDICINE

## 2023-02-13 PROCEDURE — 99214 PR OFFICE/OUTPT VISIT, EST, LEVL IV, 30-39 MIN: ICD-10-PCS | Mod: S$GLB,,, | Performed by: INTERNAL MEDICINE

## 2023-02-13 PROCEDURE — 3079F PR MOST RECENT DIASTOLIC BLOOD PRESSURE 80-89 MM HG: ICD-10-PCS | Mod: CPTII,S$GLB,, | Performed by: INTERNAL MEDICINE

## 2023-02-13 PROCEDURE — 3074F SYST BP LT 130 MM HG: CPT | Mod: CPTII,S$GLB,, | Performed by: INTERNAL MEDICINE

## 2023-02-13 RX ORDER — SULFASALAZINE 500 MG/1
500 TABLET, DELAYED RELEASE ORAL 2 TIMES DAILY
Qty: 60 TABLET | Refills: 11 | Status: SHIPPED | OUTPATIENT
Start: 2023-02-13 | End: 2023-06-13 | Stop reason: SDUPTHER

## 2023-02-13 NOTE — PROGRESS NOTES
RHEUMATOLOGY OUTPATIENT CLINIC NOTE    2/13/2023    Attending Rheumatologist: Shane Lewis  Primary Care Provider: Edgar Aviles MD   Physician Requesting Consultation: No referring provider defined for this encounter.  Chief Complaint/Reason For Consultation:  Rheumatoid Arthritis      Subjective:       HPI  Andrae Aviles is a 57 y.o. White male with medical history noted below presents for evaluation of rheumatoid arthritis.  Patient just recently moved from [place]. Seeking to establish with Rheumatology. Patient was following with Dr. Mateo Charles (Rheumatology) for RA. Paced on MTX 3 years ago and has been in remission sense. Methotrexate 6 tabs weekly. No other therapies. Patient notes that for the last 3 weeks knee pain and swelling which has improved slightly. No other complaints.     Today  Patient here for follow up.   Last visit management for RA continued. He note he has been doing well, does report right knee swelling and pain, following with Ortho, notes prior arthrocentesis with relief, but continued recurrence, discussed that he will need knee replacement, he is due to follow up with them in March and likely get it scheduled for the summer.     Review of Systems   Constitutional:  Negative for chills, fatigue, fever and unexpected weight change.   HENT:  Negative for mouth sores and trouble swallowing.    Eyes:  Negative for redness and eye dryness.   Respiratory:  Negative for cough and shortness of breath.    Cardiovascular:  Negative for chest pain.   Gastrointestinal:  Negative for abdominal distention, constipation, diarrhea, nausea, vomiting and reflux.   Genitourinary:  Negative for genital sores.   Musculoskeletal:  Positive for arthralgias and myalgias. Negative for back pain, gait problem, joint swelling, leg pain, neck pain, neck stiffness and joint deformity.   Integumentary:  Negative for rash.   Neurological:  Negative for weakness, numbness, headaches and memory  loss.   Hematological:  Negative for adenopathy. Does not bruise/bleed easily.   Psychiatric/Behavioral:  Negative for confusion, decreased concentration, sleep disturbance and suicidal ideas. The patient is not nervous/anxious.    All other systems reviewed and are negative.     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    Hypertension     NSTEMI (non-ST elevated myocardial infarction) 6/19/2022    Other cirrhosis of liver 1/13/2023    Primary hypertension 10/11/2021    Rheumatoid arthritis 10/11/2021     Past Surgical History:   Procedure Laterality Date    COLONOSCOPY      age 42    COLONOSCOPY N/A 11/22/2021    Procedure: COLONOSCOPY  Golytely   Rapid Covid;  Surgeon: Shane Guzman MD;  Location: [facility] ENDO;  Service: Endoscopy;  Laterality: N/A;    CORONARY ANGIOGRAPHY N/A 7/1/2022    Procedure: Angiogram, Coronary, with Left Heart Cath;  Surgeon: Miguelito Barney MD;  Location: [facility] CATH LAB;  Service: Cardiology;  Laterality: N/A;    CORONARY ANGIOGRAPHY Right 9/15/2022    Procedure: ANGIOGRAM, CORONARY ARTERY;  Surgeon: Phani Brown MD;  Location: [facility] CATH LAB/EP;  Service: Cardiology;  Laterality: Right;    LEFT HEART CATHETERIZATION N/A 6/20/2022    Procedure: Left heart cath;  Surgeon: Marlen Thompson MD;  Location: [facility] CATH LAB/EP;  Service: Cardiology;  Laterality: N/A;    LEFT HEART CATHETERIZATION Left 9/15/2022    Procedure: Left heart cath;  Surgeon: Phani Brown MD;  Location: [facility] CATH LAB/EP;  Service: Cardiology;  Laterality: Left;    VASECTOMY       Family History   Problem Relation Age of Onset    No Known Problems Daughter     No Known Problems Son      Social History     Substance and Sexual Activity   Alcohol Use Not  Currently    Comment: previousl;y 8+ beers daily for many years, stopped 6/2022, but resumed 11/2022, aware to stop     Social History     Tobacco Use   Smoking Status Former   Smokeless Tobacco Never     Social History     Substance and Sexual Activity   Drug Use Never       Current Outpatient Medications:     aspirin 81 MG Chew, Take 1 tablet (81 mg total) by mouth once daily., Disp: 90 tablet, Rfl: 3    atorvastatin (LIPITOR) 40 MG tablet, Take 1 tablet (40 mg total) by mouth every evening., Disp: 90 tablet, Rfl: 3    clopidogreL (PLAVIX) 75 mg tablet, Take 1 tablet (75 mg total) by mouth once daily., Disp: 90 tablet, Rfl: 11    colchicine (COLCRYS) 0.6 mg tablet, Take 1 tablet (0.6 mg total) by mouth once daily., Disp: 30 tablet, Rfl: 2    folic acid (FOLVITE) 1 MG tablet, Take 1 tablet (1,000 mcg total) by mouth once daily., Disp: 90 tablet, Rfl: 2    metoprolol succinate (TOPROL-XL) 25 MG 24 hr tablet, Take 1 tablet (25 mg total) by mouth once daily., Disp: 90 tablet, Rfl: 11    sertraline (ZOLOFT) 50 MG tablet, TAKE 1 TABLET BY MOUTH EVERY EVENING, Disp: 90 tablet, Rfl: 0    valsartan (DIOVAN) 40 MG tablet, Take 1 tablet (40 mg total) by mouth once daily., Disp: 90 tablet, Rfl: 3    nitroGLYCERIN (NITROSTAT) 0.4 MG SL tablet, Place 1 tablet (0.4 mg total) under the tongue every 5 (five) minutes as needed for Chest pain. (Patient not taking: Reported on 12/2/2022), Disp: 30 tablet, Rfl: 3    sulfaSALAzine (AZULFIDINE) 500 MG EC tablet, Take 1 tablet (500 mg total) by mouth 2 (two) times daily., Disp: 60 tablet, Rfl: 11     Objective:         Vitals:    02/13/23 0818   BP: 119/82   Pulse: 86     Physical Exam   Musculoskeletal:      Right shoulder: Normal.      Left shoulder: Normal.      Right elbow: Normal.      Left elbow: Normal.      Right wrist: Normal.      Left wrist: Normal.      Left knee: Normal.      Comments: Right knee brace       Right Side Rheumatological Exam     Examination finds the shoulder,  elbow, wrist, 1st PIP, 1st MCP, 2nd PIP, 2nd MCP, 3rd PIP, 3rd MCP, 4th PIP, 4th MCP, 5th PIP and 5th MCP normal.    Left Side Rheumatological Exam     Examination finds the shoulder, elbow, wrist, knee, 1st PIP, 1st MCP, 2nd PIP, 2nd MCP, 3rd PIP, 3rd MCP, 4th PIP, 4th MCP, 5th PIP and 5th MCP normal.        Reviewed old and all outside pertinent medical records available.    All lab results personally reviewed and interpreted by me.  Lab Results   Component Value Date    WBC 4.57 01/06/2023    HGB 13.6 (L) 01/06/2023    HCT 40.2 01/06/2023    MCV 96 01/06/2023    MCH 32.6 (H) 01/06/2023    MCHC 33.8 01/06/2023    RDW 12.3 01/06/2023     01/06/2023    MPV 10.6 01/06/2023       Lab Results   Component Value Date     01/06/2023    K 4.5 01/06/2023     01/06/2023    CO2 28 01/06/2023     (H) 01/06/2023    BUN 16 01/06/2023    CALCIUM 8.5 (L) 01/06/2023    PROT 7.2 01/06/2023    ALBUMIN 4.2 01/06/2023    BILITOT 0.6 01/06/2023    AST 47 (H) 01/06/2023    ALKPHOS 109 01/06/2023    ALT 36 01/06/2023       No results found for: COLORU, APPEARANCEUA, SPECGRAV, PHUR, PHUA, PROTEINUA, GLUCOSEU, KETONESU, BLOODU, LEUKOCYTESUR, NITRITE, UROBILINOGEN    Lab Results   Component Value Date    CRP 0.78 08/02/2021       Lab Results   Component Value Date    SEDRATE 17 (H) 08/02/2021       Lab Results   Component Value Date    .0 (H) 08/02/2021    SEDRATE 17 (H) 08/02/2021       No components found for: 25OHVITDTOT, 49TRCGKU6, 23VLNWMI4, METHODNOTE    Lab Results   Component Value Date    URICACID 8.0 (H) 08/02/2021    URICACID 8.0 (H) 08/02/2021       No components found for: TSPOTTB        Imaging:  All imaging reviewed and independently interpreted by me.         ASSESSMENT / PLAN:     Andrae Aviles is a 57 y.o. White male with:      1. Rheumatoid arthritis involving multiple sites, unspecified whether rheumatoid factor present  - patient with Hx of RA  - in remission   - continue SSZ 1gm daily,  did discuss if he were to have surgery prior to seeing me again, that he could continue SSZ during Pre/Post Op periods   - recent labs reviewed   - reassurance    2. DMARD Toxicity Monitoring  - monitor labs   - vaccines per guidelines     3. Other specified counseling  - over 10 minutes spent regarding below topics:  - Immunization counseling done.  - Weight loss counseling done.  - Nutrition and exercise counseling.  - Limitation of alcohol consumption.  - Regular exercise:  Aerobic and resistance.  - Medication counseling provided.        No follow-ups on file.    Method of contact with patient concerns: [facility]    Disclaimer:  This note is prepared using voice recognition software and as such is likely to have errors and has not been proof read. Please contact me for questions.     Time spent: 30 minutes in face to face discussion concerning diagnosis, prognosis, review of lab and test results, benefits of treatment as well as management of disease, counseling of patient and coordination of care between various health care providers.  Greater than half the time spent was used for coordination of care and counseling of patient.    Shane Lewis M.D.  Rheumatology Department   [facility]

## 2023-02-19 RX ORDER — SERTRALINE HYDROCHLORIDE 50 MG/1
TABLET, FILM COATED ORAL
Qty: 90 TABLET | Refills: 2 | Status: SHIPPED | OUTPATIENT
Start: 2023-02-19 | End: 2023-12-04

## 2023-02-19 NOTE — TELEPHONE ENCOUNTER
No new care gaps identified.  [facility] Embedded Care Gaps. Reference number: [id number]. 2/19/2023   1:42:34 PM CST

## 2023-04-30 ENCOUNTER — PATIENT MESSAGE (OUTPATIENT)
Dept: RHEUMATOLOGY | Facility: CLINIC | Age: 58
End: 2023-04-30
Payer: COMMERCIAL

## 2023-04-30 DIAGNOSIS — M25.461 EFFUSION OF RIGHT KNEE: ICD-10-CM

## 2023-04-30 DIAGNOSIS — M06.9 RHEUMATOID ARTHRITIS INVOLVING MULTIPLE SITES, UNSPECIFIED WHETHER RHEUMATOID FACTOR PRESENT: ICD-10-CM

## 2023-04-30 DIAGNOSIS — M17.5 OTHER SECONDARY OSTEOARTHRITIS OF RIGHT KNEE: ICD-10-CM

## 2023-05-01 RX ORDER — COLCHICINE 0.6 MG/1
0.6 TABLET ORAL DAILY
Qty: 30 TABLET | Refills: 2 | Status: SHIPPED | OUTPATIENT
Start: 2023-05-01 | End: 2023-10-16

## 2023-05-01 RX ORDER — FOLIC ACID 1 MG/1
1000 TABLET ORAL DAILY
Qty: 90 TABLET | Refills: 2 | Status: SHIPPED | OUTPATIENT
Start: 2023-05-01

## 2023-05-08 ENCOUNTER — OFFICE VISIT (OUTPATIENT)
Dept: FAMILY MEDICINE | Facility: CLINIC | Age: 58
End: 2023-05-08
Payer: COMMERCIAL

## 2023-05-08 VITALS
HEART RATE: 95 BPM | SYSTOLIC BLOOD PRESSURE: 116 MMHG | TEMPERATURE: 98 F | WEIGHT: 228.5 LBS | BODY MASS INDEX: 32.71 KG/M2 | OXYGEN SATURATION: 95 % | DIASTOLIC BLOOD PRESSURE: 70 MMHG | HEIGHT: 70 IN

## 2023-05-08 DIAGNOSIS — M06.9 RHEUMATOID ARTHRITIS, INVOLVING UNSPECIFIED SITE, UNSPECIFIED WHETHER RHEUMATOID FACTOR PRESENT: ICD-10-CM

## 2023-05-08 DIAGNOSIS — F41.9 ANXIETY: ICD-10-CM

## 2023-05-08 DIAGNOSIS — Z12.5 PROSTATE CANCER SCREENING: Primary | ICD-10-CM

## 2023-05-08 DIAGNOSIS — I25.10 CORONARY ARTERY DISEASE INVOLVING NATIVE CORONARY ARTERY OF NATIVE HEART WITHOUT ANGINA PECTORIS: ICD-10-CM

## 2023-05-08 DIAGNOSIS — K74.02 HEPATIC FIBROSIS, STAGE 3: ICD-10-CM

## 2023-05-08 DIAGNOSIS — I10 PRIMARY HYPERTENSION: ICD-10-CM

## 2023-05-08 DIAGNOSIS — M19.90 ARTHRITIS: ICD-10-CM

## 2023-05-08 DIAGNOSIS — E79.0 ELEVATED URIC ACID IN BLOOD: ICD-10-CM

## 2023-05-08 PROBLEM — S30.1XXA HEMATOMA OF RECTUS SHEATH: Status: RESOLVED | Noted: 2022-06-23 | Resolved: 2023-05-08

## 2023-05-08 PROBLEM — I21.4 NSTEMI (NON-ST ELEVATED MYOCARDIAL INFARCTION): Status: RESOLVED | Noted: 2022-06-19 | Resolved: 2023-05-08

## 2023-05-08 PROBLEM — I50.41 ACUTE COMBINED SYSTOLIC AND DIASTOLIC HEART FAILURE: Status: RESOLVED | Noted: 2022-06-21 | Resolved: 2023-05-08

## 2023-05-08 PROBLEM — F10.20 ALCOHOL DEPENDENCE: Status: RESOLVED | Noted: 2022-06-19 | Resolved: 2023-05-08

## 2023-05-08 PROCEDURE — 3008F BODY MASS INDEX DOCD: CPT | Mod: CPTII,S$GLB,, | Performed by: FAMILY MEDICINE

## 2023-05-08 PROCEDURE — 1160F RVW MEDS BY RX/DR IN RCRD: CPT | Mod: CPTII,S$GLB,, | Performed by: FAMILY MEDICINE

## 2023-05-08 PROCEDURE — 99214 PR OFFICE/OUTPT VISIT, EST, LEVL IV, 30-39 MIN: ICD-10-PCS | Mod: S$GLB,,, | Performed by: FAMILY MEDICINE

## 2023-05-08 PROCEDURE — 3074F PR MOST RECENT SYSTOLIC BLOOD PRESSURE < 130 MM HG: ICD-10-PCS | Mod: CPTII,S$GLB,, | Performed by: FAMILY MEDICINE

## 2023-05-08 PROCEDURE — 1159F MED LIST DOCD IN RCRD: CPT | Mod: CPTII,S$GLB,, | Performed by: FAMILY MEDICINE

## 2023-05-08 PROCEDURE — 4010F ACE/ARB THERAPY RXD/TAKEN: CPT | Mod: CPTII,S$GLB,, | Performed by: FAMILY MEDICINE

## 2023-05-08 PROCEDURE — 99214 OFFICE O/P EST MOD 30 MIN: CPT | Mod: S$GLB,,, | Performed by: FAMILY MEDICINE

## 2023-05-08 PROCEDURE — 3008F PR BODY MASS INDEX (BMI) DOCUMENTED: ICD-10-PCS | Mod: CPTII,S$GLB,, | Performed by: FAMILY MEDICINE

## 2023-05-08 PROCEDURE — 1160F PR REVIEW ALL MEDS BY PRESCRIBER/CLIN PHARMACIST DOCUMENTED: ICD-10-PCS | Mod: CPTII,S$GLB,, | Performed by: FAMILY MEDICINE

## 2023-05-08 PROCEDURE — 3078F DIAST BP <80 MM HG: CPT | Mod: CPTII,S$GLB,, | Performed by: FAMILY MEDICINE

## 2023-05-08 PROCEDURE — 3074F SYST BP LT 130 MM HG: CPT | Mod: CPTII,S$GLB,, | Performed by: FAMILY MEDICINE

## 2023-05-08 PROCEDURE — 1159F PR MEDICATION LIST DOCUMENTED IN MEDICAL RECORD: ICD-10-PCS | Mod: CPTII,S$GLB,, | Performed by: FAMILY MEDICINE

## 2023-05-08 PROCEDURE — 4010F PR ACE/ARB THEARPY RXD/TAKEN: ICD-10-PCS | Mod: CPTII,S$GLB,, | Performed by: FAMILY MEDICINE

## 2023-05-08 PROCEDURE — 3078F PR MOST RECENT DIASTOLIC BLOOD PRESSURE < 80 MM HG: ICD-10-PCS | Mod: CPTII,S$GLB,, | Performed by: FAMILY MEDICINE

## 2023-05-31 ENCOUNTER — CLINICAL SUPPORT (OUTPATIENT)
Dept: ENDOSCOPY | Facility: HOSPITAL | Age: 58
End: 2023-05-31
Payer: COMMERCIAL

## 2023-05-31 DIAGNOSIS — I85.00 ESOPHAGEAL VARICES WITHOUT BLEEDING, UNSPECIFIED ESOPHAGEAL VARICES TYPE: ICD-10-CM

## 2023-06-12 ENCOUNTER — OFFICE VISIT (OUTPATIENT)
Dept: CARDIOLOGY | Facility: CLINIC | Age: 58
End: 2023-06-12
Payer: COMMERCIAL

## 2023-06-12 VITALS
BODY MASS INDEX: 32.79 KG/M2 | WEIGHT: 229.06 LBS | OXYGEN SATURATION: 93 % | HEART RATE: 79 BPM | SYSTOLIC BLOOD PRESSURE: 96 MMHG | HEIGHT: 70 IN | DIASTOLIC BLOOD PRESSURE: 70 MMHG

## 2023-06-12 DIAGNOSIS — E78.00 PURE HYPERCHOLESTEROLEMIA: ICD-10-CM

## 2023-06-12 DIAGNOSIS — I25.10 CORONARY ARTERY DISEASE INVOLVING NATIVE CORONARY ARTERY OF NATIVE HEART WITHOUT ANGINA PECTORIS: Primary | ICD-10-CM

## 2023-06-12 PROCEDURE — 3008F BODY MASS INDEX DOCD: CPT | Mod: CPTII,S$GLB,, | Performed by: INTERNAL MEDICINE

## 2023-06-12 PROCEDURE — 4010F PR ACE/ARB THEARPY RXD/TAKEN: ICD-10-PCS | Mod: CPTII,S$GLB,, | Performed by: INTERNAL MEDICINE

## 2023-06-12 PROCEDURE — 99999 PR PBB SHADOW E&M-EST. PATIENT-LVL III: ICD-10-PCS | Mod: PBBFAC,,, | Performed by: INTERNAL MEDICINE

## 2023-06-12 PROCEDURE — 99214 PR OFFICE/OUTPT VISIT, EST, LEVL IV, 30-39 MIN: ICD-10-PCS | Mod: S$GLB,,, | Performed by: INTERNAL MEDICINE

## 2023-06-12 PROCEDURE — 3008F PR BODY MASS INDEX (BMI) DOCUMENTED: ICD-10-PCS | Mod: CPTII,S$GLB,, | Performed by: INTERNAL MEDICINE

## 2023-06-12 PROCEDURE — 3074F PR MOST RECENT SYSTOLIC BLOOD PRESSURE < 130 MM HG: ICD-10-PCS | Mod: CPTII,S$GLB,, | Performed by: INTERNAL MEDICINE

## 2023-06-12 PROCEDURE — 4010F ACE/ARB THERAPY RXD/TAKEN: CPT | Mod: CPTII,S$GLB,, | Performed by: INTERNAL MEDICINE

## 2023-06-12 PROCEDURE — 1160F PR REVIEW ALL MEDS BY PRESCRIBER/CLIN PHARMACIST DOCUMENTED: ICD-10-PCS | Mod: CPTII,S$GLB,, | Performed by: INTERNAL MEDICINE

## 2023-06-12 PROCEDURE — 3074F SYST BP LT 130 MM HG: CPT | Mod: CPTII,S$GLB,, | Performed by: INTERNAL MEDICINE

## 2023-06-12 PROCEDURE — 3078F DIAST BP <80 MM HG: CPT | Mod: CPTII,S$GLB,, | Performed by: INTERNAL MEDICINE

## 2023-06-12 PROCEDURE — 1159F PR MEDICATION LIST DOCUMENTED IN MEDICAL RECORD: ICD-10-PCS | Mod: CPTII,S$GLB,, | Performed by: INTERNAL MEDICINE

## 2023-06-12 PROCEDURE — 1160F RVW MEDS BY RX/DR IN RCRD: CPT | Mod: CPTII,S$GLB,, | Performed by: INTERNAL MEDICINE

## 2023-06-12 PROCEDURE — 1159F MED LIST DOCD IN RCRD: CPT | Mod: CPTII,S$GLB,, | Performed by: INTERNAL MEDICINE

## 2023-06-12 PROCEDURE — 99999 PR PBB SHADOW E&M-EST. PATIENT-LVL III: CPT | Mod: PBBFAC,,, | Performed by: INTERNAL MEDICINE

## 2023-06-12 PROCEDURE — 99214 OFFICE O/P EST MOD 30 MIN: CPT | Mod: S$GLB,,, | Performed by: INTERNAL MEDICINE

## 2023-06-12 PROCEDURE — 3078F PR MOST RECENT DIASTOLIC BLOOD PRESSURE < 80 MM HG: ICD-10-PCS | Mod: CPTII,S$GLB,, | Performed by: INTERNAL MEDICINE

## 2023-06-12 RX ORDER — ATORVASTATIN CALCIUM 80 MG/1
80 TABLET, FILM COATED ORAL NIGHTLY
Qty: 90 TABLET | Refills: 3 | Status: SHIPPED | OUTPATIENT
Start: 2023-06-12 | End: 2024-06-11

## 2023-06-12 NOTE — PROGRESS NOTES
[facility] Cardiology 701     SUBJECTIVE:     History of Present Illness:  Patient is a 57 y.o. male presents with CAD. He is also considering knee surgery and needs  clearance     Primary Diagnosis:   1. hypertension  2. DM: none  3. Smoked till 2016; vaps   4. CAD: s/p left main; stent Cx  5. Hepatology followup    ROS  No chest pains but had shortness of breath with his other cardiac events.  No shortness of breath; no PND or orthopnea  No syncope  No palpitations  Activity: normal with no restrictions   Blood pressures at home run around 110's   Review of patient's allergies indicates:  No Known Allergies    Past Medical History:   Diagnosis Date    Acute combined systolic and diastolic heart failure 6/21/2022    Arthritis     rheumatoid    CAD (coronary artery disease)     Carrier of hemochromatosis HFE gene mutation 1/13/2023    Hypertension     NSTEMI (non-ST elevated myocardial infarction) 6/19/2022    Other cirrhosis of liver 1/13/2023    Primary hypertension 10/11/2021    Rheumatoid arthritis 10/11/2021       Past Surgical History:   Procedure Laterality Date    COLONOSCOPY      age 42    COLONOSCOPY N/A 11/22/2021    Procedure: COLONOSCOPY  Golytely   Rapid Covid;  Surgeon: Shane Guzman MD;  Location: [facility] ENDO;  Service: Endoscopy;  Laterality: N/A;    CORONARY ANGIOGRAPHY N/A 07/01/2022    Procedure: Angiogram, Coronary, with Left Heart Cath;  Surgeon: Miguelito Barney MD;  Location: [facility] CATH LAB;  Service: Cardiology;  Laterality: N/A;    CORONARY ANGIOGRAPHY Right 09/15/2022    Procedure: ANGIOGRAM, CORONARY ARTERY;  Surgeon: Phani Brown MD;  Location: [facility] CATH LAB/EP;  Service: Cardiology;  Laterality: Right;    LEFT HEART CATHETERIZATION N/A 06/20/2022    Procedure: Left heart cath;  Surgeon: Marlen Thompson MD;  Location: [facility] CATH LAB/EP;  Service: Cardiology;  Laterality: N/A;    LEFT HEART CATHETERIZATION Left 09/15/2022    Procedure: Left heart cath;  Surgeon: Phani Brown  "MD;  Location: [facility] CATH LAB/EP;  Service: Cardiology;  Laterality: Left;    VASECTOMY             Past Hospitalization:   : stent procedure       Cardiac meds:    ASA 81 mg  Atorvastatin 40 mg  Plavix 75 mg  Metoprolol succinate 25 mg   Valsartan 40 mg       OBJECTIVE:     Vital Signs (Most Recent)  Vitals:    23 0829   BP: 96/70   Pulse: 79   SpO2: (!) 93%   Weight: 103.9 kg (229 lb 0.9 oz)   Height: 5' 10" (1.778 m)         Physical Exam:  Neck: normal carotids, no bruits; normal JVP  Lungs :clear  Heart: RR, normal S1,S2, no murmurs, no gallops  Abd: no masses; no bruits;   Exts: normal DP and PT pulses bilaterally, normal radials; no edema noted           LABS  : A1c 5.9  Lipids: LDL 92   : GFR normal; abnormal LFT's          Diagnostic Results:    1.EK/22: sinus bradycardia, otherwise normal   2.Echo: : normal EF, diastolic dysfunction; apical hypokinesis   3.cath: : ostial left main: 99%; mid Cx 99%; right 100%; 4.5X12 stent left main;   3b.Cath: 9/15/22: mid Cx 95%; VILMA placed; right 100%; Left main: normal; LAD 40%  PET stress: : large defect Cx,Right; normal EF   Chart review:      ASSESSMENT/PLAN:     CAD: s/p left main stent; cx stent  Abnormal ferritin levels followed by hepatology  3. Will await stress testing for clearance and would wait till after last stent to stop the plavix   Plan: 1. Increase atorvastatin to 80 mg to get LDL down 70's  2. Seriously consider stop vaping  3. PET stress test - call for results   4. Decrease carbs  5. Return 6 months     Pat Campa MD    "

## 2023-06-13 ENCOUNTER — OFFICE VISIT (OUTPATIENT)
Dept: RHEUMATOLOGY | Facility: CLINIC | Age: 58
End: 2023-06-13
Payer: COMMERCIAL

## 2023-06-13 VITALS
HEIGHT: 70 IN | WEIGHT: 230.38 LBS | HEART RATE: 73 BPM | DIASTOLIC BLOOD PRESSURE: 65 MMHG | SYSTOLIC BLOOD PRESSURE: 105 MMHG | BODY MASS INDEX: 32.98 KG/M2 | OXYGEN SATURATION: 95 %

## 2023-06-13 DIAGNOSIS — E66.9 CLASS 1 OBESITY WITH BODY MASS INDEX (BMI) OF 33.0 TO 33.9 IN ADULT, UNSPECIFIED OBESITY TYPE, UNSPECIFIED WHETHER SERIOUS COMORBIDITY PRESENT: ICD-10-CM

## 2023-06-13 DIAGNOSIS — M06.9 RHEUMATOID ARTHRITIS, INVOLVING UNSPECIFIED SITE, UNSPECIFIED WHETHER RHEUMATOID FACTOR PRESENT: Primary | ICD-10-CM

## 2023-06-13 DIAGNOSIS — D84.821 IMMUNODEFICIENCY DUE TO TREATMENT WITH IMMUNOSUPPRESSIVE MEDICATION: ICD-10-CM

## 2023-06-13 DIAGNOSIS — Z79.899 IMMUNODEFICIENCY DUE TO TREATMENT WITH IMMUNOSUPPRESSIVE MEDICATION: ICD-10-CM

## 2023-06-13 DIAGNOSIS — Z71.89 COUNSELING AND COORDINATION OF CARE: ICD-10-CM

## 2023-06-13 PROCEDURE — 99999 PR PBB SHADOW E&M-EST. PATIENT-LVL III: CPT | Mod: PBBFAC,,, | Performed by: INTERNAL MEDICINE

## 2023-06-13 PROCEDURE — 3008F PR BODY MASS INDEX (BMI) DOCUMENTED: ICD-10-PCS | Mod: CPTII,S$GLB,, | Performed by: INTERNAL MEDICINE

## 2023-06-13 PROCEDURE — 3078F DIAST BP <80 MM HG: CPT | Mod: CPTII,S$GLB,, | Performed by: INTERNAL MEDICINE

## 2023-06-13 PROCEDURE — 1159F PR MEDICATION LIST DOCUMENTED IN MEDICAL RECORD: ICD-10-PCS | Mod: CPTII,S$GLB,, | Performed by: INTERNAL MEDICINE

## 2023-06-13 PROCEDURE — 3078F PR MOST RECENT DIASTOLIC BLOOD PRESSURE < 80 MM HG: ICD-10-PCS | Mod: CPTII,S$GLB,, | Performed by: INTERNAL MEDICINE

## 2023-06-13 PROCEDURE — 4010F ACE/ARB THERAPY RXD/TAKEN: CPT | Mod: CPTII,S$GLB,, | Performed by: INTERNAL MEDICINE

## 2023-06-13 PROCEDURE — 3074F PR MOST RECENT SYSTOLIC BLOOD PRESSURE < 130 MM HG: ICD-10-PCS | Mod: CPTII,S$GLB,, | Performed by: INTERNAL MEDICINE

## 2023-06-13 PROCEDURE — 99214 OFFICE O/P EST MOD 30 MIN: CPT | Mod: S$GLB,,, | Performed by: INTERNAL MEDICINE

## 2023-06-13 PROCEDURE — 3074F SYST BP LT 130 MM HG: CPT | Mod: CPTII,S$GLB,, | Performed by: INTERNAL MEDICINE

## 2023-06-13 PROCEDURE — 99214 PR OFFICE/OUTPT VISIT, EST, LEVL IV, 30-39 MIN: ICD-10-PCS | Mod: S$GLB,,, | Performed by: INTERNAL MEDICINE

## 2023-06-13 PROCEDURE — 3008F BODY MASS INDEX DOCD: CPT | Mod: CPTII,S$GLB,, | Performed by: INTERNAL MEDICINE

## 2023-06-13 PROCEDURE — 99999 PR PBB SHADOW E&M-EST. PATIENT-LVL III: ICD-10-PCS | Mod: PBBFAC,,, | Performed by: INTERNAL MEDICINE

## 2023-06-13 PROCEDURE — 1159F MED LIST DOCD IN RCRD: CPT | Mod: CPTII,S$GLB,, | Performed by: INTERNAL MEDICINE

## 2023-06-13 PROCEDURE — 4010F PR ACE/ARB THEARPY RXD/TAKEN: ICD-10-PCS | Mod: CPTII,S$GLB,, | Performed by: INTERNAL MEDICINE

## 2023-06-13 RX ORDER — SULFASALAZINE 500 MG/1
500 TABLET, DELAYED RELEASE ORAL 2 TIMES DAILY
Qty: 60 TABLET | Refills: 11 | Status: SHIPPED | OUTPATIENT
Start: 2023-06-13

## 2023-06-13 NOTE — PROGRESS NOTES
Answers submitted by the patient for this visit:  Rheumatology Questionnaire (Submitted on 6/12/2023)  fever: No  eye redness: No  mouth sores: No  headaches: No  shortness of breath: No  chest pain: No  trouble swallowing: No  diarrhea: No  constipation: No  unexpected weight change: No  genital sore: No  During the last 3 days, have you had a skin rash?: No  Bruises or bleeds easily: No  cough: No             RHEUMATOLOGY OUTPATIENT CLINIC NOTE    6/13/2023    Attending Rheumatologist: Shane Lewis  Primary Care Provider: Edgar Aviles MD   Physician Requesting Consultation: No referring provider defined for this encounter.  Chief Complaint/Reason For Consultation:  Rheumatoid Arthritis and Pain      Subjective:       HPI  Andrae Aviles is a 57 y.o. White male with medical history noted below presents for evaluation of rheumatoid arthritis.  Patient just recently moved from [place]. Seeking to establish with Rheumatology. Patient was following with Dr. Mateo Charles (Rheumatology) for RA. Paced on MTX 3 years ago and has been in remission sense. Methotrexate 6 tabs weekly. No other therapies. Patient notes that for the last 3 weeks knee pain and swelling which has improved slightly. No other complaints.     Today  Patient here for follow up.   Last visit management for RA continued. He reports he has been doing well. Has no issues. Tolerating meds.       Review of Systems   Constitutional:  Negative for chills, fatigue, fever and unexpected weight change.   HENT:  Negative for mouth sores and trouble swallowing.    Eyes:  Negative for redness and eye dryness.   Respiratory:  Negative for cough and shortness of breath.    Cardiovascular:  Negative for chest pain.   Gastrointestinal:  Negative for abdominal distention, constipation, diarrhea, nausea, vomiting and reflux.   Genitourinary:  Negative for genital sores.   Musculoskeletal:  Negative for arthralgias, back pain, gait problem, joint swelling, leg  pain, myalgias, neck pain, neck stiffness and joint deformity.   Integumentary:  Negative for rash.   Neurological:  Negative for weakness, numbness, headaches and memory loss.   Hematological:  Negative for adenopathy. Does not bruise/bleed easily.   Psychiatric/Behavioral:  Negative for confusion, decreased concentration, sleep disturbance and suicidal ideas. The patient is not nervous/anxious.    All other systems reviewed and are negative.     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    Hypertension     NSTEMI (non-ST elevated myocardial infarction) 6/19/2022    Other cirrhosis of liver 1/13/2023    Primary hypertension 10/11/2021    Rheumatoid arthritis 10/11/2021     Past Surgical History:   Procedure Laterality Date    COLONOSCOPY      age 42    COLONOSCOPY N/A 11/22/2021    Procedure: COLONOSCOPY  Golytely   Rapid Covid;  Surgeon: Shane Guzman MD;  Location: [facility] ENDO;  Service: Endoscopy;  Laterality: N/A;    CORONARY ANGIOGRAPHY N/A 07/01/2022    Procedure: Angiogram, Coronary, with Left Heart Cath;  Surgeon: Miguelito Barney MD;  Location: [facility] CATH LAB;  Service: Cardiology;  Laterality: N/A;    CORONARY ANGIOGRAPHY Right 09/15/2022    Procedure: ANGIOGRAM, CORONARY ARTERY;  Surgeon: Phani Brown MD;  Location: [facility] CATH LAB/EP;  Service: Cardiology;  Laterality: Right;    LEFT HEART CATHETERIZATION N/A 06/20/2022    Procedure: Left heart cath;  Surgeon: Marlen Thompson MD;  Location: [facility] CATH LAB/EP;  Service: Cardiology;  Laterality: N/A;    LEFT HEART CATHETERIZATION Left 09/15/2022    Procedure: Left heart cath;  Surgeon: Phani Brown MD;  Location: [facility] CATH LAB/EP;  Service: Cardiology;  Laterality: Left;    VASECTOMY        Family History   Problem Relation Age of Onset    No Known Problems Daughter     No Known Problems Son      Social History     Substance and Sexual Activity   Alcohol Use Yes    Alcohol/week: 5.0 standard drinks    Types: 5 Cans of beer per week    Comment: previousl;y 8+ beers daily for many years, stopped 2022, but resumed 2022, aware to stop     Social History     Tobacco Use   Smoking Status Former    Packs/day: 1.00    Years: 15.00    Pack years: 15.00    Types: Cigarettes, Vaping with nicotine    Start date: 10/14/1985    Quit date: 6/15/2016    Years since quittin.9   Smokeless Tobacco Never     Social History     Substance and Sexual Activity   Drug Use Never       Current Outpatient Medications:     aspirin 81 MG Chew, Take 1 tablet (81 mg total) by mouth once daily., Disp: 90 tablet, Rfl: 3    atorvastatin (LIPITOR) 80 MG tablet, Take 1 tablet (80 mg total) by mouth every evening., Disp: 90 tablet, Rfl: 3    clopidogreL (PLAVIX) 75 mg tablet, Take 1 tablet (75 mg total) by mouth once daily., Disp: 90 tablet, Rfl: 11    colchicine (COLCRYS) 0.6 mg tablet, Take 1 tablet (0.6 mg total) by mouth once daily., Disp: 30 tablet, Rfl: 2    folic acid (FOLVITE) 1 MG tablet, Take 1 tablet (1,000 mcg total) by mouth once daily., Disp: 90 tablet, Rfl: 2    metoprolol succinate (TOPROL-XL) 25 MG 24 hr tablet, Take 1 tablet (25 mg total) by mouth once daily., Disp: 90 tablet, Rfl: 11    nitroGLYCERIN (NITROSTAT) 0.4 MG SL tablet, Place 1 tablet (0.4 mg total) under the tongue every 5 (five) minutes as needed for Chest pain., Disp: 30 tablet, Rfl: 3    sertraline (ZOLOFT) 50 MG tablet, TAKE 1 TABLET BY MOUTH EVERY DAY IN THE EVENING, Disp: 90 tablet, Rfl: 2    valsartan (DIOVAN) 40 MG tablet, Take 1 tablet (40 mg total) by mouth once daily., Disp: 90 tablet, Rfl: 3    sulfaSALAzine (AZULFIDINE) 500 MG EC tablet, Take 1 tablet (500 mg total) by mouth 2 (two) times daily., Disp: 60 tablet, Rfl: 11      Objective:         Vitals:    06/13/23 0825   BP: 105/65   Pulse: 73     Physical Exam   Musculoskeletal:      Right shoulder: Normal.      Left shoulder: Normal.      Right elbow: Normal.      Left elbow: Normal.      Right wrist: Normal.      Left wrist: Normal.      Left knee: Normal.       Right Side Rheumatological Exam     Examination finds the shoulder, elbow, wrist, 1st PIP, 1st MCP, 2nd PIP, 2nd MCP, 3rd PIP, 3rd MCP, 4th PIP, 4th MCP, 5th PIP and 5th MCP normal.    Left Side Rheumatological Exam     Examination finds the shoulder, elbow, wrist, knee, 1st PIP, 1st MCP, 2nd PIP, 2nd MCP, 3rd PIP, 3rd MCP, 4th PIP, 4th MCP, 5th PIP and 5th MCP normal.        Reviewed old and all outside pertinent medical records available.    All lab results personally reviewed and interpreted by me.  Lab Results   Component Value Date    WBC 4.57 01/06/2023    HGB 13.6 (L) 01/06/2023    HCT 40.2 01/06/2023    MCV 96 01/06/2023    MCH 32.6 (H) 01/06/2023    MCHC 33.8 01/06/2023    RDW 12.3 01/06/2023     01/06/2023    MPV 10.6 01/06/2023       Lab Results   Component Value Date     01/06/2023    K 4.5 01/06/2023     01/06/2023    CO2 28 01/06/2023     (H) 01/06/2023    BUN 16 01/06/2023    CALCIUM 8.5 (L) 01/06/2023    PROT 7.2 01/06/2023    ALBUMIN 4.2 01/06/2023    BILITOT 0.6 01/06/2023    AST 47 (H) 01/06/2023    ALKPHOS 109 01/06/2023    ALT 36 01/06/2023       No results found for: COLORU, APPEARANCEUA, SPECGRAV, PHUR, PHUA, PROTEINUA, GLUCOSEU, KETONESU, BLOODU, LEUKOCYTESUR, NITRITE, UROBILINOGEN    Lab Results   Component Value Date    CRP 0.78 08/02/2021       Lab Results   Component Value Date    SEDRATE 17 (H) 08/02/2021       Lab Results   Component Value Date    .0 (H) 08/02/2021    SEDRATE 17 (H) 08/02/2021       No components found for: 25OHVITDTOT, 11EXPXKF8, 19DDPMKI5, METHODNOTE    Lab Results   Component Value Date    URICACID 8.0 (H) 08/02/2021    URICACID 8.0 (H)  08/02/2021       No components found for: TSPOTTB        Imaging:  All imaging reviewed and independently interpreted by me.         ASSESSMENT / PLAN:     Andrae Aviles is a 57 y.o. White male with:      1. Rheumatoid arthritis involving multiple sites, unspecified whether rheumatoid factor present  - patient with Hx of RA  - in remission   - continue SSZ 1gm daily   - has labs upcoming  - reassurance    2. DMARD Toxicity Monitoring  - monitor labs   - vaccines per guidelines   - immunosuppression/infectious precautions discussed     3. Other specified counseling  - over 10 minutes spent regarding below topics:  - Immunization counseling done.  - Weight loss counseling done.  - Nutrition and exercise counseling.  - Limitation of alcohol consumption.  - Regular exercise:  Aerobic and resistance.  - Medication counseling provided.        Follow up in about 4 months (around 10/13/2023).    Method of contact with patient concerns: [facility]    Disclaimer:  This note is prepared using voice recognition software and as such is likely to have errors and has not been proof read. Please contact me for questions.     Time spent: 30 minutes in face to face discussion concerning diagnosis, prognosis, review of lab and test results, benefits of treatment as well as management of disease, counseling of patient and coordination of care between various health care providers.  Greater than half the time spent was used for coordination of care and counseling of patient.    Shane Lewis M.D.  Rheumatology Department   [facility]

## 2023-07-06 DIAGNOSIS — I10 PRIMARY HYPERTENSION: ICD-10-CM

## 2023-07-07 ENCOUNTER — TELEPHONE (OUTPATIENT)
Dept: ENDOSCOPY | Facility: HOSPITAL | Age: 58
End: 2023-07-07

## 2023-07-07 ENCOUNTER — PATIENT MESSAGE (OUTPATIENT)
Dept: HEPATOLOGY | Facility: CLINIC | Age: 58
End: 2023-07-07
Payer: COMMERCIAL

## 2023-07-07 ENCOUNTER — CLINICAL SUPPORT (OUTPATIENT)
Dept: ENDOSCOPY | Facility: HOSPITAL | Age: 58
End: 2023-07-07
Payer: COMMERCIAL

## 2023-07-07 DIAGNOSIS — I85.00 ESOPHAGEAL VARICES WITHOUT BLEEDING, UNSPECIFIED ESOPHAGEAL VARICES TYPE: ICD-10-CM

## 2023-07-07 RX ORDER — VALSARTAN 40 MG/1
40 TABLET ORAL DAILY
Qty: 90 TABLET | Refills: 3
Start: 2023-07-07 | End: 2023-10-09 | Stop reason: SDUPTHER

## 2023-07-07 NOTE — TELEPHONE ENCOUNTER
Dear Dr Campa,    Patient has a scheduled procedure Upper Endoscopy (EGD) on 8/25/23 and is currently taking a blood thinner prescribed by your office. In order to ensure patient safety, we would like to confirm that the patient can place their blood thinner medication on hold for the procedure. Can he/she discontinue Plavix (clopidogrel) for a minimum of 5 days prior to the procedure?     Thank you for your prompt reply.    [facility] Endoscopy Scheduling

## 2023-07-07 NOTE — PLAN OF CARE
Spoke to pt to schedule procedure(s) Upper Endoscopy (EGD)       Physician to perform procedure(s) Dr. ANDREA Green  Date of Procedure (s) 8/25/23  Arrival Time 3:15 PM  Time of Procedure(s) 4:15 PM   Location of Procedure(s) [address]  Type of Rx Prep sent to patient: N/A  Instructions provided to patient via [facility]    Patient was informed on the following information and verbalized understanding. Screening questionnaire reviewed with patient and complete. If procedure requires anesthesia, a responsible adult needs to be present to accompany the patient home, patient cannot drive after receiving anesthesia. Appointment details are tentative, especially check-in time. Patient will receive a prep-op call 4 days prior to confirm check-in time for procedure. If applicable the patient should contact their pharmacy to verify Rx for procedure prep is ready for pick-up. Patient was advised to call the scheduling department at [phone] if pharmacy states no Rx is available. Patient was advised to call the endoscopy scheduling department if any questions or concerns arise.      [facility]oscopy Scheduling Department

## 2023-07-07 NOTE — TELEPHONE ENCOUNTER
Spoke to pt to schedule procedure(s) Upper Endoscopy (EGD)       Physician to perform procedure(s) Dr. ANDREA Green  Date of Procedure (s) 8/25/23  Arrival Time 3:15 PM  Time of Procedure(s) 4:15 PM   Location of Procedure(s) [address]  Type of Rx Prep sent to patient: N/A  Instructions provided to patient via [facility]    Patient was informed on the following information and verbalized understanding. Screening questionnaire reviewed with patient and complete. If procedure requires anesthesia, a responsible adult needs to be present to accompany the patient home, patient cannot drive after receiving anesthesia. Appointment details are tentative, especially check-in time. Patient will receive a prep-op call 4 days prior to confirm check-in time for procedure. If applicable the patient should contact their pharmacy to verify Rx for procedure prep is ready for pick-up. Patient was advised to call the scheduling department at [phone] if pharmacy states no Rx is available. Patient was advised to call the endoscopy scheduling department if any questions or concerns arise.      [facility] Endoscopy Scheduling Department

## 2023-07-10 ENCOUNTER — HOSPITAL ENCOUNTER (OUTPATIENT)
Dept: RADIOLOGY | Facility: HOSPITAL | Age: 58
Discharge: HOME | End: 2023-07-10
Attending: NURSE PRACTITIONER
Payer: COMMERCIAL

## 2023-07-10 DIAGNOSIS — K74.69 OTHER CIRRHOSIS OF LIVER: ICD-10-CM

## 2023-07-10 PROCEDURE — 76700 US ABDOMEN COMPLETE: ICD-10-PCS | Mod: 26,,, | Performed by: RADIOLOGY

## 2023-07-10 PROCEDURE — 76700 US EXAM ABDOM COMPLETE: CPT | Mod: TC,PO

## 2023-07-10 PROCEDURE — 76700 US EXAM ABDOM COMPLETE: CPT | Mod: 26,,, | Performed by: RADIOLOGY

## 2023-07-11 ENCOUNTER — TELEPHONE (OUTPATIENT)
Dept: ENDOSCOPY | Facility: HOSPITAL | Age: 58
End: 2023-07-11
Payer: COMMERCIAL

## 2023-07-11 ENCOUNTER — PATIENT MESSAGE (OUTPATIENT)
Dept: ENDOSCOPY | Facility: HOSPITAL | Age: 58
End: 2023-07-11
Payer: COMMERCIAL

## 2023-07-11 ENCOUNTER — TELEPHONE (OUTPATIENT)
Dept: HEPATOLOGY | Facility: CLINIC | Age: 58
End: 2023-07-11
Payer: COMMERCIAL

## 2023-07-11 DIAGNOSIS — K76.9 LIVER DISEASE, UNSPECIFIED: Primary | ICD-10-CM

## 2023-07-11 DIAGNOSIS — K76.9 LIVER LESION: ICD-10-CM

## 2023-07-11 NOTE — TELEPHONE ENCOUNTER
Please call pt and notify him that his US noted a possible abnormality and the recommendation is an MRI before his visit with me coming up.  This is a different size than the abnormality noted on his US 1 year ago (MRI 1 year ago was normal)     Please schedule him for MRI soon      Please verify that he does not have any pacemaker, metal implants, etc     Thanks !

## 2023-07-11 NOTE — TELEPHONE ENCOUNTER
----- Message from Mindy B Seipel, RN sent at 2023 11:28 AM CDT -----  Regardin/25 BT  The patient is currently under an internal cardiologist Katheryn care and requires a blood thinner Plavix (clopidogrel) for their upcoming scheduled Upper Endoscopy (EGD) on 23.

## 2023-07-12 ENCOUNTER — TELEPHONE (OUTPATIENT)
Dept: CARDIOLOGY | Facility: HOSPITAL | Age: 58
End: 2023-07-12
Payer: COMMERCIAL

## 2023-07-12 NOTE — TELEPHONE ENCOUNTER
Call placed to the patient and message relayed from Kaitlyn Ribera NP.  Voiced understanding.    Due to job obligations MRI scheduled for Mon 7/31/23 at 8 am in [facility] as requested.  Patient denies any metal in the body.   Work on diet/ exercise  Follow up labs OV in 3 months (lipid, cmp, vit D)

## 2023-07-12 NOTE — TELEPHONE ENCOUNTER
Call returned to the Patient at [phone].  Patient informed about the changes made with his upcoming appointments.    Your Virtual Visit appointment on Fri 7/21/23 at 8 am with Kaitlyn Ribera NP has been rescheduled until after your MRI Abd W WO is completed on 7/31/23.    Your Virtual Visit appt has been rescheduled to Fri 8/4/23 at 3 pm.    The US Abd scheduled on Fri 7/28/23 at 4:45 pm [facility] has been cancelled. You did an US Abd on 7/10/23.    Patient verbalized understanding.

## 2023-07-14 ENCOUNTER — HOSPITAL ENCOUNTER (OUTPATIENT)
Dept: CARDIOLOGY | Facility: HOSPITAL | Age: 58
Discharge: HOME | End: 2023-07-14
Attending: INTERNAL MEDICINE
Payer: COMMERCIAL

## 2023-07-14 VITALS
HEIGHT: 70 IN | BODY MASS INDEX: 32.93 KG/M2 | SYSTOLIC BLOOD PRESSURE: 109 MMHG | DIASTOLIC BLOOD PRESSURE: 55 MMHG | RESPIRATION RATE: 16 BRPM | WEIGHT: 230 LBS | HEART RATE: 57 BPM

## 2023-07-14 DIAGNOSIS — I25.10 CORONARY ARTERY DISEASE INVOLVING NATIVE CORONARY ARTERY OF NATIVE HEART WITHOUT ANGINA PECTORIS: ICD-10-CM

## 2023-07-14 LAB
CFR FLOW - ANTERIOR: 2.19
CFR FLOW - INFERIOR: 1.45
CFR FLOW - LATERAL: 1.96
CFR FLOW - MAX: 2.63
CFR FLOW - MIN: 0.99
CFR FLOW - SEPTAL: 2
CFR FLOW - WHOLE HEART: 1.9
CFR FLOW- DEFECT 1: 1.34
CV STRESS BASE HR: 59 BPM
DIASTOLIC BLOOD PRESSURE: 63 MMHG
EJECTION FRACTION- HIGH: 59 %
END DIASTOLIC INDEX-HIGH: 155 ML/M2
END DIASTOLIC INDEX-LOW: 91 ML/M2
END SYSTOLIC INDEX-HIGH: 78 ML/M2
END SYSTOLIC INDEX-LOW: 40 ML/M2
NUC REST DIASTOLIC VOLUME INDEX: 127
NUC REST EJECTION FRACTION: 57
NUC REST SYSTOLIC VOLUME INDEX: 54
NUC STRESS DIASTOLIC VOLUME INDEX: 159
NUC STRESS EJECTION FRACTION: 52 %
NUC STRESS SYSTOLIC VOLUME INDEX: 77
OHS CV CPX 1 MINUTE RECOVERY HEART RATE: 72 BPM
OHS CV CPX 85 PERCENT MAX PREDICTED HEART RATE MALE: 140
OHS CV CPX MAX PREDICTED HEART RATE: 165
OHS CV CPX PATIENT IS FEMALE: 0
OHS CV CPX PATIENT IS MALE: 1
OHS CV CPX PEAK DIASTOLIC BLOOD PRESSURE: 60 MMHG
OHS CV CPX PEAK HEAR RATE: 54 BPM
OHS CV CPX PEAK RATE PRESSURE PRODUCT: 5508
OHS CV CPX PEAK SYSTOLIC BLOOD PRESSURE: 102 MMHG
OHS CV CPX PERCENT MAX PREDICTED HEART RATE ACHIEVED: 33
OHS CV CPX RATE PRESSURE PRODUCT PRESENTING: 7021
PERFUSION DEFECT 1 SIZE IN %: 11 %
PERFUSION DEFECT 2 SIZE IN %: 7 %
REST FLOW - ANTERIOR: 0.39 CC/MIN/G
REST FLOW - INFERIOR: 0.35 CC/MIN/G
REST FLOW - LATERAL: 0.43 CC/MIN/G
REST FLOW - MAX: 0.57 CC/MIN/G
REST FLOW - MIN: 0.2 CC/MIN/G
REST FLOW - SEPTAL: 0.32 CC/MIN/G
REST FLOW - WHOLE HEART: 0.37 CC/MIN/G
REST FLOW- DEFECT 1: 0.34 CC/MIN/G
RETIRED EF AND QEF - SEE NOTES: 47 %
STRESS FLOW - ANTERIOR: 0.85 CC/MIN/G
STRESS FLOW - INFERIOR: 0.51 CC/MIN/G
STRESS FLOW - LATERAL: 0.84 CC/MIN/G
STRESS FLOW - MAX: 1.29 CC/MIN/G
STRESS FLOW - MIN: 0.35 CC/MIN/G
STRESS FLOW - SEPTAL: 0.64 CC/MIN/G
STRESS FLOW - WHOLE HEART: 0.71 CC/MIN/G
STRESS FLOW- DEFECT 1: 0.45 CC/MIN/G
SYSTOLIC BLOOD PRESSURE: 119 MMHG

## 2023-07-14 PROCEDURE — 78434 AQMBF PET REST & RX STRESS: CPT | Mod: 26,,, | Performed by: INTERNAL MEDICINE

## 2023-07-14 PROCEDURE — 78434 CARDIAC PET SCAN STRESS (CUPID ONLY): ICD-10-PCS | Mod: 26,,, | Performed by: INTERNAL MEDICINE

## 2023-07-14 PROCEDURE — 93016 CV STRESS TEST SUPVJ ONLY: CPT | Mod: ,,, | Performed by: INTERNAL MEDICINE

## 2023-07-14 PROCEDURE — 78431 MYOCRD IMG PET RST&STRS CT: CPT | Mod: 26,,, | Performed by: INTERNAL MEDICINE

## 2023-07-14 PROCEDURE — 78431 CARDIAC PET SCAN STRESS (CUPID ONLY): ICD-10-PCS | Mod: 26,,, | Performed by: INTERNAL MEDICINE

## 2023-07-14 PROCEDURE — 93016 CARDIAC PET SCAN STRESS (CUPID ONLY): ICD-10-PCS | Mod: ,,, | Performed by: INTERNAL MEDICINE

## 2023-07-14 PROCEDURE — 93018 CARDIAC PET SCAN STRESS (CUPID ONLY): ICD-10-PCS | Mod: ,,, | Performed by: INTERNAL MEDICINE

## 2023-07-14 PROCEDURE — 78434 AQMBF PET REST & RX STRESS: CPT

## 2023-07-14 PROCEDURE — 63600175 PHARM REV CODE 636 W HCPCS: Performed by: INTERNAL MEDICINE

## 2023-07-14 PROCEDURE — 93018 CV STRESS TEST I&R ONLY: CPT | Mod: ,,, | Performed by: INTERNAL MEDICINE

## 2023-07-14 RX ORDER — AMINOPHYLLINE 25 MG/ML
75 INJECTION, SOLUTION INTRAVENOUS ONCE
Status: COMPLETED | OUTPATIENT
Start: 2023-07-14 | End: 2023-07-14

## 2023-07-14 RX ORDER — REGADENOSON 0.08 MG/ML
0.4 INJECTION, SOLUTION INTRAVENOUS ONCE
Status: COMPLETED | OUTPATIENT
Start: 2023-07-14 | End: 2023-07-14

## 2023-07-14 RX ADMIN — AMINOPHYLLINE 75 MG: 25 INJECTION, SOLUTION INTRAVENOUS at 01:07

## 2023-07-14 RX ADMIN — REGADENOSON 0.4 MG: 0.08 INJECTION, SOLUTION INTRAVENOUS at 01:07

## 2023-07-20 ENCOUNTER — TELEPHONE (OUTPATIENT)
Dept: ENDOSCOPY | Facility: HOSPITAL | Age: 58
End: 2023-07-20
Payer: COMMERCIAL

## 2023-07-20 NOTE — TELEPHONE ENCOUNTER
Spoke to pt to schedule procedure(s) Upper Endoscopy (EGD)       Physician to perform procedure(s) Dr. ANDREA Green  Date of Procedure (s) 9/29/23  Arrival Time 2:30 pm PM  Time of Procedure(s) 3:30 PM   Location of Procedure(s) [address]  Type of Rx Prep sent to patient: N/A  Instructions provided to patient via [facility]     Patient was informed on the following information and verbalized understanding. Screening questionnaire reviewed with patient and complete. If procedure requires anesthesia, a responsible adult needs to be present to accompany the patient home, patient cannot drive after receiving anesthesia. Appointment details are tentative, especially check-in time. Patient will receive a prep-op call 4 days prior to confirm check-in time for procedure. If applicable the patient should contact their pharmacy to verify Rx for procedure prep is ready for pick-up. Patient was advised to call the scheduling department at [phone] if pharmacy states no Rx is available. Patient was advised to call the endoscopy scheduling department if any questions or concerns arise.        [facility] Endoscopy Scheduling Department

## 2023-07-31 ENCOUNTER — TELEPHONE (OUTPATIENT)
Dept: HEPATOLOGY | Facility: CLINIC | Age: 58
End: 2023-07-31
Payer: COMMERCIAL

## 2023-07-31 ENCOUNTER — HOSPITAL ENCOUNTER (OUTPATIENT)
Dept: RADIOLOGY | Facility: HOSPITAL | Age: 58
Discharge: HOME | End: 2023-07-31
Attending: NURSE PRACTITIONER
Payer: COMMERCIAL

## 2023-07-31 DIAGNOSIS — K76.9 LIVER DISEASE, UNSPECIFIED: ICD-10-CM

## 2023-07-31 DIAGNOSIS — K76.9 LIVER LESION: ICD-10-CM

## 2023-07-31 PROCEDURE — 74183 MRI ABD W/O CNTR FLWD CNTR: CPT | Mod: 26,,, | Performed by: RADIOLOGY

## 2023-07-31 PROCEDURE — 74183 MRI ABD W/O CNTR FLWD CNTR: CPT | Mod: TC,PO

## 2023-07-31 PROCEDURE — 25500020 PHARM REV CODE 255: Mod: PO | Performed by: NURSE PRACTITIONER

## 2023-07-31 PROCEDURE — A9585 GADOBUTROL INJECTION: HCPCS | Mod: PO | Performed by: NURSE PRACTITIONER

## 2023-07-31 PROCEDURE — 74183 MRI ABDOMEN W WO CONTRAST: ICD-10-PCS | Mod: 26,,, | Performed by: RADIOLOGY

## 2023-07-31 RX ORDER — GADOBUTROL 604.72 MG/ML
10 INJECTION INTRAVENOUS
Status: COMPLETED | OUTPATIENT
Start: 2023-07-31 | End: 2023-07-31

## 2023-07-31 RX ADMIN — GADOBUTROL 10 ML: 604.72 INJECTION INTRAVENOUS at 08:07

## 2023-08-01 ENCOUNTER — PATIENT MESSAGE (OUTPATIENT)
Dept: HEPATOLOGY | Facility: CLINIC | Age: 58
End: 2023-08-01
Payer: COMMERCIAL

## 2023-08-01 NOTE — TELEPHONE ENCOUNTER
Please call pt and notify him that his MRI was stable with no lesions concerning for cancer  He is due for follow up with me, recently cancelled upcoming appt, please schedule him for f/u appt with me (video visit ok)    Thanks

## 2023-08-25 ENCOUNTER — OFFICE VISIT (OUTPATIENT)
Dept: HEPATOLOGY | Facility: CLINIC | Age: 58
End: 2023-08-25
Payer: COMMERCIAL

## 2023-08-25 DIAGNOSIS — E78.00 PURE HYPERCHOLESTEROLEMIA: ICD-10-CM

## 2023-08-25 DIAGNOSIS — I10 PRIMARY HYPERTENSION: ICD-10-CM

## 2023-08-25 DIAGNOSIS — K74.69 OTHER CIRRHOSIS OF LIVER: Primary | ICD-10-CM

## 2023-08-25 DIAGNOSIS — K76.9 LIVER LESION: ICD-10-CM

## 2023-08-25 DIAGNOSIS — R79.89 ELEVATED FERRITIN: ICD-10-CM

## 2023-08-25 DIAGNOSIS — Z14.8 CARRIER OF HEMOCHROMATOSIS HFE GENE MUTATION: ICD-10-CM

## 2023-08-25 PROCEDURE — 1159F MED LIST DOCD IN RCRD: CPT | Mod: CPTII,95,, | Performed by: NURSE PRACTITIONER

## 2023-08-25 PROCEDURE — 1159F PR MEDICATION LIST DOCUMENTED IN MEDICAL RECORD: ICD-10-PCS | Mod: CPTII,95,, | Performed by: NURSE PRACTITIONER

## 2023-08-25 PROCEDURE — 4010F PR ACE/ARB THEARPY RXD/TAKEN: ICD-10-PCS | Mod: CPTII,95,, | Performed by: NURSE PRACTITIONER

## 2023-08-25 PROCEDURE — 4010F ACE/ARB THERAPY RXD/TAKEN: CPT | Mod: CPTII,95,, | Performed by: NURSE PRACTITIONER

## 2023-08-25 PROCEDURE — 1160F RVW MEDS BY RX/DR IN RCRD: CPT | Mod: CPTII,95,, | Performed by: NURSE PRACTITIONER

## 2023-08-25 PROCEDURE — 99214 OFFICE O/P EST MOD 30 MIN: CPT | Mod: 95,,, | Performed by: NURSE PRACTITIONER

## 2023-08-25 PROCEDURE — 1160F PR REVIEW ALL MEDS BY PRESCRIBER/CLIN PHARMACIST DOCUMENTED: ICD-10-PCS | Mod: CPTII,95,, | Performed by: NURSE PRACTITIONER

## 2023-08-25 PROCEDURE — 99214 PR OFFICE/OUTPT VISIT, EST, LEVL IV, 30-39 MIN: ICD-10-PCS | Mod: 95,,, | Performed by: NURSE PRACTITIONER

## 2023-08-25 NOTE — PROGRESS NOTES
"The patient location is: [place]  The chief complaint leading to consultation is: see below    Visit type: audiovisual    Face to Face time with patient: 30 minutes of total time spent on the encounter, which includes face to face time and non-face to face time preparing to see the patient (eg, review of tests), Obtaining and/or reviewing separately obtained history, Documenting clinical information in the electronic or other health record, Independently interpreting results (not separately reported) and communicating results to the patient/family/caregiver, or Care coordination (not separately reported).         Each patient to whom he or she provides medical services by telemedicine is:  (1) informed of the relationship between the physician and patient and the respective role of any other health care provider with respect to management of the patient; and (2) notified that he or she may decline to receive medical services by telemedicine and may withdraw from such care at any time.    Notes:      [facility] VISIT FOLLOW UP  NOTE    PCP: Edgar Aviles MD     CHIEF COMPLAINT: well compensated cirrhosis (likely due to alcohol), liver lesion, HH carrier, elevated ferritin     HPI: This is a 57 y.o. White male with PMH noted below, presenting for follow up of above    Diagnosis of pre-cirrhosis based on biopsy done 6/2022 while inpatient for MI  However, concern for cirrhosis given elevated bili and AST>ALT and mild PH on TJ biopsy     Previous serologic w/u was negative for Thad's, alpha-1 antitrypsin deficiency, autoimmune etiology, and viral hepatitis A, B and C  Elevated ferritin, iron 72, HH DNA 1 copy of H63D - will arrange phlebotomy (no iron on biopsy however)  PETH negative 7/2023     Reports he had a "blood test for scar tissue" in 2004 which noted pre-cirrhosis and was told to stop alcohol at that time and stopped for a short period of time but resumed 8-12 beers daily for years.     Prior " serologic workup:   Lab Results   Component Value Date    SMOOTHMUSCAB Positive 1:40 (A) 06/27/2022    IGGSERUM 1169 06/27/2022    ANASCREEN Negative <1:80 06/27/2022    FERRITIN 602 (H) 01/06/2023    FESATURATED 72 (H) 01/06/2023    [id number] SEE BELOW 06/27/2022    [id number] 253 (H) 06/27/2022    CERULOPLSM 38.0 06/27/2022    HEPBSAG Negative 06/27/2022    HEPCAB Negative 06/27/2022    HEPAIGM Negative 06/27/2022     Risk factors for fatty liver include previous alcohol use, obesity, HTN, HLD, DM    Interval HPI: Presents today alone. Changed to NA beer ~6 months ago, big change  Aware he needs to abstain completely for life   Ferritin last ~600, did not complete phlebotomy since last visit  No s/s of hepatic decompensation: no ascites, HE or h/o variceal bleeding  US with focal fatty sparing, MRI without liver lesions, AFP WNL    Abd U/S done 7/2023 showed heterogenous liver texture, liver lesion versus focal fatty sparing    MRI 7/2023 noted fatty liver, no liver lesions     Lab Results   Component Value Date    AFP 2.3 07/10/2023        Lab Results   Component Value Date    ALT 34 07/10/2023    AST 45 07/10/2023    ALKPHOS 53 07/10/2023    BILITOT 0.9 07/10/2023    ALBUMIN 4.1 07/10/2023    INR 1.0 07/10/2023     07/10/2023     MELD 3.0: 6 at 7/10/2023  8:06 AM  MELD-Na: 6 at 7/10/2023  8:06 AM  Calculated from:  Serum Creatinine: 1.02 mg/dL at 7/10/2023  8:06 AM  Serum Sodium: 140 mmol/L (Using max of 137 mmol/L) at 7/10/2023  8:06 AM  Total Bilirubin: 0.9 mg/dL (Using min of 1 mg/dL) at 7/10/2023  8:06 AM  Serum Albumin: 4.1 g/dL (Using max of 3.5 g/dL) at 7/10/2023  8:06 AM  INR(ratio): 1.0 at 7/10/2023  8:06 AM  Age at listing (hypothetical): 57 years  Sex: Male at 7/10/2023  8:06 AM    MELD 6    Cirrhosis Health Maintenance:   -- Last EGD : upcoming   -- Due for next colonoscopy : 2031  -- HCC screening -    MRI without liver lesions, US 2/2024   AFP WNL, will repeat 2/2024  Lab Results    Component Value Date    AFP 2.3 07/10/2023       -- + Immunity to Hep B - needs Hep A vaccine, will discuss at next visit     + heavy alcohol consumption  Social History     Substance and Sexual Activity   Alcohol Use Not Currently    Comment: previousl;y 8+ beers daily for many years, stopped 6/2022, but resumed 11/2022, stopped ~2/2023            Allergy and medication list reviewed and updated     PMHX:  has a past medical history of Acute combined systolic and diastolic heart failure (6/21/2022), Arthritis, CAD (coronary artery disease), Carrier of hemochromatosis HFE gene mutation (1/13/2023), Hypertension, NSTEMI (non-ST elevated myocardial infarction) (6/19/2022), Other cirrhosis of liver (1/13/2023), Primary hypertension (10/11/2021), and Rheumatoid arthritis (10/11/2021).    PSHX:  has a past surgical history that includes Vasectomy; Colonoscopy; Colonoscopy (N/A, 11/22/2021); Left heart catheterization (N/A, 06/20/2022); Coronary angiography (N/A, 07/01/2022); Left heart catheterization (Left, 09/15/2022); and Coronary angiography (Right, 09/15/2022).    FAMILY HISTORY: Updated and reviewed in [facility]    SOCIAL HISTORY:   Social History     Substance and Sexual Activity   Alcohol Use Not Currently    Comment: previousl;y 8+ beers daily for many years, stopped 6/2022, but resumed 11/2022, stopped ~2/2023       Social History     Substance and Sexual Activity   Drug Use Never       ROS:   GENERAL: Denies fatigue  CARDIOVASCULAR: Denies edema  GI: Denies abdominal pain  SKIN: Denies rash, itching   NEURO: Denies confusion, memory loss, or mood changes    PHYSICAL EXAM:   Friendly White male, in no acute distress; alert and oriented to person, place and time  VITALS: There were no vitals taken for this visit.  EYES: Sclerae anicteric  GI: Soft, non-tender, non-distended. No ascites.  EXTREMITIES:  No edema.  SKIN: Warm and dry. No jaundice. No telangectasias noted. No palmar erythema.  NEURO:  No  asterixis.  PSYCH:  Thought and speech pattern appropriate. Behavior normal      EDUCATION:  See instructions discussed with patient in Instructions section of the After Visit Summary     ASSESSMENT & PLAN:  57 y.o. White male with:  1.  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 However, concern for cirrhosis given elevated bili and AST>ALT and PH on TJ biopsy, likely cirrhosis   -- MELD 3.0: 6 at 7/10/2023  8:06 AM  MELD-Na: 6 at 7/10/2023  8:06 AM  Calculated from:  Serum Creatinine: 1.02 mg/dL at 7/10/2023  8:06 AM  Serum Sodium: 140 mmol/L (Using max of 137 mmol/L) at 7/10/2023  8:06 AM  Total Bilirubin: 0.9 mg/dL (Using min of 1 mg/dL) at 7/10/2023  8:06 AM  Serum Albumin: 4.1 g/dL (Using max of 3.5 g/dL) at 7/10/2023  8:06 AM  INR(ratio): 1.0 at 7/10/2023  8:06 AM  Age at listing (hypothetical): 57 years  Sex: Male at 7/10/2023  8:06 AM    -- HCC screening: AFP and abd. U/S.. AFP WNL, MRI without liver lesion. US and AFP in 2/2024  -- Immunity to Hep A and B - . See HPI  -- EGD upcoming   --- Serological workup in HPI  -- Cirrhosis counseling as noted above and discussed with patient    2. Elevated ferritin, HH carrier  -- will repeat ferritin after abstinence     3. Alcohol use   -- recommend abstinence for life, pt aware    4. Portal HTN  -- mild PH on TJ biopsy portal pressure measurements        Follow up in about 6 months (around 2/25/2024). with labs and uS a few days before   Orders Placed This Encounter   Procedures    US Abdomen Limited    Ferritin    Iron and TIBC    AFP Tumor Marker    CBC Without Differential    Comprehensive Metabolic Panel    Phosphatidylethanol (PETH)    Protime-INR        Thank you for allowing me to participate in the care of Andrae Aviles    GABE Franco    I spent a total of 30 minutes on the day of the visit.This includes face to face time and non-face to face time preparing to see the patient (eg,  review of tests), obtaining and/or reviewing separately obtained history, documenting clinical information in the electronic or other health record, independently interpreting results and communicating results to the patient/family/caregiver, and coordinating care.         CC'ed note to:

## 2023-08-25 NOTE — PATIENT INSTRUCTIONS
This is a web site that you may find helpful about cirrhosis : [email or web address]    Because you have cirrhosis, it is important to attend clinic visits every 6 months with an Ultrasound and blood tests every 6 months to screen for liver cancer (you are at risk of developing liver cancer due to scar tissue in the liver)    Signs and symptoms of worsening liver disease include jaundice, fluid in the belly (ascites), and confusion/disorientation/slowed thought processes due to hepatic encephalopathy (toxins building up because of liver problems).   You should seek medical attention if any of these things occur.    Also, possible bleeding from esophageal varices (blood vessels in the stomach and foodpipe can burst and cause fatal bleeding).  Therefore, if you have symptoms of vomiting blood, blood in your stool, dark or black stools or vomiting coffee ground vomit, YOU SHOULD GO TO THE EMERGENCY ROOM IMMEDIATELY.     Cirrhosis can increase the risk of liver cancer, liver failure, and death. However, we will watch your liver function score (MELD score) closely with each clinic visit. A normal MELD score is 6, highest is 40. Your last one was an 6. We will check this with every clinic visit. A MELD 15 or higher is when we start to consider transplant because MELD 15 or higher indicates that the liver is not functioning as well     Cirrhosis Counseling  - NO alcohol use (includes beer, wine, and/or liquor)  - avoid non-steroidal anti-inflammatory drugs (NSAIDs) such as ibuprofen, Motrin, naprosyn, Alleve due to the risk of kidney damage  - can take acetaminophen (Tylenol), no more than 2000 mg per day  - low sodium (salt) 2 gram per day diet  - high protein diet: 120 grams per day to prevent muscle mass loss. Drink at least 1 protein shake daily (Premier Protein is best option because it is very high protein and low sugar). Ok to use this as nighttime snack to fit it in   - resistance exercises for muscle  strength  - avoid raw seafoods due to the risk of fatal Vibrio vulnificus infection  - ultrasound of the liver every 6 months for liver cancer screening (you are at risk of developing liver cancer due to scar tissue in the liver)  - Upper endoscopy every 1-2 years to screen for varices in the stomach and foodpipe which can burst and cause fatal bleeding

## 2023-09-26 ENCOUNTER — TELEPHONE (OUTPATIENT)
Dept: ENDOSCOPY | Facility: HOSPITAL | Age: 58
End: 2023-09-26
Payer: COMMERCIAL

## 2023-09-29 ENCOUNTER — ANESTHESIA EVENT (OUTPATIENT)
Dept: ENDOSCOPY | Facility: HOSPITAL | Age: 58
End: 2023-09-29
Payer: COMMERCIAL

## 2023-09-29 ENCOUNTER — HOSPITAL ENCOUNTER (OUTPATIENT)
Facility: HOSPITAL | Age: 58
Discharge: HOME | End: 2023-09-29
Attending: INTERNAL MEDICINE | Admitting: INTERNAL MEDICINE
Payer: COMMERCIAL

## 2023-09-29 ENCOUNTER — ANESTHESIA (OUTPATIENT)
Dept: ENDOSCOPY | Facility: HOSPITAL | Age: 58
End: 2023-09-29
Payer: COMMERCIAL

## 2023-09-29 VITALS
TEMPERATURE: 98 F | OXYGEN SATURATION: 95 % | HEIGHT: 70 IN | BODY MASS INDEX: 32.93 KG/M2 | HEART RATE: 54 BPM | WEIGHT: 230 LBS | DIASTOLIC BLOOD PRESSURE: 77 MMHG | SYSTOLIC BLOOD PRESSURE: 137 MMHG | RESPIRATION RATE: 12 BRPM

## 2023-09-29 DIAGNOSIS — Z87.19 HISTORY OF ESOPHAGEAL VARICES: ICD-10-CM

## 2023-09-29 DIAGNOSIS — K76.9 LIVER DISEASE: ICD-10-CM

## 2023-09-29 PROCEDURE — D9220A PRA ANESTHESIA: ICD-10-PCS | Mod: CRNA,,, | Performed by: NURSE ANESTHETIST, CERTIFIED REGISTERED

## 2023-09-29 PROCEDURE — 43239 EGD BIOPSY SINGLE/MULTIPLE: CPT | Mod: ,,, | Performed by: INTERNAL MEDICINE

## 2023-09-29 PROCEDURE — 37000009 HC ANESTHESIA EA ADD 15 MINS: Performed by: INTERNAL MEDICINE

## 2023-09-29 PROCEDURE — D9220A PRA ANESTHESIA: Mod: CRNA,,, | Performed by: NURSE ANESTHETIST, CERTIFIED REGISTERED

## 2023-09-29 PROCEDURE — 37000008 HC ANESTHESIA 1ST 15 MINUTES: Performed by: INTERNAL MEDICINE

## 2023-09-29 PROCEDURE — 43239 EGD BIOPSY SINGLE/MULTIPLE: CPT | Performed by: INTERNAL MEDICINE

## 2023-09-29 PROCEDURE — 43239 PR EGD, FLEX, W/BIOPSY, SGL/MULTI: ICD-10-PCS | Mod: ,,, | Performed by: INTERNAL MEDICINE

## 2023-09-29 PROCEDURE — 88305 TISSUE EXAM BY PATHOLOGIST: CPT | Mod: 26,,, | Performed by: STUDENT IN AN ORGANIZED HEALTH CARE EDUCATION/TRAINING PROGRAM

## 2023-09-29 PROCEDURE — 63600175 PHARM REV CODE 636 W HCPCS: Performed by: NURSE ANESTHETIST, CERTIFIED REGISTERED

## 2023-09-29 PROCEDURE — 25000003 PHARM REV CODE 250: Performed by: INTERNAL MEDICINE

## 2023-09-29 PROCEDURE — D9220A PRA ANESTHESIA: Mod: ANES,,, | Performed by: ANESTHESIOLOGY

## 2023-09-29 PROCEDURE — D9220A PRA ANESTHESIA: ICD-10-PCS | Mod: ANES,,, | Performed by: ANESTHESIOLOGY

## 2023-09-29 PROCEDURE — 88305 TISSUE EXAM BY PATHOLOGIST: CPT | Performed by: STUDENT IN AN ORGANIZED HEALTH CARE EDUCATION/TRAINING PROGRAM

## 2023-09-29 PROCEDURE — 25000003 PHARM REV CODE 250: Performed by: NURSE ANESTHETIST, CERTIFIED REGISTERED

## 2023-09-29 PROCEDURE — 27201012 HC FORCEPS, HOT/COLD, DISP: Performed by: INTERNAL MEDICINE

## 2023-09-29 PROCEDURE — 88305 TISSUE EXAM BY PATHOLOGIST: ICD-10-PCS | Mod: 26,,, | Performed by: STUDENT IN AN ORGANIZED HEALTH CARE EDUCATION/TRAINING PROGRAM

## 2023-09-29 RX ORDER — LIDOCAINE HYDROCHLORIDE 20 MG/ML
INJECTION INTRAVENOUS
Status: DISCONTINUED | OUTPATIENT
Start: 2023-09-29 | End: 2023-09-29

## 2023-09-29 RX ORDER — SODIUM CHLORIDE 0.9 % (FLUSH) 0.9 %
10 SYRINGE (ML) INJECTION
Status: ACTIVE | OUTPATIENT
Start: 2023-09-29

## 2023-09-29 RX ORDER — FENTANYL CITRATE 50 UG/ML
25 INJECTION, SOLUTION INTRAMUSCULAR; INTRAVENOUS EVERY 5 MIN PRN
Status: ACTIVE | OUTPATIENT
Start: 2023-09-29

## 2023-09-29 RX ORDER — PROPOFOL 10 MG/ML
VIAL (ML) INTRAVENOUS
Status: DISCONTINUED | OUTPATIENT
Start: 2023-09-29 | End: 2023-09-29

## 2023-09-29 RX ORDER — SODIUM CHLORIDE 9 MG/ML
INJECTION, SOLUTION INTRAVENOUS CONTINUOUS
Status: DISCONTINUED | OUTPATIENT
Start: 2023-09-29 | End: 2023-09-29 | Stop reason: HOSPADM

## 2023-09-29 RX ORDER — ONDANSETRON 2 MG/ML
4 INJECTION INTRAMUSCULAR; INTRAVENOUS DAILY PRN
Status: ACTIVE | OUTPATIENT
Start: 2023-09-29

## 2023-09-29 RX ORDER — PROPOFOL 10 MG/ML
VIAL (ML) INTRAVENOUS CONTINUOUS PRN
Status: DISCONTINUED | OUTPATIENT
Start: 2023-09-29 | End: 2023-09-29

## 2023-09-29 RX ORDER — PANTOPRAZOLE SODIUM 40 MG/1
40 TABLET, DELAYED RELEASE ORAL DAILY
Qty: 30 TABLET | Refills: 11 | Status: SHIPPED | OUTPATIENT
Start: 2023-09-29 | End: 2024-09-28

## 2023-09-29 RX ADMIN — LIDOCAINE HYDROCHLORIDE 100 MG: 20 INJECTION INTRAVENOUS at 02:09

## 2023-09-29 RX ADMIN — PROPOFOL 80 MG: 10 INJECTION, EMULSION INTRAVENOUS at 02:09

## 2023-09-29 RX ADMIN — SODIUM CHLORIDE: 0.9 INJECTION, SOLUTION INTRAVENOUS at 01:09

## 2023-09-29 RX ADMIN — PROPOFOL 200 MCG/KG/MIN: 10 INJECTION, EMULSION INTRAVENOUS at 02:09

## 2023-09-29 NOTE — TRANSFER OF CARE
"Anesthesia Transfer of Care Note    Patient: Andrae Aviles    Procedure(s) Performed: Procedure(s) (LRB):  EGD (ESOPHAGOGASTRODUODENOSCOPY) (N/A)    Patient location: [facility]    Anesthesia Type: general    Transport from OR: Transported from OR on room air with adequate spontaneous ventilation    Post pain: adequate analgesia    Post assessment: no apparent anesthetic complications and tolerated procedure well    Post vital signs: stable    Level of consciousness: awake, alert and oriented    Nausea/Vomiting: no nausea/vomiting    Complications: none    Transfer of care protocol was followed      Last vitals:   Visit Vitals  /66   Pulse 67   Temp 37.1 °C (98.7 °F) (Temporal)   Resp 14   Ht 5' 10" (1.778 m)   Wt 104.3 kg (230 lb)   SpO2 97%   BMI 33.00 kg/m²     "

## 2023-09-29 NOTE — ANESTHESIA PREPROCEDURE EVALUATION
[facility]  Anesthesia Pre-Operative Evaluation       Patient Name: Andrae Aviles  YOB: 1965  MRN: [id number]  [facility]: [id number]      Code Status: Prior   Date of Procedure: 9/29/2023  Anesthesia: Choice Procedure: Procedure(s) (LRB):  EGD (ESOPHAGOGASTRODUODENOSCOPY) (N/A)  Pre-Operative Diagnosis: Esophageal varices without bleeding, unspecified esophageal varices type [I85.00]  Proceduralist: Surgeon(s) and Role:     * Axel Green MD - Primary        SUBJECTIVE:   Andrae Aviles is a 57 y.o. 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 No notes on file                                                                                                         09/29/2023  Andrae Aviles is a 57 y.o., male.    7/14/23 PET Stress test  There are two significant perfusion abnormalities.    Perfusion Abnormality #1 - There is a small to moderate sized, mild to moderate intensity basal to distal inferior and inferoseptal reversible perfusion abnormality involving 11% of LV myocardium indicative of focal coronary stenosis.    Within the perfusion abnormality, absolute myocardial perfusion (cc/min/gm) averaged 0.34 cc/min/g at rest, 0.45 cc/min/g at stress and CFR was 1.34 , which equates to moderate to severely reduced coronary flow capacity within the RCA territory.    Perfusion Abnormality #2 - There is a small sized, mild intensity apical fixed perfusion abnormality involving 7% of LV myocardium consistent with a non-transmural scar.    There are no other significant perfusion abnormalities.    The whole heart absolute myocardial  perfusion values averaged 0.37 cc/min/g at rest, which is reduced; 0.71 cc/min/g at stress, which is severely reduced; and CFR is 1.90 , which is mildly reduced.    CT attenuation images demonstrate severe diffuse coronary calcifications in the LAD, modererate calcfication in theLCX and RCA territory and mild diffuse aortic calcifications in the descending aorta. A stent is present in the circumflex and LM arteries.    The gated perfusion images showed an ejection fraction of 57% at rest and 52% during stress. A normal ejection fraction is greater than 47%.    The wall motion is normal at rest and during stress.    The LV cavity size is normal at rest and mildly enlarged at stress.    The ECG portion of the study is negative for ischemia.    There were no arrhythmias during stress.    The patient reported no chest pain during the stress test.    When compared to the previous study from 8/16/2022, the large, reversible inferolateral defect is no longer present, and the inferior defect is still present but appears less severe. The small, fixed apical defect is new.    9/2022 Cath    The Mid Cx lesion was 95% stenosed with 0% stenosis post-intervention.    A stent was successfully placed.    The Prox RCA to Mid RCA lesion was 100% stenosed.    The estimated blood loss was <50 mL.    No current facility-administered medications for this encounter.       he has a current medication list which includes the following long-term medication(s): aspirin, atorvastatin, clopidogrel, colchicine (gout), folic acid, metoprolol succinate, nitroglycerin, sertraline, and valsartan.   ALLERGIES:   Review of patient's allergies indicates:  No Known Allergies  LDA:          Lines/Drains/Airways     None               MEDICATIONS:     Antibiotics (From admission, onward)    None        VTE Risk Mitigation (From admission, onward)    None        No current facility-administered medications for this encounter.          History:    There are no hospital problems to display for this patient.    Surgical History:    has a past surgical history that includes Vasectomy; Colonoscopy; Colonoscopy (N/A, 11/22/2021); Left heart catheterization (N/A, 06/20/2022); Coronary angiography (N/A, 07/01/2022); Left heart catheterization (Left, 09/15/2022); and Coronary angiography (Right, 09/15/2022).   Social History:    reports being sexually active and has had partner(s) who are female.  reports that he quit smoking about 7 years ago. His smoking use included cigarettes and vaping with nicotine. He started smoking about 37 years ago. He has a 30.7 pack-year smoking history. He has never used smokeless tobacco. He reports that he does not currently use alcohol. He reports that he does not use drugs.     OBJECTIVE:     Vital Signs (Most Recent):    Vital Signs Range (Last 24H):          There is no height or weight on file to calculate BMI.   Wt Readings from Last 4 Encounters:   07/14/23 104.3 kg (230 lb)   06/13/23 104.5 kg (230 lb 6.4 oz)   06/12/23 103.9 kg (229 lb 0.9 oz)   05/08/23 103.7 kg (228 lb 8.1 oz)     Significant Labs:  Lab Results   Component Value Date    WBC 6.25 07/10/2023    HGB 16.0 07/10/2023    HCT 47.5 07/10/2023     07/10/2023     07/10/2023    K 3.8 07/10/2023     07/10/2023    CREATININE 1.02 07/10/2023    BUN 8 07/10/2023    CO2 30 (H) 07/10/2023    GLU 85 07/10/2023    CALCIUM 9.3 07/10/2023    MG 2.0 07/02/2022    ALKPHOS 53 07/10/2023    ALT 34 07/10/2023    AST 45 07/10/2023    ALBUMIN 4.1 07/10/2023    INR 1.0 07/10/2023    APTT 26.5 06/26/2022    HGBA1C 5.9 (H) 06/20/2022    TROPONINI 0.332 (H) 06/26/2022     (H) 06/19/2022    NTPROBNP 976 (H) 06/19/2022     No LMP for male patient.  No results found for this or any previous visit (from the past 72 hour(s)).    EKG:   Results for orders placed or performed during the hospital encounter of 09/15/22   EKG 12-LEAD on arrival to floor    Collection  Time: 09/15/22 12:07 PM    Narrative    Test Reason : Z98.62,    Vent. Rate : 044 BPM     Atrial Rate : 044 BPM     P-R Int : 150 ms          QRS Dur : 084 ms      QT Int : 480 ms       P-R-T Axes : 026 037 039 degrees     QTc Int : 410 ms    Marked sinus bradycardia  Abnormal ECG  When compared with ECG of 15-SEP-2022 07:33,  No significant change was found  Confirmed by Cameron Villasenor MD ([id number]) on 9/19/2022 2:29:57 PM    Referred By: TREVOR ANGELA           Confirmed By:Cameron Villasenor MD       TTE:  Results for orders placed or performed during the hospital encounter of 08/16/22   Echo   Result Value Ref Range    BSA 2.15 m2    TDI SEPTAL 0.04 m/s    LV LATERAL E/E' RATIO 5.50 m/s    LV SEPTAL E/E' RATIO 11.00 m/s    LA WIDTH 3.82 cm    TDI LATERAL 0.08 m/s    LVIDd 6.09 (A) 3.5 - 6.0 cm    IVS 0.86 0.6 - 1.1 cm    Posterior Wall 1.12 (A) 0.6 - 1.1 cm    Ao root annulus 2.94 cm    LVIDs 4.31 (A) 2.1 - 4.0 cm    FS 29 28 - 44 %    LA volume 71.90 cm3    Sinus 2.50 cm    LV mass 249.94 g    LA size 4.41 cm    RVDD 3.17 cm    Left Ventricle Relative Wall Thickness 0.37 cm    AV mean gradient 5 mmHg    AV valve area 3.59 cm2    AV Velocity Ratio 0.83     AV index (prosthetic) 0.79     MV valve area p 1/2 method 3.28 cm2    MV valve area by continuity eq 4.08 cm2    E/A ratio 0.68     Mean e' 0.06 m/s    E wave deceleration time 230.99 msec    Pulm vein S/D ratio 1.24     LVOT diameter 2.40 cm    LVOT area 4.5 cm2    LVOT peak tom 1.15 m/s    LVOT peak VTI 17.98 cm    Ao peak tom 1.39 m/s    Ao VTI 22.63 cm    LVOT stroke volume 81.30 cm3    AV peak gradient 8 mmHg    MV peak gradient 3 mmHg    E/E' ratio 7.33 m/s    MV Peak E Tom 0.44 m/s    MV VTI 19.91 cm    MV stenosis pressure 1/2 time 66.99 ms    MV Peak A Tom 0.65 m/s    PV Peak S Tom 0.42 m/s    PV Peak D Tom 0.34 m/s    LV Systolic Volume 83.56 mL    LV Systolic Volume Index 39.6 mL/m2    LV Diastolic Volume 186.52 mL    LV Diastolic Volume Index 88.40  "mL/m2    LA Volume Index 34.1 mL/m2    LV Mass Index 118 g/m2    RA Major Axis 4.24 cm    Left Atrium Minor Axis 4.77 cm    Left Atrium Major Axis 5.30 cm    LA Volume Index (Mod) 25.8 mL/m2    LA volume (mod) 54.38 cm3    RA Width 3.99 cm    Right Atrial Pressure (from IVC) 3 mmHg    EF 55 %    Narrative    · The left ventricle is mildly enlarged with eccentric hypertrophy and   normal systolic function.  · The estimated ejection fraction is 55%.  · Indeterminate left ventricular diastolic function.  · Normal right ventricular size with normal right ventricular systolic   function.  · Normal central venous pressure (3 mmHg).  · There are segmental left ventricular wall motion abnormalities.        EF   Date Value Ref Range Status   08/16/2022 55 % Final   06/20/2022 30 % Final     Nuc Stress EF   Date Value Ref Range Status   07/14/2023 52 % Final   08/16/2022 54 % Final     Nuc Rest EF   Date Value Ref Range Status   07/14/2023 57  Final   08/16/2022 52  Final      No results found for this or any previous visit.  JOHN:  No results found for this or any previous visit.  Stress Test:  No results found for this or any previous visit.     LHC:  Results for orders placed during the hospital encounter of 09/15/22    Cardiac catheterization    Conclusion    The Mid Cx lesion was 95% stenosed with 0% stenosis post-intervention.    A stent was successfully placed.    The Prox RCA to Mid RCA lesion was 100% stenosed.    The estimated blood loss was <50 mL.    The procedure log was documented by Documenter: RT Keyon and verified by Phani Brown MD.    Date: 9/19/2022  Time: 1:42 PM     PFT:  No results found for: "FEV1", "FVC", "[id number]", "TLC", "DLCO"   ASSESSMENT/PLAN:       Pre-op Assessment    I have reviewed the Patient Summary Reports.     I have reviewed the Nursing Notes. I have reviewed the NPO Status.   I have reviewed the Medications.     Review of Systems  Anesthesia Hx:  Denies Family Hx of " Anesthesia complications.   Denies Personal Hx of Anesthesia complications.       Physical Exam  General: Well nourished    Airway:  Mallampati: II   Mouth Opening: Normal  TM Distance: Normal  Tongue: Normal  Neck ROM: Normal ROM    Dental:Any loose and/or missing teeth verified with patient   Chest/Lungs:  Clear to auscultation    Heart:  Rate: Normal  Rhythm: Regular Rhythm  Sounds: Normal    Abdomen:  Normal        Anesthesia Plan  Type of Anesthesia, risks & benefits discussed:    Anesthesia Type: Gen ETT, Gen Supraglottic Airway, Gen Natural Airway, MAC  Intra-op Monitoring Plan: Standard ASA Monitors  Post Op Pain Control Plan: multimodal analgesia and IV/PO Opioids PRN  Induction:  IV  Informed Consent: Informed consent signed with the Patient and all parties understand the risks and agree with anesthesia plan.  All questions answered.   ASA Score: 3    Ready For Surgery From Anesthesia Perspective.     .

## 2023-09-29 NOTE — PROVATION PATIENT INSTRUCTIONS
Discharge Summary/Instructions after an Endoscopic Procedure  Patient Name: Andrae Aviles  Patient MRN: [id number]  Patient YOB: 1965  Friday, September 29, 2023  Axel Green MD  Dear patient,  As a result of recent federal legislation (The Federal Cures Act), you may   receive lab or pathology results from your procedure in your [facility]   account before your physician is able to contact you. Your physician or   their representative will relay the results to you with their   recommendations at their soonest availability.  Thank you,  RESTRICTIONS:  During your procedure today, you received medications for sedation.  These   medications may affect your judgment, balance and coordination.  Therefore,   for 24 hours, you have the following restrictions:   - DO NOT drive a car, operate machinery, make legal/financial decisions,   sign important papers or drink alcohol.    ACTIVITY:  Today: no heavy lifting, straining or running due to procedural   sedation/anesthesia.  The following day: return to full activity including work.  DIET:  Eat and drink normally unless instructed otherwise.     TREATMENT FOR COMMON SIDE EFFECTS:  - Mild abdominal pain, nausea, belching, bloating or excessive gas:  rest,   eat lightly and use a heating pad.  - Sore Throat: treat with throat lozenges and/or gargle with warm salt   water.  - Because air was used during the procedure, expelling large amounts of air   from your rectum or belching is normal.  - If a bowel prep was taken, you may not have a bowel movement for 1-3 days.    This is normal.  SYMPTOMS TO WATCH FOR AND REPORT TO YOUR PHYSICIAN:  1. Abdominal pain or bloating, other than gas cramps.  2. Chest pain.  3. Back pain.  4. Signs of infection such as: chills or fever occurring within 24 hours   after the procedure.  5. Rectal bleeding, which would show as bright red, maroon, or black stools.   (A tablespoon of blood from the rectum is not serious,  especially if   hemorrhoids are present.)  6. Vomiting.  7. Weakness or dizziness.  GO DIRECTLY TO THE NEAREST EMERGENCY ROOM IF YOU HAVE ANY OF THE FOLLOWING:      Difficulty breathing              Chills and/or fever over 101 F   Persistent vomiting and/or vomiting blood   Severe abdominal pain   Severe chest pain   Black, tarry stools   Bleeding- more than one tablespoon   Any other symptom or condition that you feel may need urgent attention  Your doctor recommends these additional instructions:  If any biopsies were taken, your doctors clinic will contact you in 1 to 2   weeks with any results.  - Discharge patient to home.   - Follow an antireflux regimen indefinitely.   - Await pathology results.   - Telephone endoscopist for pathology results in 3 weeks.   - Use Protonix 40 mg once daily (or any other full strength proton pump   inhibitor) - best taken 45 minutes before your first protein containing   meal.   - Resume Plavix (clopidogrel) at prior dose tomorrow.   - Repeat upper endoscopy in 6 months to check healing.   - Return to nurse practitioner.   - The findings and recommendations were discussed with the patient.  For questions, problems or results please call your physician - Axel Green MD at Work:  [phone].  [facility], EMERGENCY ROOM PHONE NUMBER: [phone]  IF A COMPLICATION OR EMERGENCY SITUATION ARISES AND YOU ARE UNABLE TO REACH   YOUR PHYSICIAN - GO DIRECTLY TO THE EMERGENCY ROOM.  Axel Green MD  9/29/2023 2:48:35 PM  This report has been verified and signed electronically.  Dear patient,  As a result of recent federal legislation (The Federal Cures Act), you may   receive lab or pathology results from your procedure in your [facility]   account before your physician is able to contact you. Your physician or   their representative will relay the results to you with their   recommendations at their soonest availability.  Thank you,  PROVATION

## 2023-09-29 NOTE — H&P
Short Stay Endoscopy History and Physical    PCP - Edgar Aviles MD     Procedure - EGD  ASA - per anesthesia  Mallampati - per anesthesia  History of Anesthesia problems - no  Family history Anesthesia problems -  no   Plan of anesthesia - General    HPI:  This is a 57 y.o. male here for evaluation of : screen for varices.     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    Surgical History:  has a past surgical history that includes Vasectomy; Colonoscopy; Colonoscopy (N/A, 11/22/2021); Left heart catheterization (N/A, 06/20/2022); Coronary angiography (N/A, 07/01/2022); Left heart catheterization (Left, 09/15/2022); and Coronary angiography (Right, 09/15/2022).    Family History: family history includes No Known Problems in his daughter and son.. Otherwise no colon cancer, inflammatory bowel disease, or GI malignancies.    Social History:  reports that he quit smoking about 7 years ago. His smoking use included cigarettes and vaping with nicotine. He started smoking about 37 years ago. He has a 30.7 pack-year smoking history. He has never used smokeless tobacco. He reports that he does not currently use alcohol. He reports that he does not use drugs.    Review of patient's allergies indicates:  No Known Allergies    Medications:   Medications Prior to Admission   Medication Sig Dispense Refill Last Dose    aspirin 81 MG Chew Take 1 tablet (81 mg total) by mouth once daily. 90 tablet 3      atorvastatin (LIPITOR) 80 MG tablet Take 1 tablet (80 mg total) by mouth every evening. 90 tablet 3     clopidogreL (PLAVIX) 75 mg tablet Take 1 tablet (75 mg total) by mouth once daily. 90 tablet 11     colchicine (COLCRYS) 0.6 mg tablet Take 1 tablet (0.6 mg total) by mouth once daily. 30 tablet 2     folic acid (FOLVITE) 1 MG tablet Take 1 tablet (1,000 mcg total) by mouth once daily. 90 tablet 2     metoprolol succinate (TOPROL-XL) 25 MG 24 hr tablet Take 1 tablet (25 mg total) by mouth once daily. 90 tablet 11     nitroGLYCERIN (NITROSTAT) 0.4 MG SL tablet Place 1 tablet (0.4 mg total) under the tongue every 5 (five) minutes as needed for Chest pain. 30 tablet 3     sertraline (ZOLOFT) 50 MG tablet TAKE 1 TABLET BY MOUTH EVERY DAY IN THE EVENING 90 tablet 2     sulfaSALAzine (AZULFIDINE) 500 MG EC tablet Take 1 tablet (500 mg total) by mouth 2 (two) times daily. 60 tablet 11     valsartan (DIOVAN) 40 MG tablet Take 1 tablet (40 mg total) by mouth once daily. 90 tablet 3        Physical Exam:    Vital Signs: There were no vitals filed for this visit.    General Appearance: Well appearing in no acute distress  Eyes:    No scleral icterus  ENT: Neck supple, Lips, mucosa, and tongue normal; teeth and gums normal  Abdomen: Soft, non tender, non distended with normal bowel sounds. No hepatosplenomegaly, ascites, or mass.  Extremities: No edema  Skin: No rash    Labs:  Lab Results   Component Value Date    WBC 6.25 07/10/2023    HGB 16.0 07/10/2023    HCT 47.5 07/10/2023     07/10/2023    CHOL 160 06/20/2022    TRIG 116 06/20/2022    HDL 44 06/20/2022    ALT 34 07/10/2023    AST 45 07/10/2023     07/10/2023    K 3.8 07/10/2023     07/10/2023    CREATININE 1.02 07/10/2023    BUN 8 07/10/2023    CO2 30 (H) 07/10/2023    TSH 1.749 06/20/2022    INR 1.0 07/10/2023    HGBA1C 5.9 (H) 06/20/2022       I have explained the risks and benefits of endoscopy procedures to the patient including but not limited  to bleeding, perforation, infection, and death.  The patient was asked if they understand and allowed to ask any further questions to their satisfaction.      Priyanka Wagner, DO

## 2023-10-03 NOTE — ANESTHESIA POSTPROCEDURE EVALUATION
Anesthesia Post Evaluation    Patient: Andrae Aviles    Procedure(s) Performed: Procedure(s) (LRB):  EGD (ESOPHAGOGASTRODUODENOSCOPY) (N/A)    Final Anesthesia Type: general      Patient location during evaluation: [facility]  Patient participation: Yes- Able to Participate  Level of consciousness: awake and alert  Post-procedure vital signs: reviewed and stable  Pain management: adequate  Airway patency: patent    PONV status at discharge: No PONV  Anesthetic complications: no      Cardiovascular status: blood pressure returned to baseline  Respiratory status: unassisted  Hydration status: euvolemic  Follow-up not needed.          Vitals Value Taken Time   /77 09/29/23 1545   Temp 36.6 °C (97.9 °F) 09/29/23 1545   Pulse 54 09/29/23 1545   Resp 12 09/29/23 1545   SpO2 95 % 09/29/23 1545         No case tracking events are documented in the log.      Pain/Esteban Score: No data recorded

## 2023-10-04 LAB
FINAL PATHOLOGIC DIAGNOSIS: NORMAL
GROSS: NORMAL
Lab: NORMAL

## 2023-10-05 NOTE — PROGRESS NOTES
Sukhwinder your EGD pathology was benign no dysplasia no H pylori your EGD did show erosive esophagitis recommend you take your acid suppressive medicine get a repeat EGD in 6 months.    - Use Protonix 40 mg once daily (or any other full                          strength proton pump inhibitor) - best taken 45                          minutes before your first protein containing meal.                          - Resume Plavix (clopidogrel) at prior dose                          tomorrow.                          - Repeat upper endoscopy in 6 months to check                          healing.     Stomach, biopsy:       - Gastric oxyntic and antral mucosa without significant pathologic changes       - No intestinal metaplasia or dysplasia seen       - No Helicobacter pylori seen    Comment: Interp By Rosa Segal M.D., Signed on 10/04/2023

## 2023-10-09 DIAGNOSIS — I10 PRIMARY HYPERTENSION: ICD-10-CM

## 2023-10-09 RX ORDER — VALSARTAN 40 MG/1
40 TABLET ORAL DAILY
Qty: 90 TABLET | Refills: 3
Start: 2023-10-09 | End: 2023-10-17 | Stop reason: SDUPTHER

## 2023-10-13 DIAGNOSIS — M17.5 OTHER SECONDARY OSTEOARTHRITIS OF RIGHT KNEE: ICD-10-CM

## 2023-10-13 DIAGNOSIS — M25.461 EFFUSION OF RIGHT KNEE: ICD-10-CM

## 2023-10-16 RX ORDER — COLCHICINE 0.6 MG/1
0.6 TABLET ORAL
Qty: 90 TABLET | Refills: 3 | Status: SHIPPED | OUTPATIENT
Start: 2023-10-16

## 2023-10-17 DIAGNOSIS — I10 PRIMARY HYPERTENSION: ICD-10-CM

## 2023-10-18 RX ORDER — VALSARTAN 40 MG/1
40 TABLET ORAL DAILY
Qty: 90 TABLET | Refills: 3
Start: 2023-10-18 | End: 2023-10-20 | Stop reason: SDUPTHER

## 2023-10-20 ENCOUNTER — PATIENT MESSAGE (OUTPATIENT)
Dept: FAMILY MEDICINE | Facility: CLINIC | Age: 58
End: 2023-10-20
Payer: COMMERCIAL

## 2023-10-20 DIAGNOSIS — I10 PRIMARY HYPERTENSION: ICD-10-CM

## 2023-10-20 RX ORDER — VALSARTAN 40 MG/1
40 TABLET ORAL DAILY
Qty: 90 TABLET | Refills: 3
Start: 2023-10-20 | End: 2023-10-27 | Stop reason: SDUPTHER

## 2023-10-27 ENCOUNTER — PATIENT MESSAGE (OUTPATIENT)
Dept: FAMILY MEDICINE | Facility: CLINIC | Age: 58
End: 2023-10-27
Payer: COMMERCIAL

## 2023-10-27 DIAGNOSIS — I10 PRIMARY HYPERTENSION: ICD-10-CM

## 2023-10-28 RX ORDER — VALSARTAN 40 MG/1
40 TABLET ORAL DAILY
Qty: 90 TABLET | Refills: 3
Start: 2023-10-28 | End: 2024-10-27

## 2023-10-29 ENCOUNTER — PATIENT MESSAGE (OUTPATIENT)
Dept: ORTHOPEDICS | Facility: CLINIC | Age: 58
End: 2023-10-29
Payer: COMMERCIAL

## 2023-12-01 ENCOUNTER — OFFICE VISIT (OUTPATIENT)
Dept: ORTHOPEDICS | Facility: CLINIC | Age: 58
End: 2023-12-01
Payer: COMMERCIAL

## 2023-12-01 VITALS — WEIGHT: 230 LBS | BODY MASS INDEX: 32.93 KG/M2 | HEIGHT: 70 IN

## 2023-12-01 DIAGNOSIS — M17.5 OTHER SECONDARY OSTEOARTHRITIS OF RIGHT KNEE: Primary | ICD-10-CM

## 2023-12-01 PROCEDURE — 1159F MED LIST DOCD IN RCRD: CPT | Mod: CPTII,S$GLB,, | Performed by: ORTHOPAEDIC SURGERY

## 2023-12-01 PROCEDURE — 4010F ACE/ARB THERAPY RXD/TAKEN: CPT | Mod: CPTII,S$GLB,, | Performed by: ORTHOPAEDIC SURGERY

## 2023-12-01 PROCEDURE — 1160F PR REVIEW ALL MEDS BY PRESCRIBER/CLIN PHARMACIST DOCUMENTED: ICD-10-PCS | Mod: CPTII,S$GLB,, | Performed by: ORTHOPAEDIC SURGERY

## 2023-12-01 PROCEDURE — 3008F BODY MASS INDEX DOCD: CPT | Mod: CPTII,S$GLB,, | Performed by: ORTHOPAEDIC SURGERY

## 2023-12-01 PROCEDURE — 1159F PR MEDICATION LIST DOCUMENTED IN MEDICAL RECORD: ICD-10-PCS | Mod: CPTII,S$GLB,, | Performed by: ORTHOPAEDIC SURGERY

## 2023-12-01 PROCEDURE — 1160F RVW MEDS BY RX/DR IN RCRD: CPT | Mod: CPTII,S$GLB,, | Performed by: ORTHOPAEDIC SURGERY

## 2023-12-01 PROCEDURE — 4010F PR ACE/ARB THEARPY RXD/TAKEN: ICD-10-PCS | Mod: CPTII,S$GLB,, | Performed by: ORTHOPAEDIC SURGERY

## 2023-12-01 PROCEDURE — 99999 PR PBB SHADOW E&M-EST. PATIENT-LVL III: CPT | Mod: PBBFAC,,, | Performed by: ORTHOPAEDIC SURGERY

## 2023-12-01 PROCEDURE — 3008F PR BODY MASS INDEX (BMI) DOCUMENTED: ICD-10-PCS | Mod: CPTII,S$GLB,, | Performed by: ORTHOPAEDIC SURGERY

## 2023-12-01 PROCEDURE — 99999 PR PBB SHADOW E&M-EST. PATIENT-LVL III: ICD-10-PCS | Mod: PBBFAC,,, | Performed by: ORTHOPAEDIC SURGERY

## 2023-12-01 PROCEDURE — 99214 PR OFFICE/OUTPT VISIT, EST, LEVL IV, 30-39 MIN: ICD-10-PCS | Mod: S$GLB,,, | Performed by: ORTHOPAEDIC SURGERY

## 2023-12-01 PROCEDURE — 99214 OFFICE O/P EST MOD 30 MIN: CPT | Mod: S$GLB,,, | Performed by: ORTHOPAEDIC SURGERY

## 2023-12-01 NOTE — PROGRESS NOTES
Subjective:      Patient ID: Andrae Aviles is a 58 y.o. male.    Chief Complaint: Surgical Consult of the Right Knee    HPI    Follow-up for osteoarthritis.  The patient reports that his right knee is getting worse.  He is pain and giving way.  Has difficulty performing some of his job functions.  He has been treated with injections and gout medication without adequate control of his symptoms.  The patient reports that his cardiologist has informed him that it is safe for him to proceed with knee replacement surgery          Review of Systems   Constitutional: Negative for fever and weight loss.   HENT:  Negative for congestion.    Eyes:  Negative for visual disturbance.   Cardiovascular:  Negative for chest pain.   Respiratory:  Negative for shortness of breath.    Hematologic/Lymphatic: Negative for bleeding problem. Does not bruise/bleed easily.   Skin:  Negative for poor wound healing.   Musculoskeletal:  Positive for joint pain and joint swelling.   Gastrointestinal:  Negative for abdominal pain.   Genitourinary:  Negative for dysuria.   Neurological:  Negative for seizures.   Psychiatric/Behavioral:  Negative for altered mental status.    Allergic/Immunologic: Negative for persistent infections.         Objective:      Ortho/SPM Exam      The patient is not in acute distress.   Sclerae normal  Body habitus is normal.  Respiratory distress:  none   The patient walks with a limp.  Hip irritability  negative.   The skin over the knee is intact.  Knee effusion 1+  Palpation- no focal tenderness  Range of motion- 0-110  Ligament laxity exam:  2+ valgus laxity   Patellar apprehension negative.  Popliteal cyst negative  Patellar crepitation absent.  Meniscal irritability not applicable  Pulses DP present, PT present.  Motor normal 5/5 strength in all tested muscle groups.   Sensory normal      Previous radiographs show Kellgren stage IV osteoarthritis the right knee        Assessment:       Encounter Diagnosis    Name Primary?    Other secondary osteoarthritis of right knee Yes        The condition is structurally very advanced.  The patient has significant pain and functional impairment that is not controlled nonsurgically.  The only intervention likely to be meaningfully beneficial as arthroplasty          Plan:       Andrae was seen today for surgical consult.    Diagnoses and all orders for this visit:    Other secondary osteoarthritis of right knee          I explained my diagnostic impression and the reasoning behind it in detail, using layman's terms.  Models and/or pictures were used to help in the explanation.    Treatment options were discussed. The surgical process of right knee replacement was discussed in detail with the patient including a detailed discussion of the procedure itself (including visual model, x-ray review, and literature review).  I explained that patellar resurfacing is an optional part of the procedure, and that I would make an intraoperative decision as to whether or not it is necessary.  The typical perioperative and post-operative course was discussed and perioperative risks were discussed to the patient's satisfaction.  Risks and complications discussed included but were not limited to the risks of anesthetic complications, infection, bleeding, wound healing complications, stiffness, aseptic loosening, instability, limb length inequality, neurologic dysfunction including numbness and weakness, additional surgery,  DVT, pulmonary embolism, perioperative medical risks (cardiac, pulmonary, renal, neurologic), and death and the patient elects to proceed.        Please obtain new radiographs at the preop visit

## 2023-12-03 NOTE — TELEPHONE ENCOUNTER
No care due was identified.  [facility] Embedded Care Due Messages. Reference number: [id number].   12/02/2023 8:32:37 PM CST

## 2023-12-04 RX ORDER — SERTRALINE HYDROCHLORIDE 50 MG/1
TABLET, FILM COATED ORAL
Qty: 90 TABLET | Refills: 1 | Status: SHIPPED | OUTPATIENT
Start: 2023-12-04

## 2023-12-04 NOTE — TELEPHONE ENCOUNTER
Refill Routing Note   Medication(s) are not appropriate for processing by [facility] for the following reason(s):        Drug-disease interaction: sertraline and Liver lesion; Other cirrhosis of liver     [facility] action(s):  Defer               Appointments  past 12m or future 3m with PCP    Date Provider   Last Visit   5/8/2023 Edgar Aviles MD   Next Visit   5/13/2024 Edgar Aviles MD   ED visits in past 90 days: 0        Note composed:12:13 AM 12/04/2023

## 2023-12-06 ENCOUNTER — TELEPHONE (OUTPATIENT)
Dept: ORTHOPEDICS | Facility: CLINIC | Age: 58
End: 2023-12-06
Payer: COMMERCIAL

## 2023-12-06 DIAGNOSIS — Z96.651 S/P TOTAL KNEE ARTHROPLASTY, RIGHT: Primary | ICD-10-CM

## 2023-12-10 NOTE — HOSPITAL COURSE
Assessment: Sage Flores presents with functional mobility impairments which indicate the need for skilled intervention. Tolerating session today without incident. Pt still having difficulty with blurry vision and overall weakness. Was able to stand at EOB before incr c/o blurriness and fatigue. Plans on rehab at [facility]. Possible [facility] placement in future.Will continue to follow and progress as tolerated.    Admitted from OSH with chest pain found to have NSTEMI with peak troponin of 18; now 7.6  Started on heparin gtt and Plavix  Cardiology consulted who took the patient for C with findings of multi vessel CAD with need for CABG  Right dominant  LM: 80-90% ostial and mid LM disease  LAD: Moderate early mid LAD lesion. Mild-mod distal LAD stenosis  LCx: 90% mid LCx stenosis. 80% stenosis OM2 at the point of bifurcation. Samll caliber vessels.  RCA: Proximal RCA disease. Mid RCA  with L-R collaterals  LVEDP 29  Cards recommended [facility] transfer; he was accepted to [facility]  Awaiting bed at [facility]    6/23: No chest pain. Awaiting transfer to [facility]. Had one week cough with mucoid sputum with reports of worsening cough yesterday evening. He experienced acute LUQ abdominal pain with CTAP showing spontaneous left rectus abdominal wall hematoma likely due to forceful cough and heparin gtt. Heparin gtt continues. Hgb stable with serial monitoring ongoing. No evidence to suggest expansion of hematoma or acute bleeding. Awaiting abdominal US for comparison of size. Remains on serial Hgb. He is medically clear for discharge to [facility] when bed assigned.   6/24: He remains chest pain free. He has been assigned a bed at [facility]. Heparin gtt remains off due to expanding left abdominal wall rectus sheath hematoma. He is hemodynamically stable and will discharge to [facility] today for anticipated urgent CABG per cardiology recommendations.

## 2023-12-11 DIAGNOSIS — M17.5 OTHER SECONDARY OSTEOARTHRITIS OF RIGHT KNEE: Primary | ICD-10-CM

## 2023-12-11 RX ORDER — MUPIROCIN 20 MG/G
OINTMENT TOPICAL
Status: CANCELLED | OUTPATIENT
Start: 2023-12-11

## 2023-12-11 RX ORDER — SODIUM CHLORIDE 0.9 % (FLUSH) 0.9 %
3 SYRINGE (ML) INJECTION EVERY 8 HOURS
Status: CANCELLED | OUTPATIENT
Start: 2023-12-11

## 2023-12-11 RX ORDER — CEFAZOLIN SODIUM 2 G/50ML
2 SOLUTION INTRAVENOUS
Status: CANCELLED | OUTPATIENT
Start: 2023-12-11

## 2023-12-11 RX ORDER — ACETAMINOPHEN 500 MG
1000 TABLET ORAL
Status: CANCELLED | OUTPATIENT
Start: 2023-12-11 | End: 2023-12-11

## 2023-12-11 RX ORDER — NAPROXEN 500 MG/1
500 TABLET ORAL
Status: CANCELLED | OUTPATIENT
Start: 2023-12-11 | End: 2023-12-11

## 2023-12-11 RX ORDER — PREGABALIN 75 MG/1
150 CAPSULE ORAL
Status: CANCELLED | OUTPATIENT
Start: 2023-12-11 | End: 2023-12-11

## 2023-12-12 ENCOUNTER — TELEPHONE (OUTPATIENT)
Dept: PREADMISSION TESTING | Facility: HOSPITAL | Age: 58
End: 2023-12-12
Payer: COMMERCIAL

## 2023-12-12 DIAGNOSIS — Z01.818 PRE-OP EVALUATION: Primary | ICD-10-CM

## 2023-12-12 DIAGNOSIS — I10 PRIMARY HYPERTENSION: ICD-10-CM

## 2024-01-31 ENCOUNTER — TELEPHONE (OUTPATIENT)
Dept: ORTHOPEDICS | Facility: CLINIC | Age: 59
End: 2024-01-31
Payer: COMMERCIAL

## 2024-01-31 NOTE — TELEPHONE ENCOUNTER
----- Message from Yahaira Fortune sent at 1/31/2024  9:02 AM CST -----  Type:  Needs Medical Advice    Who Called:  Pt   Would the patient rather a call back or a response via [facility]? Callback   Best Call Back Number: [phone]  Additional Information:  Pt is requesting a fax number from this provider office

## 2024-02-14 ENCOUNTER — PATIENT MESSAGE (OUTPATIENT)
Dept: ANESTHESIOLOGY | Facility: HOSPITAL | Age: 59
End: 2024-02-14
Payer: COMMERCIAL

## 2024-02-14 ENCOUNTER — CLINICAL SUPPORT (OUTPATIENT)
Dept: LAB | Facility: HOSPITAL | Age: 59
End: 2024-02-14
Attending: NURSE PRACTITIONER
Payer: COMMERCIAL

## 2024-02-14 ENCOUNTER — HOSPITAL ENCOUNTER (OUTPATIENT)
Dept: PREADMISSION TESTING | Facility: HOSPITAL | Age: 59
Discharge: HOME | End: 2024-02-14
Attending: NURSE PRACTITIONER
Payer: COMMERCIAL

## 2024-02-14 ENCOUNTER — OFFICE VISIT (OUTPATIENT)
Dept: ORTHOPEDICS | Facility: CLINIC | Age: 59
End: 2024-02-14
Payer: COMMERCIAL

## 2024-02-14 ENCOUNTER — HOSPITAL ENCOUNTER (OUTPATIENT)
Dept: RADIOLOGY | Facility: HOSPITAL | Age: 59
Discharge: HOME | End: 2024-02-14
Attending: PHYSICIAN ASSISTANT
Payer: COMMERCIAL

## 2024-02-14 VITALS — DIASTOLIC BLOOD PRESSURE: 86 MMHG | HEART RATE: 82 BPM | SYSTOLIC BLOOD PRESSURE: 124 MMHG

## 2024-02-14 VITALS
HEART RATE: 78 BPM | SYSTOLIC BLOOD PRESSURE: 138 MMHG | BODY MASS INDEX: 33.52 KG/M2 | DIASTOLIC BLOOD PRESSURE: 96 MMHG | OXYGEN SATURATION: 95 % | WEIGHT: 234.13 LBS | HEIGHT: 70 IN

## 2024-02-14 DIAGNOSIS — Z41.9 ELECTIVE SURGERY: ICD-10-CM

## 2024-02-14 DIAGNOSIS — Z01.818 PREOPERATIVE EXAMINATION: Primary | ICD-10-CM

## 2024-02-14 DIAGNOSIS — Z01.818 PREOPERATIVE EXAMINATION: ICD-10-CM

## 2024-02-14 DIAGNOSIS — M17.5 OTHER SECONDARY OSTEOARTHRITIS OF RIGHT KNEE: ICD-10-CM

## 2024-02-14 DIAGNOSIS — M17.5 OTHER SECONDARY OSTEOARTHRITIS OF RIGHT KNEE: Primary | ICD-10-CM

## 2024-02-14 PROBLEM — M17.12 UNILATERAL PRIMARY OSTEOARTHRITIS, LEFT KNEE: Status: ACTIVE | Noted: 2023-05-23

## 2024-02-14 PROBLEM — M25.50 PAIN IN UNSPECIFIED JOINT: Status: ACTIVE | Noted: 2023-05-23

## 2024-02-14 PROBLEM — K64.9 UNSPECIFIED HEMORRHOIDS: Status: ACTIVE | Noted: 2023-05-23

## 2024-02-14 PROBLEM — E78.00 PURE HYPERCHOLESTEROLEMIA, UNSPECIFIED: Status: ACTIVE | Noted: 2023-05-23

## 2024-02-14 PROBLEM — F43.22 ADJUSTMENT DISORDER WITH ANXIETY: Status: ACTIVE | Noted: 2023-05-23

## 2024-02-14 LAB
OHS QRS DURATION: 92 MS
OHS QTC CALCULATION: 362 MS

## 2024-02-14 PROCEDURE — 1160F RVW MEDS BY RX/DR IN RCRD: CPT | Mod: CPTII,S$GLB,, | Performed by: PHYSICIAN ASSISTANT

## 2024-02-14 PROCEDURE — 73562 X-RAY EXAM OF KNEE 3: CPT | Mod: 26,RT,, | Performed by: RADIOLOGY

## 2024-02-14 PROCEDURE — 93005 ELECTROCARDIOGRAM TRACING: CPT

## 2024-02-14 PROCEDURE — 1159F MED LIST DOCD IN RCRD: CPT | Mod: CPTII,S$GLB,, | Performed by: PHYSICIAN ASSISTANT

## 2024-02-14 PROCEDURE — 73560 X-RAY EXAM OF KNEE 1 OR 2: CPT | Mod: TC,FY,LT

## 2024-02-14 PROCEDURE — 99213 OFFICE O/P EST LOW 20 MIN: CPT | Mod: S$GLB,,, | Performed by: PHYSICIAN ASSISTANT

## 2024-02-14 PROCEDURE — 3074F SYST BP LT 130 MM HG: CPT | Mod: CPTII,S$GLB,, | Performed by: PHYSICIAN ASSISTANT

## 2024-02-14 PROCEDURE — 73562 X-RAY EXAM OF KNEE 3: CPT | Mod: TC,FY,RT

## 2024-02-14 PROCEDURE — 4010F ACE/ARB THERAPY RXD/TAKEN: CPT | Mod: CPTII,S$GLB,, | Performed by: PHYSICIAN ASSISTANT

## 2024-02-14 PROCEDURE — 93010 ELECTROCARDIOGRAM REPORT: CPT | Mod: ,,, | Performed by: INTERNAL MEDICINE

## 2024-02-14 PROCEDURE — 3079F DIAST BP 80-89 MM HG: CPT | Mod: CPTII,S$GLB,, | Performed by: PHYSICIAN ASSISTANT

## 2024-02-14 PROCEDURE — 99999 PR PBB SHADOW E&M-EST. PATIENT-LVL IV: CPT | Mod: PBBFAC,,, | Performed by: PHYSICIAN ASSISTANT

## 2024-02-14 PROCEDURE — 99499 UNLISTED E&M SERVICE: CPT | Mod: 95,,, | Performed by: PHYSICIAN ASSISTANT

## 2024-02-14 RX ORDER — SODIUM CHLORIDE, SODIUM LACTATE, POTASSIUM CHLORIDE, CALCIUM CHLORIDE 600; 310; 30; 20 MG/100ML; MG/100ML; MG/100ML; MG/100ML
INJECTION, SOLUTION INTRAVENOUS CONTINUOUS
Status: CANCELLED | OUTPATIENT
Start: 2024-02-14

## 2024-02-14 RX ORDER — LIDOCAINE HYDROCHLORIDE 10 MG/ML
1 INJECTION, SOLUTION EPIDURAL; INFILTRATION; INTRACAUDAL; PERINEURAL ONCE
Status: CANCELLED | OUTPATIENT
Start: 2024-02-14 | End: 2024-02-14

## 2024-02-14 NOTE — PROGRESS NOTES
Subjective:       Patient ID: Andrae Aviles is a 58 y.o. male.    Chief Complaint: Pre-op Exam (Rt TKA sx 02/28)    Andrae Aviles is a 58 y.o. male with PMHx significant for:  Anxiety  HTN  CAD (Hx of stent)  Systolic + Diastolic HF  NSTEMI  HLD  Rheumatoid arthritis  Liver Lesion  Cirrhosis  Carrier of hemochromatosis HFE gene mutation    He is here today for a patient optimization in preparation for a Right total knee arthroplasty to be performed by  Dr. Kwon on 2-.  Andrae Aviles has a significant history of Right knee pain.  Pain is worse with activity and weight bearing. Patient has experienced interference of ADLs due to increased pain and decreased range of motion. Patient has failed non-operative treatment including NSAIDs, activity modification, and corticosteroid injections. Andrae Aviles ambulates independently.  There has been no significant change in medical status since last visit.    Past Medical History:   Diagnosis Date    Acute combined systolic and diastolic heart failure 6/21/2022    Arthritis     rheumatoid    CAD (coronary artery disease)     Carrier of hemochromatosis HFE gene mutation 1/13/2023    Hypertension     NSTEMI (non-ST elevated myocardial infarction) 6/19/2022    Other cirrhosis of liver 1/13/2023    Primary hypertension 10/11/2021    Rheumatoid arthritis 10/11/2021     Past Surgical History:   Procedure Laterality Date    COLONOSCOPY      age 42    COLONOSCOPY N/A 11/22/2021    Procedure: COLONOSCOPY  Golytely   Rapid Covid;  Surgeon: Shane Guzman MD;  Location: [facility] ENDO;  Service: Endoscopy;  Laterality: N/A;    CORONARY ANGIOGRAPHY N/A 07/01/2022    Procedure: Angiogram, Coronary, with Left Heart Cath;  Surgeon: Miguelito Barney MD;  Location: [facility] CATH LAB;  Service: Cardiology;  Laterality: N/A;    CORONARY ANGIOGRAPHY Right 09/15/2022    Procedure: ANGIOGRAM, CORONARY ARTERY;  Surgeon: Phani Brown MD;  Location: [facility] CATH LAB/EP;  Service:  Cardiology;  Laterality: Right;    ESOPHAGOGASTRODUODENOSCOPY N/A 2023    Procedure: EGD (ESOPHAGOGASTRODUODENOSCOPY);  Surgeon: Axel Green MD;  Location: [facility] COOKIE ([address] [place] [address]);  Service: Endoscopy;  Laterality: N/A;  inst to portal  labs done on 7/10  ok to hold Plavix 5 days per Dr Campa-GT  23 no answer for precall left message  -LVM for precall-Kpvt  -precall complete,pt notified of floor change-KPvt    LEFT HEART CATHETERIZATION N/A 2022    Procedure: Left heart cath;  Surgeon: Marlen Thompson MD;  Location: [facility] CATH LAB/EP;  Service: Cardiology;  Laterality: N/A;    LEFT HEART CATHETERIZATION Left 09/15/2022    Procedure: Left heart cath;  Surgeon: Phani Brown MD;  Location: [facility] CATH LAB/EP;  Service: Cardiology;  Laterality: Left;    VASECTOMY       Family History   Problem Relation Age of Onset    No Known Problems Daughter     No Known Problems Son      Social History     Socioeconomic History    Marital status:     Number of children: 2   Tobacco Use    Smoking status: Former     Current packs/day: 0.00     Average packs/day: 1 pack/day for 30.7 years (30.7 ttl pk-yrs)     Types: Cigarettes, Vaping with nicotine     Start date: 10/14/1985     Quit date: 6/15/2016     Years since quittin.6    Smokeless tobacco: Never   Substance and Sexual Activity    Alcohol use: Not Currently     Comment: previousl;y 8+ beers daily for many years, stopped 2022, but resumed 2022, stopped ~2023    Drug use: Never    Sexual activity: Yes     Partners: Female       Current Outpatient Medications   Medication Sig Dispense Refill    atorvastatin (LIPITOR) 80 MG tablet Take 1 tablet (80 mg total) by mouth every evening. 90 tablet 3    colchicine, gout, (COLCRYS) 0.6 mg tablet TAKE 1 TABLET BY MOUTH ONCE DAILY. 90 tablet 3    folic acid (FOLVITE) 1 MG tablet Take 1 tablet (1,000 mcg total) by mouth once daily. 90 tablet 2    pantoprazole (PROTONIX) 40 MG tablet Take 1  tablet (40 mg total) by mouth once daily. Take 45-60 minutes prior to your first protein meal of the day 30 tablet 11    sertraline (ZOLOFT) 50 MG tablet TAKE 1 TABLET BY MOUTH EVERY DAY IN THE EVENING 90 tablet 1    valsartan (DIOVAN) 40 MG tablet Take 1 tablet (40 mg total) by mouth once daily. 90 tablet 3    aspirin 81 MG Chew Take 1 tablet (81 mg total) by mouth once daily. 90 tablet 3    clopidogreL (PLAVIX) 75 mg tablet Take 1 tablet (75 mg total) by mouth once daily. 90 tablet 11    metoprolol succinate (TOPROL-XL) 25 MG 24 hr tablet Take 1 tablet (25 mg total) by mouth once daily. 90 tablet 11    nitroGLYCERIN (NITROSTAT) 0.4 MG SL tablet Place 1 tablet (0.4 mg total) under the tongue every 5 (five) minutes as needed for Chest pain. 30 tablet 3    sulfaSALAzine (AZULFIDINE) 500 MG EC tablet Take 1 tablet (500 mg total) by mouth 2 (two) times daily. (Patient not taking: Reported on 2/14/2024) 60 tablet 11     No current facility-administered medications for this visit.     Facility-Administered Medications Ordered in Other Visits   Medication Dose Route Frequency Provider Last Rate Last Admin    fentaNYL 50 mcg/mL injection 25 mcg  25 mcg Intravenous Q5 Min PRN Fareed Mendez MD        ondansetron injection 4 mg  4 mg Intravenous Daily PRN Fareed Mendez MD        sodium chloride 0.9% flush 10 mL  10 mL Intravenous PRN Fareed Mendez MD         Review of patient's allergies indicates:  No Known Allergies    Review of Systems   Constitutional:  Negative for chills and fever.   Respiratory:  Negative for shortness of breath.    Cardiovascular:  Negative for chest pain and leg swelling.   Gastrointestinal:  Negative for nausea and vomiting.   Genitourinary:  Negative for dysuria.   Musculoskeletal:  Positive for joint pain and myalgias. Negative for falls.   Skin:  Negative for rash.   Neurological:  Positive for focal weakness. Negative for dizziness and sensory change.       Objective:      Vitals:     02/14/24 0932   BP: 124/86   BP Location: Right arm   Patient Position: Sitting   BP Method: Medium (Automatic)   Pulse: 82     Physical Exam  The patient is not in acute distress.   Sclerae normal  Body habitus is normal.  Respiratory distress:  none   The patient walks with a limp.  Hip irritability  negative.     RIGHT KNEE  The skin over the knee is intact.  Knee effusion 1+  Palpation- no focal tenderness  Range of motion- 0-110  Ligament laxity exam:  2+ valgus laxity   Patellar apprehension negative.  Popliteal cyst negative  Patellar crepitation absent.  Meniscal irritability not applicable  Pulses DP present, PT present.  Motor normal 5/5 strength in all tested muscle groups.   Sensory normal     Lab Review:   No labs in past 6 months    Imaging:   Dr. Kwon previous imaging read: Previous radiographs show Kellgren stage IV osteoarthritis the right knee     Assessment:       1. Preoperative examination    2. Other secondary osteoarthritis of right knee    3. Elective surgery        Plan:   During today's Patient Optimization Visit all consultant notes, medications, imaging, EKG, and lab work has been reviewed. Discharge planning was discussed and Home Health has been ordered. Any additional testing or consults needed for medical clearance has been ordered. Pre, kianna, and post operative procedures and expectations discussed. All questions were answered.   The patient will contact us if there are any questions, concerns, or changes in medical status prior to surgery.    30 minutes were spent on this encounter. This includes face to face time and non-face to face time preparing to see the patient (eg, review of tests), obtaining and/or reviewing separately obtained history, documenting clinical information in the electronic or other health record, independently interpreting results and communicating results to the patient/family/caregiver, or care coordinator.     Walker Justification: The mobility limitation  "cannot be sufficiently resolved by the use of a cane.   Patient's functional mobility deficit can be sufficiently resolved with the use of a (Rolling Walker or Walker).  Patient's mobility limitation significantly impairs their ability to participate in one of more activities of daily living.  The use of a (RW or Walker) will significantly improve the patient's ability to participate in MRADLS and the patient will use it on regular basis in the home.     Orders Placed This Encounter   Procedures    WALKER FOR HOME USE     Walker required for ambulation assistance status-post lower extremity joint replacement     Order Specific Question:   Type of Walker:     Answer:   Adult (5'4"-6'6")     Order Specific Question:   With wheels?     Answer:   Yes     Order Specific Question:   Height:     Answer:   5'10"     Order Specific Question:   Weight:     Answer:   104 kg     Order Specific Question:   Length of need (1-99 months):     Answer:   99     Order Specific Question:   Please check all that apply:     Answer:   Patient's condition impairs ambulation.     Order Specific Question:   Please check all that apply:     Answer:   Patient is unable to safely ambulate without equipment.    CBC Auto Differential     Standing Status:   Future     Standing Expiration Date:   4/14/2025    Comprehensive Metabolic Panel     Pre-Op Lab     Standing Status:   Future     Standing Expiration Date:   2/14/2025    Hemoglobin A1C     Standing Status:   Future     Standing Expiration Date:   4/14/2025    Ambulatory referral/consult to Home Health     Standing Status:   Future     Standing Expiration Date:   3/14/2025     Referral Priority:   Routine     Referral Type:   Home Health     Referral Reason:   Specialty Services Required     Requested Specialty:   Home Health Services     Number of Visits Requested:   1    EKG 12-lead     Standing Status:   Future     Standing Expiration Date:   2/14/2025     Scheduling Instructions:      " Pre-Operative testing       Pt no longer on Plavix.     We will reach out to Cardiology for a letter of clearance.  Last appointment with Cardiology was 06/12/2023.    Joint camp and preadmit appointment scheduled for later today    Patient to obtain updated knee x-ray following joint camp today    Labs and EKG to be obtained downstairs today    Follow-up: 2 weeks postop

## 2024-02-14 NOTE — PROGRESS NOTES
Subjective:       Patient ID: Andrae Aviles is a 58 y.o. male.    Chief Complaint: No chief complaint on file.      Andrae Aviles is a 58 y.o. male with PMHx significant for:  HTN  CAD  HLD  Rheumatoid arthritis  Cirrhosis  Hemochromatosis HFE gene mutation    He is here today for a patient optimization in preparation for a Right total knee arthroplasty to be performed by  Dr. Kwon on 2-.  Andrae Aviles has a significant history of Right knee pain.  Pain is worse with activity and weight bearing. Patient has experienced interference of ADLs due to increased pain and decreased range of motion. Patient has failed non-operative treatment including NSAIDs, activity modification, and corticosteroid injections. Andrae Aviles ambulates ***.  There has been no significant change in medical status since last visit.    Past Medical History:   Diagnosis Date    Acute combined systolic and diastolic heart failure 6/21/2022    Arthritis     rheumatoid    CAD (coronary artery disease)     Carrier of hemochromatosis HFE gene mutation 1/13/2023    Hypertension     NSTEMI (non-ST elevated myocardial infarction) 6/19/2022    Other cirrhosis of liver 1/13/2023    Primary hypertension 10/11/2021    Rheumatoid arthritis 10/11/2021     Past Surgical History:   Procedure Laterality Date    COLONOSCOPY      age 42    COLONOSCOPY N/A 11/22/2021    Procedure: COLONOSCOPY  Golytely   Rapid Covid;  Surgeon: Shane Guzman MD;  Location: [facility] ENDO;  Service: Endoscopy;  Laterality: N/A;    CORONARY ANGIOGRAPHY N/A 07/01/2022    Procedure: Angiogram, Coronary, with Left Heart Cath;  Surgeon: Miguelito Barney MD;  Location: [facility] CATH LAB;  Service: Cardiology;  Laterality: N/A;    CORONARY ANGIOGRAPHY Right 09/15/2022    Procedure: ANGIOGRAM, CORONARY ARTERY;  Surgeon: Phani Brown MD;  Location: [facility] CATH LAB/EP;  Service: Cardiology;  Laterality: Right;    ESOPHAGOGASTRODUODENOSCOPY N/A 9/29/2023    Procedure: EGD  (ESOPHAGOGASTRODUODENOSCOPY);  Surgeon: Axel Green MD;  [facility]  Service: Endoscopy;  Laterality: N/A;  inst to portal  labs done on 7/10  ok to hold Plavix 5 days per Dr Campa-GT  23 no answer for precall left message  -LVM for precall-Kpvt  -precall complete,pt notified of floor change-KPvt    LEFT HEART CATHETERIZATION N/A 2022    Procedure: Left heart cath;  Surgeon: Marlen Thompson MD;  Location: [facility] CATH LAB/EP;  Service: Cardiology;  Laterality: N/A;    LEFT HEART CATHETERIZATION Left 09/15/2022    Procedure: Left heart cath;  Surgeon: Phani Brown MD;  Location: [facility] CATH LAB/EP;  Service: Cardiology;  Laterality: Left;    VASECTOMY       Family History   Problem Relation Age of Onset    No Known Problems Daughter     No Known Problems Son      Social History     Socioeconomic History    Marital status:     Number of children: 2   Tobacco Use    Smoking status: Former     Current packs/day: 0.00     Average packs/day: 1 pack/day for 30.7 years (30.7 ttl pk-yrs)     Types: Cigarettes, Vaping with nicotine     Start date: 10/14/1985     Quit date: 6/15/2016     Years since quittin.6    Smokeless tobacco: Never   Substance and Sexual Activity    Alcohol use: Not Currently     Comment: previousl;y 8+ beers daily for many years, stopped 2022, but resumed 2022, stopped ~2023    Drug use: Never    Sexual activity: Yes     Partners: Female       Current Outpatient Medications   Medication Sig Dispense Refill    aspirin 81 MG Chew Take 1 tablet (81 mg total) by mouth once daily. 90 tablet 3    atorvastatin (LIPITOR) 80 MG tablet Take 1 tablet (80 mg total) by mouth every evening. 90 tablet 3    clopidogreL (PLAVIX) 75 mg tablet Take 1 tablet (75 mg total) by mouth once daily. 90 tablet 11    colchicine, gout, (COLCRYS) 0.6 mg tablet TAKE 1 TABLET BY MOUTH ONCE DAILY. 90 tablet 3    folic acid (FOLVITE) 1 MG tablet Take 1 tablet (1,000 mcg  total) by mouth once daily. 90 tablet 2    metoprolol succinate (TOPROL-XL) 25 MG 24 hr tablet Take 1 tablet (25 mg total) by mouth once daily. 90 tablet 11    nitroGLYCERIN (NITROSTAT) 0.4 MG SL tablet Place 1 tablet (0.4 mg total) under the tongue every 5 (five) minutes as needed for Chest pain. 30 tablet 3    pantoprazole (PROTONIX) 40 MG tablet Take 1 tablet (40 mg total) by mouth once daily. Take 45-60 minutes prior to your first protein meal of the day 30 tablet 11    sertraline (ZOLOFT) 50 MG tablet TAKE 1 TABLET BY MOUTH EVERY DAY IN THE EVENING 90 tablet 1    sulfaSALAzine (AZULFIDINE) 500 MG EC tablet Take 1 tablet (500 mg total) by mouth 2 (two) times daily. 60 tablet 11    valsartan (DIOVAN) 40 MG tablet Take 1 tablet (40 mg total) by mouth once daily. 90 tablet 3     No current facility-administered medications for this visit.     Facility-Administered Medications Ordered in Other Visits   Medication Dose Route Frequency Provider Last Rate Last Admin    fentaNYL 50 mcg/mL injection 25 mcg  25 mcg Intravenous Q5 Min PRN Fareed Mendez MD        ondansetron injection 4 mg  4 mg Intravenous Daily PRN Fareed Mendez MD        sodium chloride 0.9% flush 10 mL  10 mL Intravenous PRN Fareed Mendez MD         Review of patient's allergies indicates:  No Known Allergies    Review of Systems    Objective:      There were no vitals filed for this visit.  Physical Exam  Pt reports  Skin is intact without evidence of rashes, lesions or open wounds  Ambulates with a limp  Range of motion 0-110    Lab Review:   No new labs in the past 6 mos    Imaging:   Dr. Kwon previous imaging read: Previous radiographs show Kellgren stage IV osteoarthritis the right knee     Assessment:       1. Other secondary osteoarthritis of right knee    2. Elective surgery    3. Preoperative examination        Plan:   During today's Patient Optimization Visit all consultant notes, medications, imaging, EKG, and lab work has been  reviewed. Discharge planning was discussed and Home Health has been ordered. Any additional testing or consults needed for medical clearance has been ordered. Pre, kianna, and post operative procedures and expectations discussed. All questions were answered.   The patient will contact us if there are any questions, concerns, or changes in medical status prior to surgery.    30 minutes were spent on this encounter. This includes face to face time and non-face to face time preparing to see the patient (eg, review of tests), obtaining and/or reviewing separately obtained history, documenting clinical information in the electronic or other health record, independently interpreting results and communicating results to the patient/family/caregiver, or care coordinator.     Walker Justification: The mobility limitation cannot be sufficiently resolved by the use of a cane.   Patient's functional mobility deficit can be sufficiently resolved with the use of a (Rolling Walker or Walker).  Patient's mobility limitation significantly impairs their ability to participate in one of more activities of daily living.  The use of a (RW or Walker) will significantly improve the patient's ability to participate in MRADLS and the patient will use it on regular basis in the home.     No orders of the defined types were placed in this encounter.      Needs cardiac clearance    Ask about Plavix and aspirin     Pt scheduled for pre-admit appt and joint camp on 2-

## 2024-02-17 ENCOUNTER — PATIENT MESSAGE (OUTPATIENT)
Dept: CARDIOLOGY | Facility: CLINIC | Age: 59
End: 2024-02-17
Payer: COMMERCIAL

## 2024-02-19 ENCOUNTER — TELEPHONE (OUTPATIENT)
Dept: ANESTHESIOLOGY | Facility: HOSPITAL | Age: 59
End: 2024-02-19
Payer: COMMERCIAL

## 2024-02-19 ENCOUNTER — HOSPITAL ENCOUNTER (OUTPATIENT)
Dept: RADIOLOGY | Facility: HOSPITAL | Age: 59
Discharge: HOME | End: 2024-02-19
Attending: NURSE PRACTITIONER
Payer: COMMERCIAL

## 2024-02-19 ENCOUNTER — TELEPHONE (OUTPATIENT)
Dept: CARDIOLOGY | Facility: CLINIC | Age: 59
End: 2024-02-19
Payer: COMMERCIAL

## 2024-02-19 DIAGNOSIS — K74.69 OTHER CIRRHOSIS OF LIVER: ICD-10-CM

## 2024-02-19 DIAGNOSIS — R79.89 ELEVATED FERRITIN: ICD-10-CM

## 2024-02-19 PROCEDURE — 76705 ECHO EXAM OF ABDOMEN: CPT | Mod: 26,,, | Performed by: RADIOLOGY

## 2024-02-19 PROCEDURE — 76705 ECHO EXAM OF ABDOMEN: CPT | Mod: TC,PN

## 2024-02-19 NOTE — TELEPHONE ENCOUNTER
Hi! Mr. Aviles is scheduled for a right TKA with Dr. Kwon on 2/28/24. Has he been cleared by cardiology as requested during his last visit with MARTÍN Moran on 2/14/24?

## 2024-02-19 NOTE — TELEPHONE ENCOUNTER
----- Message from Christie Oneil LPN sent at 2/19/2024  3:01 PM CST -----  Regarding: FW: Cardiac Clearance    ----- Message -----  From: Christie Oneil LPN  Sent: 2/14/2024  10:21 AM CST  To: [facility] Staff  Subject: Cardiac Clearance                                Good Morning,     Patient is scheduled for a Right Knee Arthoplasty on 2/28 with Dr. Kwon, and will need cardiac clearance, prior to scheduled surgery. Please advise.    Thanks!

## 2024-02-23 ENCOUNTER — OFFICE VISIT (OUTPATIENT)
Dept: HEPATOLOGY | Facility: CLINIC | Age: 59
End: 2024-02-23
Payer: COMMERCIAL

## 2024-02-23 DIAGNOSIS — K74.69 OTHER CIRRHOSIS OF LIVER: Primary | ICD-10-CM

## 2024-02-23 DIAGNOSIS — K76.9 LIVER DISEASE, UNSPECIFIED: ICD-10-CM

## 2024-02-23 DIAGNOSIS — R79.89 ELEVATED FERRITIN: ICD-10-CM

## 2024-02-23 DIAGNOSIS — K76.9 LIVER LESION: ICD-10-CM

## 2024-02-23 DIAGNOSIS — Z14.8 CARRIER OF HEMOCHROMATOSIS HFE GENE MUTATION: ICD-10-CM

## 2024-02-23 DIAGNOSIS — K76.6 PORTAL HYPERTENSION: ICD-10-CM

## 2024-02-23 PROCEDURE — 4010F ACE/ARB THERAPY RXD/TAKEN: CPT | Mod: CPTII,95,, | Performed by: NURSE PRACTITIONER

## 2024-02-23 PROCEDURE — 1160F RVW MEDS BY RX/DR IN RCRD: CPT | Mod: CPTII,95,, | Performed by: NURSE PRACTITIONER

## 2024-02-23 PROCEDURE — 99214 OFFICE O/P EST MOD 30 MIN: CPT | Mod: 95,,, | Performed by: NURSE PRACTITIONER

## 2024-02-23 PROCEDURE — 1159F MED LIST DOCD IN RCRD: CPT | Mod: CPTII,95,, | Performed by: NURSE PRACTITIONER

## 2024-02-23 NOTE — PROGRESS NOTES
"The patient location is: [place]  The chief complaint leading to consultation is: see below    Visit type: audiovisual    Face to Face time with patient: 30 minutes of total time spent on the encounter, which includes face to face time and non-face to face time preparing to see the patient (eg, review of tests), Obtaining and/or reviewing separately obtained history, Documenting clinical information in the electronic or other health record, Independently interpreting results (not separately reported) and communicating results to the patient/family/caregiver, or Care coordination (not separately reported).         Each patient to whom he or she provides medical services by telemedicine is:  (1) informed of the relationship between the physician and patient and the respective role of any other health care provider with respect to management of the patient; and (2) notified that he or she may decline to receive medical services by telemedicine and may withdraw from such care at any time.    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 White male with PMH noted below, presenting for follow up of above    Diagnosis of pre-cirrhosis based on biopsy done 6/2022 while inpatient for MI  However, concern for cirrhosis given elevated bili and AST>ALT and mild PH on TJ biopsy     Previous serologic w/u was negative for Thad's, alpha-1 antitrypsin deficiency, autoimmune etiology, and viral hepatitis A, B and C  Elevated, HH DNA 1 copy of H63D   PETH negative 7/2023     Reports he had a "blood test for scar tissue" in 2004 which noted pre-cirrhosis and was told to stop alcohol at that time and stopped for a short period of time but resumed 8-12 beers daily for years, then stopped/change to NA in 2023    Prior serologic workup:   Lab Results "   Component Value Date    SMOOTHMUSCAB Positive 1:40 (A) 06/27/2022    IGGSERUM 1169 06/27/2022    ANASCREEN Negative <1:80 06/27/2022    FERRITIN 282 02/14/2024    FESATURATED 51 (H) 02/14/2024    WPYHP5BWOISO SEE BELOW 06/27/2022    ZRXWC9IWPAIP 253 (H) 06/27/2022    CERULOPLSM 38.0 06/27/2022    HEPBSAG Negative 06/27/2022    HEPCAB Negative 06/27/2022    HEPAIGM Negative 06/27/2022     Risk factors for fatty liver include previous alcohol use, obesity, HTN, HLD, DM    Interval HPI: Presents today alone. Changed to NA beer in 2023, big change  Aware he needs to abstain completely for life from alcohol   Ferritin improved to 200s without phlebotomy   No s/s of hepatic decompensation: no ascites, HE or h/o variceal bleeding  US with question of liver lesion again, will arrange MRI    Abd U/S done 2/2024 showed .6 cm lesion seen adjacent to the gallbladder fossa which may be within segment 4 but difficult to exactly localize. Additional indeterminate 1.4 cm hypoechoic lesion within the right hepatic lobe       Lab Results   Component Value Date    AFP 2.8 02/14/2024        Lab Results   Component Value Date    ALT 39 02/14/2024    AST 41 (H) 02/14/2024    ALKPHOS 58 02/14/2024    BILITOT 0.8 02/14/2024    ALBUMIN 4.1 02/14/2024    INR 1.1 02/14/2024     02/14/2024     02/14/2024     MELD 3.0: 8 at 2/14/2024 11:43 AM  MELD-Na: 7 at 2/14/2024 11:43 AM  Calculated from:  Serum Creatinine: 0.9 mg/dL (Using min of 1 mg/dL) at 2/14/2024 11:43 AM  Serum Sodium: 136 mmol/L at 2/14/2024 11:43 AM  Total Bilirubin: 0.8 mg/dL (Using min of 1 mg/dL) at 2/14/2024 11:43 AM  Serum Albumin: 4.1 g/dL (Using max of 3.5 g/dL) at 2/14/2024 11:43 AM  INR(ratio): 1.1 at 2/14/2024 10:33 AM  Age at listing (hypothetical): 58 years  Sex: Male at 2/14/2024 11:43 AM    MELD 8    Cirrhosis Health Maintenance:   -- Last EGD 3/2024 no varices, repeat in 6 months due to esophagitis per GI  -- Due for next colonoscopy : 2031  -- HCC  screening -    US with liver lesions, needs MRI   AFP WNL, will repeat based on MRI results   Lab Results   Component Value Date    AFP 2.8 02/14/2024       -- + Immunity to Hep B - needs Hep A vaccine, will discuss at next visit     Previous alcohol consumption  Social History     Substance and Sexual Activity   Alcohol Use Not Currently    Comment: previousl;y 8+ beers daily for many years, stopped 6/2022, but resumed 11/2022, stopped ~2/2023            Allergy and medication list reviewed and updated     PMHX:  has a past medical history of Acute combined systolic and diastolic heart failure (6/21/2022), Arthritis, CAD (coronary artery disease), Carrier of hemochromatosis HFE gene mutation (1/13/2023), Hypertension, NSTEMI (non-ST elevated myocardial infarction) (6/19/2022), Other cirrhosis of liver (1/13/2023), Primary hypertension (10/11/2021), and Rheumatoid arthritis (10/11/2021).    PSHX:  has a past surgical history that includes Vasectomy; Colonoscopy; Colonoscopy (N/A, 11/22/2021); Left heart catheterization (N/A, 06/20/2022); Coronary angiography (N/A, 07/01/2022); Left heart catheterization (Left, 09/15/2022); Coronary angiography (Right, 09/15/2022); and Esophagogastroduodenoscopy (N/A, 9/29/2023).    FAMILY HISTORY: Updated and reviewed in [facility]    SOCIAL HISTORY:   Social History     Substance and Sexual Activity   Alcohol Use Not Currently    Comment: previousl;y 8+ beers daily for many years, stopped 6/2022, but resumed 11/2022, stopped ~2/2023       Social History     Substance and Sexual Activity   Drug Use Never       ROS:   GENERAL: Denies fatigue  CARDIOVASCULAR: Denies edema  GI: Denies abdominal pain  SKIN: Denies rash, itching   NEURO: Denies confusion, memory loss, or mood changes    PHYSICAL EXAM:   Friendly White male, in no acute distress; alert and oriented to person, place and time  VITALS: There were no vitals taken for this visit.  EYES: Sclerae anicteric  GI: Soft, non-tender,  non-distended. No ascites.  EXTREMITIES:  No edema.  SKIN: Warm and dry. No jaundice. No telangectasias noted. No palmar erythema.  NEURO:  No asterixis.  PSYCH:  Thought and speech pattern appropriate. Behavior normal      EDUCATION:  See instructions discussed with patient in Instructions section of the After Visit Summary     ASSESSMENT & PLAN:  58 y.o. White male with:  1.  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 However, concern for cirrhosis given elevated bili and AST>ALT and PH on TJ biopsy, likely cirrhosis   -- MELD 3.0: 8 at 2/14/2024 11:43 AM  MELD-Na: 7 at 2/14/2024 11:43 AM  Calculated from:  Serum Creatinine: 0.9 mg/dL (Using min of 1 mg/dL) at 2/14/2024 11:43 AM  Serum Sodium: 136 mmol/L at 2/14/2024 11:43 AM  Total Bilirubin: 0.8 mg/dL (Using min of 1 mg/dL) at 2/14/2024 11:43 AM  Serum Albumin: 4.1 g/dL (Using max of 3.5 g/dL) at 2/14/2024 11:43 AM  INR(ratio): 1.1 at 2/14/2024 10:33 AM  Age at listing (hypothetical): 58 years  Sex: Male at 2/14/2024 11:43 AM    -- HCC screening: AFP and abd. U/S.. see below  -- Immunity to Hep A and B - . See HPI  -- EGD repeat due soon per GI (not for variceal reasons)  --- Serological workup in HPI  -- Cirrhosis counseling as noted above and discussed with patient    2. Elevated ferritin, HH carrier  -- 1 copy of H63D. Statistically should not cause any significant iron overload, but can monitor ferritin yearly, hematology messaged to inquire about recommended ferritin goal     3. Previous alcohol use   -- recommend abstinence for life, pt aware    4. Portal HTN  -- mild PH on TJ biopsy portal pressure measurements    5. Liver lesion ?  MRI and tumor markers soon       No follow-ups on file. Based on MRI and lab results    Orders Placed This Encounter   Procedures    MRI Abdomen W WO Contrast    Cancer Antigen 19-9    CEA        Thank you for allowing me to participate in the care of Andrae  GABE Aquino    I spent a total of 30 minutes on the day of the visit.This includes face to face time and non-face to face time preparing to see the patient (eg, review of tests), obtaining and/or reviewing separately obtained history, documenting clinical information in the electronic or other health record, independently interpreting results and communicating results to the patient/family/caregiver, and coordinating care.         CC'ed note to:

## 2024-02-24 NOTE — PATIENT INSTRUCTIONS
This is a web site that you may find helpful about cirrhosis : [email or web address].[email or web address]    Because you have cirrhosis, it is important to attend clinic visits every 6 months with an Ultrasound and blood tests every 6 months to screen for liver cancer (you are at risk of developing liver cancer due to scar tissue in the liver)    Signs and symptoms of worsening liver disease include jaundice, fluid in the belly (ascites), and confusion/disorientation/slowed thought processes due to hepatic encephalopathy (toxins building up because of liver problems).   You should seek medical attention if any of these things occur.    Also, possible bleeding from esophageal varices (blood vessels in the stomach and foodpipe can burst and cause fatal bleeding).  Therefore, if you have symptoms of vomiting blood, blood in your stool, dark or black stools or vomiting coffee ground vomit, YOU SHOULD GO TO THE EMERGENCY ROOM IMMEDIATELY.     Cirrhosis can increase the risk of liver cancer, liver failure, and death. However, we will watch your liver function score (MELD score) closely with each clinic visit. A normal MELD score is 6, highest is 40. Your last one was an 8. We will check this with every clinic visit. A MELD 15 or higher is when we start to consider transplant because MELD 15 or higher indicates that the liver is not functioning as well     Cirrhosis Counseling  - NO alcohol use (includes beer, wine, and/or liquor)    - avoid non-steroidal anti-inflammatory drugs (NSAIDs) such as ibuprofen, Motrin, naprosyn, Alleve due to the risk of kidney damage  - can take acetaminophen (Tylenol), no more than 2000 mg per day  - low sodium (salt) 2 gram per day diet  - high protein diet: 110 grams per day to prevent muscle mass loss. Drink at least 1 protein shake daily (Premier Protein is best option because it is very high protein and low sugar). Ok to use this as nighttime snack to fit it in   - resistance exercises for muscle  strength  - avoid raw seafoods due to the risk of fatal Vibrio vulnificus infection  - ultrasound of the liver every 6 months for liver cancer screening (you are at risk of developing liver cancer due to scar tissue in the liver)  - Upper endoscopy every 1-2 years to screen for varices in the stomach and foodpipe which can burst and cause fatal bleeding

## 2024-02-26 ENCOUNTER — PATIENT MESSAGE (OUTPATIENT)
Dept: HEPATOLOGY | Facility: CLINIC | Age: 59
End: 2024-02-26
Payer: COMMERCIAL

## 2024-02-27 ENCOUNTER — TELEPHONE (OUTPATIENT)
Dept: ORTHOPEDICS | Facility: CLINIC | Age: 59
End: 2024-02-27
Payer: COMMERCIAL

## 2024-02-27 ENCOUNTER — TELEPHONE (OUTPATIENT)
Dept: CARDIOLOGY | Facility: CLINIC | Age: 59
End: 2024-02-27
Payer: COMMERCIAL

## 2024-02-27 NOTE — TELEPHONE ENCOUNTER
----- Message from Elena House sent at 2/27/2024  4:09 PM CST -----  Type:  Patient Returning Call    Who Called:Caroline  Would the patient rather a call back or a response via [facility]? Call back   Best Call Back Number:[phone]  Additional Information: trying to verify a surgery date of 03/11

## 2024-02-27 NOTE — TELEPHONE ENCOUNTER
----- Message from Ashlie Diaz sent at 2/27/2024 12:06 PM CST -----  Type:  Needs Medical Advice    Who Called: pt     Would the patient rather a call back or a response via [facility]? Call back   Best Call Back Number: [phone]  Additional Information: pt is needing to get a surgery clearance for a surgery that is  scheduled with Dr Kwon on 02/28/24 is you are not able to reach the pt please contact Dr Kwon with this information

## 2024-02-28 ENCOUNTER — PATIENT MESSAGE (OUTPATIENT)
Dept: ORTHOPEDICS | Facility: CLINIC | Age: 59
End: 2024-02-28
Payer: COMMERCIAL

## 2024-02-28 ENCOUNTER — TELEPHONE (OUTPATIENT)
Dept: CARDIOLOGY | Facility: CLINIC | Age: 59
End: 2024-02-28
Payer: COMMERCIAL

## 2024-02-28 ENCOUNTER — OFFICE VISIT (OUTPATIENT)
Dept: CARDIOLOGY | Facility: CLINIC | Age: 59
End: 2024-02-28
Payer: COMMERCIAL

## 2024-02-28 ENCOUNTER — TELEPHONE (OUTPATIENT)
Dept: ORTHOPEDICS | Facility: CLINIC | Age: 59
End: 2024-02-28
Payer: COMMERCIAL

## 2024-02-28 VITALS
HEART RATE: 76 BPM | HEIGHT: 70 IN | BODY MASS INDEX: 33.91 KG/M2 | DIASTOLIC BLOOD PRESSURE: 107 MMHG | OXYGEN SATURATION: 94 % | SYSTOLIC BLOOD PRESSURE: 160 MMHG | WEIGHT: 236.88 LBS

## 2024-02-28 DIAGNOSIS — I25.10 CORONARY ARTERY DISEASE INVOLVING NATIVE CORONARY ARTERY OF NATIVE HEART WITHOUT ANGINA PECTORIS: Primary | ICD-10-CM

## 2024-02-28 DIAGNOSIS — I10 PRIMARY HYPERTENSION: ICD-10-CM

## 2024-02-28 DIAGNOSIS — Z98.62 H/O ANGIOPLASTY: ICD-10-CM

## 2024-02-28 PROCEDURE — 1159F MED LIST DOCD IN RCRD: CPT | Mod: CPTII,S$GLB,, | Performed by: INTERNAL MEDICINE

## 2024-02-28 PROCEDURE — 1160F RVW MEDS BY RX/DR IN RCRD: CPT | Mod: CPTII,S$GLB,, | Performed by: INTERNAL MEDICINE

## 2024-02-28 PROCEDURE — 3080F DIAST BP >= 90 MM HG: CPT | Mod: CPTII,S$GLB,, | Performed by: INTERNAL MEDICINE

## 2024-02-28 PROCEDURE — 3077F SYST BP >= 140 MM HG: CPT | Mod: CPTII,S$GLB,, | Performed by: INTERNAL MEDICINE

## 2024-02-28 PROCEDURE — 4010F ACE/ARB THERAPY RXD/TAKEN: CPT | Mod: CPTII,S$GLB,, | Performed by: INTERNAL MEDICINE

## 2024-02-28 PROCEDURE — 3008F BODY MASS INDEX DOCD: CPT | Mod: CPTII,S$GLB,, | Performed by: INTERNAL MEDICINE

## 2024-02-28 PROCEDURE — 99214 OFFICE O/P EST MOD 30 MIN: CPT | Mod: S$GLB,,, | Performed by: INTERNAL MEDICINE

## 2024-02-28 PROCEDURE — 99999 PR PBB SHADOW E&M-EST. PATIENT-LVL III: CPT | Mod: PBBFAC,,, | Performed by: INTERNAL MEDICINE

## 2024-02-28 RX ORDER — ASPIRIN 81 MG/1
81 TABLET ORAL DAILY
Qty: 90 TABLET | Refills: 1 | Status: SHIPPED | OUTPATIENT
Start: 2024-02-28 | End: 2025-02-27

## 2024-02-28 NOTE — PROGRESS NOTES
[facility] [address]     SUBJECTIVE:     History of Present Illness:  Patient is a 58 y.o. male presents with CAD. He is also considering knee surgery and needs  clearance     Primary Diagnosis:   1. hypertension  2. DM: none  3. Smoked till 2016; vaps   4. CAD: s/p left main; stent Cx  5. Hepatology followup    ROS  No chest pains   No shortness of breath; no PND or orthopnea; no shortness of breath with exertion   No syncope  No palpitations  Activity: normal with no restrictions per patient; no chest pains or shortness of breath   Blood pressures at home run around 110's   Review of patient's allergies indicates:  No Known Allergies    Past Medical History:   Diagnosis Date    Acute combined systolic and diastolic heart failure 6/21/2022    Arthritis     rheumatoid    CAD (coronary artery disease)     Carrier of hemochromatosis HFE gene mutation 1/13/2023    Hypertension     NSTEMI (non-ST elevated myocardial infarction) 6/19/2022    Other cirrhosis of liver 1/13/2023    Primary hypertension 10/11/2021    Rheumatoid arthritis 10/11/2021       Past Surgical History:   Procedure Laterality Date    COLONOSCOPY      age 42    COLONOSCOPY N/A 11/22/2021    Procedure: COLONOSCOPY  Golytely   Rapid Covid;  Surgeon: Shane Guzman MD;  Location: [facility];  Service: Endoscopy;  Laterality: N/A;    CORONARY ANGIOGRAPHY N/A 07/01/2022    Procedure: Angiogram, Coronary, with Left Heart Cath;  Surgeon: Miguelito Barney MD;  Location: [facility] CATH LAB;  Service: Cardiology;  Laterality: N/A;    CORONARY ANGIOGRAPHY Right 09/15/2022    Procedure: ANGIOGRAM, CORONARY ARTERY;  Surgeon: Phani Brown MD;  Location: [facility] CATH LAB/EP;  Service: Cardiology;  Laterality: Right;    ESOPHAGOGASTRODUODENOSCOPY N/A 9/29/2023    Procedure: EGD (ESOPHAGOGASTRODUODENOSCOPY);  Surgeon: Axel Green MD;  [facility] ([address] [place] [address]);  Service: Endoscopy;  Laterality: N/A;  inst to portal  labs done on 7/10  ok to hold Plavix  "5 days per Dr Campa-GT  23 no answer for precall left message  -LVM for precall-Kpvt  -precall complete,pt notified of floor change-KPvt    LEFT HEART CATHETERIZATION N/A 2022    Procedure: Left heart cath;  Surgeon: Marlen Thompson MD;  Location: [facility] CATH LAB/EP;  Service: Cardiology;  Laterality: N/A;    LEFT HEART CATHETERIZATION Left 09/15/2022    Procedure: Left heart cath;  Surgeon: Phani Brown MD;  Location: [facility] CATH LAB/EP;  Service: Cardiology;  Laterality: Left;    VASECTOMY             Past Hospitalization:   : stent procedure       Cardiac meds:    ASA 81 mg- not on this   Atorvastatin 80 mg  Plavix 75 mg - not on this   Metoprolol succinate 25 mg   Valsartan 40 mg  Cochicine 0.6 mg        OBJECTIVE:     Vital Signs (Most Recent)  Vitals:    24 1305   BP: (!) 160/107   Pulse: 76   SpO2: (!) 94%   Weight: 107.5 kg (236 lb 14.2 oz)   Height: 5' 10" (1.778 m)           Physical Exam:  Neck: normal carotids, no bruits; normal JVP  Lungs :clear  Heart: RR, normal S1,S2, no murmurs, no gallops  Abd: no masses; no bruits;   Exts: normal DP and PT pulses bilaterally, normal radials; no edema noted           LABS  : A1c 5.9  Lipids: LDL 92   : GFR normal; abnormal LFT's  : GFR normal; LFT's normal; LDL 66        Diagnostic Results:    1.EK/22: sinus bradycardia, otherwise normal   2.Echo: : normal EF, diastolic dysfunction; apical hypokinesis   3.cath: : ostial left main: 99%; mid Cx 99%; right 100%; 4.5X12 stent left main;   3b.Cath: 9/15/22: mid Cx 95%; VILMA placed; right 100%; Left main: normal; LAD 40% - reviewed by me today showing distal right filling late to include the posterolateral branches   4.PET stress: : large defect Cx,Right; normal EF   4b. PET stress: : perfusion defect of 11% in right coronary territory; scar apical location; EF 57%; wall motion is normal; EKG negative ; compared to previous study, reversible ischemia " inferolateral defect not present; inferior defect less severe from the circumflex territory and thus most of the symptoms are more likely from the occluded right     Chart review:      ASSESSMENT/PLAN:     CAD: s/p left main stent; cx stent  Abnormal ferritin levels followed by hepatology  3. Stress test abnormality noted with ischemia in the inferolateral wall but better than on the previous stress. He is completely asymptomatic with a good exercise tolerance and wonder if the ischemia is from   Plan: 1. excellent exercise capacity with no symptoms with marked improvement in % of ischemia in the Cx territory and thus feel he is safe to undergo surgery. All this explained to patient and his wife and that since the right is occluded and that the ischemia is less, only option would be angiography to look again. They wish to proceed with the surgery and may do so especially since there is no ischemia in the LAD territory. Feel most of the ischemia described in the inferolateral wall is from the right than the Cx - at low to moderate risk for surgery   2. Restart the ASA  3. Check heart rates at home and increase metoprolol to 50 mg if heart rate consistently above 70's  4.  Return 6 months     Patient very upset for having his surgery cancelled. States he had a PET stress and no one called him with the result. I told him that he was to call me after the PET stress was done since I will not have any idea when it is done. This is also clearly written in the chart. He stated that all his doctors call him with the results and that I should have as well. I also told him that he needed preop clearance and this was not done. He stated that the office was notified but according to the office message, On 2/19, message was sent for surgery clearance by nurse; the patient was contacted one minute later and message left to call back for a pre op appointment which never happened. Patient and wife both were loud and aggressive - I  explained the results of the PET stress and that though there was ischemia in the inferoseptal area, it was much better after the CX stent procedure. That this very well could be from the right 100 % occlusion with late filling of the distal vessels and with his excellent exercise capacity and no symptoms, he should not have any issues with the surgery.     Pat Campa MD

## 2024-02-28 NOTE — TELEPHONE ENCOUNTER
----- Message from Jenniffer Cardenas sent at 2/28/2024  8:05 AM CST -----  Regarding: Call back  Contact: [phone]  Type:  Same Day Appointment Request    Caller is requesting a same day appointment.  Caller declined first available appointment listed below.    Name of Caller: PT   When is the first available appointment? Was asked to send a message   Symptoms: Surgery clearance   Best Call Back Number: [phone]  Additional Information:

## 2024-03-05 ENCOUNTER — PATIENT MESSAGE (OUTPATIENT)
Dept: ADMINISTRATIVE | Facility: HOSPITAL | Age: 59
End: 2024-03-05
Payer: COMMERCIAL

## 2024-03-05 ENCOUNTER — TELEPHONE (OUTPATIENT)
Dept: ORTHOPEDICS | Facility: CLINIC | Age: 59
End: 2024-03-05
Payer: COMMERCIAL

## 2024-03-05 NOTE — TELEPHONE ENCOUNTER
Good Morning Sukhwinder,     Below you will find attached the note needed for Jury Duty. Please confirm if you have received this note? In addition, if you need a copy of this note, we are more than happy to print it out, and leave at the .     Thanks!

## 2024-03-05 NOTE — LETTER
March 5, 2024    Andrae Aviles  [address] [address]             [facility]  [address]  [facility] [address]  [facility] [address]  Phone: [phone] To Whom it May Concern,    Mr. Andrae Aviles is scheduled for knee replacement on 3-. His restrictions at the time of the scheduled jury duty (DOS: 3-) will include the following:    No prolonged standing  No prolonged sitting  No walking without assistance    Additionally, the patient is likely to be on a regimen of pain medication during this time.     If possible, please excuse his obligation, and allow approximately 6 weeks following his procedure for re-scheduling.      If you have any questions or concerns, please don't hesitate to call.    Sincerely,        Yoko Moran PA-C

## 2024-03-07 ENCOUNTER — TELEPHONE (OUTPATIENT)
Dept: ADMINISTRATIVE | Facility: HOSPITAL | Age: 59
End: 2024-03-07
Payer: COMMERCIAL

## 2024-03-07 VITALS — SYSTOLIC BLOOD PRESSURE: 128 MMHG | DIASTOLIC BLOOD PRESSURE: 68 MMHG

## 2024-03-11 PROBLEM — Z96.651 HISTORY OF RIGHT KNEE JOINT REPLACEMENT: Status: ACTIVE | Noted: 2024-03-11

## 2024-03-12 ENCOUNTER — NURSE TRIAGE (OUTPATIENT)
Dept: ADMINISTRATIVE | Facility: CLINIC | Age: 59
End: 2024-03-12
Payer: COMMERCIAL

## 2024-03-12 ENCOUNTER — PATIENT MESSAGE (OUTPATIENT)
Dept: ORTHOPEDICS | Facility: CLINIC | Age: 59
End: 2024-03-12
Payer: COMMERCIAL

## 2024-03-13 NOTE — TELEPHONE ENCOUNTER
Pt's wife reports pt had a knee replacement yesterday, now having pain that his pain med he took an hour ago is not helping, having shooting pains, pt has been elevating the leg, wearing the compression stocking, applying ice, but feels like his skin is going to rip apart from the swelling. Call placed to [facility], Dr Kwon, who advised pt to continue the prescribed pain med/ice/elevation/stockings and can start taking OTC Aleve per bottle instructions between doses of pain medication. Wife informed and encouraged to call back with any worsening symptoms or questions. She verbalized understanding.    Reason for Disposition   [1] SEVERE post-op pain (e.g., excruciating, pain scale 8-10) AND [2] not controlled with pain medications    Additional Information   Negative: Sounds like a life-threatening emergency to the triager   Negative: [1] Widespread rash AND [2] bright red, sunburn-like   Negative: [1] SEVERE headache AND [2] after spinal (epidural) anesthesia   Negative: [1] Vomiting AND [2] persists > 4 hours   Negative: [1] Vomiting AND [2] abdomen looks much more swollen than usual   Negative: [1] Drinking very little AND [2] dehydration suspected (e.g., no urine > 12 hours, very dry mouth, very lightheaded)   Negative: Patient sounds very sick or weak to the triager   Negative: Sounds like a serious complication to the triager   Negative: Fever > 100.4 F (38.0 C)    Protocols used: Post-Op Symptoms and Ssippyxnd-A-XG

## 2024-03-14 PROCEDURE — G0180 MD CERTIFICATION HHA PATIENT: HCPCS | Mod: ,,, | Performed by: PHYSICIAN ASSISTANT

## 2024-03-21 ENCOUNTER — PATIENT MESSAGE (OUTPATIENT)
Dept: ORTHOPEDICS | Facility: CLINIC | Age: 59
End: 2024-03-21
Payer: COMMERCIAL

## 2024-03-21 RX ORDER — OXYCODONE AND ACETAMINOPHEN 5; 325 MG/1; MG/1
1 TABLET ORAL EVERY 6 HOURS PRN
Qty: 16 TABLET | Refills: 0 | Status: SHIPPED | OUTPATIENT
Start: 2024-03-21 | End: 2024-05-13

## 2024-03-22 ENCOUNTER — OFFICE VISIT (OUTPATIENT)
Dept: FAMILY MEDICINE | Facility: CLINIC | Age: 59
End: 2024-03-22
Payer: COMMERCIAL

## 2024-03-22 VITALS
DIASTOLIC BLOOD PRESSURE: 84 MMHG | HEIGHT: 70 IN | SYSTOLIC BLOOD PRESSURE: 126 MMHG | OXYGEN SATURATION: 97 % | WEIGHT: 237.56 LBS | HEART RATE: 98 BPM | TEMPERATURE: 98 F | BODY MASS INDEX: 34.01 KG/M2

## 2024-03-22 DIAGNOSIS — Z87.891 PERSONAL HISTORY OF NICOTINE DEPENDENCE: ICD-10-CM

## 2024-03-22 DIAGNOSIS — D84.821 IMMUNODEFICIENCY DUE TO TREATMENT WITH IMMUNOSUPPRESSIVE MEDICATION: ICD-10-CM

## 2024-03-22 DIAGNOSIS — F51.4 NIGHT TERROR: Primary | ICD-10-CM

## 2024-03-22 DIAGNOSIS — Z79.899 IMMUNODEFICIENCY DUE TO TREATMENT WITH IMMUNOSUPPRESSIVE MEDICATION: ICD-10-CM

## 2024-03-22 DIAGNOSIS — M06.9 RHEUMATOID ARTHRITIS, INVOLVING UNSPECIFIED SITE, UNSPECIFIED WHETHER RHEUMATOID FACTOR PRESENT: ICD-10-CM

## 2024-03-22 PROBLEM — Z79.60 IMMUNODEFICIENCY DUE TO TREATMENT WITH IMMUNOSUPPRESSIVE MEDICATION: Status: ACTIVE | Noted: 2024-03-22

## 2024-03-22 PROBLEM — E78.00 PURE HYPERCHOLESTEROLEMIA, UNSPECIFIED: Status: RESOLVED | Noted: 2023-05-23 | Resolved: 2024-03-22

## 2024-03-22 PROBLEM — T45.1X5A IMMUNODEFICIENCY DUE TO TREATMENT WITH IMMUNOSUPPRESSIVE MEDICATION: Status: ACTIVE | Noted: 2024-03-22

## 2024-03-22 PROCEDURE — 1160F RVW MEDS BY RX/DR IN RCRD: CPT | Mod: CPTII,S$GLB,, | Performed by: STUDENT IN AN ORGANIZED HEALTH CARE EDUCATION/TRAINING PROGRAM

## 2024-03-22 PROCEDURE — 3079F DIAST BP 80-89 MM HG: CPT | Mod: CPTII,S$GLB,, | Performed by: STUDENT IN AN ORGANIZED HEALTH CARE EDUCATION/TRAINING PROGRAM

## 2024-03-22 PROCEDURE — 3008F BODY MASS INDEX DOCD: CPT | Mod: CPTII,S$GLB,, | Performed by: STUDENT IN AN ORGANIZED HEALTH CARE EDUCATION/TRAINING PROGRAM

## 2024-03-22 PROCEDURE — 3074F SYST BP LT 130 MM HG: CPT | Mod: CPTII,S$GLB,, | Performed by: STUDENT IN AN ORGANIZED HEALTH CARE EDUCATION/TRAINING PROGRAM

## 2024-03-22 PROCEDURE — 1159F MED LIST DOCD IN RCRD: CPT | Mod: CPTII,S$GLB,, | Performed by: STUDENT IN AN ORGANIZED HEALTH CARE EDUCATION/TRAINING PROGRAM

## 2024-03-22 PROCEDURE — 99214 OFFICE O/P EST MOD 30 MIN: CPT | Mod: S$GLB,,, | Performed by: STUDENT IN AN ORGANIZED HEALTH CARE EDUCATION/TRAINING PROGRAM

## 2024-03-22 PROCEDURE — 4010F ACE/ARB THERAPY RXD/TAKEN: CPT | Mod: CPTII,S$GLB,, | Performed by: STUDENT IN AN ORGANIZED HEALTH CARE EDUCATION/TRAINING PROGRAM

## 2024-03-22 RX ORDER — PRAZOSIN HYDROCHLORIDE 5 MG/1
5 CAPSULE ORAL NIGHTLY
Qty: 30 CAPSULE | Refills: 11 | Status: SHIPPED | OUTPATIENT
Start: 2024-03-22 | End: 2024-05-13 | Stop reason: SDUPTHER

## 2024-03-22 RX ORDER — PODOFILOX 5 MG/ML
SOLUTION TOPICAL 2 TIMES DAILY
Qty: 3.5 ML | Refills: 11 | Status: SHIPPED | OUTPATIENT
Start: 2024-03-22

## 2024-03-22 NOTE — PROGRESS NOTES
Patient ID: Andrae Aviles is a 58 y.o. male.     Chief Complaint: night terrors, problems sleeping    Medication Refill  Pertinent negatives include no chest pain, coughing, fever, headaches, myalgias, nausea, rash, vomiting or weakness.      Patient presents with his wife. He has trouble sleeping. He is currently taking zoloft for anxiety. Patient reports night terrors. He does not associated the night terrors with the zoloft. He wakes up with very vivid, scary dreams almost every night. He has trouble falling asleep due to the night terrors. He has been on ambien in the past but stopped it due to sleep walking.     He also needs a refill of podofilox.     Review of Systems  Review of Systems   Constitutional:  Negative for fever.   HENT:  Negative for ear pain and sinus pain.    Eyes:  Negative for discharge.   Respiratory:  Negative for cough and shortness of breath.    Cardiovascular:  Negative for chest pain and leg swelling.   Gastrointestinal:  Negative for diarrhea, nausea and vomiting.   Genitourinary:  Negative for urgency.   Musculoskeletal:  Negative for myalgias.   Skin:  Negative for rash.   Neurological:  Negative for weakness and headaches.   Psychiatric/Behavioral:  Negative for depression.    All other systems reviewed and are negative.      Currently Medications  Current Outpatient Medications on File Prior to Visit   Medication Sig Dispense Refill    aspirin (ECOTRIN) 81 MG EC tablet Take 1 tablet (81 mg total) by mouth once daily. 90 tablet 1    atorvastatin (LIPITOR) 80 MG tablet Take 1 tablet (80 mg total) by mouth every evening. 90 tablet 3    colchicine, gout, (COLCRYS) 0.6 mg tablet TAKE 1 TABLET BY MOUTH ONCE DAILY. (Patient taking differently: Take 0.6 mg by mouth once daily.) 90 tablet 3    folic acid (FOLVITE) 1 MG tablet Take 1 tablet (1,000 mcg total) by mouth once daily. 90 tablet 2    oxyCODONE-acetaminophen (PERCOCET) 5-325 mg per tablet Take 1 tablet by mouth every 6 (six) hours  "as needed for Pain. 16 tablet 0    pantoprazole (PROTONIX) 40 MG tablet Take 1 tablet (40 mg total) by mouth once daily. Take 45-60 minutes prior to your first protein meal of the day 30 tablet 11    sertraline (ZOLOFT) 50 MG tablet TAKE 1 TABLET BY MOUTH EVERY DAY IN THE EVENING (Patient taking differently: Take 50 mg by mouth every evening.) 90 tablet 1    sulfaSALAzine (AZULFIDINE) 500 MG EC tablet Take 1 tablet (500 mg total) by mouth 2 (two) times daily. 60 tablet 11    valsartan (DIOVAN) 40 MG tablet Take 1 tablet (40 mg total) by mouth once daily. (Patient taking differently: Take 40 mg by mouth every evening.) 90 tablet 3    metoprolol succinate (TOPROL-XL) 25 MG 24 hr tablet Take 1 tablet (25 mg total) by mouth once daily. 90 tablet 11     Current Facility-Administered Medications on File Prior to Visit   Medication Dose Route Frequency Provider Last Rate Last Admin    fentaNYL 50 mcg/mL injection 25 mcg  25 mcg Intravenous Q5 Min PRN Fareed Mendez MD        ondansetron injection 4 mg  4 mg Intravenous Daily PRN Fareed Mendez MD        sodium chloride 0.9% flush 10 mL  10 mL Intravenous PRN Fareed Mendez MD           Physical  Exam  Vitals:    03/22/24 0910   BP: 126/84   BP Location: Left arm   Patient Position: Sitting   Pulse: 98   Temp: 98.1 °F (36.7 °C)   SpO2: 97%   Weight: 107.7 kg (237 lb 8.7 oz)   Height: 5' 10" (1.778 m)      Body mass index is 34.08 kg/m².  Wt Readings from Last 3 Encounters:   03/22/24 107.7 kg (237 lb 8.7 oz)   03/11/24 106.3 kg (234 lb 5.6 oz)   02/28/24 107.5 kg (236 lb 14.2 oz)       Physical Exam  Vitals and nursing note reviewed.   Constitutional:       General: He is not in acute distress.     Appearance: He is not ill-appearing.   HENT:      Head: Normocephalic and atraumatic.      Right Ear: External ear normal.      Left Ear: External ear normal.      Nose: Nose normal.      Mouth/Throat:      Mouth: Mucous membranes are moist.   Eyes:      Extraocular Movements: " Extraocular movements intact.      Conjunctiva/sclera: Conjunctivae normal.   Cardiovascular:      Rate and Rhythm: Normal rate and regular rhythm.      Pulses: Normal pulses.      Heart sounds: No murmur heard.  Pulmonary:      Effort: Pulmonary effort is normal. No respiratory distress.      Breath sounds: No wheezing.   Abdominal:      General: There is no distension.      Palpations: Abdomen is soft. There is no mass.      Tenderness: There is no abdominal tenderness.   Musculoskeletal:         General: No swelling.      Cervical back: Normal range of motion.   Skin:     Coloration: Skin is not jaundiced.      Findings: No rash.   Neurological:      General: No focal deficit present.      Mental Status: He is alert and oriented to person, place, and time.   Psychiatric:         Mood and Affect: Mood normal.         Thought Content: Thought content normal.         Labs:    Complete Blood Count  Lab Results   Component Value Date    RBC 5.09 02/14/2024    RBC 5.17 02/14/2024    HGB 16.5 02/14/2024    HGB 16.6 02/14/2024    HCT 47.2 02/14/2024    HCT 47.7 02/14/2024    MCV 93 02/14/2024    MCV 92 02/14/2024    MCH 32.4 (H) 02/14/2024    MCH 32.1 (H) 02/14/2024    MCHC 35.0 02/14/2024    MCHC 34.8 02/14/2024    RDW 12.4 02/14/2024    RDW 12.6 02/14/2024     02/14/2024     02/14/2024    MPV 11.4 02/14/2024    MPV 11.4 02/14/2024    GRAN 4.1 02/14/2024    GRAN 66.2 02/14/2024    LYMPH 1.4 02/14/2024    LYMPH 22.2 02/14/2024    MONO 0.6 02/14/2024    MONO 9.7 02/14/2024    EOS 0.1 02/14/2024    BASO 0.04 02/14/2024    EOSINOPHIL 1.0 02/14/2024    BASOPHIL 0.6 02/14/2024    DIFFMETHOD Automated 02/14/2024       Comprehensive Metabolic Panel  Lab Results   Component Value Date     02/14/2024    BUN 9 02/14/2024    CREATININE 0.9 02/14/2024     02/14/2024    K 4.0 02/14/2024     02/14/2024    PROT 7.7 02/14/2024    ALBUMIN 4.1 02/14/2024    BILITOT 0.8 02/14/2024    AST 41 (H) 02/14/2024  "   ALKPHOS 58 02/14/2024    CO2 20 (L) 02/14/2024    ALT 39 02/14/2024    ANIONGAP 11 02/14/2024       TSH  No results found for: "TSH"    Imaging:  US Abdomen Limited  Narrative: EXAMINATION:  US ABDOMEN LIMITED    CLINICAL HISTORY:  Other specified abnormal findings of blood chemistrycirrhosis, HCC screening;    COMPARISON:  07/10/2023, 07/31/2023    FINDINGS:  Limited right upper quadrant ultrasound performed.  The liver measures 15.4 cm in length and is homogeneous in echogenicity.  4.6 cm lesion seen adjacent to the gallbladder fossa which may be within segment 4 but difficult to exactly localize.  Additional indeterminate 1.4 cm hypoechoic lesion within the right hepatic lobe common bile duct is within normal limits at 3 mm..  No definite gallstones.  Multiple non mobile lesions seen along the gallbladder wall thought to reflect polyps measuring up to 8 mm.  The visualized portions of the pancreas and IVC are within normal limits.  Spleen appears normal.  Impression: 4.6 cm lesion adjacent the gallbladder fossa as above.  Recommend triple phase CT or MRI for further evaluation.    Multiple gallbladder polyps.    Electronically signed by: Venkat Bender MD  Date:    02/19/2024  Time:    09:38      Assessment/Plan:    1. Night terror  -     prazosin (MINIPRESS) 5 MG capsule; Take 1 capsule (5 mg total) by mouth every evening.  Dispense: 30 capsule; Refill: 11    2. Personal history of nicotine dependence  -     CT Chest Lung Screening Low Dose; Future; Expected date: 03/22/2024    3. Immunodeficiency due to treatment with immunosuppressive medication    4. Rheumatoid arthritis, involving unspecified site, unspecified whether rheumatoid factor present    Other orders  -     podofilox (CONDYLOX) 0.5 % external solution; Apply topically 2 (two) times daily.  Dispense: 3.5 mL; Refill: 11         Discussed how to stay healthy including: diet, exercise, refraining from smoking and discussed screening exams / tests " needed for age, sex and family Hx.        Donald Galarza MD

## 2024-03-26 ENCOUNTER — OFFICE VISIT (OUTPATIENT)
Dept: ORTHOPEDICS | Facility: CLINIC | Age: 59
End: 2024-03-26
Payer: COMMERCIAL

## 2024-03-26 VITALS — HEIGHT: 70 IN | WEIGHT: 238.13 LBS | BODY MASS INDEX: 34.09 KG/M2

## 2024-03-26 DIAGNOSIS — Z96.651 S/P TOTAL KNEE ARTHROPLASTY, RIGHT: ICD-10-CM

## 2024-03-26 DIAGNOSIS — M17.5 OTHER SECONDARY OSTEOARTHRITIS OF RIGHT KNEE: Primary | ICD-10-CM

## 2024-03-26 PROCEDURE — 99024 POSTOP FOLLOW-UP VISIT: CPT | Mod: S$GLB,,, | Performed by: ORTHOPAEDIC SURGERY

## 2024-03-26 PROCEDURE — 1160F RVW MEDS BY RX/DR IN RCRD: CPT | Mod: CPTII,S$GLB,, | Performed by: ORTHOPAEDIC SURGERY

## 2024-03-26 PROCEDURE — 99999 PR PBB SHADOW E&M-EST. PATIENT-LVL III: CPT | Mod: PBBFAC,,, | Performed by: ORTHOPAEDIC SURGERY

## 2024-03-26 PROCEDURE — 1159F MED LIST DOCD IN RCRD: CPT | Mod: CPTII,S$GLB,, | Performed by: ORTHOPAEDIC SURGERY

## 2024-03-26 PROCEDURE — 4010F ACE/ARB THERAPY RXD/TAKEN: CPT | Mod: CPTII,S$GLB,, | Performed by: ORTHOPAEDIC SURGERY

## 2024-03-26 NOTE — PROGRESS NOTES
Subjective:      Patient ID: Andrae Aviles is a 58 y.o. male.    Chief Complaint: Post-op Evaluation of the Right Knee (2 WEEK PO /TKA )      HPI:  Two weeks postop  The patient is seen for postop follow-up of right  TKA.  Pain control has been satisfactory  They feel that they are ambulating easily  Postoperative complaints include:  None at this time      Current Outpatient Medications:     aspirin (ECOTRIN) 81 MG EC tablet, Take 1 tablet (81 mg total) by mouth once daily., Disp: 90 tablet, Rfl: 1    atorvastatin (LIPITOR) 80 MG tablet, Take 1 tablet (80 mg total) by mouth every evening., Disp: 90 tablet, Rfl: 3    colchicine, gout, (COLCRYS) 0.6 mg tablet, TAKE 1 TABLET BY MOUTH ONCE DAILY. (Patient taking differently: Take 0.6 mg by mouth once daily.), Disp: 90 tablet, Rfl: 3    folic acid (FOLVITE) 1 MG tablet, Take 1 tablet (1,000 mcg total) by mouth once daily., Disp: 90 tablet, Rfl: 2    oxyCODONE-acetaminophen (PERCOCET) 5-325 mg per tablet, Take 1 tablet by mouth every 6 (six) hours as needed for Pain., Disp: 16 tablet, Rfl: 0    pantoprazole (PROTONIX) 40 MG tablet, Take 1 tablet (40 mg total) by mouth once daily. Take 45-60 minutes prior to your first protein meal of the day, Disp: 30 tablet, Rfl: 11    podofilox (CONDYLOX) 0.5 % external solution, Apply topically 2 (two) times daily., Disp: 3.5 mL, Rfl: 11    prazosin (MINIPRESS) 5 MG capsule, Take 1 capsule (5 mg total) by mouth every evening., Disp: 30 capsule, Rfl: 11    sertraline (ZOLOFT) 50 MG tablet, TAKE 1 TABLET BY MOUTH EVERY DAY IN THE EVENING (Patient taking differently: Take 50 mg by mouth every evening.), Disp: 90 tablet, Rfl: 1    sulfaSALAzine (AZULFIDINE) 500 MG EC tablet, Take 1 tablet (500 mg total) by mouth 2 (two) times daily., Disp: 60 tablet, Rfl: 11    valsartan (DIOVAN) 40 MG tablet, Take 1 tablet (40 mg total) by mouth once daily. (Patient taking differently: Take 40 mg by mouth every evening.), Disp: 90 tablet, Rfl: 3     "metoprolol succinate (TOPROL-XL) 25 MG 24 hr tablet, Take 1 tablet (25 mg total) by mouth once daily., Disp: 90 tablet, Rfl: 11  No current facility-administered medications for this visit.    Facility-Administered Medications Ordered in Other Visits:     fentaNYL 50 mcg/mL injection 25 mcg, 25 mcg, Intravenous, Q5 Min PRN, Fareed Mendez MD    ondansetron injection 4 mg, 4 mg, Intravenous, Daily PRN, Fareed Mendez MD    sodium chloride 0.9% flush 10 mL, 10 mL, Intravenous, PRN, Fareed Mendez MD  Review of patient's allergies indicates:  No Known Allergies    Ht 5' 10" (1.778 m)   Wt 108 kg (238 lb 1.6 oz)   BMI 34.16 kg/m²     ROS        Objective:    Ortho Exam          Alert, oriented, no acute distress  Sclera-Normal  Respiratory distress-none  Gait minimal limp  Incision:  Healing cleanly  Range of motion:  0-105  Valgus/varus stability- stable  Swelling-minimal  Distal perfusion-intact  Distal neurologic-intact    Imaging:  Right knee radiographs show well-positioned well-fixed total knee implant.  No complicating process    Assessment:             1. Other secondary osteoarthritis of right knee    2. S/P total knee arthroplasty, right        The patient is recovering as expected after the procedure.  There is no evidence of any surgical site complication        Plan:          No follow-ups on file.    I explained my assessment and reviewed the natural history of recovery after this procedure.  Appropriate progression of physiotherapy and general activity was explained          "

## 2024-04-01 PROBLEM — M25.661 DECREASED RANGE OF MOTION (ROM) OF RIGHT KNEE: Status: ACTIVE | Noted: 2024-04-01

## 2024-04-01 PROBLEM — R26.9 GAIT ABNORMALITY: Status: ACTIVE | Noted: 2024-04-01

## 2024-04-01 PROBLEM — M25.561 ACUTE PAIN OF RIGHT KNEE: Status: ACTIVE | Noted: 2024-04-01

## 2024-04-12 ENCOUNTER — HOSPITAL ENCOUNTER (OUTPATIENT)
Dept: RADIOLOGY | Facility: HOSPITAL | Age: 59
Discharge: HOME | End: 2024-04-12
Attending: STUDENT IN AN ORGANIZED HEALTH CARE EDUCATION/TRAINING PROGRAM
Payer: COMMERCIAL

## 2024-04-12 ENCOUNTER — HOSPITAL ENCOUNTER (OUTPATIENT)
Dept: RADIOLOGY | Facility: HOSPITAL | Age: 59
Discharge: HOME | End: 2024-04-12
Attending: NURSE PRACTITIONER
Payer: COMMERCIAL

## 2024-04-12 DIAGNOSIS — K76.9 LIVER LESION: ICD-10-CM

## 2024-04-12 DIAGNOSIS — Z87.891 PERSONAL HISTORY OF NICOTINE DEPENDENCE: ICD-10-CM

## 2024-04-12 DIAGNOSIS — K76.9 LIVER DISEASE, UNSPECIFIED: ICD-10-CM

## 2024-04-12 PROCEDURE — A9585 GADOBUTROL INJECTION: HCPCS | Performed by: NURSE PRACTITIONER

## 2024-04-12 PROCEDURE — 25500020 PHARM REV CODE 255: Performed by: NURSE PRACTITIONER

## 2024-04-12 PROCEDURE — 74183 MRI ABD W/O CNTR FLWD CNTR: CPT | Mod: TC

## 2024-04-12 PROCEDURE — 71271 CT THORAX LUNG CANCER SCR C-: CPT | Mod: TC

## 2024-04-12 PROCEDURE — 74183 MRI ABD W/O CNTR FLWD CNTR: CPT | Mod: 26,,, | Performed by: STUDENT IN AN ORGANIZED HEALTH CARE EDUCATION/TRAINING PROGRAM

## 2024-04-12 PROCEDURE — 71271 CT THORAX LUNG CANCER SCR C-: CPT | Mod: 26,,, | Performed by: STUDENT IN AN ORGANIZED HEALTH CARE EDUCATION/TRAINING PROGRAM

## 2024-04-12 RX ORDER — GADOBUTROL 604.72 MG/ML
10 INJECTION INTRAVENOUS
Status: COMPLETED | OUTPATIENT
Start: 2024-04-12 | End: 2024-04-12

## 2024-04-12 RX ADMIN — GADOBUTROL 10 ML: 604.72 INJECTION INTRAVENOUS at 03:04

## 2024-04-19 ENCOUNTER — TELEPHONE (OUTPATIENT)
Dept: HEPATOLOGY | Facility: CLINIC | Age: 59
End: 2024-04-19
Payer: COMMERCIAL

## 2024-04-19 ENCOUNTER — PATIENT MESSAGE (OUTPATIENT)
Dept: HEPATOLOGY | Facility: CLINIC | Age: 59
End: 2024-04-19
Payer: COMMERCIAL

## 2024-04-19 ENCOUNTER — EXTERNAL HOME HEALTH (OUTPATIENT)
Dept: HOME HEALTH SERVICES | Facility: HOSPITAL | Age: 59
End: 2024-04-19
Payer: COMMERCIAL

## 2024-04-19 DIAGNOSIS — K76.9 LIVER DISEASE, UNSPECIFIED: Primary | ICD-10-CM

## 2024-04-19 DIAGNOSIS — K74.69 OTHER CIRRHOSIS OF LIVER: ICD-10-CM

## 2024-04-19 DIAGNOSIS — K76.9 LIVER LESION: ICD-10-CM

## 2024-04-19 NOTE — TELEPHONE ENCOUNTER
Appts scheduled   
Results of MRI with explanation sent to pt in [facility]    Please contact pt to schedule the following   -- MRI and labs (CMP and AFP) in 3 months, no visit   -- follow up with me in 6 months with MRI and labs (CMP and AFP)  1 week before    Thx!  
Quality 130: Documentation Of Current Medications In The Medical Record: Current Medications Documented
Quality 431: Preventive Care And Screening: Unhealthy Alcohol Use - Screening: Patient not identified as an unhealthy alcohol user when screened for unhealthy alcohol use using a systematic screening method
Quality 226: Preventive Care And Screening: Tobacco Use: Screening And Cessation Intervention: Patient screened for tobacco use and is an ex/non-smoker
Detail Level: Detailed

## 2024-04-25 ENCOUNTER — TELEPHONE (OUTPATIENT)
Dept: ORTHOPEDICS | Facility: CLINIC | Age: 59
End: 2024-04-25
Payer: COMMERCIAL

## 2024-04-25 ENCOUNTER — OFFICE VISIT (OUTPATIENT)
Dept: ORTHOPEDICS | Facility: CLINIC | Age: 59
End: 2024-04-25
Payer: COMMERCIAL

## 2024-04-25 DIAGNOSIS — Z96.651 S/P TOTAL KNEE ARTHROPLASTY, RIGHT: Primary | ICD-10-CM

## 2024-04-25 PROCEDURE — 99999 PR PBB SHADOW E&M-EST. PATIENT-LVL III: CPT | Mod: PBBFAC,,,

## 2024-04-25 PROCEDURE — 4010F ACE/ARB THERAPY RXD/TAKEN: CPT | Mod: CPTII,S$GLB,,

## 2024-04-25 PROCEDURE — 99024 POSTOP FOLLOW-UP VISIT: CPT | Mod: S$GLB,,,

## 2024-04-25 PROCEDURE — 1160F RVW MEDS BY RX/DR IN RCRD: CPT | Mod: CPTII,S$GLB,,

## 2024-04-25 PROCEDURE — 1159F MED LIST DOCD IN RCRD: CPT | Mod: CPTII,S$GLB,,

## 2024-04-25 NOTE — LETTER
April 25, 2024      [facility]  [address]  [address]  [place] [address]  Phone: [phone]  Fax: [phone]       Patient: Andrae Aviles   YOB: 1965  Date of Visit: 04/25/2024    To Whom It May Concern:    Mary Aviles  was at [facility] on 04/25/2024. The patient is to remain off work for the time being until he is 3 months post-op (date of surgery: 03/11/2024). If you have any questions or concerns, or if I can be of further assistance, please do not hesitate to contact me.    Sincerely,        CAMMIE Hutton PA-C

## 2024-04-25 NOTE — PROGRESS NOTES
Subjective:      Patient ID: Andrae Aviles is a 58 y.o. male.    Chief Complaint: Post-op Follow Up    HPI: Andrae Aviles returns for a 6 week post-op visit following: right total knee arthroplasty (date of surgery 03/11/2024) with Dr. Kwon. Overall, the patient is doing well with no complaints of fever, chills, nausea, vomiting, SOB, chest pains, or drainage to surgical incision. The patient reports that pain has been managed with medication. He has been attending formal physical therapy and has been progressing with this. The patient reports ambulating easily. Post-operative complaints include: none    PAST MEDICAL HISTORY:    Past Medical History:   Diagnosis Date    Acute combined systolic and diastolic heart failure 06/21/2022    Anxiety     CAD (coronary artery disease)     at least 2 stents    Carrier of hemochromatosis HFE gene mutation 01/13/2023    GERD (gastroesophageal reflux disease)     Hypertension     NSTEMI (non-ST elevated myocardial infarction) 06/19/2022    Other cirrhosis of liver 01/13/2023    mild cirrhosis    Primary hypertension 10/11/2021    Rheumatoid arthritis 10/11/2021     MEDICATIONS:   Current Outpatient Medications:     aspirin (ECOTRIN) 81 MG EC tablet, Take 1 tablet (81 mg total) by mouth once daily., Disp: 90 tablet, Rfl: 1    atorvastatin (LIPITOR) 80 MG tablet, Take 1 tablet (80 mg total) by mouth every evening., Disp: 90 tablet, Rfl: 3    colchicine, gout, (COLCRYS) 0.6 mg tablet, TAKE 1 TABLET BY MOUTH ONCE DAILY. (Patient taking differently: Take 0.6 mg by mouth once daily.), Disp: 90 tablet, Rfl: 3    folic acid (FOLVITE) 1 MG tablet, Take 1 tablet (1,000 mcg total) by mouth once daily., Disp: 90 tablet, Rfl: 2    pantoprazole (PROTONIX) 40 MG tablet, Take 1 tablet (40 mg total) by mouth once daily. Take 45-60 minutes prior to your first protein meal of the day, Disp: 30 tablet, Rfl: 11    podofilox (CONDYLOX) 0.5 % external solution, Apply topically 2 (two) times  daily., Disp: 3.5 mL, Rfl: 11    prazosin (MINIPRESS) 5 MG capsule, Take 1 capsule (5 mg total) by mouth every evening., Disp: 30 capsule, Rfl: 11    sertraline (ZOLOFT) 50 MG tablet, TAKE 1 TABLET BY MOUTH EVERY DAY IN THE EVENING (Patient taking differently: Take 50 mg by mouth every evening.), Disp: 90 tablet, Rfl: 1    sulfaSALAzine (AZULFIDINE) 500 MG EC tablet, Take 1 tablet (500 mg total) by mouth 2 (two) times daily., Disp: 60 tablet, Rfl: 11    valsartan (DIOVAN) 40 MG tablet, Take 1 tablet (40 mg total) by mouth once daily. (Patient taking differently: Take 40 mg by mouth every evening.), Disp: 90 tablet, Rfl: 3    metoprolol succinate (TOPROL-XL) 25 MG 24 hr tablet, Take 1 tablet (25 mg total) by mouth once daily., Disp: 90 tablet, Rfl: 11    oxyCODONE-acetaminophen (PERCOCET) 5-325 mg per tablet, Take 1 tablet by mouth every 6 (six) hours as needed for Pain., Disp: 16 tablet, Rfl: 0  No current facility-administered medications for this visit.    Facility-Administered Medications Ordered in Other Visits:     fentaNYL 50 mcg/mL injection 25 mcg, 25 mcg, Intravenous, Q5 Min PRN, Fareed Mendez MD    ondansetron injection 4 mg, 4 mg, Intravenous, Daily PRN, Fareed Mendez MD    sodium chloride 0.9% flush 10 mL, 10 mL, Intravenous, PRN, Fareed Mendez MD    ALLERGIES:   Review of patient's allergies indicates:  No Known Allergies    Review of Systems:  Constitution: Negative for chills, fever and night sweats.   HENT: Negative for congestion and headaches.    Eyes: Negative for blurred vision or vision loss.  Cardiovascular: Negative for chest pain and syncope.   Respiratory: Negative for cough and shortness of breath.    Endocrine: Negative for polydipsia, polyphagia and polyuria.   Hematologic/Lymphatic: Negative for bleeding problem. Does not bruise/bleed easily.   Skin: Negative for dry skin, itching and rash.   Musculoskeletal: See HPI.   Gastrointestinal: Negative for abdominal pain and bowel  incontinence.   Genitourinary: Negative for bladder incontinence and nocturia.   Neurological: Negative for disturbances in coordination, loss of balance and seizures.   Psychiatric/Behavioral: Negative for depression. The patient does not have insomnia.    Allergic/Immunologic: Negative for hives and persistent infections.        Objective:      There were no vitals filed for this visit.    PHYSICAL EXAM:  General: Alert & oriented x3, well-developed and well-nourished, in no acute distress, sitting comfortably in the exam room.  Skin: Warm and dry. Capillary refill less than 2 seconds.   Head: Normocephalic and atraumatic.   Eyes: Sclera appear normal.   Nose: No deformities seen.   Ears: No deformities seen.   Neck: No tracheal deviation present.   Pulmonary/Chest: Breathing unlabored.   Neurological: Alert and oriented to person, place, and time.   Psychiatric: Mood is pleasant and affect appropriate.       RIGHT KNEE:        Observation/Inspection:    Surgical incision is well healed with appropriate scar formation.  No redness, warmth, drainage, or other signs of infection.  Mild swelling.         Range of Motion:  Active Flexion:  120°  Extension:  0°  Varus/Valgus stability:  stable.        Palpation:   No tenderness to palpation to bony prominences and soft tissues throughout.        Strength:   Motor:  normal 5/5 strength in all tested muscle groups.         Neurovascular Exam:  Pulses DP present, PT present.  Sensory:  normal.      Imaging:  None today.         Assessment:       1. S/P total knee arthroplasty, right        Plan:          6 weeks s/p right total knee arthroplasty    Overall patient is doing well post-operatively.    Wound Care:  Continue with regular wound care.  Okay to wash incision with soap and water and pat to dry.   Medications:  Continue with OTC Tylenol and/or Ibuprofen as needed for pain.   Antibiotics:  None prescribed today.  No evidence of wound infection.  Physical Therapy:   Continue with physical therapy for knee ROM, stretching, strengthening, and conditioning.  Activity:  Continue to advance activities as tolerated.  Pain Management: Ice compress to the affected area 2-3x a day for 15-20 minutes as needed for pain management.  Work Status: Work note provided today with the following restrictions: patient is to remain off work at this time and will be released to return to work at 3 months post-op.       Follow-Up: 6 weeks with Dr. Kwon for 3-month post-op visit.      All of the patient's questions were answered and the patient will contact us if they have any questions or concerns in the interim.    Roma Pollard PA-C  [facility]    Medical Dictation software was used during the dictation of portions or the entirety of this medical record.  Phonetic or grammatic errors may exist due to the use of this software. For clarification, refer to the author of the document.

## 2024-05-13 ENCOUNTER — OFFICE VISIT (OUTPATIENT)
Dept: FAMILY MEDICINE | Facility: CLINIC | Age: 59
End: 2024-05-13
Payer: COMMERCIAL

## 2024-05-13 VITALS
TEMPERATURE: 99 F | WEIGHT: 234.13 LBS | HEART RATE: 106 BPM | DIASTOLIC BLOOD PRESSURE: 72 MMHG | HEIGHT: 70 IN | OXYGEN SATURATION: 96 % | BODY MASS INDEX: 33.52 KG/M2 | SYSTOLIC BLOOD PRESSURE: 102 MMHG

## 2024-05-13 DIAGNOSIS — I10 PRIMARY HYPERTENSION: ICD-10-CM

## 2024-05-13 DIAGNOSIS — F51.4 NIGHT TERROR: ICD-10-CM

## 2024-05-13 DIAGNOSIS — I25.10 CORONARY ARTERY DISEASE INVOLVING NATIVE CORONARY ARTERY OF NATIVE HEART WITHOUT ANGINA PECTORIS: Primary | ICD-10-CM

## 2024-05-13 PROCEDURE — 1160F RVW MEDS BY RX/DR IN RCRD: CPT | Mod: CPTII,S$GLB,, | Performed by: FAMILY MEDICINE

## 2024-05-13 PROCEDURE — 99214 OFFICE O/P EST MOD 30 MIN: CPT | Mod: S$GLB,,, | Performed by: FAMILY MEDICINE

## 2024-05-13 PROCEDURE — 3078F DIAST BP <80 MM HG: CPT | Mod: CPTII,S$GLB,, | Performed by: FAMILY MEDICINE

## 2024-05-13 PROCEDURE — 3008F BODY MASS INDEX DOCD: CPT | Mod: CPTII,S$GLB,, | Performed by: FAMILY MEDICINE

## 2024-05-13 PROCEDURE — 3074F SYST BP LT 130 MM HG: CPT | Mod: CPTII,S$GLB,, | Performed by: FAMILY MEDICINE

## 2024-05-13 PROCEDURE — 4010F ACE/ARB THERAPY RXD/TAKEN: CPT | Mod: CPTII,S$GLB,, | Performed by: FAMILY MEDICINE

## 2024-05-13 PROCEDURE — 1159F MED LIST DOCD IN RCRD: CPT | Mod: CPTII,S$GLB,, | Performed by: FAMILY MEDICINE

## 2024-05-13 RX ORDER — ATORVASTATIN CALCIUM 80 MG/1
80 TABLET, FILM COATED ORAL NIGHTLY
Qty: 90 TABLET | Refills: 3 | Status: SHIPPED | OUTPATIENT
Start: 2024-05-13 | End: 2025-05-13

## 2024-05-13 RX ORDER — PRAZOSIN HYDROCHLORIDE 5 MG/1
5 CAPSULE ORAL NIGHTLY
Qty: 90 CAPSULE | Refills: 3 | Status: SHIPPED | OUTPATIENT
Start: 2024-05-13 | End: 2025-05-13

## 2024-05-13 RX ORDER — PANTOPRAZOLE SODIUM 40 MG/1
40 TABLET, DELAYED RELEASE ORAL DAILY
Qty: 30 TABLET | Refills: 11 | Status: SHIPPED | OUTPATIENT
Start: 2024-05-13 | End: 2024-05-23 | Stop reason: SDUPTHER

## 2024-05-13 RX ORDER — VALSARTAN 40 MG/1
40 TABLET ORAL DAILY
Start: 2024-05-13 | End: 2025-05-13

## 2024-05-13 NOTE — PROGRESS NOTES
"Subjective:      Patient ID: Andrae Aviles is a 58 y.o. male.    Chief Complaint: Annual Exam      Vitals:    05/13/24 1013   BP: 102/72   Pulse: 106   Temp: 98.6 °F (37 °C)   TempSrc: Oral   SpO2: 96%   Weight: 106.2 kg (234 lb 2.1 oz)   Height: 5' 10" (1.778 m)        HPI   Annual; had right TKA 2 months ago, will be going back to work in June  Due to see cardiology again; not smoking, vaping only  Had EGD in Sept, told to repeat in 6 months, which was last month, has upcoming appt with hepatology and will discuss with her then    Problem List  Patient Active Problem List   Diagnosis    Primary hypertension    Rheumatoid arthritis    Elevated uric acid in blood    Anxiety    CAD (coronary artery disease)    Liver lesion    Night terror    Pure hypercholesterolemia    Arthritis of knee, right    Elevated ferritin    Carrier of hemochromatosis HFE gene mutation    Other cirrhosis of liver    Adjustment disorder with anxiety    Unspecified hemorrhoids    Pain in unspecified joint    Essential hypertension    Unilateral primary osteoarthritis, left knee    Portal hypertension    History of right knee joint replacement    Immunodeficiency due to treatment with immunosuppressive medication    Gait abnormality    Decreased range of motion (ROM) of right knee    Acute pain of right knee        ALLERGIES: Review of patient's allergies indicates:  No Known Allergies    MEDS:   Current Outpatient Medications:     aspirin (ECOTRIN) 81 MG EC tablet, Take 1 tablet (81 mg total) by mouth once daily., Disp: 90 tablet, Rfl: 1    atorvastatin (LIPITOR) 80 MG tablet, Take 1 tablet (80 mg total) by mouth every evening., Disp: 90 tablet, Rfl: 3    colchicine, gout, (COLCRYS) 0.6 mg tablet, TAKE 1 TABLET BY MOUTH ONCE DAILY. (Patient taking differently: Take 0.6 mg by mouth once daily.), Disp: 90 tablet, Rfl: 3    folic acid (FOLVITE) 1 MG tablet, Take 1 tablet (1,000 mcg total) by mouth once daily., Disp: 90 tablet, Rfl: 2    " pantoprazole (PROTONIX) 40 MG tablet, Take 1 tablet (40 mg total) by mouth once daily. Take 45-60 minutes prior to your first protein meal of the day, Disp: 30 tablet, Rfl: 11    podofilox (CONDYLOX) 0.5 % external solution, Apply topically 2 (two) times daily., Disp: 3.5 mL, Rfl: 11    prazosin (MINIPRESS) 5 MG capsule, Take 1 capsule (5 mg total) by mouth every evening., Disp: 30 capsule, Rfl: 11    sertraline (ZOLOFT) 50 MG tablet, TAKE 1 TABLET BY MOUTH EVERY DAY IN THE EVENING (Patient taking differently: Take 50 mg by mouth every evening.), Disp: 90 tablet, Rfl: 1    sulfaSALAzine (AZULFIDINE) 500 MG EC tablet, Take 1 tablet (500 mg total) by mouth 2 (two) times daily., Disp: 60 tablet, Rfl: 11    valsartan (DIOVAN) 40 MG tablet, Take 1 tablet (40 mg total) by mouth once daily. (Patient taking differently: Take 40 mg by mouth every evening.), Disp: 90 tablet, Rfl: 3    metoprolol succinate (TOPROL-XL) 25 MG 24 hr tablet, Take 1 tablet (25 mg total) by mouth once daily. (Patient not taking: Reported on 5/13/2024), Disp: 90 tablet, Rfl: 11    oxyCODONE-acetaminophen (PERCOCET) 5-325 mg per tablet, Take 1 tablet by mouth every 6 (six) hours as needed for Pain., Disp: 16 tablet, Rfl: 0  No current facility-administered medications for this visit.    Facility-Administered Medications Ordered in Other Visits:     fentaNYL 50 mcg/mL injection 25 mcg, 25 mcg, Intravenous, Q5 Min PRN, Fareed Mendez MD    ondansetron injection 4 mg, 4 mg, Intravenous, Daily PRN, Fareed Mendez MD    sodium chloride 0.9% flush 10 mL, 10 mL, Intravenous, PRN, Fareed Mendez MD      History:  Current Providers as of 5/13/2024  PCP: Edgar Aviles MD  Care Team Provider: Shane Lewis MD  Care Team Provider: Phani Brown MD  Care Team Provider: Scroggs, Kaitlyn L., NP  Care Team Provider: Shane Lewis MD  Care Team Provider: Smith Kwon MD  Encounter Provider: Edgar Aviles MD, starting on Mon May 13,  2024 12:00 AM  Referring Provider: not found, starting on Mon May 13, 2024 12:00 AM  Consulting Physician: Edgar Aviles MD, starting on Mon May 13, 2024 10:10 AM (Active)   Past Medical History:   Diagnosis Date    Acute combined systolic and diastolic heart failure 06/21/2022    Anxiety     CAD (coronary artery disease)     at least 2 stents    Carrier of hemochromatosis HFE gene mutation 01/13/2023    GERD (gastroesophageal reflux disease)     Hypertension     NSTEMI (non-ST elevated myocardial infarction) 06/19/2022    Other cirrhosis of liver 01/13/2023    mild cirrhosis    Primary hypertension 10/11/2021    Rheumatoid arthritis 10/11/2021     Past Surgical History:   Procedure Laterality Date    COLONOSCOPY      age 42    COLONOSCOPY N/A 11/22/2021    Procedure: COLONOSCOPY  Golytely   Rapid Covid;  Surgeon: Shane Guzman MD;  Location: [facility];  Service: Endoscopy;  Laterality: N/A;    CORONARY ANGIOGRAPHY N/A 07/01/2022    Procedure: Angiogram, Coronary, with Left Heart Cath;  Surgeon: Miguelito Barney MD;  Location: [facility] CATH LAB;  Service: Cardiology;  Laterality: N/A;    CORONARY ANGIOGRAPHY Right 09/15/2022    Procedure: ANGIOGRAM, CORONARY ARTERY;  Surgeon: Phani Brown MD;  Location: [facility] CATH LAB/EP;  Service: Cardiology;  Laterality: Right;    ESOPHAGOGASTRODUODENOSCOPY N/A 09/29/2023    Procedure: EGD (ESOPHAGOGASTRODUODENOSCOPY);  Surgeon: Axel Green MD;  Location: [facility] ([address] [place] [address]);  Service: Endoscopy;  Laterality: N/A;  inst to portal  labs done on 7/10  ok to hold Plavix 5 days per Dr Campa-GT  9/22/23 no answer for precall left message  9/26-LVM for precall-Kpvt  9/28-precall complete,pt notified of floor change-KPvt    JOINT REPLACEMENT  3/11/2024    Replaced right knee    KNEE ARTHROPLASTY Right 03/11/2024    Procedure: ARTHROPLASTY, KNEE;  Surgeon: Smith Kwon MD;  Location: [facility] OR;  Service: Orthopedics;  Laterality: Right;  DePuy cementless     LEFT HEART CATHETERIZATION N/A 2022    Procedure: Left heart cath;  Surgeon: Marlen Thompson MD;  Location: [facility] CATH LAB/EP;  Service: Cardiology;  Laterality: N/A;    LEFT HEART CATHETERIZATION Left 09/15/2022    Procedure: Left heart cath;  Surgeon: Phani Brown MD;  Location: [facility] CATH LAB/EP;  Service: Cardiology;  Laterality: Left;    VASECTOMY      WISDOM TOOTH EXTRACTION       Social History     Tobacco Use    Smoking status: Every Day     Current packs/day: 0.00     Average packs/day: 1 pack/day for 30.7 years (30.7 ttl pk-yrs)     Types: Cigarettes, Vaping with nicotine     Start date: 10/14/1985     Last attempt to quit: 6/15/2016     Years since quittin.9     Passive exposure: Never    Smokeless tobacco: Never   Substance Use Topics    Alcohol use: Yes     Comment: previousl;y 8+ beers daily for many years, stopped 2022, but resumed 2022, stopped ~2023    Drug use: Never         Review of Systems  Objective:     Physical Exam        Assessment:     No diagnosis found.  Plan:        Medication List            Accurate as of May 13, 2024 10:52 AM. If you have any questions, ask your nurse or doctor.                CHANGE how you take these medications      colchicine 0.6 mg tablet  Commonly known as: COLCRYS  TAKE 1 TABLET BY MOUTH ONCE DAILY.  What changed: when to take this     valsartan 40 MG tablet  Commonly known as: DIOVAN  Take 1 tablet (40 mg total) by mouth once daily.  What changed: when to take this            CONTINUE taking these medications      aspirin 81 MG EC tablet  Commonly known as: ECOTRIN  Take 1 tablet (81 mg total) by mouth once daily.     atorvastatin 80 MG tablet  Commonly known as: LIPITOR  Take 1 tablet (80 mg total) by mouth every evening.     folic acid 1 MG tablet  Commonly known as: FOLVITE  Take 1 tablet (1,000 mcg total) by mouth once daily.     metoprolol succinate 25 MG 24 hr tablet  Commonly known as: TOPROL-XL  Take 1 tablet (25 mg total) by mouth  once daily.     oxyCODONE-acetaminophen 5-325 mg per tablet  Commonly known as: PERCOCET  Take 1 tablet by mouth every 6 (six) hours as needed for Pain.     pantoprazole 40 MG tablet  Commonly known as: PROTONIX  Take 1 tablet (40 mg total) by mouth once daily. Take 45-60 minutes prior to your first protein meal of the day     podofilox 0.5 % external solution  Commonly known as: CONDYLOX  Apply topically 2 (two) times daily.     prazosin 5 MG capsule  Commonly known as: MINIPRESS  Take 1 capsule (5 mg total) by mouth every evening.     sertraline 50 MG tablet  Commonly known as: ZOLOFT  TAKE 1 TABLET BY MOUTH EVERY DAY IN THE EVENING     sulfaSALAzine 500 MG EC tablet  Commonly known as: AZULFIDINE  Take 1 tablet (500 mg total) by mouth 2 (two) times daily.            There are no diagnoses linked to this encounter.

## 2024-05-22 ENCOUNTER — PATIENT MESSAGE (OUTPATIENT)
Dept: FAMILY MEDICINE | Facility: CLINIC | Age: 59
End: 2024-05-22
Payer: COMMERCIAL

## 2024-05-22 DIAGNOSIS — M06.9 RHEUMATOID ARTHRITIS INVOLVING MULTIPLE SITES, UNSPECIFIED WHETHER RHEUMATOID FACTOR PRESENT: ICD-10-CM

## 2024-05-23 RX ORDER — FOLIC ACID 1 MG/1
1000 TABLET ORAL DAILY
Qty: 90 TABLET | Refills: 3 | Status: SHIPPED | OUTPATIENT
Start: 2024-05-23

## 2024-05-23 NOTE — TELEPHONE ENCOUNTER
No care due was identified.  [facility] Embedded Care Due Messages. Reference number: [id number].   5/23/2024 1:13:17 PM CDT

## 2024-05-24 RX ORDER — PANTOPRAZOLE SODIUM 40 MG/1
40 TABLET, DELAYED RELEASE ORAL DAILY
Qty: 90 TABLET | Refills: 3 | Status: SHIPPED | OUTPATIENT
Start: 2024-05-24 | End: 2024-06-03 | Stop reason: SDUPTHER

## 2024-06-03 RX ORDER — PANTOPRAZOLE SODIUM 40 MG/1
40 TABLET, DELAYED RELEASE ORAL DAILY
Qty: 90 TABLET | Refills: 3 | Status: SHIPPED | OUTPATIENT
Start: 2024-06-03 | End: 2025-06-03

## 2024-06-03 NOTE — TELEPHONE ENCOUNTER
No care due was identified.  [facility] Embedded Care Due Messages. Reference number: [id number].   6/03/2024 8:46:54 AM CDT

## 2024-06-04 ENCOUNTER — OFFICE VISIT (OUTPATIENT)
Dept: ORTHOPEDICS | Facility: CLINIC | Age: 59
End: 2024-06-04
Payer: COMMERCIAL

## 2024-06-04 VITALS — BODY MASS INDEX: 33.04 KG/M2 | WEIGHT: 230.25 LBS

## 2024-06-04 DIAGNOSIS — M17.5 OTHER SECONDARY OSTEOARTHRITIS OF RIGHT KNEE: Primary | ICD-10-CM

## 2024-06-04 DIAGNOSIS — Z96.651 S/P TOTAL KNEE ARTHROPLASTY, RIGHT: ICD-10-CM

## 2024-06-04 PROCEDURE — 99999 PR PBB SHADOW E&M-EST. PATIENT-LVL III: CPT | Mod: PBBFAC,,, | Performed by: ORTHOPAEDIC SURGERY

## 2024-06-04 PROCEDURE — 99024 POSTOP FOLLOW-UP VISIT: CPT | Mod: S$GLB,,, | Performed by: ORTHOPAEDIC SURGERY

## 2024-06-04 PROCEDURE — 1160F RVW MEDS BY RX/DR IN RCRD: CPT | Mod: CPTII,S$GLB,, | Performed by: ORTHOPAEDIC SURGERY

## 2024-06-04 PROCEDURE — 1159F MED LIST DOCD IN RCRD: CPT | Mod: CPTII,S$GLB,, | Performed by: ORTHOPAEDIC SURGERY

## 2024-06-04 PROCEDURE — 4010F ACE/ARB THERAPY RXD/TAKEN: CPT | Mod: CPTII,S$GLB,, | Performed by: ORTHOPAEDIC SURGERY

## 2024-06-04 RX ORDER — PANTOPRAZOLE SODIUM 40 MG/1
40 TABLET, DELAYED RELEASE ORAL DAILY
Qty: 90 TABLET | Refills: 3 | OUTPATIENT
Start: 2024-06-04 | End: 2025-06-04

## 2024-06-04 NOTE — TELEPHONE ENCOUNTER
No care due was identified.  [facility] Embedded Care Due Messages. Reference number: [id number].   6/04/2024 2:18:55 PM CDT

## 2024-06-04 NOTE — LETTER
06/04/2024    To whom it may concern:    Andrae Aviles is under my medical care. He was seen today.    The patient may return to  work  Reema 10, 2024.  Restrictions upon return: full duty.    Yours truly,        Smith Kwon MD

## 2024-06-04 NOTE — PROGRESS NOTES
Subjective:      Patient ID: Andrae Aviles is a 58 y.o. male.    Chief Complaint: Post-op Evaluation (2 mth p/o- rt TKA )      HPI:   2 months postop  The patient is seen for postop follow-up of right  TKA.  Pain control has been satisfactory  They feel that they are ambulating easily  Postoperative complaints include:  none at this time      Current Outpatient Medications:     aspirin (ECOTRIN) 81 MG EC tablet, Take 1 tablet (81 mg total) by mouth once daily., Disp: 90 tablet, Rfl: 1    atorvastatin (LIPITOR) 80 MG tablet, Take 1 tablet (80 mg total) by mouth every evening., Disp: 90 tablet, Rfl: 3    colchicine, gout, (COLCRYS) 0.6 mg tablet, TAKE 1 TABLET BY MOUTH ONCE DAILY. (Patient taking differently: Take 0.6 mg by mouth once daily.), Disp: 90 tablet, Rfl: 3    folic acid (FOLVITE) 1 MG tablet, Take 1 tablet (1,000 mcg total) by mouth once daily., Disp: 90 tablet, Rfl: 3    podofilox (CONDYLOX) 0.5 % external solution, Apply topically 2 (two) times daily., Disp: 3.5 mL, Rfl: 11    prazosin (MINIPRESS) 5 MG capsule, Take 1 capsule (5 mg total) by mouth every evening. For night terrors, Disp: 90 capsule, Rfl: 3    valsartan (DIOVAN) 40 MG tablet, Take 1 tablet (40 mg total) by mouth once daily., Disp: , Rfl:     pantoprazole (PROTONIX) 40 MG tablet, Take 1 tablet (40 mg total) by mouth once daily. Take 45-60 minutes prior to your first protein meal of the day (Patient not taking: Reported on 6/4/2024), Disp: 90 tablet, Rfl: 3    sulfaSALAzine (AZULFIDINE) 500 MG EC tablet, Take 1 tablet (500 mg total) by mouth 2 (two) times daily. (Patient not taking: Reported on 6/4/2024), Disp: 60 tablet, Rfl: 11  No current facility-administered medications for this visit.    Facility-Administered Medications Ordered in Other Visits:     fentaNYL 50 mcg/mL injection 25 mcg, 25 mcg, Intravenous, Q5 Min PRN, Fareed Mendez MD    ondansetron injection 4 mg, 4 mg, Intravenous, Daily PRN, Fareed Mendez MD    sodium chloride  0.9% flush 10 mL, 10 mL, Intravenous, PRN, Fareed Mendez MD  Review of patient's allergies indicates:  No Known Allergies    Wt 104.5 kg (230 lb 4.3 oz)   BMI 33.04 kg/m²     Review of Systems   Constitutional: Negative for fever and weight loss.   HENT:  Negative for congestion.    Eyes:  Negative for visual disturbance.   Cardiovascular:  Negative for chest pain.   Respiratory:  Negative for shortness of breath.    Hematologic/Lymphatic: Negative for bleeding problem. Does not bruise/bleed easily.   Skin:  Negative for poor wound healing.   Gastrointestinal:  Negative for abdominal pain.   Genitourinary:  Negative for dysuria.   Neurological:  Negative for seizures.   Psychiatric/Behavioral:  Negative for altered mental status.    Allergic/Immunologic: Negative for persistent infections.           Objective:    Ortho Exam          Alert, oriented, no acute distress  Sclera-Normal  Respiratory distress-none  Gait  no limp  Incision:  cleanly healed  Range of motion:  0-135  Valgus/varus stability- stable  Swelling-minimal  Distal perfusion- intact  Distal neurologic- intact    Imaging:  none today    Assessment:             1. Other secondary osteoarthritis of right knee    2. S/P total knee arthroplasty, right         The patient has rehabilitated rapidly from his procedure.  There is no evidence of any surgical site complication        Plan:          No follow-ups on file.     I explained the natural history of long-term recovery after knee arthroplasty.  Appropriate progression of physical activity and exercise was discussed.     May return to work next week without restriction     Gradual resumption of golfing activity was discussed      Phone follow-up in 3 months

## 2024-07-15 ENCOUNTER — PATIENT MESSAGE (OUTPATIENT)
Dept: FAMILY MEDICINE | Facility: CLINIC | Age: 59
End: 2024-07-15
Payer: COMMERCIAL

## 2024-07-15 RX ORDER — SERTRALINE HYDROCHLORIDE 50 MG/1
50 TABLET, FILM COATED ORAL NIGHTLY
Qty: 90 TABLET | Refills: 1 | Status: SHIPPED | OUTPATIENT
Start: 2024-07-15

## 2024-07-19 ENCOUNTER — HOSPITAL ENCOUNTER (OUTPATIENT)
Dept: RADIOLOGY | Facility: HOSPITAL | Age: 59
Discharge: HOME | End: 2024-07-19
Attending: NURSE PRACTITIONER
Payer: COMMERCIAL

## 2024-07-19 ENCOUNTER — TELEPHONE (OUTPATIENT)
Dept: HEPATOLOGY | Facility: CLINIC | Age: 59
End: 2024-07-19
Payer: COMMERCIAL

## 2024-07-19 DIAGNOSIS — K76.9 LIVER LESION: ICD-10-CM

## 2024-07-19 DIAGNOSIS — K76.9 LIVER DISEASE, UNSPECIFIED: ICD-10-CM

## 2024-07-19 PROCEDURE — 74183 MRI ABD W/O CNTR FLWD CNTR: CPT | Mod: TC

## 2024-07-19 PROCEDURE — A9585 GADOBUTROL INJECTION: HCPCS | Performed by: NURSE PRACTITIONER

## 2024-07-19 PROCEDURE — 74183 MRI ABD W/O CNTR FLWD CNTR: CPT | Mod: 26,,, | Performed by: RADIOLOGY

## 2024-07-19 PROCEDURE — 25500020 PHARM REV CODE 255: Performed by: NURSE PRACTITIONER

## 2024-07-19 RX ORDER — GADOBUTROL 604.72 MG/ML
10 INJECTION INTRAVENOUS
Status: COMPLETED | OUTPATIENT
Start: 2024-07-19 | End: 2024-07-19

## 2024-07-19 RX ADMIN — GADOBUTROL 10 ML: 604.72 INJECTION INTRAVENOUS at 11:07

## 2024-07-19 NOTE — TELEPHONE ENCOUNTER
Patient: Andrae Aviles       MRN: [id number]      : 1965     Age: 58 y.o.  [address] [address]      Providers: Kaitlyn Ribera NP    Priority of review: Other    Patient Transplant Status: Hepatology    Reason for presentation: Indeterminate lesion    Clinical Summary: 58 year old male with cirrhosis (likely due to alcohol, last PETH In ) with indeterminate liver lesions    AFP pending     MRI 2024 noted Indeterminate 1.1 cm focus of early arterial enhancement without evidence of washout or pseudocapsule.  This previously measured 0.8 cm.   Stable 0.3 cm focus of early arterial enhancement within the liver without washout or pseudocapsule    Imaging to be reviewed: 2024    HCC Treatment History: none    Platelets:   Lab Results   Component Value Date/Time     2024 10:33 AM     2024 10:33 AM     Creatinine:   Lab Results   Component Value Date/Time    CREATININE 0.9 2024 11:43 AM     Bilirubin:   Lab Results   Component Value Date/Time    BILITOT 0.8 2024 11:43 AM     AFP Last 3 each if available:   Lab Results   Component Value Date/Time    AFP 2.8 2024 10:33 AM    AFP 2.3 07/10/2023 08:06 AM    AFP 2.5 2023 02:25 PM       MELD: MELD 3.0: 8 at 2024 11:43 AM  MELD-Na: 7 at 2024 11:43 AM  Calculated from:  Serum Creatinine: 0.9 mg/dL (Using min of 1 mg/dL) at 2024 11:43 AM  Serum Sodium: 136 mmol/L at 2024 11:43 AM  Total Bilirubin: 0.8 mg/dL (Using min of 1 mg/dL) at 2024 11:43 AM  Serum Albumin: 4.1 g/dL (Using max of 3.5 g/dL) at 2024 11:43 AM  INR(ratio): 1.1 at 2024 10:33 AM  Age at listing (hypothetical): 58 years  Sex: Male at 2024 11:43 AM      Plan:     Follow-up Provider:   ERROL Ribera NP

## 2024-07-23 ENCOUNTER — PATIENT MESSAGE (OUTPATIENT)
Dept: HEPATOLOGY | Facility: CLINIC | Age: 59
End: 2024-07-23
Payer: COMMERCIAL

## 2024-07-23 ENCOUNTER — CONFERENCE (OUTPATIENT)
Dept: TRANSPLANT | Facility: CLINIC | Age: 59
End: 2024-07-23
Payer: COMMERCIAL

## 2024-07-23 NOTE — TELEPHONE ENCOUNTER
Patient: Andrae Aviles       MRN: [id number]      : 1965     Age: 58 y.o.  [address] [address]      Providers: Kaitlyn Ribera NP    Priority of review: Other    Patient Transplant Status: Hepatology    Reason for presentation: Indeterminate lesion    Clinical Summary: 58 year old male with cirrhosis (likely due to alcohol, last PETH In ) with indeterminate liver lesions    AFP pending     MRI 2024 noted Indeterminate 1.1 cm focus of early arterial enhancement without evidence of washout or pseudocapsule.  This previously measured 0.8 cm.   Stable 0.3 cm focus of early arterial enhancement within the liver without washout or pseudocapsule    Imaging to be reviewed: 2024    HCC Treatment History: none    Platelets:   Lab Results   Component Value Date/Time     2024 10:33 AM     2024 10:33 AM     Creatinine:   Lab Results   Component Value Date/Time    CREATININE 0.9 2024 10:20 AM     Bilirubin:   Lab Results   Component Value Date/Time    BILITOT 0.6 2024 10:20 AM     AFP Last 3 each if available:   Lab Results   Component Value Date/Time    AFP 2.7 2024 10:20 AM    AFP 2.8 2024 10:33 AM    AFP 2.3 07/10/2023 08:06 AM       MELD: MELD 3.0: 8 at 2024 11:43 AM  MELD-Na: 7 at 2024 11:43 AM  Calculated from:  Serum Creatinine: 0.9 mg/dL (Using min of 1 mg/dL) at 2024 11:43 AM  Serum Sodium: 136 mmol/L at 2024 11:43 AM  Total Bilirubin: 0.8 mg/dL (Using min of 1 mg/dL) at 2024 11:43 AM  Serum Albumin: 4.1 g/dL (Using max of 3.5 g/dL) at 2024 11:43 AM  INR(ratio): 1.1 at 2024 10:33 AM  Age at listing (hypothetical): 58 years  Sex: Male at 2024 11:43 AM    Discussion/Plan:  1.1cm arterial enhancing focus in the posterior aspect of seg 3 without washout.   No evidence of HCC.  Recommend continued surveillance.    Committee Discussion/plan:  No HCC noted. Continue surveillance is the  recommendation.    Plan:  MRI in 3 months.    Follow-up Provider:   ERROL Ribera NP

## 2024-08-15 ENCOUNTER — OFFICE VISIT (OUTPATIENT)
Dept: CARDIOLOGY | Facility: CLINIC | Age: 59
End: 2024-08-15
Payer: COMMERCIAL

## 2024-08-15 VITALS
OXYGEN SATURATION: 98 % | WEIGHT: 225.5 LBS | HEART RATE: 78 BPM | DIASTOLIC BLOOD PRESSURE: 67 MMHG | SYSTOLIC BLOOD PRESSURE: 104 MMHG | HEIGHT: 70 IN | BODY MASS INDEX: 32.28 KG/M2

## 2024-08-15 DIAGNOSIS — I10 PRIMARY HYPERTENSION: ICD-10-CM

## 2024-08-15 DIAGNOSIS — I25.10 CORONARY ARTERY DISEASE INVOLVING NATIVE CORONARY ARTERY OF NATIVE HEART WITHOUT ANGINA PECTORIS: Primary | ICD-10-CM

## 2024-08-15 DIAGNOSIS — I10 ESSENTIAL HYPERTENSION: ICD-10-CM

## 2024-08-15 PROCEDURE — 99999 PR PBB SHADOW E&M-EST. PATIENT-LVL IV: CPT | Mod: PBBFAC,,, | Performed by: INTERNAL MEDICINE

## 2024-08-15 RX ORDER — ATORVASTATIN CALCIUM 80 MG/1
80 TABLET, FILM COATED ORAL NIGHTLY
Qty: 90 TABLET | Refills: 3 | Status: SHIPPED | OUTPATIENT
Start: 2024-08-15 | End: 2025-08-15

## 2024-08-15 RX ORDER — ASPIRIN 81 MG/1
81 TABLET ORAL DAILY
Qty: 90 TABLET | Refills: 1 | Status: SHIPPED | OUTPATIENT
Start: 2024-08-15 | End: 2025-08-15

## 2024-08-15 RX ORDER — VALSARTAN 40 MG/1
40 TABLET ORAL DAILY
Start: 2024-08-15 | End: 2025-08-15

## 2024-08-15 NOTE — PROGRESS NOTES
Subjective:   @Patient ID:  Andrae Aviles is a 58 y.o. male who presents for follow-up of No chief complaint on file.      HPI:     58-year-old male with active medical problems including hypertension on valsartan, hyperlipidemia on Lipitor 80 mg LDL of 66, on aspirin therapy, history of CAD, had an MI about 2 years ago ended up having left main to left circumflex artery stent, noted to have RCA occlusion at the time, recent PET viability scan in 2023 showed some reversible perfusion defect in the left circumflex territory without any symptoms.  Last saw Cardiology for perioperative risk assessment prior to knee surgery, has been doing fine, does travel a lot because of his work.  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  Risk factors appropriately well controlled.  Noted to have possible indeterminate lesion on liver MRI AFP levels have been followed and is following up with the hepatology for recommendations.      Results for orders placed during the hospital encounter of 08/16/22    Echo    Interpretation Summary  · The left ventricle is mildly enlarged with eccentric hypertrophy and normal systolic function.  · The estimated ejection fraction is 55%.  · Indeterminate left ventricular diastolic function.  · Normal right ventricular size with normal right ventricular systolic function.  · Normal central venous pressure (3 mmHg).  · There are segmental left ventricular wall motion abnormalities.      No results found for this or any previous visit.      Results for orders placed during the hospital encounter of 09/15/22    Cardiac catheterization    Conclusion    The Mid Cx lesion was 95% stenosed with 0% stenosis post-intervention.    A stent was successfully placed.    The Prox RCA to Mid RCA lesion was 100% stenosed.    The estimated blood loss was <50 mL.    The procedure log was documented by Documenter:  Carie Fuller, RT and verified by Phani Brown MD.    Date: 9/19/2022  Time: 1:42 PM      Please document below the medical necessity for continuous telemetry monitoring or discontinue the current order if appropriate.    Current rhythm from flowsheet:               Patient Active Problem List    Diagnosis Date Noted    Gait abnormality 04/01/2024    Decreased range of motion (ROM) of right knee 04/01/2024    Acute pain of right knee 04/01/2024    Immunodeficiency due to treatment with immunosuppressive medication 03/22/2024    History of right knee joint replacement 03/11/2024    Portal hypertension 02/23/2024    Adjustment disorder with anxiety 05/23/2023    Unspecified hemorrhoids 05/23/2023    Pain in unspecified joint 05/23/2023    Unilateral primary osteoarthritis, left knee 05/23/2023    Elevated ferritin 01/13/2023    Carrier of hemochromatosis HFE gene mutation 01/13/2023    Other cirrhosis of liver 01/13/2023    Arthritis of knee, right 11/07/2022    Pure hypercholesterolemia 07/12/2022    Liver lesion 07/06/2022    Night terror 07/06/2022    CAD (coronary artery disease)     Primary hypertension 10/11/2021    Rheumatoid arthritis 10/11/2021    Elevated uric acid in blood 10/11/2021    Anxiety 10/11/2021    Essential hypertension 10/13/2010                    LAST HbA1c  Lab Results   Component Value Date    HGBA1C 5.9 (H) 06/20/2022       Lipid panel  Lab Results   Component Value Date    CHOL 135 02/14/2024    CHOL 160 06/20/2022     Lab Results   Component Value Date    HDL 48 02/14/2024    HDL 44 06/20/2022     Lab Results   Component Value Date    LDLCALC 66.4 02/14/2024    LDLCALC 92.8 06/20/2022     Lab Results   Component Value Date    TRIG 103 02/14/2024    TRIG 116 06/20/2022     Lab Results   Component Value Date    CHOLHDL 35.6 02/14/2024    CHOLHDL 27.5 06/20/2022            Review of Systems   Constitutional: Negative for chills and fever.   HENT:  Negative for hearing loss and  nosebleeds.    Eyes:  Negative for blurred vision.   Cardiovascular:  Negative for chest pain, leg swelling and palpitations.   Respiratory:  Negative for hemoptysis and shortness of breath.    Hematologic/Lymphatic: Negative for bleeding problem.   Skin:  Negative for itching.   Musculoskeletal:  Negative for falls.   Gastrointestinal:  Negative for abdominal pain and hematochezia.   Genitourinary:  Negative for hematuria.   Neurological:  Negative for dizziness and loss of balance.   Psychiatric/Behavioral:  Negative for altered mental status and depression.        Objective:   Physical Exam  Constitutional:       Appearance: He is well-developed.   HENT:      Head: Normocephalic and atraumatic.   Eyes:      Conjunctiva/sclera: Conjunctivae normal.   Neck:      Vascular: No carotid bruit or JVD.   Cardiovascular:      Rate and Rhythm: Normal rate and regular rhythm.      Pulses:           Carotid pulses are 2+ on the right side and 2+ on the left side.       Radial pulses are 2+ on the right side and 2+ on the left side.      Heart sounds: Murmur heard.      No friction rub. No gallop.   Pulmonary:      Effort: Pulmonary effort is normal. No respiratory distress.      Breath sounds: Normal breath sounds. No stridor. No wheezing.   Musculoskeletal:      Cervical back: Neck supple.   Skin:     General: Skin is warm and dry.   Neurological:      Mental Status: He is alert and oriented to person, place, and time.   Psychiatric:         Behavior: Behavior normal.         Assessment:     1. Coronary artery disease involving native coronary artery of native heart without angina pectoris    2. Essential hypertension    3. Primary hypertension        Plan:     - risk factors appropriately well controlled.  Previous echocardiogram with normal left ventricular ejection fraction.  Discussed results of previous viability scan with the patient.  Asymptomatic with decent exercise capacity.  Continue aspirin and Lipitor.   Refilled home valsartan.  - last echocardiogram more than 1 year ago.  We will reorder in 6 months to assess for intracardiac filling pressures  - follow up in clinic in 6 months.  Call me if any questions or concerns.    Pertinent cardiac images and EKG reviewed independently.    Continue with current medical plan and lifestyle changes.  Return sooner for concerns or questions. If symptoms persist go to the ED  I have reviewed all pertinent data including patient's medical history in detail and updated the computerized patient record.     Orders Placed This Encounter   Procedures    IN OFFICE EKG 12-LEAD (to [place])    Echo     Standing Status:   Future     Standing Expiration Date:   8/15/2025     Order Specific Question:   Release to patient     Answer:   Immediate       Follow up as scheduled.     He expressed verbal understanding and agreed with the plan    Patient's Medications   New Prescriptions    No medications on file   Previous Medications    COLCHICINE, GOUT, (COLCRYS) 0.6 MG TABLET    TAKE 1 TABLET BY MOUTH ONCE DAILY.    FOLIC ACID (FOLVITE) 1 MG TABLET    Take 1 tablet (1,000 mcg total) by mouth once daily.    PANTOPRAZOLE (PROTONIX) 40 MG TABLET    Take 1 tablet (40 mg total) by mouth once daily. Take 45-60 minutes prior to your first protein meal of the day    PODOFILOX (CONDYLOX) 0.5 % EXTERNAL SOLUTION    Apply topically 2 (two) times daily.    PRAZOSIN (MINIPRESS) 5 MG CAPSULE    Take 1 capsule (5 mg total) by mouth every evening. For night terrors    SERTRALINE (ZOLOFT) 50 MG TABLET    Take 1 tablet (50 mg total) by mouth every evening.   Modified Medications    Modified Medication Previous Medication    ASPIRIN (ECOTRIN) 81 MG EC TABLET aspirin (ECOTRIN) 81 MG EC tablet       Take 1 tablet (81 mg total) by mouth once daily.    Take 1 tablet (81 mg total) by mouth once daily.    ATORVASTATIN (LIPITOR) 80 MG TABLET atorvastatin (LIPITOR) 80 MG tablet       Take 1 tablet (80 mg total) by mouth  every evening.    Take 1 tablet (80 mg total) by mouth every evening.    VALSARTAN (DIOVAN) 40 MG TABLET valsartan (DIOVAN) 40 MG tablet       Take 1 tablet (40 mg total) by mouth once daily.    Take 1 tablet (40 mg total) by mouth once daily.   Discontinued Medications    No medications on file        Quinton Devi M.D

## 2024-08-16 ENCOUNTER — OFFICE VISIT (OUTPATIENT)
Dept: RHEUMATOLOGY | Facility: CLINIC | Age: 59
End: 2024-08-16
Payer: COMMERCIAL

## 2024-08-16 VITALS
HEIGHT: 70 IN | RESPIRATION RATE: 18 BRPM | DIASTOLIC BLOOD PRESSURE: 77 MMHG | WEIGHT: 228.19 LBS | HEART RATE: 62 BPM | SYSTOLIC BLOOD PRESSURE: 121 MMHG | OXYGEN SATURATION: 95 % | BODY MASS INDEX: 32.67 KG/M2 | TEMPERATURE: 99 F

## 2024-08-16 DIAGNOSIS — Z79.899 IMMUNODEFICIENCY DUE TO TREATMENT WITH IMMUNOSUPPRESSIVE MEDICATION: ICD-10-CM

## 2024-08-16 DIAGNOSIS — D84.821 IMMUNODEFICIENCY DUE TO TREATMENT WITH IMMUNOSUPPRESSIVE MEDICATION: ICD-10-CM

## 2024-08-16 DIAGNOSIS — E66.09 CLASS 1 OBESITY DUE TO EXCESS CALORIES WITHOUT SERIOUS COMORBIDITY WITH BODY MASS INDEX (BMI) OF 32.0 TO 32.9 IN ADULT: ICD-10-CM

## 2024-08-16 DIAGNOSIS — M06.9 RHEUMATOID ARTHRITIS INVOLVING MULTIPLE SITES, UNSPECIFIED WHETHER RHEUMATOID FACTOR PRESENT: Primary | ICD-10-CM

## 2024-08-16 DIAGNOSIS — Z71.89 COUNSELING AND COORDINATION OF CARE: ICD-10-CM

## 2024-08-16 LAB
OHS QRS DURATION: 80 MS
OHS QTC CALCULATION: 405 MS

## 2024-08-16 PROCEDURE — 4010F ACE/ARB THERAPY RXD/TAKEN: CPT | Mod: CPTII,S$GLB,, | Performed by: STUDENT IN AN ORGANIZED HEALTH CARE EDUCATION/TRAINING PROGRAM

## 2024-08-16 PROCEDURE — 3074F SYST BP LT 130 MM HG: CPT | Mod: CPTII,S$GLB,, | Performed by: STUDENT IN AN ORGANIZED HEALTH CARE EDUCATION/TRAINING PROGRAM

## 2024-08-16 PROCEDURE — 99214 OFFICE O/P EST MOD 30 MIN: CPT | Mod: S$GLB,,, | Performed by: STUDENT IN AN ORGANIZED HEALTH CARE EDUCATION/TRAINING PROGRAM

## 2024-08-16 PROCEDURE — 3078F DIAST BP <80 MM HG: CPT | Mod: CPTII,S$GLB,, | Performed by: STUDENT IN AN ORGANIZED HEALTH CARE EDUCATION/TRAINING PROGRAM

## 2024-08-16 PROCEDURE — 3008F BODY MASS INDEX DOCD: CPT | Mod: CPTII,S$GLB,, | Performed by: STUDENT IN AN ORGANIZED HEALTH CARE EDUCATION/TRAINING PROGRAM

## 2024-08-16 PROCEDURE — 99999 PR PBB SHADOW E&M-EST. PATIENT-LVL IV: CPT | Mod: PBBFAC,,, | Performed by: STUDENT IN AN ORGANIZED HEALTH CARE EDUCATION/TRAINING PROGRAM

## 2024-08-16 NOTE — PROGRESS NOTES
Answers submitted by the patient for this visit:  Rheumatology Questionnaire (Submitted on 6/12/2023)  fever: No  eye redness: No  mouth sores: No  headaches: No  shortness of breath: No  chest pain: No  trouble swallowing: No  diarrhea: No  constipation: No  unexpected weight change: No  genital sore: No  During the last 3 days, have you had a skin rash?: No  Bruises or bleeds easily: No  cough: No             RHEUMATOLOGY OUTPATIENT CLINIC NOTE        Attending Rheumatologist: Duncan Lowery  Primary Care Provider: Edgar Aviles MD   Physician Requesting Consultation: No referring provider defined for this encounter.  Chief Complaint/Reason For Consultation:  Consult and Rheumatoid Arthritis      Subjective:       HPI  Andrae Aviles is a 58 y.o. White male with medical history noted below presents for evaluation of rheumatoid arthritis.  Patient just recently moved from [place]. Seeking to establish with Rheumatology. Patient was following with Dr. Mateo Charles (Rheumatology) for RA. Paced on MTX 3 years ago and has been in remission sense. Methotrexate 6 tabs weekly. No other therapies. Patient notes that for the last 3 weeks knee pain and swelling which has improved slightly. No other complaints.     Last visit   Patient here for follow up.   Last visit management for RA continued. He reports he has been doing well. Has no issues. Tolerating meds.     Today: Patient here for follow up   Patient doing well tolerating RA meds   No issues    Review of Systems   Constitutional:  Negative for chills, fatigue, fever and unexpected weight change.   HENT:  Negative for mouth sores and trouble swallowing.    Eyes:  Negative for redness and eye dryness.   Respiratory:  Negative for cough and shortness of breath.    Cardiovascular:  Negative for chest pain.   Gastrointestinal:  Negative for abdominal distention, constipation, diarrhea, nausea, vomiting and reflux.   Genitourinary:  Negative for genital sores.    Musculoskeletal:  Negative for arthralgias, back pain, gait problem, joint swelling, leg pain, myalgias, neck pain, neck stiffness and joint deformity.   Integumentary:  Negative for rash.   Neurological:  Negative for weakness, numbness, headaches and memory loss.   Hematological:  Negative for adenopathy. Does not bruise/bleed easily.   Psychiatric/Behavioral:  Negative for confusion, decreased concentration, sleep disturbance and suicidal ideas. The patient is not nervous/anxious.    All other systems reviewed and are negative.       Chronic comorbid conditions affecting medical decision making today:  Past Medical History:   Diagnosis Date    Acute combined systolic and diastolic heart failure 06/21/2022    Anxiety     CAD (coronary artery disease)     at least 2 stents    Carrier of hemochromatosis HFE gene mutation 01/13/2023    GERD (gastroesophageal reflux disease)     Hypertension     NSTEMI (non-ST elevated myocardial infarction) 06/19/2022    Other cirrhosis of liver 01/13/2023    mild cirrhosis    Primary hypertension 10/11/2021    Rheumatoid arthritis 10/11/2021     Past Surgical History:   Procedure Laterality Date    COLONOSCOPY      age 42    COLONOSCOPY N/A 11/22/2021    Procedure: COLONOSCOPY  Golytely   Rapid Covid;  Surgeon: Shane Guzman MD;  Location: [facility] ENDO;  Service: Endoscopy;  Laterality: N/A;    CORONARY ANGIOGRAPHY N/A 07/01/2022    Procedure: Angiogram, Coronary, with Left Heart Cath;  Surgeon: Miguelito Barney MD;  Location: [facility] CATH LAB;  Service: Cardiology;  Laterality: N/A;    CORONARY ANGIOGRAPHY Right 09/15/2022    Procedure: ANGIOGRAM, CORONARY ARTERY;  Surgeon: Phani Brown MD;  Location: [facility] CATH LAB/EP;  Service: Cardiology;  Laterality: Right;    ESOPHAGOGASTRODUODENOSCOPY N/A 09/29/2023    Procedure: EGD (ESOPHAGOGASTRODUODENOSCOPY);  Surgeon: Axel Green MD;  Location: [facility] ENDO ([address] [place] [address]);  Service: Endoscopy;  Laterality: N/A;  inst to  portal  labs done on 7/10  ok to hold Plavix 5 days per Dr Campa-GT  23 no answer for precall left message  -LVM for precall-Kpvt  -precall complete,pt notified of floor change-KPvt    JOINT REPLACEMENT  3/11/2024    Replaced right knee    KNEE ARTHROPLASTY Right 2024    Procedure: ARTHROPLASTY, KNEE;  Surgeon: Smith Kwon MD;  Location: [facility] OR;  Service: Orthopedics;  Laterality: Right;  DePuy cementless    LEFT HEART CATHETERIZATION N/A 2022    Procedure: Left heart cath;  Surgeon: Marlen Thompson MD;  Location: [facility] CATH LAB/EP;  Service: Cardiology;  Laterality: N/A;    LEFT HEART CATHETERIZATION Left 09/15/2022    Procedure: Left heart cath;  Surgeon: Phani Brown MD;  Location: [facility] CATH LAB/EP;  Service: Cardiology;  Laterality: Left;    VASECTOMY      WISDOM TOOTH EXTRACTION       Family History   Problem Relation Name Age of Onset    No Known Problems Daughter      No Known Problems Son       Social History     Substance and Sexual Activity   Alcohol Use Not Currently    Comment: previousl;y 8+ beers daily for many years, stopped 2022, but resumed 2022, stopped ~2023     Social History     Tobacco Use   Smoking Status Former    Current packs/day: 0.00    Average packs/day: 1 pack/day for 30.7 years (30.7 ttl pk-yrs)    Types: Cigarettes, Vaping with nicotine    Start date: 10/14/1985    Quit date: 6/15/2016    Years since quittin.2    Passive exposure: Never   Smokeless Tobacco Never     Social History     Substance and Sexual Activity   Drug Use Never       Current Outpatient Medications:     aspirin (ECOTRIN) 81 MG EC tablet, Take 1 tablet (81 mg total) by mouth once daily., Disp: 90 tablet, Rfl: 1    atorvastatin (LIPITOR) 80 MG tablet, Take 1 tablet (80 mg total) by mouth every evening., Disp: 90 tablet, Rfl: 3    colchicine, gout, (COLCRYS) 0.6 mg tablet, TAKE 1 TABLET BY MOUTH ONCE DAILY. (Patient taking differently: Take 0.6 mg by mouth once  daily.), Disp: 90 tablet, Rfl: 3    folic acid (FOLVITE) 1 MG tablet, Take 1 tablet (1,000 mcg total) by mouth once daily., Disp: 90 tablet, Rfl: 3    pantoprazole (PROTONIX) 40 MG tablet, Take 1 tablet (40 mg total) by mouth once daily. Take 45-60 minutes prior to your first protein meal of the day, Disp: 90 tablet, Rfl: 3    podofilox (CONDYLOX) 0.5 % external solution, Apply topically 2 (two) times daily., Disp: 3.5 mL, Rfl: 11    prazosin (MINIPRESS) 5 MG capsule, Take 1 capsule (5 mg total) by mouth every evening. For night terrors, Disp: 90 capsule, Rfl: 3    sertraline (ZOLOFT) 50 MG tablet, Take 1 tablet (50 mg total) by mouth every evening., Disp: 90 tablet, Rfl: 1    valsartan (DIOVAN) 40 MG tablet, Take 1 tablet (40 mg total) by mouth once daily., Disp: , Rfl:     sulfaSALAzine (AZULFIDINE) 500 MG EC tablet, Take 1 tablet (500 mg total) by mouth 2 (two) times daily., Disp: 120 tablet, Rfl: 2  No current facility-administered medications for this visit.    Facility-Administered Medications Ordered in Other Visits:     fentaNYL 50 mcg/mL injection 25 mcg, 25 mcg, Intravenous, Q5 Min PRN, Fareed Mendez MD    ondansetron injection 4 mg, 4 mg, Intravenous, Daily PRN, Fareed Mendez MD    sodium chloride 0.9% flush 10 mL, 10 mL, Intravenous, PRN, Fareed Mendez MD     Objective:         Vitals:    08/16/24 1545   BP: 121/77   Pulse: 62   Resp: 18   Temp: 98.6 °F (37 °C)     Physical Exam   Musculoskeletal:      Right shoulder: Normal.      Left shoulder: Normal.      Right elbow: Normal.      Left elbow: Normal.      Right wrist: Normal.      Left wrist: Normal.      Left knee: Normal.       Right Side Rheumatological Exam     Examination finds the shoulder, elbow, wrist, 1st PIP, 1st MCP, 2nd PIP, 2nd MCP, 3rd PIP, 3rd MCP, 4th PIP, 4th MCP, 5th PIP and 5th MCP normal.    Left Side Rheumatological Exam     Examination finds the shoulder, elbow, wrist, knee, 1st PIP, 1st MCP, 2nd PIP, 2nd MCP, 3rd PIP, 3rd  "MCP, 4th PIP, 4th MCP, 5th PIP and 5th MCP normal.          Reviewed old and all outside pertinent medical records available.    All lab results personally reviewed and interpreted by me.  Lab Results   Component Value Date    WBC 5.98 02/14/2024    WBC 6.26 02/14/2024    HGB 16.5 02/14/2024    HGB 16.6 02/14/2024    HCT 47.2 02/14/2024    HCT 47.7 02/14/2024    MCV 93 02/14/2024    MCV 92 02/14/2024    MCH 32.4 (H) 02/14/2024    MCH 32.1 (H) 02/14/2024    MCHC 35.0 02/14/2024    MCHC 34.8 02/14/2024    RDW 12.4 02/14/2024    RDW 12.6 02/14/2024     02/14/2024     02/14/2024    MPV 11.4 02/14/2024    MPV 11.4 02/14/2024       Lab Results   Component Value Date     07/19/2024    K 4.1 07/19/2024     07/19/2024    CO2 27 07/19/2024     07/19/2024    BUN 8 07/19/2024    CALCIUM 9.4 07/19/2024    PROT 7.2 07/19/2024    ALBUMIN 3.8 07/19/2024    BILITOT 0.6 07/19/2024    AST 32 07/19/2024    ALKPHOS 63 07/19/2024    ALT 33 07/19/2024       No results found for: "COLORU", "APPEARANCEUA", "SPECGRAV", "PHUR", "PHUA", "PROTEINUA", "GLUCOSEU", "KETONESU", "BLOODU", "LEUKOCYTESUR", "NITRITE", "UROBILINOGEN"    Lab Results   Component Value Date    CRP 0.78 08/02/2021       Lab Results   Component Value Date    SEDRATE 17 (H) 08/02/2021       Lab Results   Component Value Date    .0 (H) 08/02/2021    SEDRATE 17 (H) 08/02/2021       No components found for: "25OHVITDTOT", "82TYJMHM2", "06YZSXMK7", "METHODNOTE"    Lab Results   Component Value Date    URICACID 8.0 (H) 08/02/2021    URICACID 8.0 (H) 08/02/2021       No components found for: "TSPOTTB"        Imaging:  All imaging reviewed and independently interpreted by me.         ASSESSMENT / PLAN:     Andrae Aviles is a 58 y.o. White male with:      1. Rheumatoid arthritis involving multiple sites, unspecified whether rheumatoid factor present  - patient with Hx of RA  - in remission   - continue SSZ 1gm daily   - has labs upcoming  - " reassurance    2. DMARD Toxicity Monitoring  - monitor labs   - vaccines per guidelines   - immunosuppression/infectious precautions discussed     3. Other specified counseling  - over 10 minutes spent regarding below topics:  - Immunization counseling done.  - Weight loss counseling done.  - Nutrition and exercise counseling.  - Limitation of alcohol consumption.  - Regular exercise:  Aerobic and resistance.  - Medication counseling provided.        F/u in 3 months    Method of contact with patient concerns: [facility]    Disclaimer:  This note is prepared using voice recognition software and as such is likely to have errors and has not been proof read. Please contact me for questions.     Time spent: 30 minutes in face to face discussion concerning diagnosis, prognosis, review of lab and test results, benefits of treatment as well as management of disease, counseling of patient and coordination of care between various health care providers.  Greater than half the time spent was used for coordination of care and counseling of patient.    Duncan Lowery M.D.  Rheumatology Department   [facility]

## 2024-08-17 ENCOUNTER — PATIENT MESSAGE (OUTPATIENT)
Dept: RHEUMATOLOGY | Facility: CLINIC | Age: 59
End: 2024-08-17
Payer: COMMERCIAL

## 2024-08-17 DIAGNOSIS — M06.9 RHEUMATOID ARTHRITIS, INVOLVING UNSPECIFIED SITE, UNSPECIFIED WHETHER RHEUMATOID FACTOR PRESENT: Primary | ICD-10-CM

## 2024-08-20 RX ORDER — SULFASALAZINE 500 MG/1
500 TABLET, DELAYED RELEASE ORAL 2 TIMES DAILY
Qty: 120 TABLET | Refills: 2 | Status: SHIPPED | OUTPATIENT
Start: 2024-08-20

## 2024-08-23 ENCOUNTER — HOSPITAL ENCOUNTER (OUTPATIENT)
Dept: CARDIOLOGY | Facility: HOSPITAL | Age: 59
Discharge: HOME | End: 2024-08-23
Attending: INTERNAL MEDICINE
Payer: COMMERCIAL

## 2024-08-23 VITALS — HEIGHT: 70 IN | WEIGHT: 225 LBS | BODY MASS INDEX: 32.21 KG/M2

## 2024-08-23 DIAGNOSIS — I25.10 CORONARY ARTERY DISEASE INVOLVING NATIVE CORONARY ARTERY OF NATIVE HEART WITHOUT ANGINA PECTORIS: ICD-10-CM

## 2024-08-23 DIAGNOSIS — I10 ESSENTIAL HYPERTENSION: ICD-10-CM

## 2024-08-23 LAB
APICAL FOUR CHAMBER EJECTION FRACTION: 50 %
APICAL TWO CHAMBER EJECTION FRACTION: 60 %
ASCENDING AORTA: 3.04 CM
AV INDEX (PROSTH): 0.93
AV MEAN GRADIENT: 4 MMHG
AV PEAK GRADIENT: 8 MMHG
AV VALVE AREA BY VELOCITY RATIO: 2.43 CM²
AV VALVE AREA: 3.58 CM²
AV VELOCITY RATIO: 0.63
BSA FOR ECHO PROCEDURE: 2.24 M2
CV ECHO LV RWT: 0.17 CM
DOP CALC AO PEAK VEL: 1.4 M/S
DOP CALC AO VTI: 21.5 CM
DOP CALC LVOT AREA: 3.9 CM2
DOP CALC LVOT DIAMETER: 2.22 CM
DOP CALC LVOT PEAK VEL: 0.88 M/S
DOP CALC LVOT STROKE VOLUME: 76.99 CM3
DOP CALC MV VTI: 28.5 CM
DOP CALCLVOT PEAK VEL VTI: 19.9 CM
E WAVE DECELERATION TIME: 215.51 MSEC
E/A RATIO: 0.87
E/E' RATIO: 9.67 M/S
ECHO LV POSTERIOR WALL: 0.45 CM (ref 0.6–1.1)
FRACTIONAL SHORTENING: 27 % (ref 28–44)
INTERVENTRICULAR SEPTUM: 0.95 CM (ref 0.6–1.1)
IVC DIAMETER: 1.17 CM
IVRT: 102.76 MSEC
LA MAJOR: 4.87 CM
LA MINOR: 4.75 CM
LA WIDTH: 3.6 CM
LEFT ATRIUM AREA SYSTOLIC (APICAL 2 CHAMBER): 15.68 CM2
LEFT ATRIUM AREA SYSTOLIC (APICAL 4 CHAMBER): 16.21 CM2
LEFT ATRIUM SIZE: 4.41 CM
LEFT ATRIUM VOLUME INDEX MOD: 20.6 ML/M2
LEFT ATRIUM VOLUME INDEX: 29.6 ML/M2
LEFT ATRIUM VOLUME MOD: 45.19 CM3
LEFT ATRIUM VOLUME: 64.9 CM3
LEFT INTERNAL DIMENSION IN SYSTOLE: 3.94 CM (ref 2.1–4)
LEFT VENTRICLE DIASTOLIC VOLUME INDEX: 64.03 ML/M2
LEFT VENTRICLE DIASTOLIC VOLUME: 140.23 ML
LEFT VENTRICLE END DIASTOLIC VOLUME APICAL 2 CHAMBER: 87.84 ML
LEFT VENTRICLE END DIASTOLIC VOLUME APICAL 4 CHAMBER: 83.81 ML
LEFT VENTRICLE END SYSTOLIC VOLUME APICAL 2 CHAMBER: 43.29 ML
LEFT VENTRICLE END SYSTOLIC VOLUME APICAL 4 CHAMBER: 41.64 ML
LEFT VENTRICLE MASS INDEX: 60 G/M2
LEFT VENTRICLE SYSTOLIC VOLUME INDEX: 30.8 ML/M2
LEFT VENTRICLE SYSTOLIC VOLUME: 67.42 ML
LEFT VENTRICULAR INTERNAL DIMENSION IN DIASTOLE: 5.38 CM (ref 3.5–6)
LEFT VENTRICULAR MASS: 130.35 G
LV LATERAL E/E' RATIO: 8.29 M/S
LV SEPTAL E/E' RATIO: 11.6 M/S
LVED V (TEICH): 140.23 ML
LVES V (TEICH): 67.42 ML
LVOT MG: 1.8 MMHG
LVOT MV: 0.64 CM/S
MV A" WAVE DURATION": 114.18 MSEC
MV MEAN GRADIENT: 1 MMHG
MV PEAK A VEL: 0.67 M/S
MV PEAK E VEL: 0.58 M/S
MV PEAK GRADIENT: 3 MMHG
MV STENOSIS PRESSURE HALF TIME: 62.5 MS
MV VALVE AREA BY CONTINUITY EQUATION: 2.7 CM2
MV VALVE AREA P 1/2 METHOD: 3.52 CM2
OHS CV RV/LV RATIO: 0.7 CM
OHS LV EJECTION FRACTION SIMPSONS BIPLANE MOD: 54 %
PISA MRMAX VEL: 5.07 M/S
PULM VEIN S/D RATIO: 1
PV PEAK D VEL: 0.49 M/S
PV PEAK GRADIENT: 5 MMHG
PV PEAK S VEL: 0.49 M/S
PV PEAK VELOCITY: 1.1 M/S
RA MAJOR: 4 CM
RA PRESSURE ESTIMATED: 3 MMHG
RA WIDTH: 4.16 CM
RIGHT VENTRICULAR END-DIASTOLIC DIMENSION: 3.79 CM
RV TISSUE DOPPLER FREE WALL SYSTOLIC VELOCITY 1 (APICAL 4 CHAMBER VIEW): 12.83 CM/S
SINUS: 2.48 CM
STJ: 2.49 CM
TDI LATERAL: 0.07 M/S
TDI SEPTAL: 0.05 M/S
TDI: 0.06 M/S
TRICUSPID ANNULAR PLANE SYSTOLIC EXCURSION: 2.09 CM
Z-SCORE OF LEFT VENTRICULAR DIMENSION IN END DIASTOLE: -3.18
Z-SCORE OF LEFT VENTRICULAR DIMENSION IN END SYSTOLE: -1.05

## 2024-08-23 PROCEDURE — 93306 TTE W/DOPPLER COMPLETE: CPT | Mod: PN

## 2024-08-23 PROCEDURE — 93306 TTE W/DOPPLER COMPLETE: CPT | Mod: 26,,, | Performed by: INTERNAL MEDICINE

## 2024-08-25 ENCOUNTER — PATIENT MESSAGE (OUTPATIENT)
Dept: HEPATOLOGY | Facility: CLINIC | Age: 59
End: 2024-08-25
Payer: COMMERCIAL

## 2024-09-03 ENCOUNTER — OFFICE VISIT (OUTPATIENT)
Dept: ORTHOPEDICS | Facility: CLINIC | Age: 59
End: 2024-09-03
Payer: COMMERCIAL

## 2024-09-03 DIAGNOSIS — M17.5 OTHER SECONDARY OSTEOARTHRITIS OF RIGHT KNEE: Primary | ICD-10-CM

## 2024-09-03 DIAGNOSIS — Z96.651 S/P TOTAL KNEE ARTHROPLASTY, RIGHT: ICD-10-CM

## 2024-09-03 PROCEDURE — 1160F RVW MEDS BY RX/DR IN RCRD: CPT | Mod: CPTII,95,, | Performed by: ORTHOPAEDIC SURGERY

## 2024-09-03 PROCEDURE — 1159F MED LIST DOCD IN RCRD: CPT | Mod: CPTII,95,, | Performed by: ORTHOPAEDIC SURGERY

## 2024-09-03 PROCEDURE — 4010F ACE/ARB THERAPY RXD/TAKEN: CPT | Mod: CPTII,95,, | Performed by: ORTHOPAEDIC SURGERY

## 2024-09-03 PROCEDURE — 99212 OFFICE O/P EST SF 10 MIN: CPT | Mod: 95,,, | Performed by: ORTHOPAEDIC SURGERY

## 2024-09-03 NOTE — PROGRESS NOTES
Established Patient - Audio Only Telehealth Visit     The patient location is:  Home  The chief complaint leading to consultation is:  Follow-up after right knee replacement  Visit type: Virtual visit with audio only (telephone)  Total time spent with patient:  5 minutes       The reason for the audio only service rather than synchronous audio and video virtual visit was related to technical difficulties or patient preference/necessity.     Each patient to whom I provide medical services by telemedicine is:  (1) informed of the relationship between the physician and patient and the respective role of any other health care provider with respect to management of the patient; and (2) notified that they may decline to receive medical services by telemedicine and may withdraw from such care at any time. Patient verbally consented to receive this service via voice-only telephone call.       HPI:  The patient is 6 months postop.  Reports that he is walking well.  His only concern is discomfort with kneeling due to scar sensitivity and numbness around the incision.     Assessment and plan:  The patient has knee function is normal after the procedure.  The anterior symptoms are typical after the incision.  I explained the natural history of this and described appropriate desensitization.    X-ray 1 year postop                        This service was not originating from a related E/M service provided within the previous 7 days nor will  to an E/M service or procedure within the next 24 hours or my soonest available appointment.  Prevailing standard of care was able to be met in this audio-only visit.

## 2024-09-25 DIAGNOSIS — I25.10 CORONARY ARTERY DISEASE INVOLVING NATIVE CORONARY ARTERY OF NATIVE HEART WITHOUT ANGINA PECTORIS: Primary | ICD-10-CM

## 2024-09-25 RX ORDER — ASPIRIN 81 MG/1
81 TABLET ORAL DAILY
Qty: 90 TABLET | Refills: 1 | Status: SHIPPED | OUTPATIENT
Start: 2024-09-25 | End: 2025-09-25

## 2024-10-13 NOTE — ADDENDUM NOTE
Addended by: AMANDA NAYAK on: 5/23/2024 06:47 AM     Modules accepted: Orders     Mildly to Moderately Impaired: difficulty hearing in some environments or speaker may need to increase volume or speak distinctly

## 2024-10-17 DIAGNOSIS — I10 PRIMARY HYPERTENSION: ICD-10-CM

## 2024-10-17 RX ORDER — VALSARTAN 40 MG/1
40 TABLET ORAL
Qty: 90 TABLET | Refills: 1 | Status: SHIPPED | OUTPATIENT
Start: 2024-10-17

## 2024-10-17 NOTE — TELEPHONE ENCOUNTER
No care due was identified.  [facility] Embedded Care Due Messages. Reference number: [id number].   10/17/2024 12:13:04 AM CDT

## 2024-10-17 NOTE — TELEPHONE ENCOUNTER
Refill Decision Note   Andrae Aviles  is requesting a refill authorization.  Brief Assessment and Rationale for Refill:  Approve     Medication Therapy Plan:         Comments:     Note composed:6:44 AM 10/17/2024

## 2024-10-25 ENCOUNTER — TELEPHONE (OUTPATIENT)
Dept: HEPATOLOGY | Facility: CLINIC | Age: 59
End: 2024-10-25
Payer: COMMERCIAL

## 2024-10-25 NOTE — TELEPHONE ENCOUNTER
Reached out pt in regards to VV this morning. Pt did not complete his MRI and labs. Pt needed to r/s. Vu and appts r/s. All questions answered

## 2024-10-30 ENCOUNTER — HOSPITAL ENCOUNTER (OUTPATIENT)
Dept: RADIOLOGY | Facility: HOSPITAL | Age: 59
Discharge: HOME | End: 2024-10-30
Attending: NURSE PRACTITIONER
Payer: COMMERCIAL

## 2024-10-30 DIAGNOSIS — K76.9 LIVER DISEASE, UNSPECIFIED: ICD-10-CM

## 2024-10-30 DIAGNOSIS — K74.69 OTHER CIRRHOSIS OF LIVER: ICD-10-CM

## 2024-10-30 DIAGNOSIS — K76.9 LIVER LESION: ICD-10-CM

## 2024-10-30 PROCEDURE — 74183 MRI ABD W/O CNTR FLWD CNTR: CPT | Mod: 26,,, | Performed by: RADIOLOGY

## 2024-10-30 PROCEDURE — 25500020 PHARM REV CODE 255: Mod: PN | Performed by: NURSE PRACTITIONER

## 2024-10-30 PROCEDURE — A9585 GADOBUTROL INJECTION: HCPCS | Mod: PN | Performed by: NURSE PRACTITIONER

## 2024-10-30 PROCEDURE — 74183 MRI ABD W/O CNTR FLWD CNTR: CPT | Mod: TC,PN

## 2024-10-30 RX ORDER — GADOBUTROL 604.72 MG/ML
10 INJECTION INTRAVENOUS
Status: COMPLETED | OUTPATIENT
Start: 2024-10-30 | End: 2024-10-30

## 2024-10-30 RX ADMIN — GADOBUTROL 10 ML: 604.72 INJECTION INTRAVENOUS at 01:10

## 2024-10-31 ENCOUNTER — TELEPHONE (OUTPATIENT)
Dept: HEPATOLOGY | Facility: CLINIC | Age: 59
End: 2024-10-31
Payer: COMMERCIAL

## 2024-10-31 DIAGNOSIS — K76.9 LIVER DISEASE, UNSPECIFIED: Primary | ICD-10-CM

## 2024-10-31 DIAGNOSIS — K74.69 OTHER CIRRHOSIS OF LIVER: ICD-10-CM

## 2024-10-31 DIAGNOSIS — K76.9 LIVER LESION: ICD-10-CM

## 2024-11-01 DIAGNOSIS — M17.5 OTHER SECONDARY OSTEOARTHRITIS OF RIGHT KNEE: ICD-10-CM

## 2024-11-01 DIAGNOSIS — M25.461 EFFUSION OF RIGHT KNEE: ICD-10-CM

## 2024-11-04 RX ORDER — COLCHICINE 0.6 MG/1
0.6 TABLET ORAL
Qty: 90 TABLET | Refills: 3 | OUTPATIENT
Start: 2024-11-04

## 2024-12-24 ENCOUNTER — PATIENT MESSAGE (OUTPATIENT)
Dept: HEPATOLOGY | Facility: CLINIC | Age: 59
End: 2024-12-24
Payer: COMMERCIAL

## 2024-12-30 ENCOUNTER — PATIENT MESSAGE (OUTPATIENT)
Dept: RHEUMATOLOGY | Facility: CLINIC | Age: 59
End: 2024-12-30
Payer: COMMERCIAL

## 2025-01-02 ENCOUNTER — OFFICE VISIT (OUTPATIENT)
Dept: ORTHOPEDICS | Facility: CLINIC | Age: 60
End: 2025-01-02
Payer: COMMERCIAL

## 2025-01-02 DIAGNOSIS — M17.5 OTHER SECONDARY OSTEOARTHRITIS OF RIGHT KNEE: ICD-10-CM

## 2025-01-02 DIAGNOSIS — E79.0 ELEVATED URIC ACID IN BLOOD: Primary | ICD-10-CM

## 2025-01-02 DIAGNOSIS — M25.461 EFFUSION OF RIGHT KNEE: ICD-10-CM

## 2025-01-02 RX ORDER — COLCHICINE 0.6 MG/1
0.6 TABLET ORAL DAILY
Qty: 30 TABLET | Refills: 5 | Status: SHIPPED | OUTPATIENT
Start: 2025-01-02

## 2025-01-02 NOTE — PROGRESS NOTES
Established Patient - Audio Only Telehealth Visit     The patient location is:  Home  The chief complaint leading to consultation is:  Prior right knee replacement and chronic gout  Visit type: Virtual visit with audio only (telephone)  Total time spent with patient:  5 minutes       The reason for the audio only service rather than synchronous audio and video virtual visit was related to technical difficulties or patient preference/necessity.     Each patient to whom I provide medical services by telemedicine is:  (1) informed of the relationship between the physician and patient and the respective role of any other health care provider with respect to management of the patient; and (2) notified that they may decline to receive medical services by telemedicine and may withdraw from such care at any time. Patient verbally consented to receive this service via voice-only telephone call.       HPI:  The patient reports that his right total knee is functioning well.  He denies any severe pain in his left knee.  The patient reports that he has a long and chronic history of gout which is treated with suppressive colchicine.  He requests a refill this medication.      I can not find any record of recent uric acid testing.  The last documented level was 8.0 about 3 years ago.  Assessment and plan:  By report the right total knee is functioning as expected.  No further intervention is anticipated.  Patient should have an x-ray 1 year postop    I refilled the colchicine per patient request.  I added a uric acid level onto his next testing.  In the future, the patient may benefit by discussion with his PCP regarding using allopurinol instead of colchicine for suppression                        This service was not originating from a related E/M service provided within the previous 7 days nor will  to an E/M service or procedure within the next 24 hours or my soonest available appointment.  Prevailing standard of care  was able to be met in this audio-only visit.

## 2025-01-04 NOTE — TELEPHONE ENCOUNTER
No care due was identified.  [facility] Embedded Care Due Messages. Reference number: [id number].   1/04/2025 7:26:28 AM CST

## 2025-01-05 RX ORDER — SERTRALINE HYDROCHLORIDE 50 MG/1
50 TABLET, FILM COATED ORAL NIGHTLY
Qty: 90 TABLET | Refills: 1 | Status: SHIPPED | OUTPATIENT
Start: 2025-01-05

## 2025-01-05 NOTE — TELEPHONE ENCOUNTER
Refill Decision Note   Andrae Aviles  is requesting a refill authorization.  Brief Assessment and Rationale for Refill:  Approve     Medication Therapy Plan:        Comments:     Note composed:4:18 PM 01/05/2025

## 2025-03-14 DIAGNOSIS — I10 PRIMARY HYPERTENSION: ICD-10-CM

## 2025-03-14 RX ORDER — VALSARTAN 40 MG/1
40 TABLET ORAL
Qty: 90 TABLET | Refills: 0 | Status: SHIPPED | OUTPATIENT
Start: 2025-03-14

## 2025-03-14 NOTE — TELEPHONE ENCOUNTER
Refill Decision Note   Andrae Aviles  is requesting a refill authorization.  Brief Assessment and Rationale for Refill:  Approve     Medication Therapy Plan:         Comments:     Note composed:8:06 AM 03/14/2025

## 2025-03-14 NOTE — TELEPHONE ENCOUNTER
No care due was identified.  [facility] Embedded Care Due Messages. Reference number: [id number].   3/14/2025 12:04:29 AM CDT

## 2025-04-30 DIAGNOSIS — M06.9 RHEUMATOID ARTHRITIS INVOLVING MULTIPLE SITES, UNSPECIFIED WHETHER RHEUMATOID FACTOR PRESENT: ICD-10-CM

## 2025-04-30 NOTE — TELEPHONE ENCOUNTER
No care due was identified.  [facility] Embedded Care Due Messages. Reference number: [id number].   4/30/2025 1:13:17 PM CDT

## 2025-05-01 RX ORDER — FOLIC ACID 1 MG/1
1000 TABLET ORAL
Qty: 90 TABLET | Refills: 3 | Status: SHIPPED | OUTPATIENT
Start: 2025-05-01

## 2025-06-14 DIAGNOSIS — I10 PRIMARY HYPERTENSION: ICD-10-CM

## 2025-06-14 RX ORDER — VALSARTAN 40 MG/1
40 TABLET ORAL
Qty: 90 TABLET | Refills: 0 | Status: SHIPPED | OUTPATIENT
Start: 2025-06-14

## 2025-06-14 NOTE — TELEPHONE ENCOUNTER
Provider Staff:  Action required for this patient    Requires appointment      Please see care gap opportunities below in Care Due Message.    Thanks!  [facility]     Appointments      Date Provider   Last Visit   5/13/2024 Edgar Aviles MD   Next Visit   Visit date not found Edgar Aviles MD     Refill Decision Note   Andrae Aviles  is requesting a refill authorization.  Brief Assessment and Rationale for Refill:  Approve     Medication Therapy Plan:         Comments:     Note composed:1:26 PM 06/14/2025

## 2025-06-14 NOTE — TELEPHONE ENCOUNTER
Care Due:                  Date            Visit Type   Department     Provider  --------------------------------------------------------------------------------                                EP -                              PRIMARY      [facility]  Last Visit: 05-      CARE ([facility])   MEDICINE       Edgar Aviles  Next Visit: None Scheduled  None         None Found                                                            Last  Test          Frequency    Reason                     Performed    Due Date  --------------------------------------------------------------------------------    Office Visit  12 months..  folic, pantoprazole,       05- 05-                             prazosin, sertraline,                             valsartan................    [employer] Embedded Care Due Messages. Reference number: [id number].   6/14/2025 7:11:08 AM CDT

## 2025-06-15 DIAGNOSIS — F51.4 NIGHT TERROR: ICD-10-CM

## 2025-06-15 RX ORDER — PRAZOSIN HYDROCHLORIDE 5 MG/1
5 CAPSULE ORAL NIGHTLY
Qty: 90 CAPSULE | Refills: 0 | Status: SHIPPED | OUTPATIENT
Start: 2025-06-15

## 2025-06-15 NOTE — TELEPHONE ENCOUNTER
No care due was identified.  [facility] Embedded Care Due Messages. Reference number: [id number].   6/15/2025 6:56:08 AM CDT

## 2025-06-15 NOTE — TELEPHONE ENCOUNTER
Refill Decision Note   Andrae Aviles  is requesting a refill authorization.  Brief Assessment and Rationale for Refill:  Approve     Medication Therapy Plan:         Comments:     Note composed:5:44 PM 06/15/2025

## 2025-06-16 ENCOUNTER — PATIENT MESSAGE (OUTPATIENT)
Dept: FAMILY MEDICINE | Facility: CLINIC | Age: 60
End: 2025-06-16

## 2025-07-05 NOTE — TELEPHONE ENCOUNTER
No care due was identified.  [facility] Embedded Care Due Messages. Reference number: [id number].   7/05/2025 6:52:38 AM CDT

## 2025-07-07 RX ORDER — PANTOPRAZOLE SODIUM 40 MG/1
TABLET, DELAYED RELEASE ORAL
Qty: 90 TABLET | Refills: 0 | Status: SHIPPED | OUTPATIENT
Start: 2025-07-07

## 2025-07-07 NOTE — TELEPHONE ENCOUNTER
Refill Decision Note   Andrae Aviles  is requesting a refill authorization.  Brief Assessment and Rationale for Refill:  Approve     Medication Therapy Plan:         Comments:     Note composed:1:54 AM 07/07/2025

## 2025-07-12 NOTE — TELEPHONE ENCOUNTER
No care due was identified.  [facility] Embedded Care Due Messages. Reference number: [id number].   7/12/2025 8:50:28 AM CDT

## 2025-07-13 DIAGNOSIS — E79.0 ELEVATED URIC ACID IN BLOOD: ICD-10-CM

## 2025-07-13 RX ORDER — SERTRALINE HYDROCHLORIDE 50 MG/1
50 TABLET, FILM COATED ORAL NIGHTLY
Qty: 90 TABLET | Refills: 0 | Status: SHIPPED | OUTPATIENT
Start: 2025-07-13

## 2025-07-15 DIAGNOSIS — I10 PRIMARY HYPERTENSION: ICD-10-CM

## 2025-07-15 DIAGNOSIS — F51.4 NIGHT TERROR: ICD-10-CM

## 2025-07-15 RX ORDER — COLCHICINE 0.6 MG/1
0.6 TABLET ORAL
Qty: 90 TABLET | Refills: 1 | OUTPATIENT
Start: 2025-07-15

## 2025-07-15 RX ORDER — VALSARTAN 40 MG/1
40 TABLET ORAL
Qty: 90 TABLET | Refills: 0 | Status: SHIPPED | OUTPATIENT
Start: 2025-07-15

## 2025-07-15 RX ORDER — PANTOPRAZOLE SODIUM 40 MG/1
TABLET, DELAYED RELEASE ORAL
Qty: 90 TABLET | Refills: 0 | Status: SHIPPED | OUTPATIENT
Start: 2025-07-15

## 2025-07-15 RX ORDER — PRAZOSIN HYDROCHLORIDE 5 MG/1
5 CAPSULE ORAL NIGHTLY
Qty: 90 CAPSULE | Refills: 0 | Status: SHIPPED | OUTPATIENT
Start: 2025-07-15

## 2025-07-15 NOTE — TELEPHONE ENCOUNTER
Spoke to patient and informed them to follow up with their PCP for their [facility] refill request

## 2025-07-15 NOTE — TELEPHONE ENCOUNTER
No care due was identified.  [facility] Embedded Care Due Messages. Reference number: [id number].   7/15/2025 9:51:11 AM CDT

## 2025-07-16 NOTE — TELEPHONE ENCOUNTER
Refill Decision Note   Andrae Aviles  is requesting a refill authorization.  Brief Assessment and Rationale for Refill:  Approve     Medication Therapy Plan:        Comments:     Note composed:8:23 PM 07/15/2025

## 2025-08-04 ENCOUNTER — PATIENT MESSAGE (OUTPATIENT)
Dept: ADMINISTRATIVE | Facility: HOSPITAL | Age: 60
End: 2025-08-04

## (undated) DEVICE — PROTECTION STATION PLUS

## (undated) DEVICE — CATH IMPULSE 5FR PIGTAIL 125CM

## (undated) DEVICE — HEMOSTAT VASC BAND REG 24CM

## (undated) DEVICE — TRANSDUCER TRU WAVE

## (undated) DEVICE — CONTRAST VISIPAQUE 50ML

## (undated) DEVICE — CATH RADIAL TIG 6FR 4.0 110CM

## (undated) DEVICE — CATH ANGIO EXPO FL3.5 6F

## (undated) DEVICE — CATH BLLN FG APEX MR 3.00X20MM

## (undated) DEVICE — GUIDEWIRE EMERALD 150CM PTFE

## (undated) DEVICE — GUIDE LAUNCHER 6FR EBU 3.5

## (undated) DEVICE — DEVICE PERCLOSE SUT CLSR 6FR

## (undated) DEVICE — CONTRAST VISIPAQUE 150ML

## (undated) DEVICE — KIT CUSTOM MANIFOLD

## (undated) DEVICE — CATH GUIDE LINER  V3 6F

## (undated) DEVICE — GUIDE LAUNCHER 7FR EBU 3.5

## (undated) DEVICE — KIT GLIDESHEATH SLEND 6FR 10CM

## (undated) DEVICE — GUIDEWIRE RUNTHROUGH EF 300CM

## (undated) DEVICE — KIT CO-PILOT

## (undated) DEVICE — SHEATH INTRODUCER 7FR 11CM

## (undated) DEVICE — CATH EMERGE MR 15 X 2.50

## (undated) DEVICE — Device

## (undated) DEVICE — SET MICRO PUNCT 4FR/MPIS-401

## (undated) DEVICE — CATH EMERGE MR 15 X 1.50

## (undated) DEVICE — SPIKE CONTRAST CONTROLLER

## (undated) DEVICE — CATH EMERGE MR 20 X 3.00

## (undated) DEVICE — PAD DEFIB CADENCE ADULT R2

## (undated) DEVICE — GUIDEWIRE EMERALD .035IN 260CM

## (undated) DEVICE — CATH NC QUANTUM APEX MR 4X12

## (undated) DEVICE — GUIDE WIRE BMW 014 X190

## (undated) DEVICE — SEE MEDLINE ITEM 157187

## (undated) DEVICE — GUIDEWIRE CHOICE PT  XS 182CM

## (undated) DEVICE — INFLATOR ENCORE 26 BLLN INFL

## (undated) DEVICE — SHEATH INTRODUCER 6FR 11CM

## (undated) DEVICE — COVER PROBE US 5.5X58L NON LTX

## (undated) DEVICE — KIT INTRODUCER W/GUIDEWIRE

## (undated) DEVICE — SEE MEDLINE ITEM 156894

## (undated) DEVICE — OMNIPAQUE 350 200ML

## (undated) DEVICE — TUBING HPCIL ROT M/F ADPT 48IN

## (undated) DEVICE — KIT LEFT HEART MANIFOLD CUSTOM

## (undated) DEVICE — GUIDE LAUNCHER 6FR JL 4.0

## (undated) DEVICE — CATH EMERGE MR 20 X 2.00

## (undated) DEVICE — DRAPE ANGIO BRACH 38X44IN

## (undated) DEVICE — VISE RADIFOCUS MULTI TORQUE

## (undated) DEVICE — KIT MICROINTRO 4F .018X40X7CM

## (undated) DEVICE — TRANSDUCER ADULT DISP

## (undated) DEVICE — CATH MICRO CORSAIR PRO 135CM